# Patient Record
Sex: FEMALE | Race: WHITE | NOT HISPANIC OR LATINO | Employment: OTHER | URBAN - METROPOLITAN AREA
[De-identification: names, ages, dates, MRNs, and addresses within clinical notes are randomized per-mention and may not be internally consistent; named-entity substitution may affect disease eponyms.]

---

## 2017-01-16 ENCOUNTER — TELEPHONE (OUTPATIENT)
Dept: DERMATOLOGY | Facility: CLINIC | Age: 72
End: 2017-01-16

## 2017-01-16 NOTE — TELEPHONE ENCOUNTER
----- Message from Marnie Leon sent at 1/16/2017 11:39 AM CST -----  Contact: Patient  Patient called advising that she has a lump on the inner right arm down from her elbow.  She advised her mother had one like that.  It was treated by Dr. Smith.  She would like to be seen by Dr. Smith as well.  She is very concerned.  I scheduled her an appointment on 2/13/17 and added her to the wait list.  She advised her mother has an appointment this Thursday, 1/19/17, and wanted to be seen on the same day if possible.  She does not want to wait until 2/13/17 for an appointment.  Please contact patient at [phone] or [phone] (sister's phone).

## 2017-01-16 NOTE — TELEPHONE ENCOUNTER
Advised caller that we do not have any availabilities at this time, but have placed patient on my waiting list.

## 2017-01-19 ENCOUNTER — OFFICE VISIT (OUTPATIENT)
Dept: DERMATOLOGY | Facility: CLINIC | Age: 72
End: 2017-01-19
Payer: MEDICARE

## 2017-01-19 VITALS — HEIGHT: 65 IN | BODY MASS INDEX: 22.66 KG/M2 | WEIGHT: 136 LBS

## 2017-01-19 DIAGNOSIS — L82.1 SEBORRHEIC KERATOSES: ICD-10-CM

## 2017-01-19 DIAGNOSIS — D48.9 NEOPLASM OF UNCERTAIN BEHAVIOR: Primary | ICD-10-CM

## 2017-01-19 DIAGNOSIS — L57.0 ACTINIC KERATOSES: ICD-10-CM

## 2017-01-19 DIAGNOSIS — Z12.83 SKIN CANCER SCREENING: ICD-10-CM

## 2017-01-19 PROCEDURE — 17000 DESTRUCT PREMALG LESION: CPT | Mod: S$PBB,,, | Performed by: DERMATOLOGY

## 2017-01-19 PROCEDURE — 17003 DESTRUCT PREMALG LES 2-14: CPT | Mod: PBBFAC,PO | Performed by: DERMATOLOGY

## 2017-01-19 PROCEDURE — 17000 DESTRUCT PREMALG LESION: CPT | Mod: PBBFAC,PO | Performed by: DERMATOLOGY

## 2017-01-19 PROCEDURE — 11100 PR BIOPSY OF SKIN LESION: CPT | Mod: 59,S$PBB,, | Performed by: DERMATOLOGY

## 2017-01-19 PROCEDURE — 11100 PR BIOPSY OF SKIN LESION: CPT | Mod: 59,PBBFAC,PO | Performed by: DERMATOLOGY

## 2017-01-19 PROCEDURE — 99999 PR PBB SHADOW E&M-EST. PATIENT-LVL III: CPT | Mod: PBBFAC,,, | Performed by: DERMATOLOGY

## 2017-01-19 PROCEDURE — 88305 TISSUE EXAM BY PATHOLOGIST: CPT | Performed by: PATHOLOGY

## 2017-01-19 PROCEDURE — 17003 DESTRUCT PREMALG LES 2-14: CPT | Mod: S$PBB,,, | Performed by: DERMATOLOGY

## 2017-01-19 PROCEDURE — 99202 OFFICE O/P NEW SF 15 MIN: CPT | Mod: 25,S$PBB,, | Performed by: DERMATOLOGY

## 2017-01-19 PROCEDURE — 99213 OFFICE O/P EST LOW 20 MIN: CPT | Mod: PBBFAC,PO | Performed by: DERMATOLOGY

## 2017-01-19 RX ORDER — OLANZAPINE 15 MG/1
TABLET ORAL
Refills: 1 | COMMUNITY
Start: 2017-01-09 | End: 2017-10-02 | Stop reason: SDUPTHER

## 2017-01-19 RX ORDER — ATORVASTATIN CALCIUM 20 MG/1
TABLET, FILM COATED ORAL
Refills: 5 | COMMUNITY
Start: 2017-01-09 | End: 2017-11-16 | Stop reason: ALTCHOICE

## 2017-01-19 NOTE — PROGRESS NOTES
Subjective:       Patient ID:  Maria A Smith is a 71 y.o. female who presents for   Chief Complaint   Patient presents with    Growth     Right arm, 1 month, scaly and tender, no tx     HPI Comments: Initial visit  Lives in [place], intense sun exposure. Visiting mother in [place]        Growth  - Initial  Affected locations: right arm  Duration: 1 month  Signs / symptoms: scaling and tender  Severity: mild  Timing: constant  Aggravated by: nothing  Relieving factors/Treatments tried: nothing        Review of Systems   Constitutional: Negative for fever and chills.   Skin: Positive for daily sunscreen use and activity-related sunscreen use. Negative for recent sunburn.        Objective:    Physical Exam   Constitutional: She appears well-developed and well-nourished. No distress.   Neurological: She is alert and oriented to person, place, and time. She is not disoriented.   Psychiatric: She has a normal mood and affect.   Skin:   Areas Examined (abnormalities noted in diagram):   Head / Face Inspection Performed  RUE Inspected  LUE Inspection Performed                           Diagram Legend     Erythematous scaling macule/papule c/w actinic keratosis       Vascular papule c/w angioma      Pigmented verrucoid papule/plaque c/w seborrheic keratosis      Yellow umbilicated papule c/w sebaceous hyperplasia      Irregularly shaped tan macule c/w lentigo     1-2 mm smooth white papules consistent with Milia      Movable subcutaneous cyst with punctum c/w epidermal inclusion cyst      Subcutaneous movable cyst c/w pilar cyst      Firm pink to brown papule c/w dermatofibroma      Pedunculated fleshy papule(s) c/w skin tag(s)      Evenly pigmented macule c/w junctional nevus     Mildly variegated pigmented, slightly irregular-bordered macule c/w mildly atypical nevus      Flesh colored to evenly pigmented papule c/w intradermal nevus       Pink pearly papule/plaque c/w basal cell carcinoma      Erythematous  hyperkeratotic cursted plaque c/w SCC      Surgical scar with no sign of skin cancer recurrence      Open and closed comedones      Inflammatory papules and pustules      Verrucoid papule consistent consistent with wart     Erythematous eczematous patches and plaques     Dystrophic onycholytic nail with subungual debris c/w onychomycosis     Umbilicated papule    Erythematous-base heme-crusted tan verrucoid plaque consistent with inflamed seborrheic keratosis     Erythematous Silvery Scaling Plaque c/w Psoriasis     See annotation          Assessment / Plan:      Pathology Orders:      Normal Orders This Visit    Tissue Specimen To Pathology, Dermatology     Questions:    Directional Terms:  Other(comment)    Clinical information:  pink crusted nodule, SCC/KA vs other    Specific Site:  R forearm        Neoplasm of uncertain behavior  -     Tissue Specimen To Pathology, Dermatology  Shave biopsy procedure note:    Shave biopsy performed after verbal consent including risk of infection, scar, recurrence, need for additional treatment of site. Area prepped with alcohol, anesthetized with approximately 1.0cc of 1% lidocaine with epinephrine. Lesional tissue shaved with razor blade. Hemostasis achieved with application of aluminum chloride followed by hyfrecation. No complications. Dressing applied. Wound care explained.    Actinic keratoses  Cryosurgery Procedure Note    Verbal consent from the patient is obtained and the patient is aware of the precancerous quality and need for treatment of these lesions. Liquid nitrogen cryosurgery is applied to the 3 actinic keratoses, as detailed in the physical exam, to produce a freeze injury. The patient is aware that blisters may form and is instructed on wound care with gentle cleansing and use of vaseline ointment to keep moist until healed. The patient is supplied a handout on cryosurgery and is instructed to call if lesions do not completely resolve.    Patient instructed  in importance in daily sun protection of at least spf 30. Sun avoidance and topical protection discussed.   Recommend Elta MD (physician office) COTZ sensitive (available online) for daily use on face and neck.  Patient encouraged to wear hat for all outdoor exposure.   Also discussed sun protective clothing.    Skin cancer screening  Upper body skin examination performed today as noted in physical examination. Exposed skin only per patient preference    Seborrheic Keratoses, neck  These are benign inherited growths without a malignant potential. Reassurance given to patient. No treatment is necessary.            Return if symptoms worsen or fail to improve.

## 2017-01-19 NOTE — PATIENT INSTRUCTIONS
Shave Biopsy Wound Care    Your doctor has performed a shave biopsy today.  A band aid and vaseline ointment has been placed over the site.  This should remain in place for 24 hours.  It is recommended that you keep the area dry for the first 24 hours.  After 24 hours, you may remove the band aid and wash the area with warm soap and water and apply Vaseline jelly.  Many patients prefer to use Neosporin or Bacitracin ointment.  This is acceptable; however, know that you can develop an allergy to this medication even if you have used it safely for years.  It is important to keep the area moist.  Letting it dry out and get air slows healing time, and will worsen the scar.  Band aid is optional after first 24 hours.      If you notice increasing redness, tenderness, pain, or yellow drainage at the biopsy site, please notify your doctor.  These are signs of an infection.    If your biopsy site is bleeding, apply firm pressure for 15 minutes straight.  Repeat for another 15 minutes, if it is still bleeding.   If the surgical site continues to bleed, then please contact your doctor.       [facility]  [place] - DERMATOLOGY  [address] [place] [address]  Dept: [phone]

## 2017-01-30 ENCOUNTER — PROCEDURE VISIT (OUTPATIENT)
Dept: DERMATOLOGY | Facility: CLINIC | Age: 72
End: 2017-01-30
Payer: MEDICARE

## 2017-01-30 VITALS — BODY MASS INDEX: 22.66 KG/M2 | WEIGHT: 136 LBS | HEIGHT: 65 IN

## 2017-01-30 PROCEDURE — 11602 EXC TR-EXT MAL+MARG 1.1-2 CM: CPT | Mod: PBBFAC,PO | Performed by: DERMATOLOGY

## 2017-01-30 PROCEDURE — 12032 INTMD RPR S/A/T/EXT 2.6-7.5: CPT | Mod: S$PBB,,, | Performed by: DERMATOLOGY

## 2017-01-30 PROCEDURE — 12032 INTMD RPR S/A/T/EXT 2.6-7.5: CPT | Mod: PBBFAC,PO | Performed by: DERMATOLOGY

## 2017-01-30 PROCEDURE — 88305 TISSUE EXAM BY PATHOLOGIST: CPT | Performed by: PATHOLOGY

## 2017-01-30 PROCEDURE — 88305 TISSUE EXAM BY PATHOLOGIST: CPT | Mod: 26,,, | Performed by: PATHOLOGY

## 2017-01-30 PROCEDURE — 11602 EXC TR-EXT MAL+MARG 1.1-2 CM: CPT | Mod: 51,S$PBB,, | Performed by: DERMATOLOGY

## 2017-01-30 PROCEDURE — 99499 UNLISTED E&M SERVICE: CPT | Mod: S$PBB,,, | Performed by: DERMATOLOGY

## 2017-01-30 NOTE — PROGRESS NOTES
Subjective:       Patient ID:  Maria A Smith is a 71 y.o. female who presents for   Chief Complaint   Patient presents with    Pre-op Exam     right forearm-SCC     HPI Comments: Pt here for definitive excision of KA R forearm    FINAL PATHOLOGIC DIAGNOSIS  1. Skin, right forearm, shave biopsy:  - INVASIVE SQUAMOUS CELL CARCINOMA, WELL-DIFFERENTIATED, CRATERIFORM  (KERATOACANTHOMATOUS TYPE).  - THE TUMOR FOCALLY EXTENDS TO THE BASE OF THE BIOPSY.  MICROSCOPIC DESCRIPTION: Sections show a crateriform squamous proliferation exhibiting minimal atypia.  Diagnosed by: Atif Curtis M.D.  (Electronically Signed: 2017-01-25 09:28:38).    Feeling well today.   Denies pacemaker, defibrillator or blood thinners.   No additional cutaneous complaints.   SMOKER - 7 cig per day, planning chantix/cessation in near future   in [place] (retired)      Review of Systems   Constitutional: Negative for fever and chills.        Objective:    Physical Exam   Constitutional: She appears well-developed and well-nourished. No distress.   Neurological: She is alert and oriented to person, place, and time. She is not disoriented.   Psychiatric: She has a normal mood and affect.   Skin:   Areas Examined (abnormalities noted in diagram):   RUE Inspected              Diagram Legend     Erythematous scaling macule/papule c/w actinic keratosis       Vascular papule c/w angioma      Pigmented verrucoid papule/plaque c/w seborrheic keratosis      Yellow umbilicated papule c/w sebaceous hyperplasia      Irregularly shaped tan macule c/w lentigo     1-2 mm smooth white papules consistent with Milia      Movable subcutaneous cyst with punctum c/w epidermal inclusion cyst      Subcutaneous movable cyst c/w pilar cyst      Firm pink to brown papule c/w dermatofibroma      Pedunculated fleshy papule(s) c/w skin tag(s)      Evenly pigmented macule c/w junctional nevus     Mildly variegated pigmented, slightly irregular-bordered macule  c/w mildly atypical nevus      Flesh colored to evenly pigmented papule c/w intradermal nevus       Pink pearly papule/plaque c/w basal cell carcinoma      Erythematous hyperkeratotic cursted plaque c/w SCC      Surgical scar with no sign of skin cancer recurrence      Open and closed comedones      Inflammatory papules and pustules      Verrucoid papule consistent consistent with wart     Erythematous eczematous patches and plaques     Dystrophic onycholytic nail with subungual debris c/w onychomycosis     Umbilicated papule    Erythematous-base heme-crusted tan verrucoid plaque consistent with inflamed seborrheic keratosis     Erythematous Silvery Scaling Plaque c/w Psoriasis     See annotation      Assessment / Plan:      Pathology Orders:      Normal Orders This Visit    Tissue Specimen To Pathology, Dermatology     Questions:    Directional Terms:  Other(comment)    Clinical information:  biopsy proven SCC/KA, check margins    Specific Site:  R forearm        Squamous cell carcinoma  -     Tissue Specimen To Pathology, Dermatology    PROCEDURE: Elliptical excision with intermediate layered repair in order to decrease dead space, close large gap and preserve anatomical contour.    ANESTHETIC: 6 cc 1% Xylocaine with Epinephrine 1:100,000, buffered    SURGEON: Dee Dee Smith MD  ASSISTANTS: Yashira Steele LPN ; Douglas Avila MA      PREOPERATIVE DIAGNOSIS:  Biopsy-proven Squamous Cell Carcinoma    POSTOPERATIVE DIAGNOSIS:  Same as preoperative diagnosis    PATHOLOGIC DIAGNOSIS: Pending    LOCATION: R forearm    INITIAL LESION SIZE: 1.1 cm    EXCISED DIAMETER: 1.7 cm    PREPARATION: The diagnosis, procedure, alternatives, benefits and risks, including but not limited to: infection, bleeding/bruising, drug reactions, pain, scar or cosmetic defect, local sensation disturbances, wound dehiscence (separation of wound edges after sutures removed) and/or recurrence of present condition were explained to the patient.  The patient elected to proceed.  Patient's identity was verified using 2 patient identifiers and the side and site was verified.  Time out period with surgeon, assistant and patient in surgical suite was taken.    PROCEDURE: The location noted above was prepped and draped in the usual sterile fashion. The area was anesthetized with intradermal buffered xylocaine. Lesional tissue was carefully marked with at least 3 mm margins of clinically normal skin in all directions. A fusiform elliptical excision was done with #15 blade carried down completely through the dermis into the subcutaneous tissues to the level of the subcutaneous fat, and dissection was carried out in that plane.  Electrocoagulation was used to obtain hemostasis. Blood loss was minimal. The wound was then approximated in a layered fashion with subcutaneous and intradermal sutures of 4.0 Monocryl, approximately 6 in number, and the wound was then superficially closed with simple interrupted sutures of 4.0 Prolene. (two vertical mattress sutures to improve eversion)    The patient tolerated the procedure well.    The area was cleaned and dressed appropriately and the patient was given wound care instructions, as well as an appointment for follow-up evaluation.    LENGTH OF REPAIR: 5 cm           Return in about 6 months (around 7/30/2017).

## 2017-01-30 NOTE — PATIENT INSTRUCTIONS
Surgery Wound Care    Your doctor has performed an excision today.  A bandage and vaseline ointment has been placed over the site.  This should remain in place for 24 hours.  It is recommended that you keep the area dry for the first 24 hours.  After 24 hours, you may remove the band aid and wash the area with warm soap and water and apply Vaseline jelly or aquaphore.  Many patients prefer to use Neosporin or Bacitracin ointment.  This is acceptable; however know that you can develop an allergy to this medication even if you have used it safely for years.  It is important to keep the area moist.  Letting it dry out and get air slows healing time, will worsen the scar, and make it more difficult to remove the stitches if they were placed.        If you notice increasing redness, tenderness, pain, or yellow drainage at the biopsy or surgical site, please notify your doctor.  These are signs of an infection.    If your biopsy/surgical site is bleeding, apply firm pressure for 15 minutes straight.  Repeat for another 15 minutes, if it is still bleeding.   If the surgical site continues to bleed, then please contact your doctor.     [facility]  [place] - DERMATOLOGY  [address] [place] [address]  Dept: [phone]

## 2017-02-09 ENCOUNTER — CLINICAL SUPPORT (OUTPATIENT)
Dept: DERMATOLOGY | Facility: CLINIC | Age: 72
End: 2017-02-09
Payer: MEDICARE

## 2017-02-09 VITALS — BODY MASS INDEX: 22.66 KG/M2 | WEIGHT: 136 LBS | HEIGHT: 65 IN

## 2017-02-09 DIAGNOSIS — Z48.02 VISIT FOR SUTURE REMOVAL: Primary | ICD-10-CM

## 2017-02-09 PROCEDURE — 99999 PR PBB SHADOW E&M-EST. PATIENT-LVL II: CPT | Mod: PBBFAC,,,

## 2017-02-09 PROCEDURE — 99212 OFFICE O/P EST SF 10 MIN: CPT | Mod: PBBFAC,PO

## 2017-02-09 NOTE — PROGRESS NOTES
Suture Removal note:  CC: 71 y.o. female patient is here for suture removal.     HPI: Patient is s/p excision of SCC from the right forearm on 1/30/2017.  Patient reports no problems.    WOUND PE:  Sutures intact.  Wound healing well.  Good approximation of skin edges.  No signs or symptoms of infection.    IMPRESSION:  FINAL PATHOLOGIC DIAGNOSIS  Skin, right forearm, excision:  - RESIDUAL INVASIVE SQUAMOUS CELL CARCINOMA.  - NEGATIVE MARGINS OF EXCISION.  - PREVIOUS BIOPSY SITE CHANGE.  MICROSCOPIC DESCRIPTION: Sections show a proliferation of squamous cells exhibiting atypia and infiltrating  within the dermis. The lesion is closely associated with previous biopsy site changes. Margins of excision are  negative.  Diagnosed by: Ani Yañez M.D.    PLAN:  Sutures removed today.  Continue wound care.    RTC: In 6 months.

## 2017-02-26 ENCOUNTER — HOSPITAL ENCOUNTER (EMERGENCY)
Facility: HOSPITAL | Age: 72
Discharge: HOME | End: 2017-02-26
Attending: EMERGENCY MEDICINE
Payer: MEDICARE

## 2017-02-26 VITALS
TEMPERATURE: 98 F | WEIGHT: 127.19 LBS | HEART RATE: 74 BPM | BODY MASS INDEX: 21.19 KG/M2 | DIASTOLIC BLOOD PRESSURE: 87 MMHG | SYSTOLIC BLOOD PRESSURE: 181 MMHG | HEIGHT: 65 IN | OXYGEN SATURATION: 96 % | RESPIRATION RATE: 18 BRPM

## 2017-02-26 DIAGNOSIS — L02.413 ABSCESS OF ARM, RIGHT: Primary | ICD-10-CM

## 2017-02-26 PROCEDURE — 25000003 PHARM REV CODE 250: Performed by: NURSE PRACTITIONER

## 2017-02-26 PROCEDURE — 10060 I&D ABSCESS SIMPLE/SINGLE: CPT

## 2017-02-26 PROCEDURE — 99283 EMERGENCY DEPT VISIT LOW MDM: CPT | Mod: 25

## 2017-02-26 RX ORDER — MUPIROCIN 20 MG/G
OINTMENT TOPICAL 3 TIMES DAILY
Qty: 22 G | Refills: 0 | Status: SHIPPED | OUTPATIENT
Start: 2017-02-26 | End: 2017-03-05

## 2017-02-26 RX ORDER — LIDOCAINE HYDROCHLORIDE 10 MG/ML
INJECTION, SOLUTION EPIDURAL; INFILTRATION; INTRACAUDAL; PERINEURAL
Status: DISCONTINUED
Start: 2017-02-26 | End: 2017-02-26 | Stop reason: HOSPADM

## 2017-02-26 RX ORDER — MUPIROCIN 20 MG/G
1 OINTMENT TOPICAL
Status: COMPLETED | OUTPATIENT
Start: 2017-02-26 | End: 2017-02-26

## 2017-02-26 RX ORDER — SULFAMETHOXAZOLE AND TRIMETHOPRIM 800; 160 MG/1; MG/1
1 TABLET ORAL 2 TIMES DAILY
Qty: 14 TABLET | Refills: 0 | Status: SHIPPED | OUTPATIENT
Start: 2017-02-26 | End: 2017-03-05

## 2017-02-26 RX ORDER — LIDOCAINE HYDROCHLORIDE 10 MG/ML
5 INJECTION INFILTRATION; PERINEURAL
Status: COMPLETED | OUTPATIENT
Start: 2017-02-26 | End: 2017-02-26

## 2017-02-26 RX ADMIN — LIDOCAINE HYDROCHLORIDE 5 ML: 10 INJECTION, SOLUTION INFILTRATION; PERINEURAL at 10:02

## 2017-02-26 RX ADMIN — MUPIROCIN 22 G: 20 OINTMENT TOPICAL at 11:02

## 2017-02-26 NOTE — ED AVS SNAPSHOT
[facility] [address] [place] [address]               Maria A Smith   2017 10:42 AM   ED    Description:  Female : 1945   Department:  [facility]           Your Care was Coordinated By:     Provider Role From To    Amanuel Reddy MD Attending Provider 17 1050 --    Mary Sterling NP Nurse Practitioner 17 1044 --      Reason for Visit     Abscess           Diagnoses this Visit        Comments    Abscess of arm, right    -  Primary       ED Disposition     None           To Do List           Follow-up Information     Follow up with [facility].    Specialty:  Emergency Medicine    Why:  As needed, If symptoms worsen    Contact information:    [address] [place] [id number] [phone]        Follow up with Dee Dee Smith MD. Schedule an appointment as soon as possible for a visit in 2 days.    Specialty:  Dermatology    Contact information:    [address] [facility] [address] [phone]         These Medications        Disp Refills Start End    mupirocin (BACTROBAN) 2 % ointment 22 g 0 2017 3/5/2017    Apply topically 3 (three) times daily. - Topical (Top)    Pharmacy: [facility] - [address] Ph #: [phone]       sulfamethoxazole-trimethoprim 800-160mg (BACTRIM DS) 800-160 mg Tab 14 tablet 0 2017 3/5/2017    Take 1 tablet by mouth 2 (two) times daily. - Oral    Pharmacy: [facility] - [address] Ph #: [phone]         [facility]     [facility] Care [facility] -  Assistance  Registered nurses in the [facility] provide clinical advisement, health education, appointment booking, and other advisory services.  Call for this free service at [phone].             Medications           Message regarding Medications     Verify the changes and/or additions  to your medication regime listed below are the same as discussed with your clinician today.  If any of these changes or additions are incorrect, please notify your healthcare provider.        START taking these NEW medications        Refills    mupirocin (BACTROBAN) 2 % ointment 0    Sig: Apply topically 3 (three) times daily.    Class: Print    Route: Topical (Top)    sulfamethoxazole-trimethoprim 800-160mg (BACTRIM DS) 800-160 mg Tab 0    Sig: Take 1 tablet by mouth 2 (two) times daily.    Class: Print    Route: Oral      These medications were administered today        Dose Freq    lidocaine HCL 10 mg/ml (1%) injection 5 mL 5 mL ED 1 Time    Sig: Inject 5 mLs into the skin ED 1 Time.    Class: Normal    Route: Intradermal    lidocaine (PF) 10 mg/ml (1%) 10 mg/mL (1 %) injection      Notes to Pharmacy: Created by cabinet override    mupirocin 2 % ointment 22 g 1 Tube ED 1 Time    Sig: Apply 22 g topically ED 1 Time.    Class: Normal    Route: Topical (Top)      STOP taking these medications     hydrOXYzine (ATARAX) 25 MG tablet Take 2 tablets (50 mg total) by mouth 4 (four) times daily as needed for Itching.           Verify that the below list of medications is an accurate representation of the medications you are currently taking.  If none reported, the list may be blank. If incorrect, please contact your healthcare provider. Carry this list with you in case of emergency.           Current Medications     atorvastatin (LIPITOR) 20 MG tablet     duloxetine (CYMBALTA) 60 MG capsule Take 60 mg by mouth once daily.    lisinopril (PRINIVIL,ZESTRIL) 40 MG tablet Take 1 tablet (40 mg total) by mouth once daily.    olanzapine (ZYPREXA) 15 MG Tab     gabapentin (NEURONTIN) 100 MG capsule Take 1 capsule (100 mg total) by mouth 3 (three) times daily.    lorazepam (ATIVAN) 2 MG Tab Take 1 tablet (2 mg total) by mouth every 8 (eight) hours as needed.    mupirocin (BACTROBAN) 2 % ointment Apply topically 3 (three) times  daily.    mupirocin 2 % ointment 22 g Apply 22 g topically ED 1 Time.    permethrin (ELIMITE) 5 % cream Apply to affected area once and leave on for 12 hours, then wash off. Reapply and do the same thing 7 days later.    sulfamethoxazole-trimethoprim 800-160mg (BACTRIM DS) 800-160 mg Tab Take 1 tablet by mouth 2 (two) times daily.           Clinical Reference Information           Your Vitals Were     BP                   212/92 (BP Location: Left arm, Patient Position: Sitting)           Allergies as of 2/26/2017     No Known Allergies      Immunizations Administered on Date of Encounter - 2/26/2017     None      ED Micro, Lab, POCT     None      ED Imaging Orders     None        Discharge Instructions         Abscess (Incision & Drainage)  An abscess (sometimes called a boil) occurs when bacteria get trapped under the skin and start to grow. Pus forms inside the abscess as the body responds to the bacteria. An abscess can happen with an insect bite, ingrown hair, blocked oil gland, pimple, cyst, or puncture wound.  Your healthcare provider has drained the pus from your abscess. If the abscess pocket was large, your healthcare provider may have inserted gauze packing. Your provider will need to remove and possibly replace it on your next visit. You may not need antibiotics to treat a simple abscess, unless the infection is spreading into the skin around the wound (cellulitis).  Healing of the wound will take about 1 to 2 weeks, depending on the size of the abscess. Healthy tissue will grow from the bottom and sides of the opening until it seals over.  Home care  These tips can help your wound heal:  · The wound may drain for the first 2 days. Cover the wound with a clean dry dressing. If the dressing becomes soaked with blood or pus, change it.  · If a gauze packing was placed inside the abscess cavity, you may be told to remove it yourself. You may do this in the shower. Once the packing is removed, you should  wash the area in the shower or bath 3 to 4 times a day, until the skin opening has closed. Make sure you wash your hands after changing the packing or cleaning the wound.  · If you were prescribed antibiotics, take them as directed until they are all gone.  · You may use acetaminophen or ibuprofen to control pain, unless another pain medicine was prescribed. If you have liver disease or ever had a stomach ulcer, talk with your doctor before using these medicines.  Follow-up care  Follow up with your healthcare provider, or as advised. If a gauze packing was inserted in your wound, it should be removed in 1 to 2 days. Check your wound every day for the signs of worsening infection listed below.  When to seek medical advice  Call your healthcare provider right away if any of these occur:  · Increasing redness or swelling  · Red streaks in the skin leading away from the wound  · Increasing local pain or swelling  · Continued pus draining from the wound 2 days after treatment  · Fever of 100.4ºF (38ºC) or higher, or as directed by your healthcare provider  · Boil returns when you are at home  Date Last Reviewed: 9/1/2016  © 6168-5430 [employer]. [address] [place] [address]. All rights reserved. This information is not intended as a substitute for professional medical care. Always follow your healthcare professional's instructions.          [facility] Sign-Up     Activating your [facility] account is as easy as 1-2-3!     1) Visit [email or web address], select Sign Up Now, enter this activation code and your date of birth, then select Next.  [email or web address]  Expires: 4/12/2017 11:20 AM      2) Create a username and password to use when you visit [facility] in the future and select a security question in case you lose your password and select Next.    3) Enter your e-mail address and click Sign Up!    Additional Information  If you have questions, please e-mail [email or web address] or call  [phone] to talk to our [facility] staff. Remember, [email or web address] is NOT to be used for urgent needs. For medical emergencies, dial 911.         Smoking Cessation     If you would like to quit smoking:   You may be eligible for free services if you are a [place] resident and started smoking cigarettes before September 1, 1988.  Call the Smoking Cessation Trust (SCT) toll free at (310) 369-2978 or (515) 812-0932.   Call 1-800-QUIT-NOW if you do not meet the above criteria.             [facility] complies with applicable Federal civil rights laws and does not discriminate on the basis of race, color, national origin, age, disability, or sex.        Language Assistance Services     ATTENTION: Language assistance services are available, free of charge. Please call [phone].      ATENCIÓN: Si habla español, tiene a enamorado disposición servicios gratuitos de asistencia lingüística. Llame al [phone].     CHÚ Ý: N?u b?n nói Ti?ng Vi?t, có các d?ch v? h? tr? ngôn ng? mi?n phí dành cho b?n. G?i s? [phone].

## 2017-02-26 NOTE — ED NOTES
Pt states she did not take her BP medication this AM. States she is going to take one as soon as she gets home.

## 2017-02-26 NOTE — DISCHARGE INSTRUCTIONS
Abscess (Incision & Drainage)  An abscess (sometimes called a boil) occurs when bacteria get trapped under the skin and start to grow. Pus forms inside the abscess as the body responds to the bacteria. An abscess can happen with an insect bite, ingrown hair, blocked oil gland, pimple, cyst, or puncture wound.  Your healthcare provider has drained the pus from your abscess. If the abscess pocket was large, your healthcare provider may have inserted gauze packing. Your provider will need to remove and possibly replace it on your next visit. You may not need antibiotics to treat a simple abscess, unless the infection is spreading into the skin around the wound (cellulitis).  Healing of the wound will take about 1 to 2 weeks, depending on the size of the abscess. Healthy tissue will grow from the bottom and sides of the opening until it seals over.  Home care  These tips can help your wound heal:  · The wound may drain for the first 2 days. Cover the wound with a clean dry dressing. If the dressing becomes soaked with blood or pus, change it.  · If a gauze packing was placed inside the abscess cavity, you may be told to remove it yourself. You may do this in the shower. Once the packing is removed, you should wash the area in the shower or bath 3 to 4 times a day, until the skin opening has closed. Make sure you wash your hands after changing the packing or cleaning the wound.  · If you were prescribed antibiotics, take them as directed until they are all gone.  · You may use acetaminophen or ibuprofen to control pain, unless another pain medicine was prescribed. If you have liver disease or ever had a stomach ulcer, talk with your doctor before using these medicines.  Follow-up care  Follow up with your healthcare provider, or as advised. If a gauze packing was inserted in your wound, it should be removed in 1 to 2 days. Check your wound every day for the signs of worsening infection listed below.  When to seek  medical advice  Call your healthcare provider right away if any of these occur:  · Increasing redness or swelling  · Red streaks in the skin leading away from the wound  · Increasing local pain or swelling  · Continued pus draining from the wound 2 days after treatment  · Fever of 100.4ºF (38ºC) or higher, or as directed by your healthcare provider  · Boil returns when you are at home  Date Last Reviewed: 9/1/2016  © 5086-1095 [employer]. [address] [place] [address]. All rights reserved. This information is not intended as a substitute for professional medical care. Always follow your healthcare professional's instructions.

## 2017-02-27 ENCOUNTER — TELEPHONE (OUTPATIENT)
Dept: DERMATOLOGY | Facility: CLINIC | Age: 72
End: 2017-02-27

## 2017-02-27 NOTE — ED PROVIDER NOTES
Encounter Date: 2/26/2017       History     Chief Complaint   Patient presents with    Abscess     R forearm s/p skin cancer resection 3 weeks ago.     Review of patient's allergies indicates:  No Known Allergies  HPI Comments: Maria A Smith is a 71 year old female presenting to the ED with pain and swelling to the right forearm. The patient had an excision of squamous cell carcinoma by Dr. Smith on 1-30-17 and states that she developed pain and swelling near this site two days ago. She has had no drainage from the site and also denies fever.     The history is provided by the patient.     Past Medical History:   Diagnosis Date    Hypertension     Squamous cell carcinoma 01/19/2017    right forearm ([place]  type)      History reviewed. No pertinent surgical history.  Family History   Problem Relation Age of Onset    Melanoma Neg Hx     Psoriasis Neg Hx     Lupus Neg Hx     Eczema Neg Hx      Social History   Substance Use Topics    Smoking status: Current Every Day Smoker    Smokeless tobacco: None    Alcohol use None     Review of Systems   Constitutional: Negative.    HENT: Negative.    Respiratory: Negative.    Cardiovascular: Negative.    Gastrointestinal: Negative.    Genitourinary: Negative.    Musculoskeletal: Negative.    Skin: Positive for wound (right forearm).   Neurological: Negative.        Physical Exam   Initial Vitals   BP Pulse Resp Temp SpO2   02/26/17 1013 02/26/17 1013 02/26/17 1013 02/26/17 1013 02/26/17 1013   212/92 101 14 97.7 °F (36.5 °C) 98 %     Physical Exam    Nursing note and vitals reviewed.  Constitutional: She appears well-developed and well-nourished. She is not diaphoretic. No distress.   HENT:   Head: Normocephalic and atraumatic.   Eyes: Conjunctivae are normal.   Neck: Normal range of motion.   Cardiovascular: Normal rate and regular rhythm.   Pulmonary/Chest: Breath sounds normal. No respiratory distress.   Musculoskeletal: Normal range of motion.   Neurological:  She is alert and oriented to person, place, and time.   Skin: Skin is warm and dry.        Psychiatric: She has a normal mood and affect.         ED Course   I & D - Incision and Drainage  Date/Time: 2/26/2017 10:11 PM  Performed by: GRICELDA ELMORE  Authorized by: COLTON HO   Type: abscess  Body area: upper extremity  Location details: right arm  Anesthesia: local infiltration    Anesthesia:  Anesthesia: local infiltration  Local Anesthetic: lidocaine 1% without epinephrine   Anesthetic total: 1.5 mL  Scalpel size: 11  Incision type: single straight  Complexity: simple  Drainage: purulent and  bloody  Wound treatment: incision and  expression of material  Patient tolerance: Patient tolerated the procedure well with no immediate complications        Labs Reviewed - No data to display                APC / Resident Notes:   Maria A Smith is a 71 year old female presenting to the ED with an abscess to the right forearm near a recent excision of squamous cell carcinoma. The patient's abscess has been incised and drained.  The patient will be placed on topical and oral antibiotics.  The patient has no signs of systemic symptoms or significant cellulitis to warrant admission at this time.  The patient has been instructed to follow up with their regular doctor or the emergency department in 2 - 3 days as needed.  I have given the patient specific return precautions. I have also sent a message to Dr. Smith for follow up for recheck of wound. Based on my clinical evaluation, I do not appreciate any immediate, emergent, or life threatening condition or etiology that warrants additional workup today and feel that the patient can be discharged with close follow up care.              Attending Attestation:     Physician Attestation Statement for NP/PA:   I have conducted a face to face encounter with this patient in addition to the NP/PA, due to Medical Complexity    Other NP/PA Attestation Additions:       Medical Decision Making: I provided a face to face evaluation of this patient.  I discussed the patient's care with Advanced Practice Clinician.  I reviewed their note and agree with the history, physical, assessment, diagnosis, treatment, and discharge plan provided by the Advanced Practice Clinician. My overall impression is abscess.  The patient has been instructed to follow up with their physician or the one provided as well as specific return precautions.                    ED Course     Clinical Impression:   The encounter diagnosis was Abscess of arm, right.    Disposition:   Disposition: Discharged  Condition: Stable       Mary Sterling NP  02/26/17 2210       Amanuel Reddy MD  02/27/17 5113

## 2017-02-27 NOTE — TELEPHONE ENCOUNTER
----- Message from Frank Squires sent at 2/25/2017  9:36 AM CST -----  Contact: Patient  Patient needs same day appointment due to lump underneath skin in the same area where she says she had cancer removed. Please call patient at [phone] or [phone]. Thanks!

## 2017-02-27 NOTE — TELEPHONE ENCOUNTER
----- Message from Rocio Arteaga sent at 2/27/2017 10:55 AM CST -----  Patient requesting first available next week, she was seen at [facility] on 02/26/17 due to an infection on her arm where skin cancer was removed, was instructed to follow up with Dr. Smith . Please call patient at [phone] to schedule.         Thank you.

## 2017-03-03 ENCOUNTER — TELEPHONE (OUTPATIENT)
Dept: DERMATOLOGY | Facility: CLINIC | Age: 72
End: 2017-03-03

## 2017-03-03 NOTE — TELEPHONE ENCOUNTER
----- Message from Dee Dee Smith MD sent at 3/2/2017  6:25 PM CST -----  Please see if this patient needs to be seen    ----- Message -----     From: Mary Sterling NP     Sent: 2/26/2017  10:04 PM       To: MD Dr. Luis Pa,         Good morning! I treated a patient of yours in the ED today by the name of Maria A Smith. She had an abscess near the site where an excision was performed on the right forearm. I did an I&D in the ED and placed the patient on Bactrim. Can you see her sometime this week for a follow up of the I&D? I did advise her to return to the ED if the wound did not improve and she was unable to see someone in your clinic. Thank you so much.        VINCENT Thomas (ED)

## 2017-10-02 RX ORDER — OLANZAPINE 15 MG/1
TABLET ORAL
Qty: 30 TABLET | Refills: 0 | Status: SHIPPED | OUTPATIENT
Start: 2017-10-02 | End: 2017-11-16 | Stop reason: ALTCHOICE

## 2017-10-03 NOTE — TELEPHONE ENCOUNTER
Pt last lab 2-18-16 last seen 4-2017 needs lab CBC,CMP,lipid,T4,tSH U/a, Chest Xray EKG Will give one refill zyprexa needfs get from psych MD

## 2017-11-16 ENCOUNTER — OFFICE VISIT (OUTPATIENT)
Dept: PRIMARY CARE CLINIC | Facility: CLINIC | Age: 72
End: 2017-11-16
Payer: MEDICARE

## 2017-11-16 ENCOUNTER — TELEPHONE (OUTPATIENT)
Dept: PRIMARY CARE CLINIC | Facility: CLINIC | Age: 72
End: 2017-11-16

## 2017-11-16 VITALS
SYSTOLIC BLOOD PRESSURE: 165 MMHG | BODY MASS INDEX: 20.97 KG/M2 | WEIGHT: 133.88 LBS | RESPIRATION RATE: 18 BRPM | DIASTOLIC BLOOD PRESSURE: 77 MMHG | TEMPERATURE: 98 F | HEART RATE: 100 BPM

## 2017-11-16 DIAGNOSIS — S22.42XA CLOSED FRACTURE OF MULTIPLE RIBS OF LEFT SIDE, INITIAL ENCOUNTER: ICD-10-CM

## 2017-11-16 DIAGNOSIS — Z00.00 ROUTINE PHYSICAL EXAMINATION: ICD-10-CM

## 2017-11-16 DIAGNOSIS — I10 HYPERTENSION, UNSPECIFIED TYPE: ICD-10-CM

## 2017-11-16 DIAGNOSIS — N63.0 BREAST MASS: Primary | ICD-10-CM

## 2017-11-16 DIAGNOSIS — E78.5 HYPERLIPIDEMIA, UNSPECIFIED HYPERLIPIDEMIA TYPE: ICD-10-CM

## 2017-11-16 DIAGNOSIS — I10 ESSENTIAL (PRIMARY) HYPERTENSION: ICD-10-CM

## 2017-11-16 PROCEDURE — 99999 PR PBB SHADOW E&M-EST. PATIENT-LVL III: CPT | Mod: PBBFAC,,, | Performed by: NURSE PRACTITIONER

## 2017-11-16 PROCEDURE — 99213 OFFICE O/P EST LOW 20 MIN: CPT | Mod: PBBFAC,PN | Performed by: NURSE PRACTITIONER

## 2017-11-16 PROCEDURE — 99214 OFFICE O/P EST MOD 30 MIN: CPT | Mod: S$PBB,,, | Performed by: NURSE PRACTITIONER

## 2017-11-16 RX ORDER — HYDROCODONE BITARTRATE AND ACETAMINOPHEN 5; 325 MG/1; MG/1
1 TABLET ORAL EVERY 6 HOURS PRN
Qty: 28 TABLET | Refills: 0 | Status: SHIPPED | OUTPATIENT
Start: 2017-11-16 | End: 2017-11-23

## 2017-11-16 RX ORDER — ATORVASTATIN CALCIUM 80 MG/1
1 TABLET, FILM COATED ORAL DAILY
Refills: 6 | COMMUNITY
Start: 2017-11-04 | End: 2017-11-16 | Stop reason: SDUPTHER

## 2017-11-16 RX ORDER — CYCLOBENZAPRINE HCL 5 MG
5 TABLET ORAL 3 TIMES DAILY PRN
Qty: 30 TABLET | Refills: 0 | Status: SHIPPED | OUTPATIENT
Start: 2017-11-16 | End: 2017-11-26

## 2017-11-16 RX ORDER — LISINOPRIL 40 MG/1
40 TABLET ORAL DAILY
Qty: 30 TABLET | Refills: 5 | Status: SHIPPED | OUTPATIENT
Start: 2017-11-16 | End: 2018-06-29

## 2017-11-16 RX ORDER — ATORVASTATIN CALCIUM 80 MG/1
80 TABLET, FILM COATED ORAL DAILY
Qty: 30 TABLET | Refills: 6 | Status: SHIPPED | OUTPATIENT
Start: 2017-11-16 | End: 2017-12-14

## 2017-11-16 NOTE — PROGRESS NOTES
Chief Complaint  Chief Complaint   Patient presents with    Follow-up       HPI  Maria A Smith is a 71 y.o. female with multiple medical diagnoses as listed in the medical history and problem list that presents for rib pain and lung mass.  Patient is new to me and new to the clinic. To ER [facility] Sunday for rib pain and shortness of breath. Xray revealed left rib fracture X 2 ribs 9-10. Discharged with ibuprofen. Completing IS twice hourly. Denies shortness of breath. Reports no improvement in pain, rated 10/10 to left side. Also reports the findings of a questionable breast mass during ER imaging. Requesting mammogram today to investigate. Last mammogram date unknown. No known h/o breast cancer. No h/o CVA, CAD, DM. Denies fevers. BP elevated. Has been out of blood pressure medications for months. Requesting refill. Denies cp, palpitations.       PAST MEDICAL HISTORY:  Past Medical History:   Diagnosis Date    Hypertension     Squamous cell carcinoma 01/19/2017    right forearm ([place]  type)        PAST SURGICAL HISTORY:  History reviewed. No pertinent surgical history.    SOCIAL HISTORY:  Social History     Social History    Marital status:      Spouse name: N/A    Number of children: N/A    Years of education: N/A     Occupational History    Not on file.     Social History Main Topics    Smoking status: Former Smoker     Quit date: 9/12/2017    Smokeless tobacco: Never Used    Alcohol use Not on file    Drug use: No    Sexual activity: Not on file     Other Topics Concern    Not on file     Social History Narrative    No narrative on file       FAMILY HISTORY:  Family History   Problem Relation Age of Onset    Melanoma Neg Hx     Psoriasis Neg Hx     Lupus Neg Hx     Eczema Neg Hx        ALLERGIES AND MEDICATIONS: updated and reviewed.  Review of patient's allergies indicates:  No Known Allergies  Current Outpatient Prescriptions   Medication Sig Dispense Refill    atorvastatin (LIPITOR)  80 MG tablet Take 1 tablet by mouth once daily.  6    duloxetine (CYMBALTA) 60 MG capsule Take 60 mg by mouth once daily.      ibuprofen (ADVIL,MOTRIN) 400 MG tablet Take 1 tablet (400 mg total) by mouth every 6 (six) hours as needed. 20 tablet 0    cyclobenzaprine (FLEXERIL) 5 MG tablet Take 1 tablet (5 mg total) by mouth 3 (three) times daily as needed for Muscle spasms. 30 tablet 0    gabapentin (NEURONTIN) 100 MG capsule Take 1 capsule (100 mg total) by mouth 3 (three) times daily. 90 capsule 5    hydrocodone-acetaminophen 5-325mg (NORCO) 5-325 mg per tablet Take 1 tablet by mouth every 6 (six) hours as needed for Pain. 28 tablet 0    lisinopril (PRINIVIL,ZESTRIL) 40 MG tablet Take 1 tablet (40 mg total) by mouth once daily. 30 tablet 5     No current facility-administered medications for this visit.          ROS  Review of Systems   Constitutional: Negative for chills, fatigue and fever.   HENT: Negative for congestion, rhinorrhea, sinus pressure and sore throat.    Respiratory: Positive for shortness of breath. Negative for cough and chest tightness.    Cardiovascular: Positive for chest pain. Negative for palpitations.   Gastrointestinal: Negative for blood in stool, diarrhea, nausea and vomiting.   Genitourinary: Negative for dysuria, frequency, hematuria and urgency.   Musculoskeletal: Positive for arthralgias and myalgias. Negative for joint swelling.   Skin: Negative for rash and wound.   Neurological: Negative for dizziness and headaches.   Psychiatric/Behavioral: Negative for dysphoric mood and sleep disturbance. The patient is not nervous/anxious.          PHYSICAL EXAM  Vitals:    11/16/17 1040   BP: (!) 165/77   BP Location: Right arm   Patient Position: Sitting   BP Method: Medium (Automatic)   Pulse: 100   Resp: 18   Temp: 97.8 °F (36.6 °C)   TempSrc: Oral   Weight: 60.7 kg (133 lb 14.4 oz)    Body mass index is 20.97 kg/m².  Weight: 60.7 kg (133 lb 14.4 oz)         Physical Exam    Constitutional: She is oriented to person, place, and time. She appears well-developed and well-nourished.   HENT:   Head: Normocephalic.   Right Ear: Tympanic membrane normal.   Left Ear: Tympanic membrane normal.   Mouth/Throat: Uvula is midline, oropharynx is clear and moist and mucous membranes are normal.   Eyes: Conjunctivae are normal.   Cardiovascular: Normal rate, regular rhythm, normal heart sounds and normal pulses.    No murmur heard.  Pulses:       Radial pulses are 2+ on the right side, and 2+ on the left side.   Pulmonary/Chest: Effort normal. Tachypnea noted. She has decreased breath sounds. She has no wheezes. She exhibits tenderness and bony tenderness. She exhibits no crepitus, no edema, no swelling and no retraction.       Breath sounds noted in all lung fields without crackles or wheezes.    Abdominal: Soft. Bowel sounds are normal. There is no tenderness.   Musculoskeletal: She exhibits no edema.   Lymphadenopathy:     She has no cervical adenopathy.   Neurological: She is alert and oriented to person, place, and time.   Skin: Skin is warm and dry. No rash noted.   Psychiatric: She has a normal mood and affect.         Health Maintenance       Date Due Completion Date    Hepatitis C Screening 1945 ---    Lipid Panel 1945 ---    TETANUS VACCINE 12/03/1965 ---    Mammogram 12/03/1987 ---    DEXA SCAN 12/03/1987 ---    Colonoscopy 12/03/1997 ---    Zoster Vaccine 12/03/2007 ---    Pneumococcal (65+) (1 of 2 - PCV13) 12/03/2012 ---    Influenza Vaccine 08/01/2017 ---            Assessment & Plan    Maria A was seen today for follow-up.    Diagnoses and all orders for this visit:    Breast mass  -     Mammo Digital Diagnostic Bilat without CAD; Future    Hypertension, unspecified type  -     lisinopril (PRINIVIL,ZESTRIL) 40 MG tablet; Take 1 tablet (40 mg total) by mouth once daily.  -     CBC auto differential; Future  -     Comprehensive metabolic panel; Future    Squamous cell  carcinoma    Hyperlipidemia, unspecified hyperlipidemia type  -     atorvastatin (LIPITOR) 80 MG tablet; Take 1 tablet (80 mg total) by mouth once daily.  -     Lipid panel; Future    Routine physical examination  -     TSH; Future    Essential (primary) hypertension   -     TSH; Future  -     T4, free; Future    Closed fracture of multiple ribs of left side, initial encounter  -     cyclobenzaprine (FLEXERIL) 5 MG tablet; Take 1 tablet (5 mg total) by mouth 3 (three) times daily as needed for Muscle spasms.  -     hydrocodone-acetaminophen 5-325mg (NORCO) 5-325 mg per tablet; Take 1 tablet by mouth every 6 (six) hours as needed for Pain.    Follow-up: Return in about 2 weeks (around 11/30/2017).\

## 2017-11-16 NOTE — TELEPHONE ENCOUNTER
----- Message from Kim De Oliveira sent at 11/16/2017  4:25 PM CST -----  Patient states that the pharmacy will not fill the blood thinner prescription and need to speak to you.  Please call [phone].

## 2017-11-16 NOTE — TELEPHONE ENCOUNTER
Spoke with patient states insurance will not let her fill her meds with out a Pa. Notified her I will do her PA and fax it to her insurance company to hopefully get approval. States her understanding

## 2017-11-16 NOTE — PATIENT INSTRUCTIONS
Flexeril for muscle spasms    Norco for pain    Do not take together    Please take Ibuprofen as ordered to decrease inflammation

## 2017-11-22 ENCOUNTER — TELEPHONE (OUTPATIENT)
Dept: PRIMARY CARE CLINIC | Facility: CLINIC | Age: 72
End: 2017-11-22

## 2017-11-22 DIAGNOSIS — D64.9 ANEMIA, UNSPECIFIED TYPE: ICD-10-CM

## 2017-11-22 DIAGNOSIS — I10 HYPERTENSION, UNSPECIFIED TYPE: Primary | ICD-10-CM

## 2017-11-22 NOTE — TELEPHONE ENCOUNTER
----- Message from Rebeka Oconnor NP sent at 11/21/2017  4:34 PM CST -----  Please call patient and let her know that I have received the results of her mammogram. It showed the same cyst as seen previously in 2015 with no growth or change. Please repeat in 1 year. Thank you.

## 2017-11-29 ENCOUNTER — TELEPHONE (OUTPATIENT)
Dept: PRIMARY CARE CLINIC | Facility: CLINIC | Age: 72
End: 2017-11-29

## 2017-11-29 NOTE — TELEPHONE ENCOUNTER
Spoke with pt, notified her that she is getting repeat lab work due to a low blood count. States her understanding

## 2017-11-29 NOTE — TELEPHONE ENCOUNTER
----- Message from Chioma Pavon sent at 11/29/2017  3:01 PM CST -----  Contact: self  Patient [phone] had blood labs on 11 22 17 and she is asking why she has to have more blood work tomorrow 11 30 17/please advise

## 2017-12-04 ENCOUNTER — OFFICE VISIT (OUTPATIENT)
Dept: PRIMARY CARE CLINIC | Facility: CLINIC | Age: 72
End: 2017-12-04
Payer: MEDICARE

## 2017-12-04 VITALS
BODY MASS INDEX: 20.53 KG/M2 | OXYGEN SATURATION: 97 % | HEART RATE: 78 BPM | WEIGHT: 131.13 LBS | DIASTOLIC BLOOD PRESSURE: 81 MMHG | RESPIRATION RATE: 18 BRPM | SYSTOLIC BLOOD PRESSURE: 182 MMHG | TEMPERATURE: 98 F

## 2017-12-04 DIAGNOSIS — I49.9 IRREGULAR CARDIAC RHYTHM: ICD-10-CM

## 2017-12-04 DIAGNOSIS — I10 HYPERTENSION, UNSPECIFIED TYPE: ICD-10-CM

## 2017-12-04 DIAGNOSIS — D64.9 ANEMIA, UNSPECIFIED TYPE: Primary | ICD-10-CM

## 2017-12-04 PROCEDURE — 99214 OFFICE O/P EST MOD 30 MIN: CPT | Mod: S$PBB,,, | Performed by: NURSE PRACTITIONER

## 2017-12-04 PROCEDURE — 99214 OFFICE O/P EST MOD 30 MIN: CPT | Mod: PBBFAC,PN | Performed by: NURSE PRACTITIONER

## 2017-12-04 PROCEDURE — 99999 PR PBB SHADOW E&M-EST. PATIENT-LVL IV: CPT | Mod: PBBFAC,,, | Performed by: NURSE PRACTITIONER

## 2017-12-04 RX ORDER — AMLODIPINE BESYLATE 5 MG/1
5 TABLET ORAL DAILY
Qty: 30 TABLET | Refills: 11 | Status: SHIPPED | OUTPATIENT
Start: 2017-12-04 | End: 2017-12-04 | Stop reason: ALTCHOICE

## 2017-12-04 RX ORDER — LISINOPRIL AND HYDROCHLOROTHIAZIDE 20; 25 MG/1; MG/1
1 TABLET ORAL DAILY
Refills: 0 | COMMUNITY
Start: 2017-11-16 | End: 2017-12-04

## 2017-12-04 RX ORDER — OLANZAPINE 15 MG/1
1 TABLET ORAL DAILY
Refills: 1 | COMMUNITY
Start: 2017-12-01 | End: 2018-01-10 | Stop reason: SDUPTHER

## 2017-12-04 RX ORDER — FERROUS SULFATE 325(65) MG
325 TABLET, DELAYED RELEASE (ENTERIC COATED) ORAL DAILY
Qty: 30 TABLET | Refills: 2 | COMMUNITY
Start: 2017-12-04 | End: 2018-06-29

## 2017-12-04 RX ORDER — AMLODIPINE BESYLATE 5 MG/1
5 TABLET ORAL DAILY
Qty: 30 TABLET | Refills: 11 | Status: SHIPPED | OUTPATIENT
Start: 2017-12-04 | End: 2018-06-29

## 2017-12-04 NOTE — PROGRESS NOTES
Chief Complaint  Chief Complaint   Patient presents with    Follow-up       HPI  Maria A Smith is a 72 y.o. female with multiple medical diagnoses as listed in the medical history and problem list that presents for lab work review.  Patient is new to me but is known to this clinic with her last appointment being 11/16/2017.  Presents with review of lab work from 11/30. Patient with h/o anemia without iron therapy. No known source of blood loss. BM daily, formed. No blood or tarry stools. No abdominal pain. Last colonscopy unknown. Patient presents with elevated blood pressure. Has taken BP meds this morning. H/o HTN on previous visits. Patient denies headaches, chest pain, blurred vision, palpitations.       PAST MEDICAL HISTORY:  Past Medical History:   Diagnosis Date    Hypertension     Squamous cell carcinoma 01/19/2017    right forearm ([place]  type)        PAST SURGICAL HISTORY:  History reviewed. No pertinent surgical history.    SOCIAL HISTORY:  Social History     Social History    Marital status:      Spouse name: N/A    Number of children: N/A    Years of education: N/A     Occupational History    Not on file.     Social History Main Topics    Smoking status: Former Smoker     Quit date: 9/12/2017    Smokeless tobacco: Never Used    Alcohol use Not on file    Drug use: No    Sexual activity: Not on file     Other Topics Concern    Not on file     Social History Narrative    No narrative on file       FAMILY HISTORY:  Family History   Problem Relation Age of Onset    Melanoma Neg Hx     Psoriasis Neg Hx     Lupus Neg Hx     Eczema Neg Hx        ALLERGIES AND MEDICATIONS: updated and reviewed.  Review of patient's allergies indicates:  No Known Allergies  Current Outpatient Prescriptions   Medication Sig Dispense Refill    atorvastatin (LIPITOR) 80 MG tablet Take 1 tablet (80 mg total) by mouth once daily. 30 tablet 6    ibuprofen (ADVIL,MOTRIN) 400 MG tablet Take 1 tablet (400 mg  total) by mouth every 6 (six) hours as needed. 20 tablet 0    lisinopril (PRINIVIL,ZESTRIL) 40 MG tablet Take 1 tablet (40 mg total) by mouth once daily. 30 tablet 5    lisinopril-hydrochlorothiazide (PRINZIDE,ZESTORETIC) 20-25 mg Tab Take 1 tablet by mouth once daily.  0    OLANZapine (ZYPREXA) 15 MG Tab Take 1 tablet by mouth once daily.  1    amLODIPine (NORVASC) 5 MG tablet Take 1 tablet (5 mg total) by mouth once daily. 30 tablet 11    ferrous sulfate 325 (65 FE) MG EC tablet Take 1 tablet (325 mg total) by mouth once daily. 30 tablet 2    gabapentin (NEURONTIN) 100 MG capsule Take 1 capsule (100 mg total) by mouth 3 (three) times daily. 90 capsule 5     No current facility-administered medications for this visit.          ROS  Review of Systems   Constitutional: Negative for chills, fatigue and fever.   HENT: Negative for congestion, rhinorrhea, sinus pressure and sore throat.    Respiratory: Positive for cough. Negative for chest tightness, shortness of breath and wheezing.    Cardiovascular: Negative for chest pain and palpitations.   Gastrointestinal: Negative for blood in stool, diarrhea, nausea and vomiting.   Genitourinary: Negative for dysuria, frequency, hematuria and urgency.   Musculoskeletal: Negative for arthralgias and joint swelling.   Skin: Negative for rash and wound.   Neurological: Negative for dizziness and headaches.   Psychiatric/Behavioral: Negative for dysphoric mood and sleep disturbance. The patient is not nervous/anxious.          PHYSICAL EXAM  Vitals:    12/04/17 1413 12/04/17 1414   BP: (!) 199/78 (!) 182/81   BP Location: Right arm Left arm   Patient Position: Sitting Sitting   BP Method: Medium (Automatic) Medium (Automatic)   Pulse: 78    Resp: 18    Temp: 98 °F (36.7 °C)    TempSrc: Oral    SpO2: 97%    Weight: 59.5 kg (131 lb 1.6 oz)     Body mass index is 20.53 kg/m².  Weight: 59.5 kg (131 lb 1.6 oz)         Physical Exam   Constitutional: She is oriented to person,  place, and time. She appears well-developed and well-nourished.   HENT:   Head: Normocephalic.   Right Ear: Tympanic membrane normal.   Left Ear: Tympanic membrane normal.   Mouth/Throat: Uvula is midline, oropharynx is clear and moist and mucous membranes are normal.   Eyes: Conjunctivae are normal.   Cardiovascular: Normal rate, normal heart sounds and normal pulses.  An irregular rhythm present.   No murmur heard.  Pulses:       Radial pulses are 2+ on the right side, and 2+ on the left side.   Pulmonary/Chest: Effort normal and breath sounds normal. She has no wheezes.   Abdominal: Soft. Bowel sounds are normal. There is no tenderness.   Musculoskeletal: She exhibits no edema.   Lymphadenopathy:     She has no cervical adenopathy.   Neurological: She is alert and oriented to person, place, and time.   Skin: Skin is warm and dry. No rash noted.   Psychiatric: She has a normal mood and affect.         Health Maintenance       Date Due Completion Date    Hepatitis C Screening 1945 ---    TETANUS VACCINE 12/03/1965 ---    DEXA SCAN 12/03/1987 ---    Colonoscopy 12/03/1997 ---    Zoster Vaccine 12/03/2007 ---    Pneumococcal (65+) (1 of 2 - PCV13) 12/03/2012 ---    Influenza Vaccine 08/01/2017 ---    Mammogram 11/21/2019 11/21/2017    Lipid Panel 11/21/2022 11/21/2017            Assessment & Plan    Maria A was seen today for follow-up.    Diagnoses and all orders for this visit:    Anemia, unspecified type  -     ferrous sulfate 325 (65 FE) MG EC tablet; Take 1 tablet (325 mg total) by mouth once daily.  -     Ambulatory referral to Gastroenterology  -     CBC auto differential; Future  -     Iron; Future    Hypertension, unspecified type      Irregular cardiac rhythm  -     IN OFFICE EKG 12-LEAD (to Muse) - NSR with PACs          Follow-up: Return in about 2 weeks (around 12/18/2017) for nurse visit - blood pressure check; labwork 6 weeks.

## 2017-12-06 ENCOUNTER — TELEPHONE (OUTPATIENT)
Dept: PRIMARY CARE CLINIC | Facility: CLINIC | Age: 72
End: 2017-12-06

## 2017-12-06 NOTE — TELEPHONE ENCOUNTER
----- Message from Ruth Ortiz sent at 12/5/2017 11:10 AM CST -----  Contact: self  Patient iron and blood pressure medication send to [facility], any questions please call back at [phone] (home)     [facility] - [address]  [address] [place] [id number]  Phone: [phone] Fax: [phone]

## 2017-12-06 NOTE — TELEPHONE ENCOUNTER
----- Message from Beatriz Arnold sent at 12/6/2017 11:17 AM CST -----  Contact: Patient  Maria A, patient [phone] calling because Iron pills, and blood pressure pills were supposed to be called into pharmacy (patient did not state name). Please advise. Thanks.      [facility]   [address] [id number]  Phone: [phone] Fax: [phone]

## 2017-12-27 ENCOUNTER — TELEPHONE (OUTPATIENT)
Dept: SMOKING CESSATION | Facility: CLINIC | Age: 72
End: 2017-12-27

## 2017-12-27 NOTE — TELEPHONE ENCOUNTER
Pt missed scon visit last week. Called to see  If she was still interested. She states she is. I will call office to get her rescheduled.

## 2017-12-31 RX ORDER — DULOXETIN HYDROCHLORIDE 60 MG/1
CAPSULE, DELAYED RELEASE ORAL
Qty: 30 CAPSULE | Refills: 5 | Status: SHIPPED | OUTPATIENT
Start: 2017-12-31 | End: 2018-06-29

## 2018-01-02 ENCOUNTER — CLINICAL SUPPORT (OUTPATIENT)
Dept: SMOKING CESSATION | Facility: CLINIC | Age: 73
End: 2018-01-02
Payer: COMMERCIAL

## 2018-01-02 DIAGNOSIS — F17.200 NICOTINE DEPENDENCE: Primary | ICD-10-CM

## 2018-01-02 PROCEDURE — 99404 PREV MED CNSL INDIV APPRX 60: CPT | Mod: S$GLB,,, | Performed by: FAMILY MEDICINE

## 2018-01-02 RX ORDER — ASPIRIN/CALCIUM CARB/MAGNESIUM 325 MG
4 TABLET ORAL
Qty: 72 LOZENGE | Refills: 0 | Status: SHIPPED | OUTPATIENT
Start: 2018-01-02 | End: 2018-02-01

## 2018-01-02 RX ORDER — IBUPROFEN 200 MG
1 TABLET ORAL DAILY
Qty: 28 PATCH | Refills: 0 | Status: SHIPPED | OUTPATIENT
Start: 2018-01-02 | End: 2018-01-31 | Stop reason: SDUPTHER

## 2018-01-02 NOTE — Clinical Note
Pt seen at intake today. She currently smokes 20 cigs/day. Discussed tobacco cessation medication of 21 mg nicotine patch QD and 4 mg nicotine lozenge PRN (1-2 per hour in place of cigarettes). Pt started on rate reduction and wait time of 15 min prior to smoking. Exhaled carbon monoxide level was 21 (0-6 non-smoker). Will see pt back in office in 1 wk.

## 2018-01-02 NOTE — PROGRESS NOTES
See Tobacco Cessation Intake Form for patient assessment and recommendations.  Exhaled carbon monoxide level was 21 ppm per [employer].

## 2018-01-10 RX ORDER — OLANZAPINE 15 MG/1
15 TABLET ORAL DAILY
Qty: 30 TABLET | Refills: 1 | Status: SHIPPED | OUTPATIENT
Start: 2018-01-10 | End: 2018-04-10 | Stop reason: SDUPTHER

## 2018-01-10 NOTE — TELEPHONE ENCOUNTER
----- Message from Rocio Abreu sent at 1/9/2018  3:37 PM CST -----  Patient needs a refill of medication:OLANZapine (ZYPREXA) 15 MG Tab called into [facility] pharmacy . Please order or call patient back at [phone] if you have any questions. Thanks!

## 2018-01-18 NOTE — TELEPHONE ENCOUNTER
----- Message from Rocio Arteaga sent at 11/16/2017  2:17 PM CST -----  Patient states insurance is requesting why she needs muscle relaxors, pharmacy is to fax information to office, patient would like a call back when information is sent back to pharmacy at [phone].    Thank you   
Documented in previous encounter  
JUN...

## 2018-01-23 ENCOUNTER — CLINICAL SUPPORT (OUTPATIENT)
Dept: SMOKING CESSATION | Facility: CLINIC | Age: 73
End: 2018-01-23
Payer: COMMERCIAL

## 2018-01-23 DIAGNOSIS — F17.200 NICOTINE DEPENDENCE: Primary | ICD-10-CM

## 2018-01-31 ENCOUNTER — CLINICAL SUPPORT (OUTPATIENT)
Dept: SMOKING CESSATION | Facility: CLINIC | Age: 73
End: 2018-01-31
Payer: COMMERCIAL

## 2018-01-31 DIAGNOSIS — F17.200 NICOTINE DEPENDENCE: Primary | ICD-10-CM

## 2018-01-31 DIAGNOSIS — F17.200 NICOTINE DEPENDENCE: ICD-10-CM

## 2018-01-31 PROCEDURE — 99407 BEHAV CHNG SMOKING > 10 MIN: CPT | Mod: S$GLB,,, | Performed by: FAMILY MEDICINE

## 2018-01-31 RX ORDER — IBUPROFEN 200 MG
TABLET ORAL
Qty: 28 PATCH | Refills: 0 | Status: SHIPPED | OUTPATIENT
Start: 2018-01-31 | End: 2018-06-29

## 2018-02-12 ENCOUNTER — CLINICAL SUPPORT (OUTPATIENT)
Dept: SMOKING CESSATION | Facility: CLINIC | Age: 73
End: 2018-02-12
Payer: COMMERCIAL

## 2018-02-12 DIAGNOSIS — F17.200 NICOTINE DEPENDENCE: Primary | ICD-10-CM

## 2018-02-12 PROCEDURE — 99407 BEHAV CHNG SMOKING > 10 MIN: CPT | Mod: S$GLB,,, | Performed by: FAMILY MEDICINE

## 2018-02-26 ENCOUNTER — TELEPHONE (OUTPATIENT)
Dept: SMOKING CESSATION | Facility: CLINIC | Age: 73
End: 2018-02-26

## 2018-02-26 NOTE — TELEPHONE ENCOUNTER
Called patient to check up on her progress with tobacco cessation. She was quit as of last phone call. No answer, left message to call back at [phone] if she needs any further help.

## 2018-03-09 DIAGNOSIS — Z71.89 HISTORY OF PARTICIPATION IN SMOKING CESSATION COUNSELING: Primary | ICD-10-CM

## 2018-03-09 NOTE — TELEPHONE ENCOUNTER
----- Message from Chioma Pavon sent at 3/9/2018 10:42 AM CST -----  Contact: self  Patient [phone] is asking if Dr Lord would call Chantix to pharmacy     [facility] - [address]  [address] [place] [address]  Phone: [phone] Fax: [phone]

## 2018-03-13 RX ORDER — VARENICLINE TARTRATE 0.5 (11)-1
1 KIT ORAL 2 TIMES DAILY
Qty: 1 PACKAGE | Refills: 0 | OUTPATIENT
Start: 2018-03-13

## 2018-03-13 RX ORDER — VARENICLINE TARTRATE 1 MG/1
0.5 TABLET, FILM COATED ORAL DAILY
OUTPATIENT
Start: 2018-03-13

## 2018-03-14 RX ORDER — VARENICLINE TARTRATE 0.5 (11)-1
KIT ORAL
Qty: 1 PACKAGE | Refills: 0 | Status: SHIPPED | OUTPATIENT
Start: 2018-03-14 | End: 2018-06-29

## 2018-03-14 RX ORDER — VARENICLINE TARTRATE 1 MG/1
1 TABLET, FILM COATED ORAL DAILY
Qty: 30 TABLET | Refills: 0 | Status: SHIPPED | OUTPATIENT
Start: 2018-03-14 | End: 2018-06-29

## 2018-03-20 ENCOUNTER — TELEPHONE (OUTPATIENT)
Dept: PRIMARY CARE CLINIC | Facility: CLINIC | Age: 73
End: 2018-03-20

## 2018-03-20 NOTE — TELEPHONE ENCOUNTER
----- Message from Kim De Oliveira sent at 3/19/2018  2:49 PM CDT -----  Patient states that she need the medication to stop smoking called into [facility]/[address].  Call patient when called in at [phone].

## 2018-03-21 NOTE — TELEPHONE ENCOUNTER
Please call patient and ask exactly what medication she is looking for?  I see that we have called in Chantix.  She started it?  She may just need a continuation pack.

## 2018-03-27 NOTE — TELEPHONE ENCOUNTER
Spoke to pt. And she states she has already picked up the rx from her pharmacy and that it is Chantix she is currently taking.

## 2018-04-11 RX ORDER — OLANZAPINE 15 MG/1
TABLET ORAL
Qty: 30 TABLET | Refills: 1 | Status: SHIPPED | OUTPATIENT
Start: 2018-04-11 | End: 2018-06-29

## 2018-06-04 RX ORDER — OLANZAPINE 15 MG/1
TABLET ORAL
Qty: 30 TABLET | Refills: 1 | OUTPATIENT
Start: 2018-06-04

## 2018-06-26 ENCOUNTER — NURSE TRIAGE (OUTPATIENT)
Dept: ADMINISTRATIVE | Facility: CLINIC | Age: 73
End: 2018-06-26

## 2018-06-26 NOTE — TELEPHONE ENCOUNTER
Reason for Disposition   Nursing judgment    Protocols used: ST NO PROTOCOL CALL: INFORMATION ONLY-A-OH    Ms. Smith was seen at [facility]'s ED for a stroke. She is requesting an ED follow up with Dr. Lord.

## 2018-06-29 ENCOUNTER — OFFICE VISIT (OUTPATIENT)
Dept: PRIMARY CARE CLINIC | Facility: CLINIC | Age: 73
End: 2018-06-29
Payer: MEDICARE

## 2018-06-29 VITALS
DIASTOLIC BLOOD PRESSURE: 72 MMHG | SYSTOLIC BLOOD PRESSURE: 135 MMHG | HEIGHT: 65 IN | RESPIRATION RATE: 18 BRPM | OXYGEN SATURATION: 98 % | BODY MASS INDEX: 19.49 KG/M2 | WEIGHT: 117 LBS | HEART RATE: 83 BPM

## 2018-06-29 DIAGNOSIS — I63.9 CEREBROVASCULAR ACCIDENT (CVA), UNSPECIFIED MECHANISM: Primary | ICD-10-CM

## 2018-06-29 DIAGNOSIS — Z72.0 TOBACCO ABUSE: ICD-10-CM

## 2018-06-29 DIAGNOSIS — G81.94 LEFT HEMIPARESIS: ICD-10-CM

## 2018-06-29 DIAGNOSIS — I10 HYPERTENSION, UNSPECIFIED TYPE: ICD-10-CM

## 2018-06-29 DIAGNOSIS — D64.9 ANEMIA, UNSPECIFIED TYPE: ICD-10-CM

## 2018-06-29 DIAGNOSIS — R41.3 MEMORY LOSS: ICD-10-CM

## 2018-06-29 DIAGNOSIS — E78.5 HYPERLIPIDEMIA, UNSPECIFIED HYPERLIPIDEMIA TYPE: ICD-10-CM

## 2018-06-29 PROCEDURE — 3078F DIAST BP <80 MM HG: CPT | Mod: CPTII,S$GLB,, | Performed by: FAMILY MEDICINE

## 2018-06-29 PROCEDURE — 99213 OFFICE O/P EST LOW 20 MIN: CPT | Mod: S$GLB,,, | Performed by: FAMILY MEDICINE

## 2018-06-29 PROCEDURE — 3077F SYST BP >= 140 MM HG: CPT | Mod: CPTII,S$GLB,, | Performed by: FAMILY MEDICINE

## 2018-06-29 PROCEDURE — 99999 PR PBB SHADOW E&M-EST. PATIENT-LVL IV: CPT | Mod: PBBFAC,,, | Performed by: FAMILY MEDICINE

## 2018-06-29 RX ORDER — DULOXETIN HYDROCHLORIDE 60 MG/1
60 CAPSULE, DELAYED RELEASE ORAL DAILY
COMMUNITY
End: 2018-07-24 | Stop reason: SDUPTHER

## 2018-06-29 RX ORDER — IBUPROFEN 200 MG
21 TABLET ORAL DAILY
Status: ON HOLD | COMMUNITY
Start: 2018-06-15 | End: 2020-02-10 | Stop reason: CLARIF

## 2018-06-29 RX ORDER — ENOXAPARIN SODIUM 100 MG/ML
40 INJECTION SUBCUTANEOUS
COMMUNITY
Start: 2018-06-15 | End: 2019-07-19

## 2018-06-29 RX ORDER — PANTOPRAZOLE SODIUM 40 MG/1
TABLET, DELAYED RELEASE ORAL
Refills: 0 | COMMUNITY
Start: 2018-06-25 | End: 2018-07-24 | Stop reason: SDUPTHER

## 2018-06-29 RX ORDER — OLANZAPINE 15 MG/1
15 TABLET ORAL NIGHTLY
Status: ON HOLD | COMMUNITY
End: 2020-02-10

## 2018-06-29 RX ORDER — NAPROXEN SODIUM 220 MG/1
81 TABLET, FILM COATED ORAL DAILY
Status: ON HOLD | COMMUNITY
Start: 2018-06-15 | End: 2020-02-10

## 2018-06-29 RX ORDER — CLOPIDOGREL BISULFATE 75 MG/1
75 TABLET ORAL DAILY
COMMUNITY
Start: 2018-06-15 | End: 2018-07-09 | Stop reason: SDUPTHER

## 2018-06-29 RX ORDER — HYDROCHLOROTHIAZIDE 12.5 MG/1
12.5 CAPSULE ORAL DAILY
Refills: 0 | COMMUNITY
Start: 2018-06-25 | End: 2018-07-24 | Stop reason: SDUPTHER

## 2018-06-29 RX ORDER — LISINOPRIL 10 MG/1
10 TABLET ORAL DAILY
Qty: 90 TABLET | Refills: 3 | Status: SHIPPED | OUTPATIENT
Start: 2018-06-29 | End: 2019-06-29

## 2018-06-29 RX ORDER — LISINOPRIL 20 MG/1
20 TABLET ORAL DAILY
Refills: 0 | Status: ON HOLD | COMMUNITY
Start: 2018-06-25 | End: 2023-03-24 | Stop reason: HOSPADM

## 2018-06-29 RX ORDER — ATORVASTATIN CALCIUM 80 MG/1
80 TABLET, FILM COATED ORAL DAILY
Status: ON HOLD | COMMUNITY
Start: 2018-06-15 | End: 2023-03-24 | Stop reason: SDUPTHER

## 2018-06-29 RX ORDER — ONDANSETRON 4 MG/1
4 TABLET, FILM COATED ORAL EVERY 6 HOURS PRN
Status: ON HOLD | COMMUNITY
End: 2020-02-10

## 2018-06-29 NOTE — PROGRESS NOTES
Subjective:       Patient ID: Maria A Smith is a 72 y.o. female.    Chief Complaint: Hospital Follow Up (stroke)    HPI: 72-year-old white female in for follow-up CVA--was an [facility] for one week around 6/13/18--then was transferred to [facility] [place] there for a week.  Patient to get physical therapy at [facility] outpatient.  Patient had a blood clot in the brain--right facial droop, trouble speaking, some difficulty finding words left side was weak.  Patient is able to walk has problems with short-term memory. Pt had a clot in the brain    ROS:  Skin: no psoriasis, eczema, skin cancer  HEENT: No headache, ocular pain, blurred vision, diplopia, epistaxis, hoarseness change in voice, thyroid trouble  Lung: No pneumonia, asthma, Tb, wheezing, SOB, + smoking--third of a pack per day considering Chantix  Heart: No chest pain, ankle edema, palpitations, MI, mary ann murmur, +hypertension,+ hyperlipidemia  Abdomen: No nausea, vomiting, diarrhea, constipation, ulcers, hepatitis, gallbladder disease, melena, hematochezia, hematemesis  : no UTI, renal disease, stones   MS: no fractures, O/A, lupus, rheumatoid, gout   Neuro: No dizziness, LOC, seizures   No diabetes, no anemia, no anxiety, no depression   Patient lives with , has 3 children all grown.    Objective:   Physical Exam:  General: Well nourished, well developed, no acute distress+ Thin   Skin: No lesions --Mild bruising forearms secondary to aspirin   HEENT: Eyes PERRLA, EOM intact, nose patent, throat non-erythematous upper and lower dentures ears TM clear   NECK: Supple, no bruits, No JVD, no nodes  Lungs: Clear, no rales, rhonchi, wheezing  Heart: Regular rate and rhythm, no murmurs, gallops, or rubs  Abdomen: flat, bowel sounds positive, no tenderness, or organomegaly  MS: Range of motion and muscle strength intactSlight tremor to the hands but range of motion muscle strength of the arms good, reflexes +2 over 4 able  squat and arise without difficulty   Neuro: Alert, CN intact, oriented X 3Romberg negative very slight sway heel-to-toe very slight swelling overall pretty good   Extremities: No cyanosis, clubbing, or edema         Assessment:       1. Cerebrovascular accident (CVA), unspecified mechanism    2. Memory loss    3. Left hemiparesis    4. Tobacco abuse    5. Hypertension, unspecified type    6. Hyperlipidemia, unspecified hyperlipidemia type    7. Anemia, unspecified type        Plan:       Cerebrovascular accident (CVA), unspecified mechanism    Memory loss    Left hemiparesis    Tobacco abuse    Hypertension, unspecified type    Hyperlipidemia, unspecified hyperlipidemia type    Anemia, unspecified type     patient and  deny any history of squamous cell cancer  Smoking cessation program get NicoDerm patch would avoid Chantix due to Cymbalta--depression  Recent CVA needs to continue physical therapy--has difficulty finding words has some short-term memory loss needs to follow-up with neurologist  Patient with hypertension blood pressure 146/74 need to decrease to 140  Patient on Norvasc 5 mg less than April 20 milligrams used to be on 40 on hydrochlorothiazide 12.5--patient needs on blood pressure cuff recheck in a week take Norvasc 5 mg and lisinopril 10 mg in AM and lisinopril 20 and hydrochlorothiazide 12.5 and afternoon  Review labs CBC CMP lipid sedimentation rate T4 TSH UA  Recheck blood pressure in 1 month   redo lab in 6 months CBC CMP lipid

## 2018-07-03 ENCOUNTER — CLINICAL SUPPORT (OUTPATIENT)
Dept: PRIMARY CARE CLINIC | Facility: CLINIC | Age: 73
End: 2018-07-03
Payer: MEDICARE

## 2018-07-03 DIAGNOSIS — I63.9 CEREBROVASCULAR ACCIDENT (CVA), UNSPECIFIED MECHANISM: ICD-10-CM

## 2018-07-03 DIAGNOSIS — E78.5 HYPERLIPIDEMIA, UNSPECIFIED HYPERLIPIDEMIA TYPE: ICD-10-CM

## 2018-07-03 LAB
ALBUMIN SERPL BCP-MCNC: 4 G/DL
ALP SERPL-CCNC: 75 U/L
ALT SERPL W/O P-5'-P-CCNC: 16 U/L
ANION GAP SERPL CALC-SCNC: 10 MMOL/L
AST SERPL-CCNC: 19 U/L
BASOPHILS # BLD AUTO: 0.1 K/UL
BASOPHILS NFR BLD: 0.9 %
BILIRUB SERPL-MCNC: 0.4 MG/DL
BUN SERPL-MCNC: 8 MG/DL
CALCIUM SERPL-MCNC: 10 MG/DL
CHLORIDE SERPL-SCNC: 94 MMOL/L
CHOLEST SERPL-MCNC: 191 MG/DL
CHOLEST/HDLC SERPL: 3.8 {RATIO}
CO2 SERPL-SCNC: 27 MMOL/L
CREAT SERPL-MCNC: 0.7 MG/DL
DIFFERENTIAL METHOD: ABNORMAL
EOSINOPHIL # BLD AUTO: 0 K/UL
EOSINOPHIL NFR BLD: 0.1 %
ERYTHROCYTE [DISTWIDTH] IN BLOOD BY AUTOMATED COUNT: 23.1 %
EST. GFR  (AFRICAN AMERICAN): >60 ML/MIN/1.73 M^2
EST. GFR  (NON AFRICAN AMERICAN): >60 ML/MIN/1.73 M^2
GLUCOSE SERPL-MCNC: 112 MG/DL
HCT VFR BLD AUTO: 25.4 %
HDLC SERPL-MCNC: 50 MG/DL
HDLC SERPL: 26.2 %
HGB BLD-MCNC: 7.8 G/DL
LDLC SERPL CALC-MCNC: 114 MG/DL
LYMPHOCYTES # BLD AUTO: 1.7 K/UL
LYMPHOCYTES NFR BLD: 12.6 %
MCH RBC QN AUTO: 23.6 PG
MCHC RBC AUTO-ENTMCNC: 30.9 G/DL
MCV RBC AUTO: 76 FL
MONOCYTES # BLD AUTO: 0.7 K/UL
MONOCYTES NFR BLD: 4.9 %
NEUTROPHILS # BLD AUTO: 11 K/UL
NEUTROPHILS NFR BLD: 81.5 %
NONHDLC SERPL-MCNC: 141 MG/DL
PLATELET # BLD AUTO: 467 K/UL
PMV BLD AUTO: 7.6 FL
POTASSIUM SERPL-SCNC: 3.8 MMOL/L
PROT SERPL-MCNC: 7.4 G/DL
RBC # BLD AUTO: 3.32 M/UL
SODIUM SERPL-SCNC: 131 MMOL/L
TRIGL SERPL-MCNC: 135 MG/DL
WBC # BLD AUTO: 13.5 K/UL

## 2018-07-03 PROCEDURE — 99999 PR PBB SHADOW E&M-EST. PATIENT-LVL II: CPT | Mod: PBBFAC,,,

## 2018-07-06 ENCOUNTER — TELEPHONE (OUTPATIENT)
Dept: PRIMARY CARE CLINIC | Facility: CLINIC | Age: 73
End: 2018-07-06

## 2018-07-06 NOTE — TELEPHONE ENCOUNTER
----- Message from Oralia Parson sent at 7/6/2018  3:13 PM CDT -----  Contact: [phone]  Patient is returning nurse's phone call.  Please call patient back at [phone].

## 2018-07-09 ENCOUNTER — CLINICAL SUPPORT (OUTPATIENT)
Dept: PRIMARY CARE CLINIC | Facility: CLINIC | Age: 73
End: 2018-07-09
Payer: MEDICARE

## 2018-07-09 ENCOUNTER — OFFICE VISIT (OUTPATIENT)
Dept: PRIMARY CARE CLINIC | Facility: CLINIC | Age: 73
End: 2018-07-09
Payer: MEDICARE

## 2018-07-09 VITALS
OXYGEN SATURATION: 97 % | HEART RATE: 62 BPM | HEIGHT: 65 IN | TEMPERATURE: 98 F | RESPIRATION RATE: 18 BRPM | WEIGHT: 115 LBS | SYSTOLIC BLOOD PRESSURE: 122 MMHG | DIASTOLIC BLOOD PRESSURE: 66 MMHG | BODY MASS INDEX: 19.16 KG/M2

## 2018-07-09 DIAGNOSIS — G81.94 LEFT HEMIPARESIS: ICD-10-CM

## 2018-07-09 DIAGNOSIS — D64.9 ANEMIA, UNSPECIFIED TYPE: ICD-10-CM

## 2018-07-09 DIAGNOSIS — E78.5 HYPERLIPIDEMIA, UNSPECIFIED HYPERLIPIDEMIA TYPE: ICD-10-CM

## 2018-07-09 DIAGNOSIS — Z72.0 TOBACCO ABUSE: ICD-10-CM

## 2018-07-09 DIAGNOSIS — R41.3 MEMORY LOSS: ICD-10-CM

## 2018-07-09 DIAGNOSIS — I10 HYPERTENSION, UNSPECIFIED TYPE: ICD-10-CM

## 2018-07-09 DIAGNOSIS — D64.9 ANEMIA, UNSPECIFIED TYPE: Primary | ICD-10-CM

## 2018-07-09 DIAGNOSIS — I63.9 CEREBROVASCULAR ACCIDENT (CVA), UNSPECIFIED MECHANISM: ICD-10-CM

## 2018-07-09 LAB
ANISOCYTOSIS BLD QL SMEAR: SLIGHT
BASOPHILS # BLD AUTO: 0.1 K/UL
BASOPHILS NFR BLD: 0.6 %
DIFFERENTIAL METHOD: ABNORMAL
EOSINOPHIL # BLD AUTO: 0.1 K/UL
EOSINOPHIL NFR BLD: 0.6 %
ERYTHROCYTE [DISTWIDTH] IN BLOOD BY AUTOMATED COUNT: 31 %
HCT VFR BLD AUTO: 29.3 %
HGB BLD-MCNC: 9.4 G/DL
LYMPHOCYTES # BLD AUTO: 1.4 K/UL
LYMPHOCYTES NFR BLD: 10.8 %
MCH RBC QN AUTO: 25.7 PG
MCHC RBC AUTO-ENTMCNC: 32 G/DL
MCV RBC AUTO: 81 FL
MONOCYTES # BLD AUTO: 0.6 K/UL
MONOCYTES NFR BLD: 4.8 %
NEUTROPHILS # BLD AUTO: 10.5 K/UL
NEUTROPHILS NFR BLD: 83.2 %
PLATELET # BLD AUTO: 340 K/UL
PMV BLD AUTO: 7.7 FL
POIKILOCYTOSIS BLD QL SMEAR: SLIGHT
POLYCHROMASIA BLD QL SMEAR: ABNORMAL
RBC # BLD AUTO: 3.65 M/UL
SPHEROCYTES BLD QL SMEAR: ABNORMAL
WBC # BLD AUTO: 12.6 K/UL

## 2018-07-09 PROCEDURE — 3074F SYST BP LT 130 MM HG: CPT | Mod: CPTII,S$GLB,, | Performed by: FAMILY MEDICINE

## 2018-07-09 PROCEDURE — 99213 OFFICE O/P EST LOW 20 MIN: CPT | Mod: S$GLB,,, | Performed by: FAMILY MEDICINE

## 2018-07-09 PROCEDURE — 3078F DIAST BP <80 MM HG: CPT | Mod: CPTII,S$GLB,, | Performed by: FAMILY MEDICINE

## 2018-07-09 PROCEDURE — 99999 PR PBB SHADOW E&M-EST. PATIENT-LVL II: CPT | Mod: PBBFAC,,,

## 2018-07-09 PROCEDURE — 99999 PR PBB SHADOW E&M-EST. PATIENT-LVL V: CPT | Mod: PBBFAC,,, | Performed by: FAMILY MEDICINE

## 2018-07-09 RX ORDER — CLOPIDOGREL BISULFATE 75 MG/1
75 TABLET ORAL DAILY
Qty: 30 TABLET | Refills: 5 | Status: ON HOLD | OUTPATIENT
Start: 2018-07-09 | End: 2020-02-13 | Stop reason: SDUPTHER

## 2018-07-09 NOTE — PROGRESS NOTES
Subjective:       Patient ID: Maria A Smith is a 72 y.o. female.    Chief Complaint: Results    HPI: 72-year-old white female in for lab results--had CVA 6/13/18--blood clot in the brain--had right facial droop--much improved, trouble speaking--better, some difficulty finding words--a lot better but still occasionally difficulty finding words, left-sided weakness appears to have resolved, short-term memory loss still with some residual problems.  Overall doing much better.  Patient supposed to start [facility] physical therapy       Labs WBC 13.5 light increase hematocrit 25.4 platelets 467 sodium 131  better if 100 cholesterol 191 better if 180       Patient with history of anemia is on iron pills twice a day platelets 467--- patient was not on aspirin or Plavix at discharge--is on Lovenox now.  Patient was seen by neurologist thinks  at [facility].       When patient conscious self she still bleeds a lot.       History of present illness from 6/29/18 72-year-old white female in for follow-up CVA--was an [facility] for one week around 6/13/18--then was transferred to [facility] there for a week.  Patient to get physical therapy at [facility] outpatient.  Patient had a blood clot in the brain--right facial droop, trouble speaking, some difficulty finding words left side was weak.  Patient is able to walk has problems with short-term memory. Pt had a clot in the brain    ROS:  Skin: no psoriasis, eczema, skin cancer does bleed a lot of contact  HEENT: No headache, ocular pain, blurred vision, diplopia, epistaxis, hoarseness change in voice, thyroid trouble  Lung: No pneumonia, asthma, Tb, wheezing, SOB, + smoking--third of a pack per day considering Chantix--still smokes but a little less  Heart: No chest pain, ankle edema, palpitations, MI, mary ann murmur, +hypertension,+ hyperlipidemia  Abdomen: No nausea, vomiting, diarrhea, constipation, ulcers,  hepatitis, gallbladder disease, melena, hematochezia, hematemesis  : no UTI, renal disease, stones   MS: no fractures, O/A, lupus, rheumatoid, gout    Neuro: No dizziness, LOC, seizures  patient with a history CVA of difficulty speaking left-sided weakness memory loss see history of present   No diabetes, + anemia--the history of present illness, no anxiety, no depression   Patient lives with , has 3 children all grown.    Objective:   Physical Exam:  General: Well nourished, well developed, no acute distress+ Thin   Skin: No lesions --Mild bruising forearms secondary to aspirin   HEENT: Eyes PERRLA, EOM intact, nose patent, throat non-erythematous upper and lower dentures ears TM clear   NECK: Supple, no bruits, No JVD, no nodes --??/Carotid bruit on the left  Lungs: Clear, no rales, rhonchi, wheezing  Heart: Regular rate and rhythm, no murmurs, gallops, or rubs  Abdomen: flat, bowel sounds positive, no tenderness, or organomegaly  MS: Range of motion and muscle strength intact--these are improved patient did have a slight tremor since appears to be better has full range of motion muscle strength able squat and arise without difficulty   Neuro: Alert, CN intact, oriented X 3Romberg negative very slight sway heel-to-toe very slight swelling overall pretty good   Extremities: No cyanosis, clubbing, or edema         Assessment:       1. Anemia, unspecified type    2. Hypertension, unspecified type    3. Hyperlipidemia, unspecified hyperlipidemia type    4. Cerebrovascular accident (CVA), unspecified mechanism    5. Memory loss    6. Left hemiparesis    7. Tobacco abuse        Plan:       Anemia, unspecified type  -     CBC auto differential; Future; Expected date: 07/09/2018    Hypertension, unspecified type    Hyperlipidemia, unspecified hyperlipidemia type    Cerebrovascular accident (CVA), unspecified mechanism  -     Ambulatory Referral to Physical/Occupational Therapy  -     Ambulatory Referral to  Neurology    Memory loss  -     Ambulatory Referral to Physical/Occupational Therapy  -     Ambulatory Referral to Neurology    Left hemiparesis    Tobacco abuse  -     Ambulatory referral to Smoking Cessation Program    Other orders  -     clopidogrel (PLAVIX) 75 mg tablet; Take 1 tablet (75 mg total) by mouth once daily.  Dispense: 30 tablet; Refill: 5     patient and  deny any history of squamous cell cancer  Smoking cessation program get NicoDerm patch would avoid Chantix due to Cymbalta--depression--patient was referred to this program lasted minutes will give the telephone number again told risk of having a second stroke the patient continues to smoke is very high  Recent CVA needs to continue physical therapy--all of the patient's physical findings have improved patient had difficulty finding words, slurred speech, left-sided weakness all much improved still have some memory loss probably some dementia  Patient with hypertension blood pressure 122/66--presently blood pressure regimen appears to be adequate--patient encouraged to have a home blood pressure cuff and check blood pressure daily  Review labs CBC noted to be anemic with hematocrit 25.4 hemoglobin 7.8--on iron twice a day--patient told to see neurologist--see about switching Lovenox to aspirin and Plavix if they feel appropriate Needs repeat CBC and distal stool guaiac--if  neurologist desires transfusion will give n0w if not we'll monitor weekly--or will transfuse if stool guaiac positive  Recheck blood pressure in 1 month --and see patient again in 1 month and will go to every 3 month  redo lab in 6 months CBC CMP lipid   Patient supposed to be going to [facility] rehabilitation for left-sided weakness and speech problems  Needs to see Dr. Sam JOHNSON for evaluation of hematocrit and stroke and switching Lovenox to Plavix or aspirin if she feels appropriate  To ER notes any blood in the stool.  Decrease in blood pressure,  increased weakness increased slurred speech otherwise to CBC every week including one now

## 2018-07-12 ENCOUNTER — TELEPHONE (OUTPATIENT)
Dept: SMOKING CESSATION | Facility: CLINIC | Age: 73
End: 2018-07-12

## 2018-07-12 ENCOUNTER — TELEPHONE (OUTPATIENT)
Dept: PRIMARY CARE CLINIC | Facility: CLINIC | Age: 73
End: 2018-07-12

## 2018-07-12 NOTE — TELEPHONE ENCOUNTER
Called patient to confirm for her intake appointment with tobacco cessation. She said she will be there .

## 2018-07-12 NOTE — TELEPHONE ENCOUNTER
----- Message from Kim Middleton sent at 7/12/2018  3:58 PM CDT -----  Contact: Hussein Significant other  Hussein is calling to find out if there is a neurologist in the hospital and does he have to find the physical therapist himself. Please call him [phone]. Thanks!

## 2018-07-16 ENCOUNTER — TELEPHONE (OUTPATIENT)
Dept: PRIMARY CARE CLINIC | Facility: CLINIC | Age: 73
End: 2018-07-16

## 2018-07-16 NOTE — TELEPHONE ENCOUNTER
----- Message from Cary Maldonado sent at 7/16/2018  1:54 PM CDT -----  Contact:   Type:  Test Results    Who Called:  Hussein,   Name of Test (Lab/Mammo/Etc):  Lab  Date of Test:  Last week  Ordering Provider:  Dr Lord  Where the test was performed:  Office  Best Call Back Number:  [phone]  Additional Information:  Please call him. Thanks.

## 2018-07-24 ENCOUNTER — TELEPHONE (OUTPATIENT)
Dept: PRIMARY CARE CLINIC | Facility: CLINIC | Age: 73
End: 2018-07-24

## 2018-07-24 RX ORDER — PANTOPRAZOLE SODIUM 40 MG/1
40 TABLET, DELAYED RELEASE ORAL DAILY
Qty: 30 TABLET | Refills: 0 | Status: ON HOLD | OUTPATIENT
Start: 2018-07-24 | End: 2023-03-24 | Stop reason: SDUPTHER

## 2018-07-24 RX ORDER — HYDROCHLOROTHIAZIDE 12.5 MG/1
12.5 CAPSULE ORAL DAILY
Qty: 30 CAPSULE | Refills: 0 | Status: ON HOLD | OUTPATIENT
Start: 2018-07-24 | End: 2020-02-10

## 2018-07-24 RX ORDER — DULOXETIN HYDROCHLORIDE 60 MG/1
60 CAPSULE, DELAYED RELEASE ORAL DAILY
Qty: 30 CAPSULE | Refills: 0 | Status: ON HOLD | OUTPATIENT
Start: 2018-07-24 | End: 2023-03-24 | Stop reason: SDUPTHER

## 2018-07-24 NOTE — TELEPHONE ENCOUNTER
----- Message from Cary Maldonado sent at 7/24/2018  3:49 PM CDT -----  Contact:   Type:  RX Refill Request    Who Called:  higinio Savage  Refill or New Rx:  Refill  RX Name and Strength:  DULoxetine (CYMBALTA) 60 MG capsule  How is the patient currently taking it? (ex. 1XDay):  Take 60 mg by mouth once daily  Is this a 30 day or 90 day RX:  30  Preferred Pharmacy with phone number:    [facility] - [facility] - [address]  [address] [place] [address]  Phone: [phone] Fax: [phone]  Local or Mail Order:  Local  Ordering Provider:  Dr Bereket Richards Call Back Number:  [phone]  Additional Information:  Please see second request.    Type:  RX Refill Request  Refill or New Rx: Refill  RX Name and Strength:  pantoprazole (PROTONIX) 40 MG tablet  How is the patient currently taking it? (ex. 1XDay):  One tablet a day  Is this a 30 day or 90 day RX:  30  Additional Information:  Please see third request.    Type:  RX Refill Request    Refill or New Rx:  Refill  RX Name and Strength:  hydroCHLOROthiazide (MICROZIDE) 12.5 mg capsule  How is the patient currently taking it? (ex. 1XDay):  One tablet once a day  Is this a 30 day or 90 day RX:  30  Additional Information:  Please advise. Thanks.

## 2018-07-24 NOTE — TELEPHONE ENCOUNTER
Patient was seen on 7/9/2018 by Dr. Lord. Needs refills on cymbalta, protonix and hydrochlorothiazide.

## 2018-07-25 NOTE — TELEPHONE ENCOUNTER
----- Message from Yifan Jenkins sent at 7/25/2018 10:59 AM CDT -----  Contact: Ivette larry/[facility]   Ivette is calling to speak with a nurse about the status of pt, pls call back and advise  Call Back#[phone] ext [phone]  Thanks

## 2018-08-15 ENCOUNTER — TELEPHONE (OUTPATIENT)
Dept: PRIMARY CARE CLINIC | Facility: CLINIC | Age: 73
End: 2018-08-15

## 2018-08-15 RX ORDER — OLANZAPINE 15 MG/1
15 TABLET ORAL NIGHTLY
Qty: 30 TABLET | Refills: 1 | OUTPATIENT
Start: 2018-08-15

## 2018-08-15 NOTE — TELEPHONE ENCOUNTER
----- Message from Марина Linton sent at 8/15/2018 11:41 AM CDT -----  Type:  Pharmacy Calling to Clarify an RX    Name of Caller:  Evelyn  Pharmacy Name:  [facility]  Prescription Name:  OLANZapine (ZYPREXA) 15 MG Tab   What do they need to clarify?:  Office never did send in the refill sent in on 8/13/18  Best Call Back Number:  [phone]

## 2018-08-15 NOTE — TELEPHONE ENCOUNTER
----- Message from Cary Miller sent at 8/15/2018 12:39 PM CDT -----  Pt is out of OLANZapine  15 MG Tab / asking for Dr Lord to call in a prescription today/ the Dr that gave it to her is no longer around   [facility] [facility] [address]  [address] [place] [address]  Phone: [phone] Fax: [phone]

## 2018-08-15 NOTE — TELEPHONE ENCOUNTER
Patient is a be getting Zyprexa from psychiatrist--if unable to get in to see psychiatrist can give some bridge medications but only after told when the appointment with the psychiatrist.  0 0 refills at this time

## 2018-08-16 NOTE — TELEPHONE ENCOUNTER
----- Message from Laquita Garcia sent at 8/16/2018 10:05 AM CDT -----  Contact: self   Patient want to know if Dr Lord can refill her rx OLANZapine (ZYPREXA) 15 MG Tab, [facility] Please call back at [phone] (home)        [facility] [address]  [address] [place] [address]  Phone: [phone] Fax: [phone]

## 2018-08-17 RX ORDER — OLANZAPINE 15 MG/1
15 TABLET ORAL NIGHTLY
Qty: 30 TABLET | Refills: 5 | OUTPATIENT
Start: 2018-08-17

## 2019-07-21 ENCOUNTER — TELEPHONE (OUTPATIENT)
Dept: GASTROENTEROLOGY | Facility: HOSPITAL | Age: 74
End: 2019-07-21

## 2019-07-21 NOTE — TELEPHONE ENCOUNTER
Called about eating around 1:30 PM, has colonoscopy scheduled tomorrow, I recommended continuing bowel prep and calling back tomorrow morning and reporting what the stool looks like.

## 2020-02-10 ENCOUNTER — HOSPITAL ENCOUNTER (OUTPATIENT)
Facility: HOSPITAL | Age: 75
Discharge: HOME | End: 2020-02-13
Attending: INTERNAL MEDICINE | Admitting: INTERNAL MEDICINE
Payer: MEDICARE

## 2020-02-10 DIAGNOSIS — D64.9 SYMPTOMATIC ANEMIA: ICD-10-CM

## 2020-02-10 DIAGNOSIS — K63.5 POLYP OF COLON, UNSPECIFIED PART OF COLON, UNSPECIFIED TYPE: Primary | ICD-10-CM

## 2020-02-10 DIAGNOSIS — D50.9 IRON DEFICIENCY ANEMIA, UNSPECIFIED IRON DEFICIENCY ANEMIA TYPE: ICD-10-CM

## 2020-02-10 DIAGNOSIS — K57.90 DIVERTICULOSIS: ICD-10-CM

## 2020-02-10 DIAGNOSIS — K64.8 INTERNAL HEMORRHOIDS: ICD-10-CM

## 2020-02-10 PROBLEM — R79.89 ELEVATED BRAIN NATRIURETIC PEPTIDE (BNP) LEVEL: Status: ACTIVE | Noted: 2020-02-10

## 2020-02-10 LAB
ABO GROUP BLD: NORMAL
BLD GP AB SCN CELLS X3 SERPL QL: NORMAL
HCT VFR BLD AUTO: 21.8 % (ref 37–48.5)
HGB BLD-MCNC: 5.7 G/DL (ref 12–16)
RH BLD: NORMAL

## 2020-02-10 PROCEDURE — 86880 COOMBS TEST DIRECT: CPT

## 2020-02-10 PROCEDURE — 63600175 PHARM REV CODE 636 W HCPCS: Performed by: INTERNAL MEDICINE

## 2020-02-10 PROCEDURE — 86920 COMPATIBILITY TEST SPIN: CPT

## 2020-02-10 PROCEDURE — 86850 RBC ANTIBODY SCREEN: CPT | Mod: 91

## 2020-02-10 PROCEDURE — C9113 INJ PANTOPRAZOLE SODIUM, VIA: HCPCS | Performed by: INTERNAL MEDICINE

## 2020-02-10 PROCEDURE — 96361 HYDRATE IV INFUSION ADD-ON: CPT | Performed by: INTERNAL MEDICINE

## 2020-02-10 PROCEDURE — 96365 THER/PROPH/DIAG IV INF INIT: CPT | Mod: 59 | Performed by: INTERNAL MEDICINE

## 2020-02-10 PROCEDURE — 85014 HEMATOCRIT: CPT

## 2020-02-10 PROCEDURE — 36430 TRANSFUSION BLD/BLD COMPNT: CPT

## 2020-02-10 PROCEDURE — 85018 HEMOGLOBIN: CPT

## 2020-02-10 PROCEDURE — 86901 BLOOD TYPING SEROLOGIC RH(D): CPT | Mod: 91

## 2020-02-10 PROCEDURE — G0378 HOSPITAL OBSERVATION PER HR: HCPCS

## 2020-02-10 PROCEDURE — G0379 DIRECT REFER HOSPITAL OBSERV: HCPCS

## 2020-02-10 PROCEDURE — 25000003 PHARM REV CODE 250: Performed by: INTERNAL MEDICINE

## 2020-02-10 PROCEDURE — 96375 TX/PRO/DX INJ NEW DRUG ADDON: CPT | Performed by: INTERNAL MEDICINE

## 2020-02-10 PROCEDURE — 36415 COLL VENOUS BLD VENIPUNCTURE: CPT

## 2020-02-10 RX ORDER — FUROSEMIDE 10 MG/ML
40 INJECTION INTRAMUSCULAR; INTRAVENOUS ONCE AS NEEDED
Status: COMPLETED | OUTPATIENT
Start: 2020-02-10 | End: 2020-02-11

## 2020-02-10 RX ORDER — SODIUM CHLORIDE 450 MG/100ML
INJECTION, SOLUTION INTRAVENOUS CONTINUOUS
Status: DISCONTINUED | OUTPATIENT
Start: 2020-02-10 | End: 2020-02-10

## 2020-02-10 RX ORDER — HYDRALAZINE HYDROCHLORIDE 20 MG/ML
10 INJECTION INTRAMUSCULAR; INTRAVENOUS EVERY 6 HOURS PRN
Status: DISCONTINUED | OUTPATIENT
Start: 2020-02-10 | End: 2020-02-13 | Stop reason: HOSPADM

## 2020-02-10 RX ORDER — HYDROCODONE BITARTRATE AND ACETAMINOPHEN 500; 5 MG/1; MG/1
TABLET ORAL
Status: DISCONTINUED | OUTPATIENT
Start: 2020-02-11 | End: 2020-02-13 | Stop reason: HOSPADM

## 2020-02-10 RX ORDER — IPRATROPIUM BROMIDE AND ALBUTEROL SULFATE 2.5; .5 MG/3ML; MG/3ML
3 SOLUTION RESPIRATORY (INHALATION) EVERY 6 HOURS PRN
Status: DISCONTINUED | OUTPATIENT
Start: 2020-02-11 | End: 2020-02-13 | Stop reason: HOSPADM

## 2020-02-10 RX ORDER — DULOXETIN HYDROCHLORIDE 30 MG/1
60 CAPSULE, DELAYED RELEASE ORAL DAILY
Status: DISCONTINUED | OUTPATIENT
Start: 2020-02-11 | End: 2020-02-13 | Stop reason: HOSPADM

## 2020-02-10 RX ORDER — PANTOPRAZOLE SODIUM 40 MG/10ML
40 INJECTION, POWDER, LYOPHILIZED, FOR SOLUTION INTRAVENOUS 2 TIMES DAILY
Status: DISCONTINUED | OUTPATIENT
Start: 2020-02-10 | End: 2020-02-13 | Stop reason: HOSPADM

## 2020-02-10 RX ORDER — HYDROCODONE BITARTRATE AND ACETAMINOPHEN 500; 5 MG/1; MG/1
TABLET ORAL
Status: DISCONTINUED | OUTPATIENT
Start: 2020-02-10 | End: 2020-02-11

## 2020-02-10 RX ADMIN — PANTOPRAZOLE SODIUM 40 MG: 40 INJECTION, POWDER, LYOPHILIZED, FOR SOLUTION INTRAVENOUS at 06:02

## 2020-02-10 RX ADMIN — IRON SUCROSE 200 MG: 20 INJECTION, SOLUTION INTRAVENOUS at 10:02

## 2020-02-10 RX ADMIN — SODIUM CHLORIDE: 0.45 INJECTION, SOLUTION INTRAVENOUS at 06:02

## 2020-02-10 RX ADMIN — HYDRALAZINE HYDROCHLORIDE 10 MG: 20 INJECTION INTRAMUSCULAR; INTRAVENOUS at 08:02

## 2020-02-10 NOTE — PLAN OF CARE
"Outside Transfer Acceptance Note        Patients name/MRN:     Maria A Smith MRN: [id number]     Referring Physician or Mid-Level provider giving report:   Juan Diego Lara MD     Referral Facility:    [facility] ED     Date/Time of Acceptance:    2/10/20 at 1:45pm     Accepting Physician for admission to hospital: Cortez Mattson MD ()     Accepting facility:    [facility], telemetry     Consulting Physicians from [facility] involved in case:   Blanca Fuentes MD    Reason for transfer request:    Potential need for GI or Heme/Onc services    73yo woman with hx of CVA 7/18 with aphasia (on DAPT), COPD, HTN, HLD, and depression presents to [facility] ED with progressive shortness of breath over past 2 days. No associated chest pain, palpitations, fever, chills. She denies any black or bloody bowel movements. Afebrile with pulse 90, respirations 28 with 98% saturation on ambient air. Chest x-ray significant for interstitial changes without consolidation; she has had emphysematous changes on CT scan in the past. Initial lab workup revealed hemoglobin 4.6 (baseline 9) with MCV 61, RDW 20, platelets 459.  B12 173. Retic count 2.4. D-dimer 0.76. She was given 125 mg IV Solu-Medrol and DuoNeb treatment with improvement in shortness of breath. 1u PRBC administered. Admitting Physician requesting transfer to facility with GI and Hematology availability.    To Do List upon arrival:    1) Monitor Hb and transfuse <7  2) Continue COPD therapy/workup  3) Consider GI and Hematology consults to further workup anemia    Vital signs:   BP (!) 154/72   Pulse 95   Temp 98.2 °F (36.8 °C) (Oral)   Resp (!) 21   Ht 5' 7" (1.702 m)   Wt 49.9 kg (110 lb)   SpO2 98%   BMI 17.23 kg/m²     Past Medical History:   Diagnosis Date    Hypertension     Squamous cell carcinoma 01/19/2017    right forearm ([facility]  type)     Stroke      Past Surgical History:   Procedure Laterality Date    COLONOSCOPY " N/A 8/7/2019    Procedure: COLONOSCOPY;  Surgeon: Alex Degroot MD;  Location: [facility];  Service: Endoscopy;  Laterality: N/A;       Allergies:   Review of patient's allergies indicates:  No Known Allergies    Current Facility-Administered Medications:   No current facility-administered medications for this encounter.     Current Outpatient Medications:     aspirin 81 MG Chew, 81 mg once daily. , Disp: , Rfl:     atorvastatin (LIPITOR) 80 MG tablet, Take 80 mg by mouth once daily. , Disp: , Rfl:     clopidogrel (PLAVIX) 75 mg tablet, Take 1 tablet (75 mg total) by mouth once daily., Disp: 30 tablet, Rfl: 5    DULoxetine (CYMBALTA) 60 MG capsule, Take 1 capsule (60 mg total) by mouth once daily., Disp: 30 capsule, Rfl: 0    gabapentin (NEURONTIN) 100 MG capsule, Take 1 capsule (100 mg total) by mouth 3 (three) times daily., Disp: 90 capsule, Rfl: 5    hydroCHLOROthiazide (MICROZIDE) 12.5 mg capsule, Take 1 capsule (12.5 mg total) by mouth once daily., Disp: 30 capsule, Rfl: 0    lisinopril (PRINIVIL,ZESTRIL) 20 MG tablet, Take 20 mg by mouth once daily. , Disp: , Rfl: 0    nicotine (NICODERM CQ) 21 mg/24 hr, Place 21 mg onto the skin once daily. , Disp: , Rfl:     OLANZapine (ZYPREXA) 15 MG Tab, 15 mg nightly. , Disp: , Rfl:     ondansetron (ZOFRAN) 4 MG tablet, Take 4 mg by mouth every 6 (six) hours as needed for Nausea., Disp: , Rfl:     pantoprazole (PROTONIX) 40 MG tablet, Take 1 tablet (40 mg total) by mouth once daily., Disp: 30 tablet, Rfl: 0    LABS:  H+H 4.6/17, MCV 61, FOBT negative    See epic    Imaging:    See epic    Cortez Mattson MD  Department of Hospital Medicine  Patient Flow Center/   [phone]

## 2020-02-10 NOTE — NURSING
Pt's 1 unit PRBC completed upon arrival to unit.   Stopped transfusion vitals:  170/72  86  18  98% RA

## 2020-02-11 ENCOUNTER — ANESTHESIA EVENT (OUTPATIENT)
Dept: ENDOSCOPY | Facility: HOSPITAL | Age: 75
End: 2020-02-11
Payer: MEDICARE

## 2020-02-11 ENCOUNTER — ANESTHESIA (OUTPATIENT)
Dept: ENDOSCOPY | Facility: HOSPITAL | Age: 75
End: 2020-02-11
Payer: MEDICARE

## 2020-02-11 LAB
ANISOCYTOSIS BLD QL SMEAR: ABNORMAL
BASOPHILS # BLD AUTO: 0.04 K/UL (ref 0–0.2)
BASOPHILS # BLD AUTO: 0.07 K/UL (ref 0–0.2)
BASOPHILS NFR BLD: 0.3 % (ref 0–1.9)
BASOPHILS NFR BLD: 0.5 % (ref 0–1.9)
BLD PROD TYP BPU: NORMAL
BLOOD UNIT EXPIRATION DATE: NORMAL
BLOOD UNIT TYPE CODE: 8400
BLOOD UNIT TYPE: NORMAL
CODING SYSTEM: NORMAL
DAT IGG-SP REAG RBC-IMP: NORMAL
DIFFERENTIAL METHOD: ABNORMAL
DIFFERENTIAL METHOD: ABNORMAL
DISPENSE STATUS: NORMAL
EOSINOPHIL # BLD AUTO: 0 K/UL (ref 0–0.5)
EOSINOPHIL # BLD AUTO: 0.1 K/UL (ref 0–0.5)
EOSINOPHIL NFR BLD: 0 % (ref 0–8)
EOSINOPHIL NFR BLD: 0.6 % (ref 0–8)
ERYTHROCYTE [DISTWIDTH] IN BLOOD BY AUTOMATED COUNT: 21.9 % (ref 11.5–14.5)
ERYTHROCYTE [DISTWIDTH] IN BLOOD BY AUTOMATED COUNT: 22.5 % (ref 11.5–14.5)
HCT VFR BLD AUTO: 27.3 % (ref 37–48.5)
HCT VFR BLD AUTO: 28.3 % (ref 37–48.5)
HGB BLD-MCNC: 7.9 G/DL (ref 12–16)
HGB BLD-MCNC: 8 G/DL (ref 12–16)
HYPOCHROMIA BLD QL SMEAR: ABNORMAL
IMM GRANULOCYTES # BLD AUTO: 0.11 K/UL (ref 0–0.04)
IMM GRANULOCYTES # BLD AUTO: 0.15 K/UL (ref 0–0.04)
LDH SERPL L TO P-CCNC: 198 U/L (ref 110–260)
LYMPHOCYTES # BLD AUTO: 1.2 K/UL (ref 1–4.8)
LYMPHOCYTES # BLD AUTO: 1.5 K/UL (ref 1–4.8)
LYMPHOCYTES NFR BLD: 10 % (ref 18–48)
LYMPHOCYTES NFR BLD: 10.1 % (ref 18–48)
MCH RBC QN AUTO: 19.9 PG (ref 27–31)
MCH RBC QN AUTO: 20.4 PG (ref 27–31)
MCHC RBC AUTO-ENTMCNC: 28.3 G/DL (ref 32–36)
MCHC RBC AUTO-ENTMCNC: 28.9 G/DL (ref 32–36)
MCV RBC AUTO: 70 FL (ref 82–98)
MCV RBC AUTO: 70 FL (ref 82–98)
MONOCYTES # BLD AUTO: 1.1 K/UL (ref 0.3–1)
MONOCYTES # BLD AUTO: 1.1 K/UL (ref 0.3–1)
MONOCYTES NFR BLD: 7.4 % (ref 4–15)
MONOCYTES NFR BLD: 9 % (ref 4–15)
NEUTROPHILS # BLD AUTO: 11.8 K/UL (ref 1.8–7.7)
NEUTROPHILS # BLD AUTO: 9.7 K/UL (ref 1.8–7.7)
NEUTROPHILS NFR BLD: 79.4 % (ref 38–73)
NEUTROPHILS NFR BLD: 80.7 % (ref 38–73)
NRBC BLD-RTO: 1 /100 WBC
NRBC BLD-RTO: 1 /100 WBC
NUM UNITS TRANS PACKED RBC: NORMAL
PLATELET # BLD AUTO: 380 K/UL (ref 150–350)
PLATELET # BLD AUTO: 382 K/UL (ref 150–350)
PMV BLD AUTO: 9.6 FL (ref 9.2–12.9)
PMV BLD AUTO: 9.9 FL (ref 9.2–12.9)
POLYCHROMASIA BLD QL SMEAR: ABNORMAL
RBC # BLD AUTO: 3.88 M/UL (ref 4–5.4)
RBC # BLD AUTO: 4.02 M/UL (ref 4–5.4)
WBC # BLD AUTO: 12.22 K/UL (ref 3.9–12.7)
WBC # BLD AUTO: 14.54 K/UL (ref 3.9–12.7)

## 2020-02-11 PROCEDURE — 25000003 PHARM REV CODE 250: Performed by: INTERNAL MEDICINE

## 2020-02-11 PROCEDURE — 88305 TISSUE EXAM BY PATHOLOGIST: ICD-10-PCS | Mod: 26,,, | Performed by: PATHOLOGY

## 2020-02-11 PROCEDURE — 25000003 PHARM REV CODE 250: Performed by: NURSE PRACTITIONER

## 2020-02-11 PROCEDURE — 88342 IMHCHEM/IMCYTCHM 1ST ANTB: CPT | Performed by: PATHOLOGY

## 2020-02-11 PROCEDURE — 88305 TISSUE EXAM BY PATHOLOGIST: CPT | Performed by: PATHOLOGY

## 2020-02-11 PROCEDURE — 43239 EGD BIOPSY SINGLE/MULTIPLE: CPT | Performed by: INTERNAL MEDICINE

## 2020-02-11 PROCEDURE — 88342 CHG IMMUNOCYTOCHEMISTRY: ICD-10-PCS | Mod: 26,,, | Performed by: PATHOLOGY

## 2020-02-11 PROCEDURE — 88305 TISSUE EXAM BY PATHOLOGIST: CPT | Mod: 26,,, | Performed by: PATHOLOGY

## 2020-02-11 PROCEDURE — 94640 AIRWAY INHALATION TREATMENT: CPT

## 2020-02-11 PROCEDURE — 43239 PR EGD, FLEX, W/BIOPSY, SGL/MULTI: ICD-10-PCS | Mod: ,,, | Performed by: INTERNAL MEDICINE

## 2020-02-11 PROCEDURE — D9220A PRA ANESTHESIA: ICD-10-PCS | Mod: ,,, | Performed by: ANESTHESIOLOGY

## 2020-02-11 PROCEDURE — 99900035 HC TECH TIME PER 15 MIN (STAT)

## 2020-02-11 PROCEDURE — 96376 TX/PRO/DX INJ SAME DRUG ADON: CPT | Performed by: INTERNAL MEDICINE

## 2020-02-11 PROCEDURE — D9220A PRA ANESTHESIA: Mod: ,,, | Performed by: ANESTHESIOLOGY

## 2020-02-11 PROCEDURE — 96374 THER/PROPH/DIAG INJ IV PUSH: CPT | Mod: 59 | Performed by: INTERNAL MEDICINE

## 2020-02-11 PROCEDURE — 25000242 PHARM REV CODE 250 ALT 637 W/ HCPCS: Performed by: NURSE PRACTITIONER

## 2020-02-11 PROCEDURE — 99214 PR OFFICE/OUTPT VISIT, EST, LEVL IV, 30-39 MIN: ICD-10-PCS | Mod: 25,,, | Performed by: INTERNAL MEDICINE

## 2020-02-11 PROCEDURE — C9113 INJ PANTOPRAZOLE SODIUM, VIA: HCPCS | Performed by: INTERNAL MEDICINE

## 2020-02-11 PROCEDURE — 63600175 PHARM REV CODE 636 W HCPCS: Performed by: INTERNAL MEDICINE

## 2020-02-11 PROCEDURE — 37000008 HC ANESTHESIA 1ST 15 MINUTES: Performed by: INTERNAL MEDICINE

## 2020-02-11 PROCEDURE — 88342 IMHCHEM/IMCYTCHM 1ST ANTB: CPT | Mod: 26,,, | Performed by: PATHOLOGY

## 2020-02-11 PROCEDURE — 36415 COLL VENOUS BLD VENIPUNCTURE: CPT

## 2020-02-11 PROCEDURE — 27100171 HC OXYGEN HIGH FLOW UP TO 24 HOURS

## 2020-02-11 PROCEDURE — 94761 N-INVAS EAR/PLS OXIMETRY MLT: CPT

## 2020-02-11 PROCEDURE — 25000003 PHARM REV CODE 250: Performed by: HOSPITALIST

## 2020-02-11 PROCEDURE — 27000221 HC OXYGEN, UP TO 24 HOURS

## 2020-02-11 PROCEDURE — 27201012 HC FORCEPS, HOT/COLD, DISP: Performed by: INTERNAL MEDICINE

## 2020-02-11 PROCEDURE — 85025 COMPLETE CBC W/AUTO DIFF WBC: CPT

## 2020-02-11 PROCEDURE — 83615 LACTATE (LD) (LDH) ENZYME: CPT

## 2020-02-11 PROCEDURE — P9016 RBC LEUKOCYTES REDUCED: HCPCS

## 2020-02-11 PROCEDURE — 43239 EGD BIOPSY SINGLE/MULTIPLE: CPT | Mod: ,,, | Performed by: INTERNAL MEDICINE

## 2020-02-11 PROCEDURE — 63600175 PHARM REV CODE 636 W HCPCS: Performed by: NURSE PRACTITIONER

## 2020-02-11 PROCEDURE — G0378 HOSPITAL OBSERVATION PER HR: HCPCS

## 2020-02-11 PROCEDURE — 63600175 PHARM REV CODE 636 W HCPCS: Performed by: HOSPITALIST

## 2020-02-11 PROCEDURE — 00731 ANES UPR GI NDSC PX NOS: CPT | Performed by: INTERNAL MEDICINE

## 2020-02-11 PROCEDURE — 63600175 PHARM REV CODE 636 W HCPCS: Performed by: NURSE ANESTHETIST, CERTIFIED REGISTERED

## 2020-02-11 PROCEDURE — 99214 OFFICE O/P EST MOD 30 MIN: CPT | Mod: 25,,, | Performed by: INTERNAL MEDICINE

## 2020-02-11 RX ORDER — SODIUM CHLORIDE 9 MG/ML
INJECTION, SOLUTION INTRAVENOUS CONTINUOUS
Status: DISCONTINUED | OUTPATIENT
Start: 2020-02-11 | End: 2020-02-13 | Stop reason: HOSPADM

## 2020-02-11 RX ORDER — PROPOFOL 10 MG/ML
INJECTION, EMULSION INTRAVENOUS
Status: DISCONTINUED | OUTPATIENT
Start: 2020-02-11 | End: 2020-02-11

## 2020-02-11 RX ORDER — LIDOCAINE HCL/PF 100 MG/5ML
SYRINGE (ML) INTRAVENOUS
Status: DISCONTINUED | OUTPATIENT
Start: 2020-02-11 | End: 2020-02-11

## 2020-02-11 RX ORDER — POLYETHYLENE GLYCOL 3350, SODIUM CHLORIDE, SODIUM BICARBONATE, POTASSIUM CHLORIDE 420; 11.2; 5.72; 1.48 G/4L; G/4L; G/4L; G/4L
4000 POWDER, FOR SOLUTION ORAL ONCE
Status: COMPLETED | OUTPATIENT
Start: 2020-02-11 | End: 2020-02-11

## 2020-02-11 RX ORDER — ACETAMINOPHEN 500 MG
1000 TABLET ORAL EVERY 6 HOURS PRN
Status: DISCONTINUED | OUTPATIENT
Start: 2020-02-11 | End: 2020-02-13 | Stop reason: HOSPADM

## 2020-02-11 RX ORDER — DIPHENHYDRAMINE HCL 25 MG
25 CAPSULE ORAL NIGHTLY PRN
Status: DISCONTINUED | OUTPATIENT
Start: 2020-02-11 | End: 2020-02-13 | Stop reason: HOSPADM

## 2020-02-11 RX ORDER — DIPHENHYDRAMINE HCL 25 MG
25 CAPSULE ORAL ONCE
Status: COMPLETED | OUTPATIENT
Start: 2020-02-11 | End: 2020-02-11

## 2020-02-11 RX ADMIN — PANTOPRAZOLE SODIUM 40 MG: 40 INJECTION, POWDER, LYOPHILIZED, FOR SOLUTION INTRAVENOUS at 08:02

## 2020-02-11 RX ADMIN — POLYETHYLENE GLYCOL 3350, SODIUM CHLORIDE, SODIUM BICARBONATE AND POTASSIUM CHLORIDE 4000 ML: KIT ORAL at 08:02

## 2020-02-11 RX ADMIN — FUROSEMIDE 40 MG: 10 INJECTION, SOLUTION INTRAMUSCULAR; INTRAVENOUS at 12:02

## 2020-02-11 RX ADMIN — HYDRALAZINE HYDROCHLORIDE 10 MG: 20 INJECTION INTRAMUSCULAR; INTRAVENOUS at 08:02

## 2020-02-11 RX ADMIN — LIDOCAINE HYDROCHLORIDE 50 MG: 20 INJECTION INTRAVENOUS at 01:02

## 2020-02-11 RX ADMIN — PROPOFOL 60 MG: 10 INJECTION, EMULSION INTRAVENOUS at 01:02

## 2020-02-11 RX ADMIN — IPRATROPIUM BROMIDE AND ALBUTEROL SULFATE 3 ML: .5; 2.5 SOLUTION RESPIRATORY (INHALATION) at 04:02

## 2020-02-11 RX ADMIN — HYDRALAZINE HYDROCHLORIDE 10 MG: 20 INJECTION INTRAMUSCULAR; INTRAVENOUS at 04:02

## 2020-02-11 RX ADMIN — ACETAMINOPHEN 1000 MG: 500 TABLET ORAL at 06:02

## 2020-02-11 RX ADMIN — DULOXETINE 60 MG: 30 CAPSULE, DELAYED RELEASE ORAL at 09:02

## 2020-02-11 RX ADMIN — SODIUM CHLORIDE: 0.9 INJECTION, SOLUTION INTRAVENOUS at 12:02

## 2020-02-11 RX ADMIN — DIPHENHYDRAMINE HYDROCHLORIDE 25 MG: 25 CAPSULE ORAL at 12:02

## 2020-02-11 RX ADMIN — PROPOFOL 40 MG: 10 INJECTION, EMULSION INTRAVENOUS at 01:02

## 2020-02-11 RX ADMIN — SODIUM CHLORIDE: 0.9 INJECTION, SOLUTION INTRAVENOUS at 01:02

## 2020-02-11 NOTE — PLAN OF CARE
NAD noted. POC reviewed w pt and they verbalized understanding.  Tele 8626.  Pt free from falls. Bed in low position, side rails up X2, bed alarm on, wheels locked, call light in reach. Will continue to monitor. Pt received 1 unit of blood pt tolerated well.

## 2020-02-11 NOTE — ANESTHESIA POSTPROCEDURE EVALUATION
Anesthesia Post Evaluation    Patient: Maria A Smith    Procedure(s) Performed: Procedure(s) (LRB):  EGD (ESOPHAGOGASTRODUODENOSCOPY) (N/A)    Final Anesthesia Type: general    Patient location during evaluation: PACU  Patient participation: Yes- Able to Participate  Level of consciousness: awake and alert and oriented  Post-procedure vital signs: reviewed and stable  Pain management: adequate  Airway patency: patent    PONV status at discharge: No PONV  Anesthetic complications: no      Cardiovascular status: blood pressure returned to baseline  Respiratory status: unassisted, spontaneous ventilation and room air  Hydration status: euvolemic  Follow-up not needed.          Vitals Value Taken Time   /81 2/11/2020  2:06 PM   Temp 36.8 °C (98.2 °F) 2/11/2020  1:10 PM   Pulse 92 2/11/2020  2:06 PM   Resp 19 2/11/2020  2:06 PM   SpO2 96 % 2/11/2020  2:06 PM         Event Time     Out of Recovery 02/11/2020 14:12:08          Pain/Rylee Score: Rylee Score: 10 (2/11/2020  2:06 PM)

## 2020-02-11 NOTE — SIGNIFICANT EVENT
02/11/20 0429   Patient Assessment/Suction   Level of Consciousness (AVPU) alert   Respiratory Effort Severe;Labored;Mouth breathing   Expansion/Accessory Muscles/Retractions abdominal muscle use;accessory muscle use;supraclavicular retractions   All Lung Fields Breath Sounds diminished   KOFFI Breath Sounds diminished   LLL Breath Sounds crackles   RUL Breath Sounds diminished   RML Breath Sounds diminished   RLL Breath Sounds crackles   PRE-TX-O2   O2 Device (Oxygen Therapy) nasal cannula   $ Is the patient on Low Flow Oxygen? Yes   Flow (L/min) 2   SpO2 95 %   Pulse Oximetry Type Intermittent   $ Pulse Oximetry - Multiple Charge Pulse Oximetry - Multiple   Pulse 90   Resp (!) 31   Aerosol Therapy   $ Aerosol Therapy Charges Aerosol Treatment   Respiratory Treatment Status (SVN) given   Treatment Route (SVN) mask   Patient Position (SVN) HOB elevated   Post Treatment Assessment (SVN) breath sounds unchanged   Signs of Intolerance (SVN) none   Breath Sounds Post-Respiratory Treatment   Post-treatment Heart Rate (beats/min) 90   Post-treatment Resp Rate (breaths/min) 28   pt extremely SOB, pt RR in the 30s Sats 95% on 2nc, pt came in with elevated BNP and receiving blood product at this time. Pt bilateral Crackles at the lung bases with diminished throughout the lungs.

## 2020-02-11 NOTE — PROGRESS NOTES
EGD done.  Atrophic gastritis noted with some ulceration but no bleeding.  Biopsies taken.  Recommend clear liquid diet and colonoscopy tomorrow.  Will follow.

## 2020-02-11 NOTE — SUBJECTIVE & OBJECTIVE
Past Medical History:   Diagnosis Date    Hypertension     Squamous cell carcinoma 01/19/2017    right forearm ([facility]  type)     Stroke        Past Surgical History:   Procedure Laterality Date    COLONOSCOPY N/A 8/7/2019    Procedure: COLONOSCOPY;  Surgeon: Alex Degroot MD;  Location: [facility];  Service: Endoscopy;  Laterality: N/A;    HYSTERECTOMY         Review of patient's allergies indicates:  No Known Allergies    Current Facility-Administered Medications on File Prior to Encounter   Medication    [COMPLETED] albuterol-ipratropium 2.5 mg-0.5 mg/3 mL nebulizer solution 3 mL    [COMPLETED] methylPREDNISolone sodium succinate injection 125 mg     Current Outpatient Medications on File Prior to Encounter   Medication Sig    atorvastatin (LIPITOR) 80 MG tablet Take 80 mg by mouth once daily.     clopidogrel (PLAVIX) 75 mg tablet Take 1 tablet (75 mg total) by mouth once daily.    DULoxetine (CYMBALTA) 60 MG capsule Take 1 capsule (60 mg total) by mouth once daily.    lisinopril (PRINIVIL,ZESTRIL) 20 MG tablet Take 20 mg by mouth once daily.     pantoprazole (PROTONIX) 40 MG tablet Take 1 tablet (40 mg total) by mouth once daily.    [DISCONTINUED] aspirin 81 MG Chew 81 mg once daily.     [DISCONTINUED] gabapentin (NEURONTIN) 100 MG capsule Take 1 capsule (100 mg total) by mouth 3 (three) times daily.    [DISCONTINUED] hydroCHLOROthiazide (MICROZIDE) 12.5 mg capsule Take 1 capsule (12.5 mg total) by mouth once daily.    [DISCONTINUED] nicotine (NICODERM CQ) 21 mg/24 hr Place 21 mg onto the skin once daily.     [DISCONTINUED] OLANZapine (ZYPREXA) 15 MG Tab 15 mg nightly.     [DISCONTINUED] ondansetron (ZOFRAN) 4 MG tablet Take 4 mg by mouth every 6 (six) hours as needed for Nausea.     Family History     Problem Relation (Age of Onset)    Cancer Sister    Diabetes Sister        Tobacco Use    Smoking status: Former Smoker     Packs/day: 2.00     Last attempt to quit: 9/12/2017     Years since  quittin.4    Smokeless tobacco: Never Used   Substance and Sexual Activity    Alcohol use: Yes     Alcohol/week: 1.0 standard drinks     Types: 1 Cans of beer per week     Comment: occasionally    Drug use: No    Sexual activity: Not on file     Review of Systems   Constitutional: Negative for appetite change, chills, fatigue and fever.   HENT: Negative for congestion, hearing loss, rhinorrhea, sore throat, trouble swallowing and voice change.    Respiratory: Positive for shortness of breath. Negative for cough, chest tightness and wheezing.    Cardiovascular: Negative for chest pain, palpitations and leg swelling.   Gastrointestinal: Negative for abdominal pain, blood in stool, diarrhea, nausea and vomiting.   Genitourinary: Negative for difficulty urinating, frequency, hematuria and urgency.   Musculoskeletal: Negative for back pain, joint swelling and neck stiffness.   Skin: Negative for pallor and rash.   Neurological: Negative for tremors, seizures, syncope, speech difficulty, weakness, numbness and headaches.   Hematological: Negative for adenopathy.   Psychiatric/Behavioral: Negative for agitation, behavioral problems, confusion and sleep disturbance.     Objective:     Vital Signs (Most Recent):  Temp: 98.4 °F (36.9 °C) (02/10/20 1950)  Pulse: 90 (02/10/20 1950)  Resp: 18 (02/10/20 1950)  BP: (!) 192/85 (02/10/20 1950)  SpO2: 99 % (02/10/20 1950) Vital Signs (24h Range):  Temp:  [97.6 °F (36.4 °C)-98.5 °F (36.9 °C)] 98.4 °F (36.9 °C)  Pulse:  [] 90  Resp:  [16-95] 18  SpO2:  [96 %-99 %] 99 %  BP: (135-204)/(53-97) 192/85        There is no height or weight on file to calculate BMI.    Physical Exam   Constitutional: She is oriented to person, place, and time. She appears well-developed and well-nourished. No distress.   HENT:   Head: Normocephalic and atraumatic.   Right Ear: External ear normal.   Left Ear: External ear normal.   Nose: Nose normal.   Mouth/Throat: Oropharynx is clear and  moist.   Eyes: Pupils are equal, round, and reactive to light. Conjunctivae and EOM are normal. Right eye exhibits no discharge. Left eye exhibits no discharge. No scleral icterus.   Neck: Normal range of motion. Neck supple. No thyromegaly present.   Cardiovascular: Normal rate, regular rhythm and intact distal pulses.   Murmur heard.  Pulmonary/Chest: Effort normal and breath sounds normal. No stridor. No respiratory distress. She has no wheezes. She has no rales.   Abdominal: Soft. Bowel sounds are normal. She exhibits no distension. There is tenderness.   Musculoskeletal: She exhibits no edema or tenderness.   Lymphadenopathy:     She has no cervical adenopathy.   Neurological: She is alert and oriented to person, place, and time. No cranial nerve deficit or sensory deficit. She exhibits normal muscle tone. Coordination normal.   Skin: Skin is warm and dry. Capillary refill takes less than 2 seconds. No rash noted. She is not diaphoretic. No erythema.   Psychiatric: She has a normal mood and affect. Her behavior is normal. Judgment and thought content normal.   Nursing note and vitals reviewed.        CRANIAL NERVES     CN III, IV, VI   Pupils are equal, round, and reactive to light.  Extraocular motions are normal.        Significant Labs:   BMP:   Recent Labs   Lab 02/10/20  1104   *      K 4.6      CO2 24   BUN 16   CREATININE 1.0   CALCIUM 9.5     CBC:   Recent Labs   Lab 02/10/20  1104   WBC 11.70   HGB 4.6*   HCT 17.0*   *       Significant Imaging: I have reviewed all pertinent imaging results/findings within the past 24 hours.

## 2020-02-11 NOTE — HPI
75yo woman with hx of CVA 7/18 with aphasia (on DAPT), COPD, HTN, HLD, and depression presented to [place] ED with progressive shortness of breath over past 2 days.  Patient states that shortness of breath as the main reason she went to the emergency room.  States that she has had dark black stools in the past but nothing recent.  Patient states that recently she had a pinkish color in her stool denies any fresh red blood.  Patient denies chest pain, palpitations, fever, chills.  Patient is a poor historian and limited history available this point.  When evaluated in the emergency room at [facility], she was Afebrile with pulse 90, respirations 28 with 98% saturation on ambient air. Chest x-ray significant for interstitial changes without consolidation; . Initial lab workup revealed hemoglobin 4.6 (baseline 9) with MCV 61, RDW 20, platelets 459.  B12 173. Retic count 2.4. D-dimer 0.76. She was given 125 mg IV Solu-Medrol and DuoNeb treatment with improvement in shortness of breath. 1u PRBC administered.

## 2020-02-11 NOTE — ASSESSMENT & PLAN NOTE
Will transfuse 1 more unit of blood  In addition Will give IV ferric gluconate  Will consider switching to p.o. iron replacement  Most likely secondary GI bleed  Lab Results   Component Value Date    IRON 14 (L) 02/10/2020    TIBC 556 (H) 02/10/2020    FERRITIN 5 (L) 02/10/2020

## 2020-02-11 NOTE — CONSULTS
[facility]     CC: Anemia    HPI 74 y.o. female presenting with shortness of breath. EMS was called and patient was found to be hypoxic on arrival with 80% room air.  Patient does admit to long smoking history.  HPI difficult to obtain on arrival secondary to patient being on BiPAP and in moderate respiratory distress.       Patient has a past medical history of Hypertension, Squamous cell carcinoma (2017), and Stroke.  Patient has a past surgical history that includes Colonoscopy (N/A, 2019).    ADDITIONAL HISTORY:  Above obtained from chart review from Dr. Lara.  In addition on my interview, patient has anemia, recently worse, moderate to severe, with no associated overt GI bleeding and with heme negative stool on exam.  Indices are, however, consistent with RISSA.  She has some aphasia secondary to past CVA but is able to communicate.  She denies BRBPR, black stool, or emesis currently but states that she did have episode of hematemesis about 4 months ago.  She has not had EGD.  Last colonoscopy over a year ago with Dr. Degroot and was unremarkable.  No other acute complaints.  Patient is on DAPT with last dose 3 days ago.      Past Medical History:   Diagnosis Date    Hypertension     Squamous cell carcinoma 2017    right forearm ([place]  type)     Stroke        Past Surgical History:   Procedure Laterality Date    APPENDECTOMY      COLONOSCOPY N/A 2019    Procedure: COLONOSCOPY;  Surgeon: Alex Degroot MD;  Location: [facility];  Service: Endoscopy;  Laterality: N/A;    HYSTERECTOMY         Social History     Tobacco Use    Smoking status: Former Smoker     Packs/day: 2.00     Last attempt to quit: 2017     Years since quittin.4    Smokeless tobacco: Never Used   Substance Use Topics    Alcohol use: Yes     Alcohol/week: 1.0 standard drinks     Types: 1 Cans of beer per week    Drug use: No       Family History   Problem Relation Age of Onset    Cancer Sister      Diabetes Sister     Melanoma Neg Hx     Psoriasis Neg Hx     Lupus Neg Hx     Eczema Neg Hx        Review of Systems  General ROS: negative for - chills, fever or weight loss  Psychological ROS: negative for - hallucination, depression or suicidal ideation  Ophthalmic ROS: negative for - blurry vision, photophobia or eye pain  ENT ROS: negative for - epistaxis, sore throat or rhinorrhea  Respiratory ROS: no cough, + shortness of breath, or wheezing  Cardiovascular ROS: no chest pain or dyspnea on exertion  Gastrointestinal ROS: no bleeding  Genito-Urinary ROS: no dysuria, trouble voiding, or hematuria  Musculoskeletal ROS: negative for - arthralgia, myalgia, weakness  Neurological ROS: no syncope or seizures; no ataxia  Dermatological ROS: negative for pruritis, rash and jaundice    Physical Examination  BP (!) 193/81   Pulse 86   Temp 98.2 °F (36.8 °C)   Resp 16   Wt 53.5 kg (117 lb 15.1 oz)   SpO2 99%   Breastfeeding? No   BMI 18.47 kg/m²   General appearance: alert, cooperative, no distress, cachectic female  HENT: Normocephalic, atraumatic, neck symmetrical, no nasal discharge   Eyes: conjunctivae/corneas clear, PERRL, EOM's intact, sclera anicteric  Lungs: coarse to auscultation bilaterally, no dullness to percussion bilaterally, symmetric expansion, breathing unlabored  Heart: regular rate and rhythm without rub; no displacement of the PMI   Abdomen: scaphoid, NT  Extremities: extremities symmetric; no clubbing, cyanosis, or edema  Integument: Skin color, texture, turgor normal; no rashes; hair distrubution normal, no jaundice  Neurologic: Alert and oriented X 3, + mild aphasia  Psychiatric: no pressured speech; normal affect; no evidence of impaired cognition, no anxiety/depression     Labs:  Lab Results   Component Value Date    WBC 14.54 (H) 02/11/2020    HGB 7.9 (L) 02/11/2020    HCT 27.3 (L) 02/11/2020    MCV 70 (L) 02/11/2020     (H) 02/11/2020         CMP  Sodium   Date Value Ref  Range Status   02/10/2020 139 136 - 145 mmol/L Final     Potassium   Date Value Ref Range Status   02/10/2020 4.6 3.5 - 5.1 mmol/L Final     Chloride   Date Value Ref Range Status   02/10/2020 105 101 - 111 mmol/L Final     CO2   Date Value Ref Range Status   02/10/2020 24 23 - 29 mmol/L Final     Glucose   Date Value Ref Range Status   02/10/2020 136 (H) 74 - 118 mg/dL Final     BUN, Bld   Date Value Ref Range Status   02/10/2020 16 8 - 23 mg/dL Final     Creatinine   Date Value Ref Range Status   02/10/2020 1.0 0.5 - 1.4 mg/dL Final     Calcium   Date Value Ref Range Status   02/10/2020 9.5 8.6 - 10.0 mg/dL Final     Total Protein   Date Value Ref Range Status   02/10/2020 7.2 6.0 - 8.4 g/dL Final     Albumin   Date Value Ref Range Status   02/10/2020 3.5 3.5 - 5.2 g/dL Final     Total Bilirubin   Date Value Ref Range Status   02/10/2020 0.6 0.3 - 1.2 mg/dL Final     Comment:     For infants and newborns, interpretation of results should be based  on gestational age, weight and in agreement with clinical  observations.  Premature Infant recommended reference ranges:  Up to 24 hours.............<8.0 mg/dL  Up to 48 hours............<12.0 mg/dL  3-5 days..................<15.0 mg/dL  6-29 days.................<15.0 mg/dL       Alkaline Phosphatase   Date Value Ref Range Status   02/10/2020 110 38 - 126 U/L Final     AST   Date Value Ref Range Status   02/10/2020 23 15 - 41 U/L Final     ALT   Date Value Ref Range Status   02/10/2020 13 (L) 14 - 54 U/L Final     Anion Gap   Date Value Ref Range Status   02/10/2020 10 8 - 16 mmol/L Final     eGFR if    Date Value Ref Range Status   02/10/2020 >60.0 >60 mL/min/1.73 m^2 Final     eGFR if non    Date Value Ref Range Status   02/10/2020 56.4 (A) >60 mL/min/1.73 m^2 Final     Comment:     Calculation used to obtain the estimated glomerular filtration  rate (eGFR) is the CKD-EPI equation.          Lab Results   Component Value Date    IRON 14  (L) 02/10/2020    TIBC 556 (H) 02/10/2020    FERRITIN 5 (L) 02/10/2020         Imaging:  CXR was independently visualized and reviewed by me and showed interstitial prominence.    I have personally reviewed these images    Old records from Dr. Lara reviewed and are as summarized above in the HPI.      Assessment:   1.  RISSA  2.  Aphasia  3.  H/O CVA  4.  SOB  5.  Hematemesis - remote episode  6.  DAPT    Plan:  1.  NPO  2.  EGD  3.  PPI  4.  Transfuse PRN  5.  Will likely need colonoscopy +/- pillcam for further workup of RISSA  6.  Further recommendations to follow after above.  7.  Communication will be sent to the referring provider, Dr. Linares regarding my assessment and plan on this patient via EPIC.      Evelio Santos MD  [facility]  [address], [address]  [place] [address]  Office: [phone]  Fax: [phone]

## 2020-02-11 NOTE — ASSESSMENT & PLAN NOTE
No signs of CHF exacerbation  Will keep Lasix p.r.n. during blood transfusion  Will repeat BNP in the morning

## 2020-02-11 NOTE — PLAN OF CARE
CM met with pt and family at bedside. Pt verified information on facesheet as correct. Denies POA/LW. JOSEPH is her 3 children. Pt reports living at listed address with her SO Hussein. PCP is DR. López. Pharm is [facility] in [place]. Pt denies hh/hd/dme. Pt reports being independent with ADLs, but not able to do a lot due to being SOB. Pt is not able to drive herself to appts- transportation is provided by SO or daughter, Sirisha. DC plan is home. CM following to assist in any DC needs.     Pt's daughter, Sirisha, asked CM about transferring to another hospital closer to home. CM explained that we don't transfer to other hospitals unless a higher level of care is needed and if family/pt want to transfer they would be responsible for costs. Sirisha verbalized understanding and expressed her unhappiness with pt being transferred so far from home. CM offered to call pt advocate- family declined at this time.        02/11/20 0095   Discharge Assessment   Assessment Type Discharge Planning Assessment   Confirmed/corrected address and phone number on facesheet? Yes   Assessment information obtained from? Patient   Communicated expected length of stay with patient/caregiver yes   Prior to hospitilization cognitive status: Alert/Oriented   Prior to hospitalization functional status: Independent   Current cognitive status: Alert/Oriented   Current Functional Status: Independent   Lives With significant other   Able to Return to Prior Arrangements yes   Is patient able to care for self after discharge? Yes   Patient's perception of discharge disposition home or selfcare   Readmission Within the Last 30 Days no previous admission in last 30 days   Patient currently being followed by outpatient case management? No   Patient currently receives any other outside agency services? No   Equipment Currently Used at Home none   Do you have any problems affording any of your prescribed medications? No   Is the patient taking medications as  prescribed? yes   Does the patient have transportation home? Yes   Transportation Anticipated family or friend will provide   Does the patient receive services at the [facility]? No   Discharge Plan A Home   DME Needed Upon Discharge  none   Patient/Family in Agreement with Plan yes

## 2020-02-11 NOTE — [employer] PATIENT INSTRUCTIONS
Discharge Summary/Instructions after an Endoscopic Procedure  Patient Name: Maria A Smith  Patient MRN: [id number]  Patient YOB: 1945  Tuesday, February 11, 2020  Evelio Santos MD  RESTRICTIONS:  During your procedure today, you received medications for sedation.  These   medications may affect your judgment, balance and coordination.  Therefore,   for 24 hours, you have the following restrictions:   - DO NOT drive a car, operate machinery, make legal/financial decisions,   sign important papers or drink alcohol.    ACTIVITY:  Today: no heavy lifting, straining or running due to procedural   sedation/anesthesia.  The following day: return to full activity including work.  DIET:  Eat and drink normally unless instructed otherwise.     TREATMENT FOR COMMON SIDE EFFECTS:  - Mild abdominal pain, nausea, belching, bloating or excessive gas:  rest,   eat lightly and use a heating pad.  - Sore Throat: treat with throat lozenges and/or gargle with warm salt   water.  - Because air was used during the procedure, expelling large amounts of air   from your rectum or belching is normal.  - If a bowel prep was taken, you may not have a bowel movement for 1-3 days.    This is normal.  SYMPTOMS TO WATCH FOR AND REPORT TO YOUR PHYSICIAN:  1. Abdominal pain or bloating, other than gas cramps.  2. Chest pain.  3. Back pain.  4. Signs of infection such as: chills or fever occurring within 24 hours   after the procedure.  5. Rectal bleeding, which would show as bright red, maroon, or black stools.   (A tablespoon of blood from the rectum is not serious, especially if   hemorrhoids are present.)  6. Vomiting.  7. Weakness or dizziness.  GO DIRECTLY TO THE NEAREST EMERGENCY ROOM IF YOU HAVE ANY OF THE FOLLOWING:      Difficulty breathing              Chills and/or fever over 101 F   Persistent vomiting and/or vomiting blood   Severe abdominal pain   Severe chest pain   Black, tarry stools   Bleeding- more than one  tablespoon   Any other symptom or condition that you feel may need urgent attention  Your doctor recommends these additional instructions:  If any biopsies were taken, your doctors clinic will contact you in 1 to 2   weeks with any results.  - Return patient to hospital rolon for ongoing care.   - Clear liquid diet.   - Continue present medications.   - No aspirin, ibuprofen, naproxen, or other non-steroidal anti-inflammatory   drugs.   - Await pathology results.   - Perform a colonoscopy tomorrow.  For questions, problems or results please call your physician - Evelio Santos MD at Work:  [phone].  [facility], EMERGENCY ROOM PHONE NUMBER: [phone]  IF A COMPLICATION OR EMERGENCY SITUATION ARISES AND YOU ARE UNABLE TO REACH   YOUR PHYSICIAN - GO DIRECTLY TO THE EMERGENCY ROOM.  Evelio Santos MD  2/11/2020 1:35:19 PM  This report has been verified and signed electronically.  [employer]

## 2020-02-11 NOTE — ANESTHESIA PREPROCEDURE EVALUATION
02/11/2020  Maria A Smith is a 74 y.o., female.    Anesthesia Evaluation         Review of Systems  Anesthesia Hx:  No problems with previous Anesthesia   Cardiovascular:   Hypertension    Pulmonary:  Pulmonary Normal    Renal/:  Renal/ Normal     Hepatic/GI:  Hepatic/GI Normal    Neurological:   CVA, residual symptoms Aphasia   Endocrine:  Endocrine Normal    Psych:  Psychiatric Normal           Physical Exam  General:  Well nourished    Airway/Jaw/Neck:  Airway Findings: Mouth Opening: Normal Tongue: Normal  General Airway Assessment: Adult  Mallampati: II  TM Distance: Normal, at least 6 cm       Chest/Lungs:  Chest/Lungs Clear    Heart/Vascular:  Heart Findings: Normal            Anesthesia Plan  Type of Anesthesia, risks & benefits discussed:  Anesthesia Type:  general  Patient's Preference:   Intra-op Monitoring Plan: standard ASA monitors  Intra-op Monitoring Plan Comments:   Post Op Pain Control Plan: multimodal analgesia and IV/PO Opioids PRN  Post Op Pain Control Plan Comments: Opioids and analgesic adjuvants as needed  Regional blocks if applicable/indicated  Induction:   IV  Beta Blocker:  Patient is not currently on a Beta-Blocker (No further documentation required).       Informed Consent: Patient understands risks and agrees with Anesthesia plan.  Questions answered. Anesthesia consent signed with patient.  ASA Score: 3     Day of Surgery Review of History & Physical:    H&P update referred to the surgeon.     Anesthesia Plan Notes: I have personally evaluated the patient and discussed risk/benefits/alternatives of general anesthesia.        Ready For Surgery From Anesthesia Perspective.

## 2020-02-11 NOTE — CARE UPDATE
02/11/20 0702   Patient Assessment/Suction   Level of Consciousness (AVPU) alert   Respiratory Effort Normal   Expansion/Accessory Muscles/Retractions expansion symmetric   All Lung Fields Breath Sounds Anterior:   KOFFI Breath Sounds clear   LLL Breath Sounds diminished   RUL Breath Sounds clear   RLL Breath Sounds diminished   Rhythm/Pattern, Respiratory pattern regular   Cough Frequency no cough   PRE-TX-O2   O2 Device (Oxygen Therapy) nasal cannula   $ Is the patient on Low Flow Oxygen? Yes   Flow (L/min) 2   Oxygen Concentration (%) 28   SpO2 95 %   Pulse Oximetry Type Intermittent   $ Pulse Oximetry - Multiple Charge Pulse Oximetry - Multiple   Pulse 88   Resp 18   Positioning HOB elevated 30 degrees   Aerosol Therapy   $ Aerosol Therapy Charges PRN treatment not required     Aerosol PRN.

## 2020-02-11 NOTE — NURSING
Pt's significant other just called and said he checked the medicine bottle at the house and noticied pt did not take Plavix on Monday.

## 2020-02-11 NOTE — ASSESSMENT & PLAN NOTE
Patient's anemia is currently uncontrolled. S/p 1 units of PRBCs. Etiology likely GI bleed versus autoimmune hemolytic anemia  Patient has shortness of breath most likely secondary to low H&H  Will get Marquise test iron studies done  Current CBC reviewed-   Lab Results   Component Value Date    HGB 4.6 (LL) 02/10/2020    HCT 17.0 (LL) 02/10/2020     Will transfuse 1 more unit of packed red blood cells and will check hemoglobin and hematocrit  Monitor serial CBC and transfuse if patient becomes hemodynamically unstable, symptomatic or H/H drops below 7/21.

## 2020-02-11 NOTE — OR NURSING
During recovery phase from EGD it was discovered that the pt was incorrect about her last dose of Plavix. The S.O. Hussein stated that she had indeed taken it on Sunday and he was reasonably sure she also took it before coming to the hosp. on Monday. He states he will check he med box and let her nurse know for sure. Will notify Dr Rizvi.

## 2020-02-11 NOTE — TRANSFER OF CARE
Anesthesia Transfer of Care Note    Patient: Maria A Smith    Procedure(s) Performed: Procedure(s) (LRB):  EGD (ESOPHAGOGASTRODUODENOSCOPY) (N/A)    Patient location: GI    Anesthesia Type: general    Transport from OR: Transported from OR on 2-3 L/min O2 by NC with adequate spontaneous ventilation    Post pain: adequate analgesia    Post assessment: no apparent anesthetic complications and tolerated procedure well    Post vital signs: stable    Level of consciousness: sedated and responds to stimulation    Nausea/Vomiting: no nausea/vomiting    Complications: none    Transfer of care protocol was followed      Last vitals:   Visit Vitals  BP (!) 144/67   Pulse 99   Temp 36.8 °C (98.2 °F)   Resp (!) 22   Wt 53.5 kg (117 lb 15.1 oz)   SpO2 100%   Breastfeeding? No   BMI 18.47 kg/m²      [FreeTextEntry1] : rash, pruritus most consistent with urticaria/dermatographism\par \par zyrtec 10/20 HS; \par continue Cerave anti itch lotion\par f/u prn in 1 month

## 2020-02-11 NOTE — H&P
[facility] Medicine  History & Physical    Patient Name: Maria A Smith  MRN: [id number]  Admission Date: 2/10/2020  Attending Physician: Marcell Martinez MD   Primary Care Provider: Sneha Peguero MD         Patient information was obtained from patient and ER records.     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  Patient states that shortness of breath as the main reason she went to the emergency room.  States that she has had dark black stools in the past but nothing recent.  Patient states that recently she had a pinkish color in her stool denies any fresh red blood.  Patient denies chest pain, palpitations, fever, chills.  Patient is a poor historian and limited history available this point.  When evaluated in the emergency room at [facility], she was Afebrile with pulse 90, respirations 28 with 98% saturation on ambient air. Chest x-ray significant for interstitial changes without consolidation; . Initial lab workup revealed hemoglobin 4.6 (baseline 9) with MCV 61, RDW 20, platelets 459.  B12 173. Retic count 2.4. D-dimer 0.76. She was given 125 mg IV Solu-Medrol and DuoNeb treatment with improvement in shortness of breath. 1u PRBC administered.    Past Medical History:   Diagnosis Date    Hypertension     Squamous cell carcinoma 01/19/2017    right forearm ([place]  type)     Stroke        Past Surgical History:   Procedure Laterality Date    COLONOSCOPY N/A 8/7/2019    Procedure: COLONOSCOPY;  Surgeon: Alex Degroot MD;  Location: [facility];  Service: Endoscopy;  Laterality: N/A;    HYSTERECTOMY         Review of patient's allergies indicates:  No Known Allergies    Current Facility-Administered Medications on File Prior to Encounter    Medication    [COMPLETED] albuterol-ipratropium 2.5 mg-0.5 mg/3 mL nebulizer solution 3 mL    [COMPLETED] methylPREDNISolone sodium succinate injection 125 mg     Current Outpatient Medications on File Prior to Encounter   Medication Sig    atorvastatin (LIPITOR) 80 MG tablet Take 80 mg by mouth once daily.     clopidogrel (PLAVIX) 75 mg tablet Take 1 tablet (75 mg total) by mouth once daily.    DULoxetine (CYMBALTA) 60 MG capsule Take 1 capsule (60 mg total) by mouth once daily.    lisinopril (PRINIVIL,ZESTRIL) 20 MG tablet Take 20 mg by mouth once daily.     pantoprazole (PROTONIX) 40 MG tablet Take 1 tablet (40 mg total) by mouth once daily.    [DISCONTINUED] aspirin 81 MG Chew 81 mg once daily.     [DISCONTINUED] gabapentin (NEURONTIN) 100 MG capsule Take 1 capsule (100 mg total) by mouth 3 (three) times daily.    [DISCONTINUED] hydroCHLOROthiazide (MICROZIDE) 12.5 mg capsule Take 1 capsule (12.5 mg total) by mouth once daily.    [DISCONTINUED] nicotine (NICODERM CQ) 21 mg/24 hr Place 21 mg onto the skin once daily.     [DISCONTINUED] OLANZapine (ZYPREXA) 15 MG Tab 15 mg nightly.     [DISCONTINUED] ondansetron (ZOFRAN) 4 MG tablet Take 4 mg by mouth every 6 (six) hours as needed for Nausea.     Family History     Problem Relation (Age of Onset)    Cancer Sister    Diabetes Sister        Tobacco Use    Smoking status: Former Smoker     Packs/day: 2.00     Last attempt to quit: 2017     Years since quittin.4    Smokeless tobacco: Never Used   Substance and Sexual Activity    Alcohol use: Yes     Alcohol/week: 1.0 standard drinks     Types: 1 Cans of beer per week     Comment: occasionally    Drug use: No    Sexual activity: Not on file     Review of Systems   Constitutional: Negative for appetite change, chills, fatigue and fever.   HENT: Negative for congestion, hearing loss, rhinorrhea, sore throat, trouble swallowing and voice change.    Respiratory: Positive for shortness of  breath. Negative for cough, chest tightness and wheezing.    Cardiovascular: Negative for chest pain, palpitations and leg swelling.   Gastrointestinal: Negative for abdominal pain, blood in stool, diarrhea, nausea and vomiting.   Genitourinary: Negative for difficulty urinating, frequency, hematuria and urgency.   Musculoskeletal: Negative for back pain, joint swelling and neck stiffness.   Skin: Negative for pallor and rash.   Neurological: Negative for tremors, seizures, syncope, speech difficulty, weakness, numbness and headaches.   Hematological: Negative for adenopathy.   Psychiatric/Behavioral: Negative for agitation, behavioral problems, confusion and sleep disturbance.     Objective:     Vital Signs (Most Recent):  Temp: 98.4 °F (36.9 °C) (02/10/20 1950)  Pulse: 90 (02/10/20 1950)  Resp: 18 (02/10/20 1950)  BP: (!) 192/85 (02/10/20 1950)  SpO2: 99 % (02/10/20 1950) Vital Signs (24h Range):  Temp:  [97.6 °F (36.4 °C)-98.5 °F (36.9 °C)] 98.4 °F (36.9 °C)  Pulse:  [] 90  Resp:  [16-95] 18  SpO2:  [96 %-99 %] 99 %  BP: (135-204)/(53-97) 192/85        There is no height or weight on file to calculate BMI.    Physical Exam   Constitutional: She is oriented to person, place, and time. She appears well-developed and well-nourished. No distress.   HENT:   Head: Normocephalic and atraumatic.   Right Ear: External ear normal.   Left Ear: External ear normal.   Nose: Nose normal.   Mouth/Throat: Oropharynx is clear and moist.   Eyes: Pupils are equal, round, and reactive to light. Conjunctivae and EOM are normal. Right eye exhibits no discharge. Left eye exhibits no discharge. No scleral icterus.   Neck: Normal range of motion. Neck supple. No thyromegaly present.   Cardiovascular: Normal rate, regular rhythm and intact distal pulses.   Murmur heard.  Pulmonary/Chest: Effort normal and breath sounds normal. No stridor. No respiratory distress. She has no wheezes. She has no rales.   Abdominal: Soft. Bowel sounds  are normal. She exhibits no distension. There is tenderness.   Musculoskeletal: She exhibits no edema or tenderness.   Lymphadenopathy:     She has no cervical adenopathy.   Neurological: She is alert and oriented to person, place, and time. No cranial nerve deficit or sensory deficit. She exhibits normal muscle tone. Coordination normal.   Skin: Skin is warm and dry. Capillary refill takes less than 2 seconds. No rash noted. She is not diaphoretic. No erythema.   Psychiatric: She has a normal mood and affect. Her behavior is normal. Judgment and thought content normal.   Nursing note and vitals reviewed.        CRANIAL NERVES     CN III, IV, VI   Pupils are equal, round, and reactive to light.  Extraocular motions are normal.        Significant Labs:   BMP:   Recent Labs   Lab 02/10/20  1104   *      K 4.6      CO2 24   BUN 16   CREATININE 1.0   CALCIUM 9.5     CBC:   Recent Labs   Lab 02/10/20  1104   WBC 11.70   HGB 4.6*   HCT 17.0*   *       Significant Imaging: I have reviewed all pertinent imaging results/findings within the past 24 hours.    Assessment/Plan:     * Symptomatic anemia  Patient's anemia is currently uncontrolled. S/p 1 units of PRBCs. Etiology likely GI bleed versus autoimmune hemolytic anemia  Patient has shortness of breath most likely secondary to low H&H  Will get Marquise test iron studies done  Current CBC reviewed-   Lab Results   Component Value Date    HGB 4.6 (LL) 02/10/2020    HCT 17.0 (LL) 02/10/2020     Will transfuse 1 more unit of packed red blood cells and will check hemoglobin and hematocrit  Monitor serial CBC and transfuse if patient becomes hemodynamically unstable, symptomatic or H/H drops below 7/21.       Elevated brain natriuretic peptide (BNP) level  No signs of CHF exacerbation  Will keep Lasix p.r.n. during blood transfusion  Will repeat BNP in the morning        Iron deficiency anemia  Will transfuse 1 more unit of blood  In addition Will give  IV ferric gluconate  Will consider switching to p.o. iron replacement  Most likely secondary GI bleed  Lab Results   Component Value Date    IRON 14 (L) 02/10/2020    TIBC 556 (H) 02/10/2020    FERRITIN 5 (L) 02/10/2020             Cerebrovascular accident (CVA)  History noted      Tobacco abuse  Not a current smoker  History of smoking noted      Hyperlipidemia  Will hold her home statin at this point      Hypertension  Will continue to monitor for now will hold home medications        VTE Risk Mitigation (From admission, onward)         Ordered     Place sequential compression device  Until discontinued      02/10/20 1816     IP VTE HIGH RISK PATIENT  Once      02/10/20 1816                   Justyn Forman MD  Department of Hospital Medicine   [facility]

## 2020-02-12 ENCOUNTER — ANESTHESIA EVENT (OUTPATIENT)
Dept: ENDOSCOPY | Facility: HOSPITAL | Age: 75
End: 2020-02-12
Payer: MEDICARE

## 2020-02-12 ENCOUNTER — ANESTHESIA (OUTPATIENT)
Dept: ENDOSCOPY | Facility: HOSPITAL | Age: 75
End: 2020-02-12
Payer: MEDICARE

## 2020-02-12 LAB
ANION GAP SERPL CALC-SCNC: 11 MMOL/L (ref 8–16)
ANISOCYTOSIS BLD QL SMEAR: ABNORMAL
BASOPHILS # BLD AUTO: 0.06 K/UL (ref 0–0.2)
BASOPHILS NFR BLD: 0.4 % (ref 0–1.9)
BUN SERPL-MCNC: 12 MG/DL (ref 8–23)
CALCIUM SERPL-MCNC: 10.2 MG/DL (ref 8.7–10.5)
CHLORIDE SERPL-SCNC: 104 MMOL/L (ref 95–110)
CO2 SERPL-SCNC: 23 MMOL/L (ref 23–29)
CREAT SERPL-MCNC: 0.8 MG/DL (ref 0.5–1.4)
DIFFERENTIAL METHOD: ABNORMAL
EOSINOPHIL # BLD AUTO: 0.1 K/UL (ref 0–0.5)
EOSINOPHIL NFR BLD: 0.7 % (ref 0–8)
ERYTHROCYTE [DISTWIDTH] IN BLOOD BY AUTOMATED COUNT: 22.8 % (ref 11.5–14.5)
EST. GFR  (AFRICAN AMERICAN): >60 ML/MIN/1.73 M^2
EST. GFR  (NON AFRICAN AMERICAN): >60 ML/MIN/1.73 M^2
GLUCOSE SERPL-MCNC: 80 MG/DL (ref 70–110)
HCT VFR BLD AUTO: 29.9 % (ref 37–48.5)
HGB BLD-MCNC: 8.4 G/DL (ref 12–16)
HYPOCHROMIA BLD QL SMEAR: ABNORMAL
IMM GRANULOCYTES # BLD AUTO: 0.21 K/UL (ref 0–0.04)
LYMPHOCYTES # BLD AUTO: 1.2 K/UL (ref 1–4.8)
LYMPHOCYTES NFR BLD: 8 % (ref 18–48)
MCH RBC QN AUTO: 20.1 PG (ref 27–31)
MCHC RBC AUTO-ENTMCNC: 28.1 G/DL (ref 32–36)
MCV RBC AUTO: 72 FL (ref 82–98)
MONOCYTES # BLD AUTO: 1.1 K/UL (ref 0.3–1)
MONOCYTES NFR BLD: 7.1 % (ref 4–15)
NEUTROPHILS # BLD AUTO: 12.6 K/UL (ref 1.8–7.7)
NEUTROPHILS NFR BLD: 82.4 % (ref 38–73)
NRBC BLD-RTO: 0 /100 WBC
PLATELET # BLD AUTO: 383 K/UL (ref 150–350)
PLATELET BLD QL SMEAR: ABNORMAL
PMV BLD AUTO: 9.5 FL (ref 9.2–12.9)
POLYCHROMASIA BLD QL SMEAR: ABNORMAL
POTASSIUM SERPL-SCNC: 4.9 MMOL/L (ref 3.5–5.1)
RBC # BLD AUTO: 4.18 M/UL (ref 4–5.4)
SODIUM SERPL-SCNC: 138 MMOL/L (ref 136–145)
WBC # BLD AUTO: 15.3 K/UL (ref 3.9–12.7)

## 2020-02-12 PROCEDURE — 88305 TISSUE EXAM BY PATHOLOGIST: ICD-10-PCS | Mod: 26,,, | Performed by: PATHOLOGY

## 2020-02-12 PROCEDURE — C9113 INJ PANTOPRAZOLE SODIUM, VIA: HCPCS | Performed by: INTERNAL MEDICINE

## 2020-02-12 PROCEDURE — 63600175 PHARM REV CODE 636 W HCPCS: Performed by: INTERNAL MEDICINE

## 2020-02-12 PROCEDURE — 96361 HYDRATE IV INFUSION ADD-ON: CPT | Mod: 59 | Performed by: INTERNAL MEDICINE

## 2020-02-12 PROCEDURE — 45381 COLONOSCOPY SUBMUCOUS NJX: CPT | Performed by: INTERNAL MEDICINE

## 2020-02-12 PROCEDURE — D9220A PRA ANESTHESIA: ICD-10-PCS | Mod: ,,, | Performed by: ANESTHESIOLOGY

## 2020-02-12 PROCEDURE — 88305 TISSUE EXAM BY PATHOLOGIST: CPT | Mod: 26,,, | Performed by: PATHOLOGY

## 2020-02-12 PROCEDURE — 36415 COLL VENOUS BLD VENIPUNCTURE: CPT

## 2020-02-12 PROCEDURE — 45381 PR COLONOSCPY,FLEX,W/DIR SUBMUC INJECT: ICD-10-PCS | Mod: 51,,, | Performed by: INTERNAL MEDICINE

## 2020-02-12 PROCEDURE — 00811 ANES LWR INTST NDSC NOS: CPT | Performed by: INTERNAL MEDICINE

## 2020-02-12 PROCEDURE — 45385 PR COLONOSCOPY,REMV LESN,SNARE: ICD-10-PCS | Mod: 22,,, | Performed by: INTERNAL MEDICINE

## 2020-02-12 PROCEDURE — 45385 COLONOSCOPY W/LESION REMOVAL: CPT | Performed by: INTERNAL MEDICINE

## 2020-02-12 PROCEDURE — 25000003 PHARM REV CODE 250: Performed by: HOSPITALIST

## 2020-02-12 PROCEDURE — 27200997: Performed by: INTERNAL MEDICINE

## 2020-02-12 PROCEDURE — 45381 COLONOSCOPY SUBMUCOUS NJX: CPT | Mod: 51,,, | Performed by: INTERNAL MEDICINE

## 2020-02-12 PROCEDURE — 99900035 HC TECH TIME PER 15 MIN (STAT)

## 2020-02-12 PROCEDURE — 63600175 PHARM REV CODE 636 W HCPCS: Performed by: HOSPITALIST

## 2020-02-12 PROCEDURE — 45380 COLONOSCOPY AND BIOPSY: CPT | Mod: 59,,, | Performed by: INTERNAL MEDICINE

## 2020-02-12 PROCEDURE — 80048 BASIC METABOLIC PNL TOTAL CA: CPT

## 2020-02-12 PROCEDURE — 45380 PR COLONOSCOPY,BIOPSY: ICD-10-PCS | Mod: 59,,, | Performed by: INTERNAL MEDICINE

## 2020-02-12 PROCEDURE — 37000009 HC ANESTHESIA EA ADD 15 MINS: Performed by: INTERNAL MEDICINE

## 2020-02-12 PROCEDURE — 63600175 PHARM REV CODE 636 W HCPCS: Performed by: NURSE ANESTHETIST, CERTIFIED REGISTERED

## 2020-02-12 PROCEDURE — 25000003 PHARM REV CODE 250: Performed by: INTERNAL MEDICINE

## 2020-02-12 PROCEDURE — 27201028 HC NEEDLE, SCLERO: Performed by: INTERNAL MEDICINE

## 2020-02-12 PROCEDURE — 27000221 HC OXYGEN, UP TO 24 HOURS

## 2020-02-12 PROCEDURE — 96375 TX/PRO/DX INJ NEW DRUG ADDON: CPT | Mod: 59 | Performed by: INTERNAL MEDICINE

## 2020-02-12 PROCEDURE — 37000008 HC ANESTHESIA 1ST 15 MINUTES: Performed by: INTERNAL MEDICINE

## 2020-02-12 PROCEDURE — 94761 N-INVAS EAR/PLS OXIMETRY MLT: CPT

## 2020-02-12 PROCEDURE — 85025 COMPLETE CBC W/AUTO DIFF WBC: CPT

## 2020-02-12 PROCEDURE — 96376 TX/PRO/DX INJ SAME DRUG ADON: CPT | Mod: 59 | Performed by: INTERNAL MEDICINE

## 2020-02-12 PROCEDURE — 27201012 HC FORCEPS, HOT/COLD, DISP: Performed by: INTERNAL MEDICINE

## 2020-02-12 PROCEDURE — G0378 HOSPITAL OBSERVATION PER HR: HCPCS

## 2020-02-12 PROCEDURE — 27201089 HC SNARE, DISP (ANY): Performed by: INTERNAL MEDICINE

## 2020-02-12 PROCEDURE — 88305 TISSUE EXAM BY PATHOLOGIST: CPT | Mod: 59 | Performed by: PATHOLOGY

## 2020-02-12 PROCEDURE — 45380 COLONOSCOPY AND BIOPSY: CPT | Performed by: INTERNAL MEDICINE

## 2020-02-12 PROCEDURE — D9220A PRA ANESTHESIA: Mod: ,,, | Performed by: ANESTHESIOLOGY

## 2020-02-12 PROCEDURE — 45385 COLONOSCOPY W/LESION REMOVAL: CPT | Mod: 22,,, | Performed by: INTERNAL MEDICINE

## 2020-02-12 RX ORDER — PROPOFOL 10 MG/ML
VIAL (ML) INTRAVENOUS
Status: DISCONTINUED | OUTPATIENT
Start: 2020-02-12 | End: 2020-02-12

## 2020-02-12 RX ORDER — LIDOCAINE HYDROCHLORIDE 20 MG/ML
INJECTION INTRAVENOUS
Status: DISCONTINUED | OUTPATIENT
Start: 2020-02-12 | End: 2020-02-12

## 2020-02-12 RX ADMIN — SODIUM CHLORIDE: 0.9 INJECTION, SOLUTION INTRAVENOUS at 08:02

## 2020-02-12 RX ADMIN — ACETAMINOPHEN 1000 MG: 500 TABLET ORAL at 08:02

## 2020-02-12 RX ADMIN — PROPOFOL 80 MG: 10 INJECTION, EMULSION INTRAVENOUS at 12:02

## 2020-02-12 RX ADMIN — PANTOPRAZOLE SODIUM 40 MG: 40 INJECTION, POWDER, LYOPHILIZED, FOR SOLUTION INTRAVENOUS at 08:02

## 2020-02-12 RX ADMIN — HYDRALAZINE HYDROCHLORIDE 10 MG: 20 INJECTION INTRAMUSCULAR; INTRAVENOUS at 03:02

## 2020-02-12 RX ADMIN — HYDRALAZINE HYDROCHLORIDE 10 MG: 20 INJECTION INTRAMUSCULAR; INTRAVENOUS at 11:02

## 2020-02-12 RX ADMIN — PANTOPRAZOLE SODIUM 40 MG: 40 INJECTION, POWDER, LYOPHILIZED, FOR SOLUTION INTRAVENOUS at 09:02

## 2020-02-12 RX ADMIN — LIDOCAINE HYDROCHLORIDE 75 MG: 20 INJECTION, SOLUTION INTRAVENOUS at 12:02

## 2020-02-12 RX ADMIN — DULOXETINE 60 MG: 30 CAPSULE, DELAYED RELEASE ORAL at 08:02

## 2020-02-12 NOTE — NURSING
Tap water enema completed. Pt tolerated well. Endo notified. POC updated and reviewed with pt and alvaro, . No complaints at this time. Will continue to monitor.

## 2020-02-12 NOTE — PLAN OF CARE
POC reviewed with patient. Patient verbalizes understanding. Safety and tele maintained throughout shift. NPO at midnight. Call light within reach.

## 2020-02-12 NOTE — PLAN OF CARE
Notified Dr. Rizvi that patient was not able to tolerate bowel prep. Ordered tap water enemas until patient's BM is clear.

## 2020-02-12 NOTE — ANESTHESIA POSTPROCEDURE EVALUATION
Anesthesia Post Evaluation    Patient: Maria A Smith    Procedure(s) Performed: Procedure(s) (LRB):  COLONOSCOPY (N/A)    Final Anesthesia Type: general    Patient location during evaluation: PACU  Patient participation: Yes- Able to Participate  Level of consciousness: awake and alert and oriented  Post-procedure vital signs: reviewed and stable  Pain management: adequate  Airway patency: patent    PONV status at discharge: No PONV  Anesthetic complications: no      Cardiovascular status: blood pressure returned to baseline and stable  Respiratory status: unassisted and spontaneous ventilation  Hydration status: euvolemic  Follow-up not needed.          Vitals Value Taken Time   /65 2/12/2020  1:39 PM   Temp 36.6 °C (97.9 °F) 2/12/2020 12:11 PM   Pulse 82 2/12/2020  1:39 PM   Resp 18 2/12/2020  1:39 PM   SpO2 100 % 2/12/2020  1:39 PM         No case tracking events are documented in the log.      Pain/Rylee Score: Pain Rating Prior to Med Admin: 3 (2/12/2020  8:51 AM)  Pain Rating Post Med Admin: 3 (2/11/2020  7:27 PM)  Rylee Score: 10 (2/11/2020  2:06 PM)

## 2020-02-12 NOTE — SUBJECTIVE & OBJECTIVE
Interval History:   Pt seen/examined -c/o headache  No bleeding/melena noted     Review of Systems   Constitutional: Negative for appetite change, chills, fatigue and fever.   HENT: Negative for congestion, hearing loss, rhinorrhea, sore throat, trouble swallowing and voice change.    Respiratory: Negative for cough, chest tightness and shortness of breath.    Cardiovascular: Negative for chest pain, palpitations and leg swelling.   Gastrointestinal: Negative for abdominal pain, blood in stool and diarrhea.   Genitourinary: Negative for urgency.   Skin: Negative for pallor and rash.   Neurological: Positive for weakness and headaches. Negative for tremors, seizures, syncope, speech difficulty and numbness.   Hematological: Negative for adenopathy.   Psychiatric/Behavioral: Negative for agitation and behavioral problems.     Objective:     Vital Signs (Most Recent):  Temp: 97.1 °F (36.2 °C) (02/11/20 1544)  Pulse: 88 (02/11/20 1544)  Resp: 18 (02/11/20 1544)  BP: (!) 182/83 (02/11/20 1544)  SpO2: 99 % (02/11/20 1544) Vital Signs (24h Range):  Temp:  [96.4 °F (35.8 °C)-98.4 °F (36.9 °C)] 97.1 °F (36.2 °C)  Pulse:  [79-99] 88  Resp:  [16-31] 18  SpO2:  [92 %-100 %] 99 %  BP: (141-198)/(64-94) 182/83     Weight: 53.5 kg (117 lb 15.1 oz)  Body mass index is 18.47 kg/m².    Intake/Output Summary (Last 24 hours) at 2/11/2020 1819  Last data filed at 2/11/2020 1406  Gross per 24 hour   Intake 993.33 ml   Output --   Net 993.33 ml      Physical Exam   Constitutional: She is oriented to person, place, and time. She appears well-developed and well-nourished. No distress.   HENT:   Head: Normocephalic and atraumatic.   Right Ear: External ear normal.   Left Ear: External ear normal.   Nose: Nose normal.   Mouth/Throat: Oropharynx is clear and moist.   Eyes: Pupils are equal, round, and reactive to light. Conjunctivae and EOM are normal. Right eye exhibits no discharge. Left eye exhibits no discharge. No scleral icterus.   Neck:  Normal range of motion. Neck supple. No thyromegaly present.   Cardiovascular: Normal rate, regular rhythm and intact distal pulses.   Murmur heard.  Pulmonary/Chest: Effort normal and breath sounds normal. No stridor. No respiratory distress. She has no wheezes. She has no rales.   Abdominal: Soft. Bowel sounds are normal. She exhibits no distension. There is tenderness.   Musculoskeletal: She exhibits no edema or tenderness.   Lymphadenopathy:     She has no cervical adenopathy.   Neurological: She is alert and oriented to person, place, and time. No cranial nerve deficit or sensory deficit. She exhibits normal muscle tone. Coordination normal.   Skin: Skin is warm and dry. Capillary refill takes less than 2 seconds. No rash noted. She is not diaphoretic. No erythema.   Psychiatric: She has a normal mood and affect. Her behavior is normal. Judgment and thought content normal.   Nursing note and vitals reviewed.      Significant Labs:   BMP:   Recent Labs   Lab 02/10/20  1104   *      K 4.6      CO2 24   BUN 16   CREATININE 1.0   CALCIUM 9.5     CBC:   Recent Labs   Lab 02/10/20  1104 02/10/20  2220 02/11/20  0548 02/11/20  1139   WBC 11.70  --  12.22 14.54*   HGB 4.6* 5.7* 8.0* 7.9*   HCT 17.0* 21.8* 28.3* 27.3*   *  --  380* 382*       Significant Imaging: I have reviewed and interpreted all pertinent imaging results/findings within the past 24 hours.

## 2020-02-12 NOTE — PLAN OF CARE
02/12/20 0728   Patient Assessment/Suction   Level of Consciousness (AVPU) alert   Respiratory Effort Unlabored   All Lung Fields Breath Sounds diminished;clear   Rhythm/Pattern, Respiratory no shortness of breath reported   PRE-TX-O2   O2 Device (Oxygen Therapy) nasal cannula   $ Is the patient on Low Flow Oxygen? Yes   Flow (L/min) 2   SpO2 98 %   Pulse Oximetry Type Intermittent   $ Pulse Oximetry - Multiple Charge Pulse Oximetry - Multiple   Pulse 87   Resp 18   Aerosol Therapy   $ Aerosol Therapy Charges PRN treatment not required

## 2020-02-12 NOTE — NURSING
Pt not able to tolerate bowel prep.  Pt became sob and nauseated.  NP notified will on unit making rounds.

## 2020-02-12 NOTE — PLAN OF CARE
NAD noted. POC reviewed w pt and they verbalized understanding.  Tele [id number].  Pt free from falls.   Bed in low position, side rails up X2, bed alarm on, wheels locked, call light in reach. Will continue to monitor.

## 2020-02-12 NOTE — PROGRESS NOTES
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  Patient states that shortness of breath as the main reason she went to the emergency room.  States that she has had dark black stools in the past but nothing recent.  Patient states that recently she had a pinkish color in her stool denies any fresh red blood.  Patient denies chest pain, palpitations, fever, chills.  Patient is a poor historian and limited history available this point.  When evaluated in the emergency room at [facility], she was Afebrile with pulse 90, respirations 28 with 98% saturation on ambient air. Chest x-ray significant for interstitial changes without consolidation; . Initial lab workup revealed hemoglobin 4.6 (baseline 9) with MCV 61, RDW 20, platelets 459.  B12 173. Retic count 2.4. D-dimer 0.76. She was given 125 mg IV Solu-Medrol and DuoNeb treatment with improvement in shortness of breath. 1u PRBC administered.    Overview/Hospital Course:  No notes on file    Interval History:   Pt seen/examined -c/o headache  No bleeding/melena noted     Review of Systems   Constitutional: Negative for appetite change, chills, fatigue and fever.   HENT: Negative for congestion, hearing loss, rhinorrhea, sore throat, trouble swallowing and voice change.    Respiratory: Negative for cough, chest tightness and shortness of breath.    Cardiovascular: Negative for chest pain, palpitations and leg swelling.   Gastrointestinal: Negative for abdominal pain, blood in stool and  diarrhea.   Genitourinary: Negative for urgency.   Skin: Negative for pallor and rash.   Neurological: Positive for weakness and headaches. Negative for tremors, seizures, syncope, speech difficulty and numbness.   Hematological: Negative for adenopathy.   Psychiatric/Behavioral: Negative for agitation and behavioral problems.     Objective:     Vital Signs (Most Recent):  Temp: 97.1 °F (36.2 °C) (02/11/20 1544)  Pulse: 88 (02/11/20 1544)  Resp: 18 (02/11/20 1544)  BP: (!) 182/83 (02/11/20 1544)  SpO2: 99 % (02/11/20 1544) Vital Signs (24h Range):  Temp:  [96.4 °F (35.8 °C)-98.4 °F (36.9 °C)] 97.1 °F (36.2 °C)  Pulse:  [79-99] 88  Resp:  [16-31] 18  SpO2:  [92 %-100 %] 99 %  BP: (141-198)/(64-94) 182/83     Weight: 53.5 kg (117 lb 15.1 oz)  Body mass index is 18.47 kg/m².    Intake/Output Summary (Last 24 hours) at 2/11/2020 1819  Last data filed at 2/11/2020 1406  Gross per 24 hour   Intake 993.33 ml   Output --   Net 993.33 ml      Physical Exam   Constitutional: She is oriented to person, place, and time. She appears well-developed and well-nourished. No distress.   HENT:   Head: Normocephalic and atraumatic.   Right Ear: External ear normal.   Left Ear: External ear normal.   Nose: Nose normal.   Mouth/Throat: Oropharynx is clear and moist.   Eyes: Pupils are equal, round, and reactive to light. Conjunctivae and EOM are normal. Right eye exhibits no discharge. Left eye exhibits no discharge. No scleral icterus.   Neck: Normal range of motion. Neck supple. No thyromegaly present.   Cardiovascular: Normal rate, regular rhythm and intact distal pulses.   Murmur heard.  Pulmonary/Chest: Effort normal and breath sounds normal. No stridor. No respiratory distress. She has no wheezes. She has no rales.   Abdominal: Soft. Bowel sounds are normal. She exhibits no distension. There is tenderness.   Musculoskeletal: She exhibits no edema or tenderness.   Lymphadenopathy:     She has no cervical adenopathy.   Neurological:  She is alert and oriented to person, place, and time. No cranial nerve deficit or sensory deficit. She exhibits normal muscle tone. Coordination normal.   Skin: Skin is warm and dry. Capillary refill takes less than 2 seconds. No rash noted. She is not diaphoretic. No erythema.   Psychiatric: She has a normal mood and affect. Her behavior is normal. Judgment and thought content normal.   Nursing note and vitals reviewed.      Significant Labs:   BMP:   Recent Labs   Lab 02/10/20  1104   *      K 4.6      CO2 24   BUN 16   CREATININE 1.0   CALCIUM 9.5     CBC:   Recent Labs   Lab 02/10/20  1104 02/10/20  2220 02/11/20  0548 02/11/20  1139   WBC 11.70  --  12.22 14.54*   HGB 4.6* 5.7* 8.0* 7.9*   HCT 17.0* 21.8* 28.3* 27.3*   *  --  380* 382*       Significant Imaging: I have reviewed and interpreted all pertinent imaging results/findings within the past 24 hours.      Assessment/Plan:      * Symptomatic anemia  Patient's anemia is currently uncontrolled. S/p 1 units of PRBCs. Etiology likely GI bleed versus autoimmune hemolytic anemia  Patient has shortness of breath most likely secondary to low H&H  Will get Marquise test iron studies done  Current CBC reviewed-   Lab Results   Component Value Date    HGB 7.9 (L) 02/11/2020    HCT 27.3 (L) 02/11/2020     Will transfuse 1 more unit of packed red blood cells and will check hemoglobin and hematocrit  Monitor serial CBC and transfuse if patient becomes hemodynamically unstable, symptomatic or H/H drops below 7/21.       Elevated brain natriuretic peptide (BNP) level  No signs of CHF exacerbation  Will keep Lasix p.r.n. during blood transfusion  Will repeat BNP in the morning        Iron deficiency anemia  Will transfuse 1 more unit of blood  In addition Will give IV ferric gluconate  Will consider switching to p.o. iron replacement  Most likely secondary GI bleed  Lab Results   Component Value Date    IRON 14 (L) 02/10/2020    TIBC 556 (H)  02/10/2020    FERRITIN 5 (L) 02/10/2020             Cerebrovascular accident (CVA)  History noted      Tobacco abuse  Not a current smoker  History of smoking noted      Hyperlipidemia  Will hold her home statin at this point      Hypertension  Will continue to monitor for now will hold home medications        VTE Risk Mitigation (From admission, onward)         Ordered     Place sequential compression device  Until discontinued      02/10/20 1816     IP VTE HIGH RISK PATIENT  Once      02/10/20 1816                      Connie Linares MD  Department of Hospital Medicine   [facility]

## 2020-02-12 NOTE — ASSESSMENT & PLAN NOTE
Patient's anemia is currently uncontrolled. S/p 1 units of PRBCs. Etiology likely GI bleed versus autoimmune hemolytic anemia  Patient has shortness of breath most likely secondary to low H&H  Will get Marquise test iron studies done  Current CBC reviewed-   Lab Results   Component Value Date    HGB 7.9 (L) 02/11/2020    HCT 27.3 (L) 02/11/2020     Will transfuse 1 more unit of packed red blood cells and will check hemoglobin and hematocrit  Monitor serial CBC and transfuse if patient becomes hemodynamically unstable, symptomatic or H/H drops below 7/21.

## 2020-02-12 NOTE — PROGRESS NOTES
Colonoscopy done.  Polyps removed, one of which was large and required cautery.  Recommend holding Plavix for 3 more days.  If no bleeding then can resume after that.  Recommend repeat colonoscopy in 3 months with Plavix held x 5 days before that procedure given piecemeal polypectomy.  Clear liquid diet today and advance tomorrow.  Will follow.  If above all unrevealing, then will need small bowel evaluation for RISSA.

## 2020-02-12 NOTE — TRANSFER OF CARE
"Anesthesia Transfer of Care Note    Patient: Maria A Smith    Procedure(s) Performed: Procedure(s) (LRB):  COLONOSCOPY (N/A)    Patient location: GI    Anesthesia Type: general    Transport from OR: Transported from OR on room air with adequate spontaneous ventilation    Post pain: adequate analgesia    Post assessment: no apparent anesthetic complications and tolerated procedure well    Post vital signs: stable    Level of consciousness: awake, alert and oriented    Nausea/Vomiting: no nausea/vomiting    Complications: none    Transfer of care protocol was followed      Last vitals:   Visit Vitals  /65   Pulse 82   Temp 36.6 °C (97.9 °F) (Skin)   Resp 18   Ht 5' 7" (1.702 m)   Wt 47.2 kg (104 lb)   SpO2 100%   Breastfeeding? No   BMI 16.29 kg/m²     "

## 2020-02-12 NOTE — RESPIRATORY THERAPY
02/11/20 2016   PRE-TX-O2   O2 Device (Oxygen Therapy) nasal cannula   $ Is the patient on Low Flow Oxygen? Yes   Flow (L/min) 2   Oxygen Concentration (%) 28   SpO2 98 %   Pulse Oximetry Type Intermittent   Pulse 88   Resp 18   Aerosol Therapy   $ Aerosol Therapy Charges PRN treatment not required   Respiratory Treatment Status (SVN) PRN treatment not required   Ready to Wean/Extubation Screen   FIO2<=50 (chart decimal) 0.28

## 2020-02-12 NOTE — [employer] PATIENT INSTRUCTIONS
Discharge Summary/Instructions after an Endoscopic Procedure  Patient Name: Maria A Smith  Patient MRN: [id number]  Patient YOB: 1945  Wednesday, February 12, 2020  Evelio Santos MD  RESTRICTIONS:  During your procedure today, you received medications for sedation.  These   medications may affect your judgment, balance and coordination.  Therefore,   for 24 hours, you have the following restrictions:   - DO NOT drive a car, operate machinery, make legal/financial decisions,   sign important papers or drink alcohol.    ACTIVITY:  Today: no heavy lifting, straining or running due to procedural   sedation/anesthesia.  The following day: return to full activity including work.  DIET:  Eat and drink normally unless instructed otherwise.     TREATMENT FOR COMMON SIDE EFFECTS:  - Mild abdominal pain, nausea, belching, bloating or excessive gas:  rest,   eat lightly and use a heating pad.  - Sore Throat: treat with throat lozenges and/or gargle with warm salt   water.  - Because air was used during the procedure, expelling large amounts of air   from your rectum or belching is normal.  - If a bowel prep was taken, you may not have a bowel movement for 1-3 days.    This is normal.  SYMPTOMS TO WATCH FOR AND REPORT TO YOUR PHYSICIAN:  1. Abdominal pain or bloating, other than gas cramps.  2. Chest pain.  3. Back pain.  4. Signs of infection such as: chills or fever occurring within 24 hours   after the procedure.  5. Rectal bleeding, which would show as bright red, maroon, or black stools.   (A tablespoon of blood from the rectum is not serious, especially if   hemorrhoids are present.)  6. Vomiting.  7. Weakness or dizziness.  GO DIRECTLY TO THE NEAREST EMERGENCY ROOM IF YOU HAVE ANY OF THE FOLLOWING:      Difficulty breathing              Chills and/or fever over 101 F   Persistent vomiting and/or vomiting blood   Severe abdominal pain   Severe chest pain   Black, tarry stools   Bleeding- more than one  tablespoon   Any other symptom or condition that you feel may need urgent attention  Your doctor recommends these additional instructions:  If any biopsies were taken, your doctors clinic will contact you in 1 to 2   weeks with any results.  - Return patient to hospital rolon for ongoing care.   - Clear liquid diet.   - Continue present medications.   - Discontinue Plavix (clopidogrel) for 3 days.   - Await pathology results.   - Repeat colonoscopy in 3 months for surveillance after piecemeal   polypectomy.  For questions, problems or results please call your physician - Evelio Santos MD at Work:  [phone].  [facility], EMERGENCY ROOM PHONE NUMBER: [phone]  IF A COMPLICATION OR EMERGENCY SITUATION ARISES AND YOU ARE UNABLE TO REACH   YOUR PHYSICIAN - GO DIRECTLY TO THE EMERGENCY ROOM.  Evleio Santos MD  2/12/2020 1:41:49 PM  This report has been verified and signed electronically.  [employer]

## 2020-02-13 VITALS
TEMPERATURE: 97 F | WEIGHT: 104 LBS | HEART RATE: 89 BPM | BODY MASS INDEX: 16.32 KG/M2 | OXYGEN SATURATION: 99 % | RESPIRATION RATE: 20 BRPM | DIASTOLIC BLOOD PRESSURE: 69 MMHG | SYSTOLIC BLOOD PRESSURE: 158 MMHG | HEIGHT: 67 IN

## 2020-02-13 PROBLEM — D64.9 SYMPTOMATIC ANEMIA: Status: RESOLVED | Noted: 2020-02-10 | Resolved: 2020-02-13

## 2020-02-13 PROBLEM — K63.5 POLYP OF COLON: Status: ACTIVE | Noted: 2020-02-13

## 2020-02-13 LAB
ANION GAP SERPL CALC-SCNC: 14 MMOL/L (ref 8–16)
BASOPHILS # BLD AUTO: 0.04 K/UL (ref 0–0.2)
BASOPHILS NFR BLD: 0.4 % (ref 0–1.9)
BUN SERPL-MCNC: 15 MG/DL (ref 8–23)
CALCIUM SERPL-MCNC: 9.5 MG/DL (ref 8.7–10.5)
CHLORIDE SERPL-SCNC: 107 MMOL/L (ref 95–110)
CO2 SERPL-SCNC: 16 MMOL/L (ref 23–29)
CREAT SERPL-MCNC: 0.8 MG/DL (ref 0.5–1.4)
DIFFERENTIAL METHOD: ABNORMAL
EOSINOPHIL # BLD AUTO: 0.2 K/UL (ref 0–0.5)
EOSINOPHIL NFR BLD: 1.9 % (ref 0–8)
ERYTHROCYTE [DISTWIDTH] IN BLOOD BY AUTOMATED COUNT: 23.4 % (ref 11.5–14.5)
EST. GFR  (AFRICAN AMERICAN): >60 ML/MIN/1.73 M^2
EST. GFR  (NON AFRICAN AMERICAN): >60 ML/MIN/1.73 M^2
GLUCOSE SERPL-MCNC: 59 MG/DL (ref 70–110)
HCT VFR BLD AUTO: 28.9 % (ref 37–48.5)
HGB BLD-MCNC: 8.1 G/DL (ref 12–16)
IMM GRANULOCYTES # BLD AUTO: 0.15 K/UL (ref 0–0.04)
LYMPHOCYTES # BLD AUTO: 1.3 K/UL (ref 1–4.8)
LYMPHOCYTES NFR BLD: 12.6 % (ref 18–48)
MCH RBC QN AUTO: 20.3 PG (ref 27–31)
MCHC RBC AUTO-ENTMCNC: 28 G/DL (ref 32–36)
MCV RBC AUTO: 72 FL (ref 82–98)
MONOCYTES # BLD AUTO: 0.7 K/UL (ref 0.3–1)
MONOCYTES NFR BLD: 6.7 % (ref 4–15)
NEUTROPHILS # BLD AUTO: 8.1 K/UL (ref 1.8–7.7)
NEUTROPHILS NFR BLD: 77 % (ref 38–73)
NRBC BLD-RTO: 0 /100 WBC
PLATELET # BLD AUTO: 370 K/UL (ref 150–350)
PMV BLD AUTO: 9.9 FL (ref 9.2–12.9)
POTASSIUM SERPL-SCNC: 4.2 MMOL/L (ref 3.5–5.1)
RBC # BLD AUTO: 3.99 M/UL (ref 4–5.4)
SODIUM SERPL-SCNC: 137 MMOL/L (ref 136–145)
WBC # BLD AUTO: 10.47 K/UL (ref 3.9–12.7)

## 2020-02-13 PROCEDURE — 94760 N-INVAS EAR/PLS OXIMETRY 1: CPT

## 2020-02-13 PROCEDURE — 99214 PR OFFICE/OUTPT VISIT, EST, LEVL IV, 30-39 MIN: ICD-10-PCS | Mod: ,,, | Performed by: INTERNAL MEDICINE

## 2020-02-13 PROCEDURE — 80048 BASIC METABOLIC PNL TOTAL CA: CPT

## 2020-02-13 PROCEDURE — 99214 OFFICE O/P EST MOD 30 MIN: CPT | Mod: ,,, | Performed by: INTERNAL MEDICINE

## 2020-02-13 PROCEDURE — 36415 COLL VENOUS BLD VENIPUNCTURE: CPT

## 2020-02-13 PROCEDURE — 25000003 PHARM REV CODE 250: Performed by: INTERNAL MEDICINE

## 2020-02-13 PROCEDURE — G0378 HOSPITAL OBSERVATION PER HR: HCPCS

## 2020-02-13 PROCEDURE — C9113 INJ PANTOPRAZOLE SODIUM, VIA: HCPCS | Performed by: INTERNAL MEDICINE

## 2020-02-13 PROCEDURE — 96365 THER/PROPH/DIAG IV INF INIT: CPT | Mod: 59 | Performed by: INTERNAL MEDICINE

## 2020-02-13 PROCEDURE — 99900035 HC TECH TIME PER 15 MIN (STAT)

## 2020-02-13 PROCEDURE — 94761 N-INVAS EAR/PLS OXIMETRY MLT: CPT

## 2020-02-13 PROCEDURE — 27000221 HC OXYGEN, UP TO 24 HOURS

## 2020-02-13 PROCEDURE — 63600175 PHARM REV CODE 636 W HCPCS: Performed by: INTERNAL MEDICINE

## 2020-02-13 PROCEDURE — 25000003 PHARM REV CODE 250: Performed by: HOSPITALIST

## 2020-02-13 PROCEDURE — 96376 TX/PRO/DX INJ SAME DRUG ADON: CPT | Mod: 59 | Performed by: INTERNAL MEDICINE

## 2020-02-13 PROCEDURE — 96366 THER/PROPH/DIAG IV INF ADDON: CPT | Mod: 59 | Performed by: INTERNAL MEDICINE

## 2020-02-13 PROCEDURE — 85025 COMPLETE CBC W/AUTO DIFF WBC: CPT

## 2020-02-13 RX ORDER — CLOPIDOGREL BISULFATE 75 MG/1
75 TABLET ORAL DAILY
Qty: 30 TABLET | Refills: 5 | Status: ON HOLD
Start: 2020-02-14 | End: 2023-03-24 | Stop reason: SDUPTHER

## 2020-02-13 RX ORDER — FERROUS SULFATE 325(65) MG
325 TABLET, DELAYED RELEASE (ENTERIC COATED) ORAL 2 TIMES DAILY
Status: DISCONTINUED | OUTPATIENT
Start: 2020-02-13 | End: 2020-02-13 | Stop reason: HOSPADM

## 2020-02-13 RX ORDER — FERROUS SULFATE 325(65) MG
325 TABLET, DELAYED RELEASE (ENTERIC COATED) ORAL 2 TIMES DAILY
Qty: 60 TABLET | Refills: 0 | Status: ON HOLD | OUTPATIENT
Start: 2020-02-13 | End: 2023-03-24 | Stop reason: SDUPTHER

## 2020-02-13 RX ADMIN — PANTOPRAZOLE SODIUM 40 MG: 40 INJECTION, POWDER, LYOPHILIZED, FOR SOLUTION INTRAVENOUS at 09:02

## 2020-02-13 RX ADMIN — FERROUS SULFATE TAB EC 325 MG (65 MG FE EQUIVALENT) 325 MG: 325 (65 FE) TABLET DELAYED RESPONSE at 09:02

## 2020-02-13 RX ADMIN — IRON SUCROSE 200 MG: 20 INJECTION, SOLUTION INTRAVENOUS at 02:02

## 2020-02-13 RX ADMIN — HYDRALAZINE HYDROCHLORIDE 10 MG: 20 INJECTION INTRAMUSCULAR; INTRAVENOUS at 11:02

## 2020-02-13 RX ADMIN — ACETAMINOPHEN 1000 MG: 500 TABLET ORAL at 10:02

## 2020-02-13 RX ADMIN — DULOXETINE 60 MG: 30 CAPSULE, DELAYED RELEASE ORAL at 09:02

## 2020-02-13 NOTE — ASSESSMENT & PLAN NOTE
Will give IV ferric gluconate  Will consider switching to p.o. iron replacement  Most likely secondary GI bleed  Lab Results   Component Value Date    IRON 14 (L) 02/10/2020    TIBC 556 (H) 02/10/2020    FERRITIN 5 (L) 02/10/2020

## 2020-02-13 NOTE — DISCHARGE SUMMARY
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  Patient states that shortness of breath as the main reason she went to the emergency room.  States that she has had dark black stools in the past but nothing recent.  Patient states that recently she had a pinkish color in her stool denies any fresh red blood.  Patient denies chest pain, palpitations, fever, chills.  Patient is a poor historian and limited history available this point.  When evaluated in the emergency room at [facility], she was Afebrile with pulse 90, respirations 28 with 98% saturation on ambient air. Chest x-ray significant for interstitial changes without consolidation; . Initial lab workup revealed hemoglobin 4.6 (baseline 9) with MCV 61, RDW 20, platelets 459.  B12 173. Retic count 2.4. D-dimer 0.76. She was given 125 mg IV Solu-Medrol and DuoNeb treatment with improvement in shortness of breath. 1u PRBC administered.    Procedure(s) (LRB):  COLONOSCOPY (N/A)      Hospital Course:   Patient presented to the hospital with hemoglobin of 4.6 (baseline 9) with MCV 61, RDW 20, platelets 459.  B12 173. Retic count 2.4. D-dimer 0.76.  Patient was transfuse 1 unit and then H&H has  Repeated is a 5.7 and 21.8 the patient was transfused another unit of blood.  Patient became tachypneic and was given 1 dose of Lasix after which his breathing improved.  GI was consulted EGD was done.  Showed  Atrophic gastritis noted with some ulceration but no bleeding.   Biopsies  was taken.  Bowel prep was given which the patient did not complete.  Enema was given. Colonoscopy was done.  Polyps removed, one of which was large and required cautery.  Recommend ed holding Plavix for 3 more days.  If no bleeding then can resume after that.  Recommend ed repeat colonoscopy in 3 months with Plavix held x 5 days before that procedure given piecemeal polypectomy.    restarted on Clear liquid diet  and advance the next.    patient H&H remained stable GI cleared the patient for discharge with outpatient follow-up     Consults:   Consults (From admission, onward)        Status Ordering Provider     Inpatient consult to Gastroenterology  Once     Provider:  Evelio Rizvi MD    Completed GIRISH HANDLEY          No new Assessment & Plan notes have been filed under this hospital service since the last note was generated.  Service: Hospital Medicine    Final Active Diagnoses:    Diagnosis Date Noted POA    Polyp of colon [K63.5] 02/13/2020 Yes    Iron deficiency anemia [D50.9] 02/10/2020 Yes    Elevated brain natriuretic peptide (BNP) level [R79.89] 02/10/2020 Yes    Tobacco abuse [Z72.0] 06/29/2018 Yes    Cerebrovascular accident (CVA) [I63.9] 06/29/2018 Yes    Hypertension [I10] 11/16/2017 Yes    Hyperlipidemia [E78.5] 11/16/2017 Yes      Problems Resolved During this Admission:    Diagnosis Date Noted Date Resolved POA    PRINCIPAL PROBLEM:  Symptomatic anemia [D64.9] 02/10/2020 02/13/2020 Yes       Discharged Condition: stable    Disposition: Home or Self Care    Follow Up:  Follow-up Information     Sneha Peguero MD In 1 week.    Specialty:  Internal Medicine  Contact information:  [address]  [address]  [place] [address] [phone]             Evelio Rizvi MD In 4 weeks.    Specialty:  Gastroenterology  Contact information:  [address]  [address]  [place] [address] [phone]                 Patient Instructions:      CBC auto differential    Standing Status: Future Standing Exp. Date: 04/13/21     CBC auto differential   Standing Status: Future Standing Exp. Date: 04/13/21     Diet Cardiac     Notify your health care provider if you experience any of the following:  temperature >100.4     Notify your health care provider if you experience any of the following:  severe uncontrolled pain     Activity as tolerated       Significant Diagnostic Studies: Labs:   BMP:   Recent Labs   Lab 02/12/20  0806 02/13/20  1150   GLU 80 59*    137   K 4.9 4.2    107   CO2 23 16*   BUN 12 15   CREATININE 0.8 0.8   CALCIUM 10.2 9.5   , CBC   Recent Labs   Lab 02/12/20  0806 02/13/20  1150   WBC 15.30* 10.47   HGB 8.4* 8.1*   HCT 29.9* 28.9*   * 370*    and INR   Lab Results   Component Value Date    INR 1.0 02/10/2020       Pending Diagnostic Studies:     Procedure Component Value Units Date/Time    Specimen to Pathology, Surgery Gastrointestinal tract [[id number]] Collected:  02/12/20 1337    Order Status:  Sent Lab Status:  In process Updated:  02/12/20 1439    Specimen to Pathology, Surgery Gastrointestinal tract [[id number]] Collected:  02/11/20 1418    Order Status:  Sent Lab Status:  In process Updated:  02/11/20 1529         Medications:  Reconciled Home Medications:      Medication List      START taking these medications    ferrous sulfate 325 (65 FE) MG EC tablet  Take 1 tablet (325 mg total) by mouth 2 (two) times daily.        CONTINUE taking these medications    atorvastatin 80 MG tablet  Commonly known as:  LIPITOR  Take 80 mg by mouth once daily.     clopidogreL 75 mg tablet  Commonly known as:  PLAVIX  Take 1 tablet (75 mg total) by mouth once daily.  Start taking on:  February 14, 2020     DULoxetine 60 MG capsule  Commonly known as:  CYMBALTA  Take 1 capsule (60 mg total) by mouth once daily.     lisinopril 20 MG tablet  Commonly known as:  PRINIVIL,ZESTRIL  Take 20 mg by mouth once daily.     pantoprazole 40 MG tablet  Commonly known  as:  PROTONIX  Take 1 tablet (40 mg total) by mouth once daily.            Indwelling Lines/Drains at time of discharge:   Lines/Drains/Airways     None                 Time spent on the discharge of patient: 60 minutes  Patient was seen and examined on the date of discharge and determined to be suitable for discharge.         Justyn Forman MD  Department of Hospital Medicine  [facility]

## 2020-02-13 NOTE — PLAN OF CARE
02/13/20 0720   Patient Assessment/Suction   Level of Consciousness (AVPU) alert   Respiratory Effort Unlabored   Expansion/Accessory Muscles/Retractions no use of accessory muscles   All Lung Fields Breath Sounds diminished   Rhythm/Pattern, Respiratory no shortness of breath reported;unlabored;pattern regular;depth regular   PRE-TX-O2   O2 Device (Oxygen Therapy) nasal cannula   $ Is the patient on Low Flow Oxygen? Yes   Flow (L/min) 2   SpO2 98 %   Pulse Oximetry Type Intermittent   $ Pulse Oximetry - Multiple Charge Pulse Oximetry - Multiple   Pulse 84   Resp 19   Aerosol Therapy   $ Aerosol Therapy Charges PRN treatment not required

## 2020-02-13 NOTE — PROGRESS NOTES
"[facility] Note    CC: Anemia    HPI 74 y.o. female presenting with shortness of breath. EMS was called and patient was found to be hypoxic on arrival with 80% room air.  Patient does admit to long smoking history.  HPI difficult to obtain on arrival secondary to patient being on BiPAP and in moderate respiratory distress.       Patient has a past medical history of Hypertension, Squamous cell carcinoma (01/19/2017), and Stroke.  Patient has a past surgical history that includes Colonoscopy (N/A, 8/7/2019).     ADDITIONAL HISTORY:  Above obtained from chart review from Dr. Lara.  In addition on my interview, patient has anemia, recently worse, moderate to severe, with no associated overt GI bleeding and with heme negative stool on exam.  Indices are, however, consistent with RISSA.  She has some aphasia secondary to past CVA but is able to communicate.  She denies BRBPR, black stool, or emesis currently but states that she did have episode of hematemesis about 4 months ago.  She has not had EGD.  Last colonoscopy over a year ago with Dr. Degroot and was unremarkable.  No other acute complaints.  Patient is on DAPT with last dose 3 days ago.      INTERVAL HISTORY:  Patient had colonoscopy with intervention.  See report for details.  No further bleeding.  BMs normal.  Tolerating PO.  No complaints.  Case discussed with nursing regarding resumption of diet and potential discharge home.  No new events overnight.    Past Medical History:   Diagnosis Date    Hypertension     Squamous cell carcinoma 01/19/2017    right forearm ([facility]  type)     Stroke          Review of Systems  General ROS: negative for - chills, fever or weight loss  Cardiovascular ROS: no chest pain or dyspnea on exertion  Gastrointestinal ROS: no abdominal pain or bleeding.    Physical Examination  BP (!) 158/69   Pulse 89   Temp 97.2 °F (36.2 °C) (Oral)   Resp 20   Ht 5' 7" (1.702 m)   Wt 47.2 kg (104 lb)   SpO2 99%   " Breastfeeding? No   BMI 16.29 kg/m²   General appearance: alert, cooperative, no distress, cachectic female  HENT: Normocephalic, atraumatic, neck symmetrical, no nasal discharge   Eyes: conjunctivae/corneas clear, PERRL, EOM's intact, sclera anicteric  Lungs: coarse to auscultation bilaterally, no dullness to percussion bilaterally, symmetric expansion, breathing unlabored  Heart: regular rate and rhythm without rub; no displacement of the PMI   Abdomen: scaphoid, NT  Extremities: extremities symmetric; no clubbing, cyanosis, or edema  Integument: Skin color, texture, turgor normal; no rashes; hair distrubution normal, no jaundice  Neurologic: Alert and oriented X 3, + mild aphasia  Psychiatric: no pressured speech; normal affect; no evidence of impaired cognition, no anxiety/depression       Labs:  Lab Results   Component Value Date    WBC 10.47 02/13/2020    HGB 8.1 (L) 02/13/2020    HCT 28.9 (L) 02/13/2020    MCV 72 (L) 02/13/2020     (H) 02/13/2020           Assessment:   1.  RISSA  2.  Aphasia  3.  H/O CVA  4.  SOB  5.  Hematemesis - remote episode  6.  DAPT  7.  Colon polyps    Plan:  1.  Continue diet  2.  OK to resume Plavix tomorrow  3.  Follow up in my office in 4 weeks.  4.  Patient will need repeat colonoscopy in 3-6 months secondary to piecemeal polypectomy  5.  GI to sign off.  Call for questions.    Evelio Santos MD  [facility]  [address], [address]  [place] [address]  Office: [phone]  Fax: [phone]

## 2020-02-13 NOTE — ASSESSMENT & PLAN NOTE
Patient's anemia is currently controlled. S/p 2 units of PRBCs. Etiology likely GI bleed   Patient has shortness of breath most likely secondary to low H&H  And study shows iron deficiency anemia  Will replace iron with IV iron and then with p.o. Iron  H&H improved after 2 units   Current CBC reviewed-   Monitor serial CBC and transfuse if patient becomes hemodynamically unstable, symptomatic or H/H drops below 7/21.  Scheduled for a colonoscopy today but could not complete the bowel prep

## 2020-02-13 NOTE — HOSPITAL COURSE
Patient presented to the hospital with hemoglobin of 4.6 (baseline 9) with MCV 61, RDW 20, platelets 459.  B12 173. Retic count 2.4. D-dimer 0.76.  Patient was transfuse 1 unit and then H&H has  Repeated is a 5.7 and 21.8 the patient was transfused another unit of blood.  Patient became tachypneic and was given 1 dose of Lasix after which his breathing improved.  GI was consulted EGD was done.  Showed  Atrophic gastritis noted with some ulceration but no bleeding.  Biopsies was taken.  Bowel prep was given which the patient did not complete.  Enema was given. Colonoscopy was done.  Polyps removed, one of which was large and required cautery.  Recommended holding Plavix for 3 more days.  If no bleeding then can resume after that.  Recommended repeat colonoscopy in 3 months with Plavix held x 5 days before that procedure given piecemeal polypectomy.   restarted on Clear liquid diet  and advance the next.   patient H&H remained stable GI cleared the patient for discharge with outpatient follow-up

## 2020-02-13 NOTE — SUBJECTIVE & OBJECTIVE
Interval History:  Patient states last night she had nausea vomiting especially after and the bowel prep.  Could not complete the bowel prep.  No  more signs of bleeding no abdominal pain.    Review of Systems   Constitutional: Negative for appetite change, chills, fatigue and fever.   HENT: Negative for congestion, hearing loss, rhinorrhea, sore throat, trouble swallowing and voice change.    Respiratory: Negative for cough, chest tightness and shortness of breath.    Cardiovascular: Negative for chest pain, palpitations and leg swelling.   Gastrointestinal: Negative for abdominal pain, blood in stool and diarrhea.   Genitourinary: Negative for urgency.   Skin: Negative for pallor and rash.   Neurological: Positive for weakness and headaches. Negative for tremors, seizures, syncope, speech difficulty and numbness.   Hematological: Negative for adenopathy.   Psychiatric/Behavioral: Negative for agitation and behavioral problems.     Objective:     Vital Signs (Most Recent):  Temp: 98.1 °F (36.7 °C) (02/13/20 0354)  Pulse: 95 (02/13/20 0354)  Resp: 18 (02/13/20 0354)  BP: (!) 141/71 (02/13/20 0354)  SpO2: 98 % (02/13/20 0354) Vital Signs (24h Range):  Temp:  [96.6 °F (35.9 °C)-98.6 °F (37 °C)] 98.1 °F (36.7 °C)  Pulse:  [] 95  Resp:  [17-18] 18  SpO2:  [95 %-100 %] 98 %  BP: (139-223)/(65-93) 141/71     Weight: 47.2 kg (104 lb)  Body mass index is 16.29 kg/m².    Intake/Output Summary (Last 24 hours) at 2/13/2020 0608  Last data filed at 2/12/2020 0800  Gross per 24 hour   Intake 120 ml   Output --   Net 120 ml      Physical Exam   Constitutional: She is oriented to person, place, and time. She appears well-developed and well-nourished. No distress.   HENT:   Head: Normocephalic and atraumatic.   Right Ear: External ear normal.   Left Ear: External ear normal.   Nose: Nose normal.   Mouth/Throat: Oropharynx is clear and moist.   Eyes: Pupils are equal, round, and reactive to light. Conjunctivae and EOM are  normal. Right eye exhibits no discharge. Left eye exhibits no discharge. No scleral icterus.   Neck: Normal range of motion. Neck supple. No thyromegaly present.   Cardiovascular: Normal rate, regular rhythm and intact distal pulses.   Murmur heard.  Pulmonary/Chest: Effort normal and breath sounds normal. No stridor. No respiratory distress. She has no wheezes. She has no rales.   Abdominal: Soft. Bowel sounds are normal. She exhibits no distension. There is no tenderness.   Musculoskeletal: She exhibits no edema or tenderness.   Lymphadenopathy:     She has no cervical adenopathy.   Neurological: She is alert and oriented to person, place, and time. No cranial nerve deficit or sensory deficit. She exhibits normal muscle tone. Coordination normal.   Skin: Skin is warm and dry. Capillary refill takes less than 2 seconds. No rash noted. She is not diaphoretic. No erythema.   Psychiatric: She has a normal mood and affect. Her behavior is normal. Judgment and thought content normal.   Nursing note and vitals reviewed.      Significant Labs: All pertinent labs within the past 24 hours have been reviewed.    Significant Imaging: I have reviewed all pertinent imaging results/findings within the past 24 hours.

## 2020-02-13 NOTE — DISCHARGE INSTRUCTIONS
Thank you for choosing [facility] for your medical care. The primary doctor who is taking care of you at the time of your discharge is Justyn Forman MD.     You were admitted to the hospital with Symptomatic anemia.     Please note your discharge instructions, including diet/activity restrictions, follow-up appointments, and medication changes.  If you have any questions about your medical issues, prescriptions, or any other questions, please feel free to contact the [facility] Medicine Dept at [phone] and we will help.    If you are previously with Home health, outpatient PT/OT or under a therapy program, you are cleared to return to those programs.    Please direct all long term medication refills and follow up to your primary care provider, Sneha Peguero MD. Thank you again for letting us take care of your health care needs.    Please note the following discharge instructions per your discharging physician-  Lab work in 1 week time to evaluate your CBC  Continue pantoprazole once daily  Follow up with GI doctor in 4 weeks

## 2020-02-13 NOTE — PLAN OF CARE
02/13/20 1632   Final Note   Assessment Type Final Discharge Note   Anticipated Discharge Disposition Home

## 2020-02-13 NOTE — PROGRESS NOTES
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  Patient states that shortness of breath as the main reason she went to the emergency room.  States that she has had dark black stools in the past but nothing recent.  Patient states that recently she had a pinkish color in her stool denies any fresh red blood.  Patient denies chest pain, palpitations, fever, chills.  Patient is a poor historian and limited history available this point.  When evaluated in the emergency room at [facility], she was Afebrile with pulse 90, respirations 28 with 98% saturation on ambient air. Chest x-ray significant for interstitial changes without consolidation; . Initial lab workup revealed hemoglobin 4.6 (baseline 9) with MCV 61, RDW 20, platelets 459.  B12 173. Retic count 2.4. D-dimer 0.76. She was given 125 mg IV Solu-Medrol and DuoNeb treatment with improvement in shortness of breath. 1u PRBC administered.    Overview/Hospital Course:  No notes on file    Interval History:  Patient states last night she had nausea vomiting especially after and the bowel prep.  Could not complete the bowel prep.  No  more signs of bleeding no abdominal pain.    Review of Systems   Constitutional: Negative for appetite change, chills, fatigue and fever.   HENT: Negative for congestion, hearing loss, rhinorrhea, sore throat, trouble swallowing and voice change.    Respiratory: Negative for cough, chest tightness and shortness of breath.    Cardiovascular: Negative for  chest pain, palpitations and leg swelling.   Gastrointestinal: Negative for abdominal pain, blood in stool and diarrhea.   Genitourinary: Negative for urgency.   Skin: Negative for pallor and rash.   Neurological: Positive for weakness and headaches. Negative for tremors, seizures, syncope, speech difficulty and numbness.   Hematological: Negative for adenopathy.   Psychiatric/Behavioral: Negative for agitation and behavioral problems.     Objective:     Vital Signs (Most Recent):  Temp: 98.1 °F (36.7 °C) (02/13/20 0354)  Pulse: 95 (02/13/20 0354)  Resp: 18 (02/13/20 0354)  BP: (!) 141/71 (02/13/20 0354)  SpO2: 98 % (02/13/20 0354) Vital Signs (24h Range):  Temp:  [96.6 °F (35.9 °C)-98.6 °F (37 °C)] 98.1 °F (36.7 °C)  Pulse:  [] 95  Resp:  [17-18] 18  SpO2:  [95 %-100 %] 98 %  BP: (139-223)/(65-93) 141/71     Weight: 47.2 kg (104 lb)  Body mass index is 16.29 kg/m².    Intake/Output Summary (Last 24 hours) at 2/13/2020 0608  Last data filed at 2/12/2020 0800  Gross per 24 hour   Intake 120 ml   Output --   Net 120 ml      Physical Exam   Constitutional: She is oriented to person, place, and time. She appears well-developed and well-nourished. No distress.   HENT:   Head: Normocephalic and atraumatic.   Right Ear: External ear normal.   Left Ear: External ear normal.   Nose: Nose normal.   Mouth/Throat: Oropharynx is clear and moist.   Eyes: Pupils are equal, round, and reactive to light. Conjunctivae and EOM are normal. Right eye exhibits no discharge. Left eye exhibits no discharge. No scleral icterus.   Neck: Normal range of motion. Neck supple. No thyromegaly present.   Cardiovascular: Normal rate, regular rhythm and intact distal pulses.   Murmur heard.  Pulmonary/Chest: Effort normal and breath sounds normal. No stridor. No respiratory distress. She has no wheezes. She has no rales.   Abdominal: Soft. Bowel sounds are normal. She exhibits no distension. There is no tenderness.   Musculoskeletal: She  exhibits no edema or tenderness.   Lymphadenopathy:     She has no cervical adenopathy.   Neurological: She is alert and oriented to person, place, and time. No cranial nerve deficit or sensory deficit. She exhibits normal muscle tone. Coordination normal.   Skin: Skin is warm and dry. Capillary refill takes less than 2 seconds. No rash noted. She is not diaphoretic. No erythema.   Psychiatric: She has a normal mood and affect. Her behavior is normal. Judgment and thought content normal.   Nursing note and vitals reviewed.      Significant Labs: All pertinent labs within the past 24 hours have been reviewed.    Significant Imaging: I have reviewed all pertinent imaging results/findings within the past 24 hours.      Assessment/Plan:      * Symptomatic anemia  Patient's anemia is currently controlled. S/p 2 units of PRBCs. Etiology likely GI bleed   Patient has shortness of breath most likely secondary to low H&H  And study shows iron deficiency anemia  Will replace iron with IV iron and then with p.o. Iron  H&H improved after 2 units   Current CBC reviewed-   Monitor serial CBC and transfuse if patient becomes hemodynamically unstable, symptomatic or H/H drops below 7/21.  Scheduled for a colonoscopy today but could not complete the bowel prep       Elevated brain natriuretic peptide (BNP) level  No signs of CHF exacerbation  Will keep Lasix p.r.n. during blood transfusion  Will repeat BNP in the morning        Iron deficiency anemia   Will give IV ferric gluconate  Will consider switching to p.o. iron replacement  Most likely secondary GI bleed  Lab Results   Component Value Date    IRON 14 (L) 02/10/2020    TIBC 556 (H) 02/10/2020    FERRITIN 5 (L) 02/10/2020             Cerebrovascular accident (CVA)  History noted      Tobacco abuse  Not a current smoker  History of smoking noted      Hyperlipidemia  Will hold her home statin at this point      Hypertension  Will continue to monitor for now will hold home  medications        VTE Risk Mitigation (From admission, onward)         Ordered     Place sequential compression device  Until discontinued      02/10/20 1816     IP VTE HIGH RISK PATIENT  Once      02/10/20 1816                      Justyn Forman MD  Department of Hospital Medicine   [facility]

## 2020-02-14 ENCOUNTER — TELEPHONE (OUTPATIENT)
Dept: MEDSURG UNIT | Facility: HOSPITAL | Age: 75
End: 2020-02-14

## 2020-02-17 LAB
FINAL PATHOLOGIC DIAGNOSIS: NORMAL
FINAL PATHOLOGIC DIAGNOSIS: NORMAL
GROSS: NORMAL
GROSS: NORMAL

## 2021-01-14 ENCOUNTER — IMMUNIZATION (OUTPATIENT)
Dept: PRIMARY CARE CLINIC | Facility: CLINIC | Age: 76
End: 2021-01-14
Payer: MEDICARE

## 2021-01-14 DIAGNOSIS — Z23 NEED FOR VACCINATION: ICD-10-CM

## 2021-01-14 PROCEDURE — 91300 COVID-19, MRNA, LNP-S, PF, 30 MCG/0.3 ML DOSE VACCINE: CPT | Mod: PBBFAC | Performed by: EMERGENCY MEDICINE

## 2021-02-04 ENCOUNTER — IMMUNIZATION (OUTPATIENT)
Dept: PRIMARY CARE CLINIC | Facility: CLINIC | Age: 76
End: 2021-02-04
Payer: MEDICARE

## 2021-02-04 DIAGNOSIS — Z23 NEED FOR VACCINATION: Primary | ICD-10-CM

## 2021-02-04 PROCEDURE — 91300 COVID-19, MRNA, LNP-S, PF, 30 MCG/0.3 ML DOSE VACCINE: CPT | Mod: PBBFAC | Performed by: EMERGENCY MEDICINE

## 2021-02-04 PROCEDURE — 0002A COVID-19, MRNA, LNP-S, PF, 30 MCG/0.3 ML DOSE VACCINE: CPT | Mod: PBBFAC | Performed by: EMERGENCY MEDICINE

## 2023-03-21 ENCOUNTER — HOSPITAL ENCOUNTER (INPATIENT)
Facility: HOSPITAL | Age: 78
LOS: 1 days | Discharge: HOME HEALTH | DRG: 309 | End: 2023-03-24
Attending: EMERGENCY MEDICINE | Admitting: HOSPITALIST
Payer: MEDICARE

## 2023-03-21 DIAGNOSIS — J43.9 PULMONARY EMPHYSEMA, UNSPECIFIED EMPHYSEMA TYPE: Primary | ICD-10-CM

## 2023-03-21 DIAGNOSIS — R00.0 TACHYCARDIA: ICD-10-CM

## 2023-03-21 DIAGNOSIS — I48.91 A-FIB: ICD-10-CM

## 2023-03-21 DIAGNOSIS — R07.9 CHEST PAIN: ICD-10-CM

## 2023-03-21 DIAGNOSIS — M79.603 ARM PAIN: ICD-10-CM

## 2023-03-21 DIAGNOSIS — I48.91 ATRIAL FIBRILLATION: ICD-10-CM

## 2023-03-21 DIAGNOSIS — I48.91 NEW ONSET ATRIAL FIBRILLATION: ICD-10-CM

## 2023-03-21 DIAGNOSIS — Z92.89 HISTORY OF CARDIOVERSION: ICD-10-CM

## 2023-03-21 PROBLEM — D50.9 IRON DEFICIENCY ANEMIA: Status: ACTIVE | Noted: 2018-08-24

## 2023-03-21 PROBLEM — M81.0 OSTEOPOROSIS: Status: ACTIVE | Noted: 2020-02-02

## 2023-03-21 PROBLEM — M85.80 OSTEOPENIA: Status: ACTIVE | Noted: 2018-08-24

## 2023-03-21 PROBLEM — F43.12 CHRONIC POST-TRAUMATIC STRESS DISORDER: Status: ACTIVE | Noted: 2018-08-24

## 2023-03-21 PROBLEM — Z86.73 HISTORY OF CVA (CEREBROVASCULAR ACCIDENT): Status: ACTIVE | Noted: 2022-03-10

## 2023-03-21 PROBLEM — I69.30 LATE EFFECTS OF CEREBRAL ISCHEMIC STROKE: Status: ACTIVE | Noted: 2018-06-24

## 2023-03-21 PROBLEM — F17.200 NICOTINE DEPENDENCE: Status: ACTIVE | Noted: 2021-06-28

## 2023-03-21 LAB
ALBUMIN SERPL BCP-MCNC: 3.3 G/DL (ref 3.5–5.2)
ALBUMIN SERPL BCP-MCNC: 3.9 G/DL (ref 3.5–5.2)
ALP SERPL-CCNC: 107 U/L (ref 55–135)
ALP SERPL-CCNC: 131 U/L (ref 55–135)
ALT SERPL W/O P-5'-P-CCNC: 12 U/L (ref 10–44)
ALT SERPL W/O P-5'-P-CCNC: 13 U/L (ref 10–44)
ANION GAP SERPL CALC-SCNC: 11 MMOL/L (ref 8–16)
ANION GAP SERPL CALC-SCNC: 17 MMOL/L (ref 8–16)
APTT BLDCRRT: 23.9 SEC (ref 21–32)
AST SERPL-CCNC: 16 U/L (ref 10–40)
AST SERPL-CCNC: 33 U/L (ref 10–40)
BACTERIA #/AREA URNS AUTO: ABNORMAL /HPF
BASOPHILS # BLD AUTO: 0.03 K/UL (ref 0–0.2)
BASOPHILS NFR BLD: 0.4 % (ref 0–1.9)
BILIRUB SERPL-MCNC: 1.8 MG/DL (ref 0.1–1)
BILIRUB SERPL-MCNC: 1.9 MG/DL (ref 0.1–1)
BILIRUB UR QL STRIP: NEGATIVE
BUN SERPL-MCNC: 10 MG/DL (ref 8–23)
BUN SERPL-MCNC: 9 MG/DL (ref 8–23)
CALCIUM SERPL-MCNC: 10.3 MG/DL (ref 8.7–10.5)
CALCIUM SERPL-MCNC: 9.4 MG/DL (ref 8.7–10.5)
CHLORIDE SERPL-SCNC: 103 MMOL/L (ref 95–110)
CHLORIDE SERPL-SCNC: 105 MMOL/L (ref 95–110)
CLARITY UR REFRACT.AUTO: ABNORMAL
CO2 SERPL-SCNC: 22 MMOL/L (ref 23–29)
CO2 SERPL-SCNC: 26 MMOL/L (ref 23–29)
COLOR UR AUTO: YELLOW
CREAT SERPL-MCNC: 0.7 MG/DL (ref 0.5–1.4)
CREAT SERPL-MCNC: 0.8 MG/DL (ref 0.5–1.4)
DIFFERENTIAL METHOD: ABNORMAL
EOSINOPHIL # BLD AUTO: 0 K/UL (ref 0–0.5)
EOSINOPHIL NFR BLD: 0.4 % (ref 0–8)
ERYTHROCYTE [DISTWIDTH] IN BLOOD BY AUTOMATED COUNT: 17.2 % (ref 11.5–14.5)
EST. GFR  (NO RACE VARIABLE): >60 ML/MIN/1.73 M^2
EST. GFR  (NO RACE VARIABLE): >60 ML/MIN/1.73 M^2
GLUCOSE SERPL-MCNC: 90 MG/DL (ref 70–110)
GLUCOSE SERPL-MCNC: 94 MG/DL (ref 70–110)
GLUCOSE UR QL STRIP: NEGATIVE
HCT VFR BLD AUTO: 33.4 % (ref 37–48.5)
HGB BLD-MCNC: 9.8 G/DL (ref 12–16)
HGB UR QL STRIP: NEGATIVE
HYALINE CASTS UR QL AUTO: 0 /LPF
IMM GRANULOCYTES # BLD AUTO: 0.04 K/UL (ref 0–0.04)
IMM GRANULOCYTES NFR BLD AUTO: 0.6 % (ref 0–0.5)
INR PPP: 1.2 (ref 0.8–1.2)
KETONES UR QL STRIP: ABNORMAL
LEUKOCYTE ESTERASE UR QL STRIP: NEGATIVE
LYMPHOCYTES # BLD AUTO: 1 K/UL (ref 1–4.8)
LYMPHOCYTES NFR BLD: 14 % (ref 18–48)
MAGNESIUM SERPL-MCNC: 1.5 MG/DL (ref 1.6–2.6)
MCH RBC QN AUTO: 23.4 PG (ref 27–31)
MCHC RBC AUTO-ENTMCNC: 29.3 G/DL (ref 32–36)
MCV RBC AUTO: 80 FL (ref 82–98)
MICROSCOPIC COMMENT: ABNORMAL
MONOCYTES # BLD AUTO: 0.6 K/UL (ref 0.3–1)
MONOCYTES NFR BLD: 8 % (ref 4–15)
NEUTROPHILS # BLD AUTO: 5.3 K/UL (ref 1.8–7.7)
NEUTROPHILS NFR BLD: 76.6 % (ref 38–73)
NITRITE UR QL STRIP: POSITIVE
NRBC BLD-RTO: 0 /100 WBC
PH UR STRIP: 7 [PH] (ref 5–8)
PLATELET # BLD AUTO: 215 K/UL (ref 150–450)
PMV BLD AUTO: 10.5 FL (ref 9.2–12.9)
POTASSIUM SERPL-SCNC: 3.6 MMOL/L (ref 3.5–5.1)
POTASSIUM SERPL-SCNC: 4.6 MMOL/L (ref 3.5–5.1)
PROT SERPL-MCNC: 6.1 G/DL (ref 6–8.4)
PROT SERPL-MCNC: 7.6 G/DL (ref 6–8.4)
PROT UR QL STRIP: ABNORMAL
PROTHROMBIN TIME: 12.5 SEC (ref 9–12.5)
RBC # BLD AUTO: 4.18 M/UL (ref 4–5.4)
RBC #/AREA URNS AUTO: 18 /HPF (ref 0–4)
SODIUM SERPL-SCNC: 142 MMOL/L (ref 136–145)
SODIUM SERPL-SCNC: 142 MMOL/L (ref 136–145)
SP GR UR STRIP: 1.02 (ref 1–1.03)
SQUAMOUS #/AREA URNS AUTO: 6 /HPF
TROPONIN I SERPL DL<=0.01 NG/ML-MCNC: 0.07 NG/ML (ref 0–0.03)
TROPONIN I SERPL DL<=0.01 NG/ML-MCNC: 0.08 NG/ML (ref 0–0.03)
TSH SERPL DL<=0.005 MIU/L-ACNC: 1.32 UIU/ML (ref 0.4–4)
URN SPEC COLLECT METH UR: ABNORMAL
WBC # BLD AUTO: 6.87 K/UL (ref 3.9–12.7)
WBC #/AREA URNS AUTO: 15 /HPF (ref 0–5)

## 2023-03-21 PROCEDURE — 87186 SC STD MICRODIL/AGAR DIL: CPT | Performed by: NURSE PRACTITIONER

## 2023-03-21 PROCEDURE — 84443 ASSAY THYROID STIM HORMONE: CPT | Performed by: PHYSICIAN ASSISTANT

## 2023-03-21 PROCEDURE — 99291 CRITICAL CARE FIRST HOUR: CPT | Mod: ,,, | Performed by: EMERGENCY MEDICINE

## 2023-03-21 PROCEDURE — 93005 ELECTROCARDIOGRAM TRACING: CPT

## 2023-03-21 PROCEDURE — 96365 THER/PROPH/DIAG IV INF INIT: CPT | Mod: 59

## 2023-03-21 PROCEDURE — 85730 THROMBOPLASTIN TIME PARTIAL: CPT | Performed by: EMERGENCY MEDICINE

## 2023-03-21 PROCEDURE — 87077 CULTURE AEROBIC IDENTIFY: CPT | Performed by: NURSE PRACTITIONER

## 2023-03-21 PROCEDURE — 80053 COMPREHEN METABOLIC PANEL: CPT | Performed by: EMERGENCY MEDICINE

## 2023-03-21 PROCEDURE — 96366 THER/PROPH/DIAG IV INF ADDON: CPT

## 2023-03-21 PROCEDURE — 63600175 PHARM REV CODE 636 W HCPCS: Performed by: EMERGENCY MEDICINE

## 2023-03-21 PROCEDURE — 93010 EKG 12-LEAD: ICD-10-PCS | Mod: ,,, | Performed by: INTERNAL MEDICINE

## 2023-03-21 PROCEDURE — 85610 PROTHROMBIN TIME: CPT | Performed by: EMERGENCY MEDICINE

## 2023-03-21 PROCEDURE — 87086 URINE CULTURE/COLONY COUNT: CPT | Performed by: NURSE PRACTITIONER

## 2023-03-21 PROCEDURE — 25500020 PHARM REV CODE 255: Performed by: EMERGENCY MEDICINE

## 2023-03-21 PROCEDURE — 93010 ELECTROCARDIOGRAM REPORT: CPT | Mod: 76,,, | Performed by: INTERNAL MEDICINE

## 2023-03-21 PROCEDURE — 84484 ASSAY OF TROPONIN QUANT: CPT | Mod: 91 | Performed by: NURSE PRACTITIONER

## 2023-03-21 PROCEDURE — 94761 N-INVAS EAR/PLS OXIMETRY MLT: CPT

## 2023-03-21 PROCEDURE — 25000003 PHARM REV CODE 250: Performed by: EMERGENCY MEDICINE

## 2023-03-21 PROCEDURE — 84484 ASSAY OF TROPONIN QUANT: CPT | Performed by: EMERGENCY MEDICINE

## 2023-03-21 PROCEDURE — 25000003 PHARM REV CODE 250: Performed by: PHYSICIAN ASSISTANT

## 2023-03-21 PROCEDURE — 99285 EMERGENCY DEPT VISIT HI MDM: CPT | Mod: 25

## 2023-03-21 PROCEDURE — 99291 PR CRITICAL CARE, E/M 30-74 MINUTES: ICD-10-PCS | Mod: ,,, | Performed by: EMERGENCY MEDICINE

## 2023-03-21 PROCEDURE — 85025 COMPLETE CBC W/AUTO DIFF WBC: CPT | Performed by: EMERGENCY MEDICINE

## 2023-03-21 PROCEDURE — 80053 COMPREHEN METABOLIC PANEL: CPT | Mod: 91 | Performed by: NURSE PRACTITIONER

## 2023-03-21 PROCEDURE — 96375 TX/PRO/DX INJ NEW DRUG ADDON: CPT

## 2023-03-21 PROCEDURE — 96376 TX/PRO/DX INJ SAME DRUG ADON: CPT

## 2023-03-21 PROCEDURE — 93010 ELECTROCARDIOGRAM REPORT: CPT | Mod: ,,, | Performed by: INTERNAL MEDICINE

## 2023-03-21 PROCEDURE — 81001 URINALYSIS AUTO W/SCOPE: CPT | Performed by: NURSE PRACTITIONER

## 2023-03-21 PROCEDURE — 83735 ASSAY OF MAGNESIUM: CPT | Performed by: PHYSICIAN ASSISTANT

## 2023-03-21 PROCEDURE — 87088 URINE BACTERIA CULTURE: CPT | Performed by: NURSE PRACTITIONER

## 2023-03-21 RX ORDER — DILTIAZEM HYDROCHLORIDE 30 MG/1
30 TABLET, FILM COATED ORAL ONCE
Status: COMPLETED | OUTPATIENT
Start: 2023-03-21 | End: 2023-03-21

## 2023-03-21 RX ORDER — LISINOPRIL 20 MG/1
40 TABLET ORAL
Status: COMPLETED | OUTPATIENT
Start: 2023-03-21 | End: 2023-03-21

## 2023-03-21 RX ORDER — ASPIRIN 325 MG
325 TABLET ORAL
Status: COMPLETED | OUTPATIENT
Start: 2023-03-21 | End: 2023-03-21

## 2023-03-21 RX ORDER — HYDRALAZINE HYDROCHLORIDE 20 MG/ML
5 INJECTION INTRAMUSCULAR; INTRAVENOUS
Status: COMPLETED | OUTPATIENT
Start: 2023-03-21 | End: 2023-03-21

## 2023-03-21 RX ORDER — DILTIAZEM HYDROCHLORIDE 5 MG/ML
0.25 INJECTION INTRAVENOUS
Status: COMPLETED | OUTPATIENT
Start: 2023-03-21 | End: 2023-03-21

## 2023-03-21 RX ORDER — ACETAMINOPHEN 500 MG
1000 TABLET ORAL
Status: COMPLETED | OUTPATIENT
Start: 2023-03-21 | End: 2023-03-21

## 2023-03-21 RX ORDER — HEPARIN SODIUM,PORCINE/D5W 25000/250
0-40 INTRAVENOUS SOLUTION INTRAVENOUS CONTINUOUS
Status: DISCONTINUED | OUTPATIENT
Start: 2023-03-21 | End: 2023-03-24

## 2023-03-21 RX ORDER — MAGNESIUM SULFATE HEPTAHYDRATE 40 MG/ML
2 INJECTION, SOLUTION INTRAVENOUS ONCE
Status: COMPLETED | OUTPATIENT
Start: 2023-03-21 | End: 2023-03-21

## 2023-03-21 RX ADMIN — LISINOPRIL 40 MG: 20 TABLET ORAL at 10:03

## 2023-03-21 RX ADMIN — DILTIAZEM HYDROCHLORIDE 15 MG: 5 INJECTION INTRAVENOUS at 06:03

## 2023-03-21 RX ADMIN — CEFTRIAXONE 1 G: 1 INJECTION, POWDER, FOR SOLUTION INTRAMUSCULAR; INTRAVENOUS at 10:03

## 2023-03-21 RX ADMIN — HYDRALAZINE HYDROCHLORIDE 5 MG: 20 INJECTION, SOLUTION INTRAMUSCULAR; INTRAVENOUS at 11:03

## 2023-03-21 RX ADMIN — ACETAMINOPHEN 1000 MG: 500 TABLET ORAL at 06:03

## 2023-03-21 RX ADMIN — IOHEXOL 75 ML: 350 INJECTION, SOLUTION INTRAVENOUS at 08:03

## 2023-03-21 RX ADMIN — ASPIRIN 325 MG ORAL TABLET 325 MG: 325 PILL ORAL at 06:03

## 2023-03-21 RX ADMIN — DILTIAZEM HYDROCHLORIDE 30 MG: 30 TABLET, FILM COATED ORAL at 09:03

## 2023-03-21 RX ADMIN — MAGNESIUM SULFATE 2 G: 2 INJECTION INTRAVENOUS at 09:03

## 2023-03-21 RX ADMIN — HEPARIN SODIUM 12 UNITS/KG/HR: 10000 INJECTION, SOLUTION INTRAVENOUS at 11:03

## 2023-03-21 NOTE — ED PROVIDER NOTES
Encounter Date: 3/21/2023       History     Chief Complaint   Patient presents with    Back Pain     Right sided back pain that started today. Denies N/V/D. +urinary frequency.      77-year-old female presenting with left-sided rib and arm pain.    PMH: CVA, hypertension, hyperlipidemia, squamous cell carcinoma    Patient's daughter Sirisha is present at bedside to supplemental history.    Context:  Sirisha is concerned about some cognitive decline recently.  The patient states she was having pain in her left arm earlier, as well as the left side of her chest.  Triage nurse noted right side, but patient tells me that it is her left.  Patient was initially evaluated in the intake area, but noted to be tachycardic and transferred to an ED bed.  Patient denies falls or trauma.  No prior history of abnormal heart rhythm.  She reports the pain is improved.  Unable to say when the pain started, exactly, but she was washing her kitchen floor.   Onset:  Gradual   Location:  Left chest and left arm  Duration:  Today  Associated Symptoms: Positive urinary frequency       The history is provided by the patient, medical records and a relative. No  was used.   Review of patient's allergies indicates:  No Known Allergies  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)     Stroke      Past Surgical History:   Procedure Laterality Date    APPENDECTOMY      COLONOSCOPY N/A 8/7/2019    Procedure: COLONOSCOPY;  Surgeon: Alex Degroot MD;  Location: [facility];  Service: Endoscopy;  Laterality: N/A;    COLONOSCOPY N/A 2/12/2020    Procedure: COLONOSCOPY;  Surgeon: Evelio Rizvi MD;  Location: [facility];  Service: Endoscopy;  Laterality: N/A;    ESOPHAGOGASTRODUODENOSCOPY N/A 2/11/2020    Procedure: EGD (ESOPHAGOGASTRODUODENOSCOPY);  Surgeon: Evelio Rizvi MD;  Location: [facility];  Service: Endoscopy;  Laterality: N/A;    HYSTERECTOMY        Family History   Problem Relation Age of Onset    Cancer Sister     Diabetes Sister     Melanoma Neg Hx     Psoriasis Neg Hx     Lupus Neg Hx     Eczema Neg Hx      Social History     Tobacco Use    Smoking status: Former     Packs/day: 2.00     Types: Cigarettes     Quit date: 2017     Years since quittin.5    Smokeless tobacco: Never   Substance Use Topics    Alcohol use: Yes     Alcohol/week: 1.0 standard drink     Types: 1 Cans of beer per week    Drug use: No     Review of Systems   Constitutional:  Negative for fever.   Respiratory:  Positive for chest tightness and shortness of breath.    Genitourinary:  Positive for frequency.   Musculoskeletal:  Positive for myalgias.     Physical Exam     Initial Vitals [23 1603]   BP Pulse Resp Temp SpO2   (!) 142/92 (!) 119 20 97.5 °F (36.4 °C) 96 %      MAP       --         Physical Exam    Nursing note and vitals reviewed.  Constitutional: She is not diaphoretic. No distress.   HENT:   Head: Normocephalic and atraumatic.   Eyes: Right eye exhibits no discharge. Left eye exhibits no discharge.   Neck: Neck supple. No tracheal deviation present.   Cardiovascular:  Intact distal pulses. An irregularly irregular rhythm present.   Tachycardia present.         Pulmonary/Chest: Breath sounds normal. No respiratory distress. She exhibits no tenderness.   Abdominal: Abdomen is soft. There is no abdominal tenderness.   Musculoskeletal:         General: No edema.      Cervical back: Neck supple.      Comments: Left upper extremity:  All compartments soft, intact distal pulse, no deformity, diffuse upper arm tenderness, full range of motion at shoulder with no overlying skin changes or warmth     Neurological: She is alert and oriented to person, place, and time.   Skin: Skin is warm. No rash noted.   Psychiatric: She has a normal mood and affect. Her behavior is normal.       ED Course   Procedures  Labs Reviewed   COMPREHENSIVE METABOLIC PANEL - Abnormal;  Notable for the following components:       Result Value    CO2 22 (*)     Total Bilirubin 1.9 (*)     Anion Gap 17 (*)     All other components within normal limits   TROPONIN I - Abnormal; Notable for the following components:    Troponin I 0.082 (*)     All other components within normal limits   URINALYSIS, REFLEX TO URINE CULTURE - Abnormal; Notable for the following components:    Appearance, UA Hazy (*)     Protein, UA 1+ (*)     Ketones, UA 1+ (*)     Nitrite, UA Positive (*)     All other components within normal limits    Narrative:     Specimen Source->Urine   MAGNESIUM - Abnormal; Notable for the following components:    Magnesium 1.5 (*)     All other components within normal limits   TROPONIN I - Abnormal; Notable for the following components:    Troponin I 0.066 (*)     All other components within normal limits   CBC W/ AUTO DIFFERENTIAL - Abnormal; Notable for the following components:    Hemoglobin 9.8 (*)     Hematocrit 33.4 (*)     MCV 80 (*)     MCH 23.4 (*)     MCHC 29.3 (*)     RDW 17.2 (*)     Immature Granulocytes 0.6 (*)     Gran % 76.6 (*)     Lymph % 14.0 (*)     All other components within normal limits   COMPREHENSIVE METABOLIC PANEL - Abnormal; Notable for the following components:    Albumin 3.3 (*)     Total Bilirubin 1.8 (*)     All other components within normal limits   URINALYSIS MICROSCOPIC - Abnormal; Notable for the following components:    RBC, UA 18 (*)     WBC, UA 15 (*)     Bacteria Moderate (*)     All other components within normal limits    Narrative:     Specimen Source->Urine   APTT - Abnormal; Notable for the following components:    aPTT 66.9 (*)     All other components within normal limits    Narrative:     PTT daily if no changes in infusion rate   CBC W/ AUTO DIFFERENTIAL - Abnormal; Notable for the following components:    Hemoglobin 10.2 (*)     Hematocrit 34.5 (*)     MCV 79 (*)     MCH 23.3 (*)     MCHC 29.6 (*)     RDW 17.2 (*)     Immature Granulocytes 0.6  (*)     Immature Grans (Abs) 0.05 (*)     Gran % 77.5 (*)     Lymph % 12.9 (*)     All other components within normal limits    Narrative:     PTT daily if no changes in infusion rate   CBC W/ AUTO DIFFERENTIAL - Abnormal; Notable for the following components:    Hemoglobin 10.2 (*)     Hematocrit 34.5 (*)     MCV 79 (*)     MCH 23.3 (*)     MCHC 29.6 (*)     RDW 17.2 (*)     Immature Granulocytes 0.6 (*)     Immature Grans (Abs) 0.05 (*)     Gran % 77.5 (*)     Lymph % 12.9 (*)     All other components within normal limits    Narrative:     PTT daily if no changes in infusion rate   CULTURE, URINE   TSH   APTT    Narrative:     Draw baseline aPTT prior to starting the heparin bolus or  infusion  (if patient is on warfarin prior to heparin therapy)   PROTIME-INR    Narrative:     Draw baseline aPTT prior to starting the heparin bolus or  infusion  (if patient is on warfarin prior to heparin therapy)   BASIC METABOLIC PANEL    Narrative:     PTT daily if no changes in infusion rate   APTT     EKG Readings: (Independently Interpreted)   Initial Reading: No STEMI. Rhythm: Atrial Fibrillation. Heart Rate: 137. Clinical Impression: Atrial Fibrillation with RVR   ST-TWA     Imaging Results              CT Head Without Contrast (Final result)  Result time 03/21/23 21:15:34      Final result by Suman Burroughs MD (03/21/23 21:15:34)                   Impression:      1. No acute intracranial process.  2. Involutional changes with chronic microvascular ischemic changes and remote left MCA distribution infarction involving the left frontal cortex, left parietal cortex and left lateral basal ganglia.      Electronically signed by: Suman Burroughs  Date:    03/21/2023  Time:    21:15               Narrative:    EXAMINATION:  CT HEAD WITHOUT CONTRAST    CLINICAL HISTORY:  Mental status change, unknown cause;    TECHNIQUE:  Low dose axial CT images obtained throughout the head without intravenous contrast. Sagittal and coronal  reconstructions were performed.    COMPARISON:  10/06/2020    FINDINGS:  Intracranial compartment:    No midline shift or hydrocephalus.  No extra-axial blood or fluid collections.    Remote cortical infarction involving the left frontal and parietal lobes in the left MCA distribution with residual encephalomalacia.  Remote infarction of the left lateral basal ganglia.    Involutional changes with chronic microvascular ischemic changes in the periventricular white matter bilaterally.    MRI follow-up may better characterize if there is high clinical suspicion.    No parenchymal mass, hemorrhage, edema or major vascular distribution infarct.    Skull/extracranial contents (limited evaluation): No fracture. Mastoid air cells and paranasal sinuses are essentially clear.                                        CTA Chest Abdomen Pelvis (Final result)  Result time 03/21/23 21:35:27      Final result by Suman Burroughs MD (03/21/23 21:35:27)                   Impression:      1. No evidence of aortic aneurysm or dissection.  2. Severe hydronephrosis on the left with associated renal cortical thinning which is new from the prior study.  No significant hydroureter.  UPJ narrowing is a consideration.  Follow-up is recommended.  3. Diverticulosis of the colon.  There is mild thickening and stranding in the descending colon could represent mild acute diverticulitis.  Mild wall thickening in the transverse colon and right colon also may be associated with mild colitis.  Follow-up recommended.  4. Mild cardiomegaly.  5. Moderate-severe emphysematous changes throughout the lungs.  6. Few small subcentimeter hypodense foci in the spleen which are nonspecific.  This could represent small cysts.  Fungal infection is felt to be less likely.  7.  This report was flagged in Epic as abnormal.      Electronically signed by: Suman Burroughs  Date:    03/21/2023  Time:    21:35               Narrative:    EXAMINATION:  CTA CHEST ABDOMEN  PELVIS    CLINICAL HISTORY:  Aortic aneurysm, known or suspected;    TECHNIQUE:  Low dose axial, sagittal and coronal reformations were performed from the thoracic inlet to the pubic symphysis following the IV administration of 75 mL of Omnipaque 350.   CTA protocol.  MIPS were utilized.    COMPARISON:  11/12/2017    FINDINGS:  Chest:    Heart and great vessels: Heart is enlarged.  No pericardial effusion.    Adenopathy: None demonstrated.    Lungs: Moderate to severe emphysematous changes throughout the lungs.  Mild atelectasis in the right middle lobe.  No mass or consolidation.  No effusion is detected.    No evidence of aortic aneurysm or dissection.    No evidence of pulmonary embolism.    Abdomen:    Liver: Within normal limits.    Gallbladder and biliary: Within normal limits.    Spleen: Few small subcentimeter hypodense foci in the spleen could represent small cyst.  Fungal infection is felt to be less likely.  Recommend clinical correlation.    Pancreas: Within normal limits.    Adrenals: Within normal limits.    Kidneys: There is severe hydronephrosis on the left associated renal cortical thinning which is new from the prior study.  No significant hydroureter.  UPJ narrowing is a consideration.  Follow-up recommended.    Right kidney is within normal limits.    Bowel: No evidence of bowel obstruction.  There is diverticulosis of the colon.  There is mild thickening and stranding in the descending colon could represent mild acute diverticulitis.  There is mild wall thickening in the transverse colon and right colon also.    The appendix is not definitively identified.    Peritoneum: No ascites or pneumoperitoneum.    Abdominal Adenopathy: None.    Vasculature: No evidence of aortic aneurysm or dissection.    Pelvis:    Urinary bladder: Unremarkable.    Female: Within normal limits.    Pelvis adenopathy: None.    Bones: No acute findings.    Miscellaneous: None.                                       X-Ray  Humerus 2 View Left (Final result)  Result time 03/21/23 19:31:14      Final result by Rogers Whitfield MD (03/21/23 19:31:14)                   Impression:      Grossly stable proximal left humeral remote postop findings, without acute displaced fracture-dislocation identified.      Electronically signed by: Rogers Whitfield MD  Date:    03/21/2023  Time:    19:31               Narrative:    EXAMINATION:  XR SHOULDER TRAUMA 3 VIEW LEFT; XR HUMERUS 2 VIEW LEFT    CLINICAL HISTORY:  Pain in arm, unspecified    TECHNIQUE:  Three views of the left shoulder; AP and lateral views left humerus    COMPARISON  Left shoulder series 10/03/2021    FINDINGS:  Redemonstrated remote postoperative change of the proximal left humerus with surgical plate and multiple screw construct, stable in configuration.  No evidence of acute hardware malalignment or loosening or perihardware fracture.  Generalized osteopenia.  No displaced fracture, dislocation or destructive osseous process.  Moderate to advanced degenerative change at the glenohumeral joint not significantly progressed from prior.  Mild DJD at the AC joint also unchanged.  Calcification at the arch.  Left lung apex is clear.  No subcutaneous emphysema or radiodense retained foreign body.                                       X-Ray Shoulder Trauma Left (Final result)  Result time 03/21/23 19:31:14      Final result by Rogers Whitfield MD (03/21/23 19:31:14)                   Impression:      Grossly stable proximal left humeral remote postop findings, without acute displaced fracture-dislocation identified.      Electronically signed by: Rogers Whitfield MD  Date:    03/21/2023  Time:    19:31               Narrative:    EXAMINATION:  XR SHOULDER TRAUMA 3 VIEW LEFT; XR HUMERUS 2 VIEW LEFT    CLINICAL HISTORY:  Pain in arm, unspecified    TECHNIQUE:  Three views of the left shoulder; AP and lateral views left humerus    COMPARISON  Left shoulder series  10/03/2021    FINDINGS:  Redemonstrated remote postoperative change of the proximal left humerus with surgical plate and multiple screw construct, stable in configuration.  No evidence of acute hardware malalignment or loosening or perihardware fracture.  Generalized osteopenia.  No displaced fracture, dislocation or destructive osseous process.  Moderate to advanced degenerative change at the glenohumeral joint not significantly progressed from prior.  Mild DJD at the AC joint also unchanged.  Calcification at the arch.  Left lung apex is clear.  No subcutaneous emphysema or radiodense retained foreign body.                                       X-Ray Chest AP Portable (Final result)  Result time 03/21/23 19:43:28      Final result by Suman Burroughs MD (03/21/23 19:43:28)                   Impression:      Mild atelectasis at the right lung base.      Electronically signed by: Suman Burroughs  Date:    03/21/2023  Time:    19:43               Narrative:    EXAMINATION:  XR CHEST AP PORTABLE    CLINICAL HISTORY:  chest pain, a fib;    TECHNIQUE:  Single frontal view of the chest was performed.    COMPARISON:  02/02/2020    FINDINGS:  Heart is normal size.  No effusion or pneumothorax.  Aortic atherosclerosis.    Mild atelectasis at the right lung base.    No acute osseous abnormality.    Postop changes of the left humerus.    No mass, consolidation or significant infiltrate.                                       Medications   heparin 25,000 units in dextrose 5% 250 mL (100 units/mL) infusion LOW INTENSITY nomogram - [facility] (12 Units/kg/hr × 55 kg (Adjusted) Intravenous New Bag 3/21/23 4936)   heparin 25,000 units in dextrose 5% (100 units/ml) IV bolus from bag - ADDITIONAL PRN BOLUS - 60 units/kg (has no administration in time range)   heparin 25,000 units in dextrose 5% (100 units/ml) IV bolus from bag - ADDITIONAL PRN BOLUS - 30 units/kg (has no administration in time range)   atorvastatin tablet 80 mg (has no  administration in time range)   clopidogreL tablet 75 mg (has no administration in time range)   DULoxetine DR capsule 60 mg (has no administration in time range)   ferrous sulfate tablet 1 each (has no administration in time range)   lisinopriL tablet 20 mg (has no administration in time range)   pantoprazole EC tablet 40 mg (has no administration in time range)   sodium chloride 0.9% flush 5 mL (has no administration in time range)   melatonin tablet 6 mg (has no administration in time range)   ondansetron disintegrating tablet 8 mg (has no administration in time range)   prochlorperazine injection Soln 5 mg (has no administration in time range)   polyethylene glycol packet 17 g (has no administration in time range)   bisacodyL suppository 10 mg (has no administration in time range)   simethicone chewable tablet 80 mg (has no administration in time range)   aluminum-magnesium hydroxide-simethicone 200-200-20 mg/5 mL suspension 30 mL (has no administration in time range)   acetaminophen tablet 650 mg (has no administration in time range)   acetaminophen tablet 1,000 mg (has no administration in time range)   glucagon (human recombinant) injection 1 mg (has no administration in time range)   hydrALAZINE tablet 50 mg (0 mg Oral Hold 3/22/23 0553)   metoprolol injection 5 mg (5 mg Intravenous Given 3/22/23 0320)   cefTRIAXone (ROCEPHIN) 1 g in dextrose 5 % in water (D5W) 5 % 50 mL IVPB (MB+) (has no administration in time range)   acetaminophen tablet 1,000 mg (1,000 mg Oral Given 3/21/23 1843)   aspirin tablet 325 mg (325 mg Oral Given 3/21/23 1844)   diltiaZEM injection 15 mg (15 mg Intravenous Given 3/21/23 1850)   diltiaZEM tablet 30 mg (30 mg Oral Given 3/21/23 2100)   magnesium sulfate 2g in water 50mL IVPB (premix) (0 g Intravenous Stopped 3/21/23 2326)   iohexoL (OMNIPAQUE 350) injection 75 mL (75 mLs Intravenous Given 3/21/23 2046)   cefTRIAXone (ROCEPHIN) 1 g in dextrose 5 % in water (D5W) 5 % 50 mL IVPB  (MB+) (0 g Intravenous Stopped 3/21/23 4898)   heparin 25,000 units in dextrose 5% (100 units/ml) IV bolus from bag INITIAL BOLUS (3,300 Units Intravenous Bolus from Bag 3/21/23 4144)   lisinopriL tablet 40 mg (40 mg Oral Given 3/21/23 2259)   hydrALAZINE injection 5 mg (5 mg Intravenous Given 3/21/23 2329)   hydrALAZINE injection 5 mg (5 mg Intravenous Given 3/22/23 0030)   hydrALAZINE injection 5 mg (5 mg Intravenous Given 3/22/23 0345)     Medical Decision Making:   History:   I obtained history from: someone other than patient.  Old Medical Records: I decided to obtain old medical records.  Old Records Summarized: other records.       <> Summary of Records: No prior echo noted  Initial Assessment:   Emergent evaluation of a 77-year-old female presenting with left-sided chest pain and left arm pain.  On initial arrival, she is hypertensive, and AFib.  Differential Diagnosis:   Including, but not limited to:    ACS  Arrhythmia  Lower suspicion for PE - no hypoxia  Aortic pathology   Arm pain could be related to her prior surgery vs ACS  Independently Interpreted Test(s):   I have ordered and independently interpreted X-rays - see summary below.       <> Summary of X-Ray Reading(s): Stable hardware   I have ordered and independently interpreted EKG Reading(s) - see prior notes  Clinical Tests:   Lab Tests: Ordered and Reviewed  Radiological Study: Reviewed and Ordered  Medical Tests: Ordered and Reviewed  ED Management:  New onset a fib with RVR.  Rate controlled after single dose of IV diltiazem, ordered PO.  Repeat EKG with rate control, remains in a fib.   Pain resolved.   Nitrites on UA, tx for UTI.  Case d/w cardiology -- recommend anticoagulation given her CVA risk, admit for EP consult in AM.   Will plan for CTH and CT C/A/P to r/o aortic emergency/ICH prior to anti-coagulation.     Signed out at 9p to oncoming attending.   Plan:  Admit to Hospital Medicine.  Anticoagulate for stroke risk.  Electrophysiology  consult in the morning for TTE/cardioversion consideration.   CTNI down-trending in ED.     ED diagnosis:   Acute atrial fibrillation  UTI  Chest pain, r/o ACS  Elevated troponin   Hypomagnesemia   Other:   I have discussed this case with another health care provider.           ED Course as of 03/22/23 0633   Tue Mar 21, 2023   1834 Troponin I(!): 0.082  Abnormal  [AB]   1900 Rate improved.  Patient currently asymptomatic. [AB]   1930 Anion Gap(!): 17 [AB]   2009 Repeat EKG from 1902:  rate 91, a fib, IRBBB, nonspecific ST abnormality  [AB]   2012 CHADS-VASC elevated, AC indicated. Discussed with cardiology - once CTH back, will order heparin gtt and admit for EP consult.  [AB]   2102 Potassium: 3.6 [AB]   2109 Troponin I(!): 0.066  Down-trending  [AB]   2118 NITRITE UA(!): Positive [AB]      ED Course User Index  [AB] Allen Aguilera MD                   Clinical Impression:   Final diagnoses:  [R00.0] Tachycardia  [M79.603] Arm pain  [I48.91] Atrial fibrillation  [I48.91] New onset atrial fibrillation        ED Disposition Condition    Observation                     Allen Aguilera MD  03/22/23 0611

## 2023-03-21 NOTE — ED TRIAGE NOTES
Pt reports having L arm pain that started today. Pt denies falls or trauma to arm. Pt denies taking any medication for arm at home. Pt AAOx4.

## 2023-03-21 NOTE — FIRST PROVIDER EVALUATION
" Emergency Department TeleTriage Encounter Note      CHIEF COMPLAINT    Chief Complaint   Patient presents with    Back Pain     Right sided back pain that started today. Denies N/V/D. +urinary frequency.        VITAL SIGNS   Initial Vitals [03/21/23 1603]   BP Pulse Resp Temp SpO2   (!) 142/92 (!) 119 20 97.5 °F (36.4 °C) 96 %      MAP       --            ALLERGIES    Review of patient's allergies indicates:  No Known Allergies    PROVIDER TRIAGE NOTE  This is a teletriage evaluation of a 77 y.o. female presenting to the ED complaining of right sided back pain starting today. Denies hematuria, dysuria, abd pain, n/v/d. Reports urinary frequency "for a month."  No pmhx of kidney stones.  Pain is constant.     Pt is tachycardic but afebrile. Will check labs and EKG.     Initial orders will be placed and care will be transferred to an alternate provider when patient is roomed for a full evaluation. Any additional orders and the final disposition will be determined by that provider.         ORDERS  Labs Reviewed - No data to display    ED Orders (720h ago, onward)      Start Ordered     Status Ordering Provider    03/21/23 1715 03/21/23 1714  Troponin I  STAT         Ordered NATHALIE PHIPPS NAnupam    03/21/23 1715 03/21/23 1714  Urinalysis, Reflex to Urine Culture Urine, Clean Catch  STAT         Ordered NATHALIE PHIPPS N.    03/21/23 1714 03/21/23 1714  CBC auto differential  STAT         Ordered NATHALIE PHIPPS NAnupam    03/21/23 1714 03/21/23 1714  Comprehensive metabolic panel  STAT         Ordered NATHALIE PHIPPS NAnupam    03/21/23 1714 03/21/23 1714  Insert Saline lock IV  Once         Ordered NATHALIE PHIPPS    03/21/23 1714 03/21/23 1714  EKG 12-lead  Once         Ordered NATHALIE PHIPPS              Virtual Visit Note: The provider triage portion of this emergency department evaluation and documentation was performed via [employer], a HIPAA-compliant telemedicine " application, in concert with a tele-presenter in the room. A face to face patient evaluation with one of my colleagues will occur once the patient is placed in an emergency department room.      DISCLAIMER: This note was prepared with [employer] voice recognition transcription software. Garbled syntax, mangled pronouns, and other bizarre constructions may be attributed to that software system.

## 2023-03-21 NOTE — FIRST PROVIDER EVALUATION
"Medical screening examination initiated.  I have conducted a focused provider triage encounter, findings are as follows:    Brief history of present illness:  77-year-old female history of CVA, hypertension, osteoporosis presents for left arm pain for 1 day.  She reports that she was washing her kitchen floor but denies a fall or specific trauma.  She denies any other complaints, no chest pain, shortness of breath, lightheadedness or weakness.    Vitals:    03/21/23 1603 03/21/23 1725   BP: (!) 142/92 (!) 183/101   BP Location:  Left arm   Patient Position:  Sitting   Pulse: (!) 119 66   Resp: 20 16   Temp: 97.5 °F (36.4 °C) 97.9 °F (36.6 °C)   TempSrc: Oral Oral   SpO2: 96% 98%   Weight: 59 kg (130 lb)    Height: 5' 3" (1.6 m)        Pertinent physical exam:  Tachycardic with irregular rhythm.  Able to range the left arm but tenderness to palpation of the posterior humerus.    Per my independent interpretation, EKG shows atrial fibrillation at a rate of 137.  Significant artifact.  Will place on cardiac monitor, check labs, transferred to main ED bed.    Brief workup plan:  Labs, cardiac monitoring, x-rays    Preliminary workup initiated; this workup will be continued and followed by the physician or advanced practice provider that is assigned to the patient when roomed.  "

## 2023-03-22 PROBLEM — N13.30 HYDRONEPHROSIS: Status: ACTIVE | Noted: 2023-03-22

## 2023-03-22 PROBLEM — I48.91 NEW ONSET ATRIAL FIBRILLATION: Status: ACTIVE | Noted: 2023-03-22

## 2023-03-22 LAB
ANION GAP SERPL CALC-SCNC: 12 MMOL/L (ref 8–16)
APTT BLDCRRT: 40.2 SEC (ref 21–32)
APTT BLDCRRT: 66.9 SEC (ref 21–32)
ASCENDING AORTA: 2.91 CM
AV INDEX (PROSTH): 1
AV MEAN GRADIENT: 3 MMHG
AV PEAK GRADIENT: 6 MMHG
AV VALVE AREA: 3.02 CM2
AV VELOCITY RATIO: 0.74
BASOPHILS # BLD AUTO: 0.04 K/UL (ref 0–0.2)
BASOPHILS # BLD AUTO: 0.04 K/UL (ref 0–0.2)
BASOPHILS NFR BLD: 0.5 % (ref 0–1.9)
BASOPHILS NFR BLD: 0.5 % (ref 0–1.9)
BNP SERPL-MCNC: 505 PG/ML (ref 0–99)
BSA FOR ECHO PROCEDURE: 1.62 M2
BUN SERPL-MCNC: 9 MG/DL (ref 8–23)
CALCIUM SERPL-MCNC: 10 MG/DL (ref 8.7–10.5)
CHLORIDE SERPL-SCNC: 103 MMOL/L (ref 95–110)
CO2 SERPL-SCNC: 27 MMOL/L (ref 23–29)
CREAT SERPL-MCNC: 0.7 MG/DL (ref 0.5–1.4)
CV ECHO LV RWT: 0.59 CM
DIFFERENTIAL METHOD: ABNORMAL
DIFFERENTIAL METHOD: ABNORMAL
DOP CALC AO PEAK VEL: 1.23 M/S
DOP CALC AO VTI: 15.68 CM
DOP CALC LVOT AREA: 3 CM2
DOP CALC LVOT DIAMETER: 1.96 CM
DOP CALC LVOT PEAK VEL: 0.91 M/S
DOP CALC LVOT STROKE VOLUME: 47.35 CM3
DOP CALCLVOT PEAK VEL VTI: 15.7 CM
ECHO LV POSTERIOR WALL: 1.1 CM (ref 0.6–1.1)
EJECTION FRACTION: 65 %
EOSINOPHIL # BLD AUTO: 0 K/UL (ref 0–0.5)
EOSINOPHIL # BLD AUTO: 0 K/UL (ref 0–0.5)
EOSINOPHIL NFR BLD: 0.5 % (ref 0–8)
EOSINOPHIL NFR BLD: 0.5 % (ref 0–8)
ERYTHROCYTE [DISTWIDTH] IN BLOOD BY AUTOMATED COUNT: 17.2 % (ref 11.5–14.5)
ERYTHROCYTE [DISTWIDTH] IN BLOOD BY AUTOMATED COUNT: 17.2 % (ref 11.5–14.5)
EST. GFR  (NO RACE VARIABLE): >60 ML/MIN/1.73 M^2
FRACTIONAL SHORTENING: 30 % (ref 28–44)
GLUCOSE SERPL-MCNC: 101 MG/DL (ref 70–110)
HCT VFR BLD AUTO: 34.5 % (ref 37–48.5)
HCT VFR BLD AUTO: 34.5 % (ref 37–48.5)
HGB BLD-MCNC: 10.2 G/DL (ref 12–16)
HGB BLD-MCNC: 10.2 G/DL (ref 12–16)
IMM GRANULOCYTES # BLD AUTO: 0.05 K/UL (ref 0–0.04)
IMM GRANULOCYTES # BLD AUTO: 0.05 K/UL (ref 0–0.04)
IMM GRANULOCYTES NFR BLD AUTO: 0.6 % (ref 0–0.5)
IMM GRANULOCYTES NFR BLD AUTO: 0.6 % (ref 0–0.5)
INTERVENTRICULAR SEPTUM: 1.1 CM (ref 0.6–1.1)
LA MAJOR: 5.44 CM
LA MINOR: 5.19 CM
LA WIDTH: 3.81 CM
LEFT ATRIUM SIZE: 3.6 CM
LEFT ATRIUM VOLUME INDEX: 38.5 ML/M2
LEFT ATRIUM VOLUME: 61.93 CM3
LEFT INTERNAL DIMENSION IN SYSTOLE: 2.58 CM (ref 2.1–4)
LEFT VENTRICLE DIASTOLIC VOLUME INDEX: 27.61 ML/M2
LEFT VENTRICLE DIASTOLIC VOLUME: 44.46 ML
LEFT VENTRICLE MASS INDEX: 80 G/M2
LEFT VENTRICLE SYSTOLIC VOLUME INDEX: 15 ML/M2
LEFT VENTRICLE SYSTOLIC VOLUME: 24.13 ML
LEFT VENTRICULAR INTERNAL DIMENSION IN DIASTOLE: 3.7 CM (ref 3.5–6)
LEFT VENTRICULAR MASS: 129.33 G
LYMPHOCYTES # BLD AUTO: 1.1 K/UL (ref 1–4.8)
LYMPHOCYTES # BLD AUTO: 1.1 K/UL (ref 1–4.8)
LYMPHOCYTES NFR BLD: 12.9 % (ref 18–48)
LYMPHOCYTES NFR BLD: 12.9 % (ref 18–48)
MCH RBC QN AUTO: 23.3 PG (ref 27–31)
MCH RBC QN AUTO: 23.3 PG (ref 27–31)
MCHC RBC AUTO-ENTMCNC: 29.6 G/DL (ref 32–36)
MCHC RBC AUTO-ENTMCNC: 29.6 G/DL (ref 32–36)
MCV RBC AUTO: 79 FL (ref 82–98)
MCV RBC AUTO: 79 FL (ref 82–98)
MONOCYTES # BLD AUTO: 0.7 K/UL (ref 0.3–1)
MONOCYTES # BLD AUTO: 0.7 K/UL (ref 0.3–1)
MONOCYTES NFR BLD: 8 % (ref 4–15)
MONOCYTES NFR BLD: 8 % (ref 4–15)
NEUTROPHILS # BLD AUTO: 6.6 K/UL (ref 1.8–7.7)
NEUTROPHILS # BLD AUTO: 6.6 K/UL (ref 1.8–7.7)
NEUTROPHILS NFR BLD: 77.5 % (ref 38–73)
NEUTROPHILS NFR BLD: 77.5 % (ref 38–73)
NRBC BLD-RTO: 0 /100 WBC
NRBC BLD-RTO: 0 /100 WBC
PLATELET # BLD AUTO: 217 K/UL (ref 150–450)
PLATELET # BLD AUTO: 217 K/UL (ref 150–450)
PMV BLD AUTO: 9.8 FL (ref 9.2–12.9)
PMV BLD AUTO: 9.8 FL (ref 9.2–12.9)
POTASSIUM SERPL-SCNC: 3.5 MMOL/L (ref 3.5–5.1)
RA MAJOR: 4.99 CM
RA PRESSURE: 3 MMHG
RA WIDTH: 3.51 CM
RBC # BLD AUTO: 4.37 M/UL (ref 4–5.4)
RBC # BLD AUTO: 4.37 M/UL (ref 4–5.4)
RIGHT VENTRICULAR END-DIASTOLIC DIMENSION: 2.84 CM
SINUS: 2.63 CM
SODIUM SERPL-SCNC: 142 MMOL/L (ref 136–145)
STJ: 2.6 CM
TDI LATERAL: 0.1 M/S
TDI SEPTAL: 0.07 M/S
TDI: 0.09 M/S
TRICUSPID ANNULAR PLANE SYSTOLIC EXCURSION: 1.64 CM
WBC # BLD AUTO: 8.45 K/UL (ref 3.9–12.7)
WBC # BLD AUTO: 8.45 K/UL (ref 3.9–12.7)

## 2023-03-22 PROCEDURE — 99223 1ST HOSP IP/OBS HIGH 75: CPT | Mod: GC,,, | Performed by: STUDENT IN AN ORGANIZED HEALTH CARE EDUCATION/TRAINING PROGRAM

## 2023-03-22 PROCEDURE — 85730 THROMBOPLASTIN TIME PARTIAL: CPT | Performed by: PHYSICIAN ASSISTANT

## 2023-03-22 PROCEDURE — G0378 HOSPITAL OBSERVATION PER HR: HCPCS

## 2023-03-22 PROCEDURE — 25000003 PHARM REV CODE 250: Performed by: PHYSICIAN ASSISTANT

## 2023-03-22 PROCEDURE — 63600175 PHARM REV CODE 636 W HCPCS: Performed by: EMERGENCY MEDICINE

## 2023-03-22 PROCEDURE — 25000003 PHARM REV CODE 250

## 2023-03-22 PROCEDURE — 99900031 HC PATIENT EDUCATION (STAT)

## 2023-03-22 PROCEDURE — 99223 PR INITIAL HOSPITAL CARE,LEVL III: ICD-10-PCS | Mod: ,,, | Performed by: PHYSICIAN ASSISTANT

## 2023-03-22 PROCEDURE — 25000003 PHARM REV CODE 250: Performed by: HOSPITALIST

## 2023-03-22 PROCEDURE — 96375 TX/PRO/DX INJ NEW DRUG ADDON: CPT

## 2023-03-22 PROCEDURE — 99900035 HC TECH TIME PER 15 MIN (STAT)

## 2023-03-22 PROCEDURE — 94761 N-INVAS EAR/PLS OXIMETRY MLT: CPT

## 2023-03-22 PROCEDURE — 99223 PR INITIAL HOSPITAL CARE,LEVL III: ICD-10-PCS | Mod: GC,,, | Performed by: STUDENT IN AN ORGANIZED HEALTH CARE EDUCATION/TRAINING PROGRAM

## 2023-03-22 PROCEDURE — 80048 BASIC METABOLIC PNL TOTAL CA: CPT | Performed by: PHYSICIAN ASSISTANT

## 2023-03-22 PROCEDURE — 85730 THROMBOPLASTIN TIME PARTIAL: CPT | Mod: 91 | Performed by: HOSPITALIST

## 2023-03-22 PROCEDURE — 83880 ASSAY OF NATRIURETIC PEPTIDE: CPT | Performed by: HOSPITALIST

## 2023-03-22 PROCEDURE — 99223 1ST HOSP IP/OBS HIGH 75: CPT | Mod: ,,, | Performed by: PHYSICIAN ASSISTANT

## 2023-03-22 PROCEDURE — 85025 COMPLETE CBC W/AUTO DIFF WBC: CPT | Performed by: PHYSICIAN ASSISTANT

## 2023-03-22 PROCEDURE — 63600175 PHARM REV CODE 636 W HCPCS

## 2023-03-22 PROCEDURE — 27000221 HC OXYGEN, UP TO 24 HOURS

## 2023-03-22 PROCEDURE — 96376 TX/PRO/DX INJ SAME DRUG ADON: CPT

## 2023-03-22 RX ORDER — DULOXETIN HYDROCHLORIDE 60 MG/1
60 CAPSULE, DELAYED RELEASE ORAL DAILY
Status: DISCONTINUED | OUTPATIENT
Start: 2023-03-22 | End: 2023-03-24 | Stop reason: HOSPADM

## 2023-03-22 RX ORDER — ATORVASTATIN CALCIUM 40 MG/1
80 TABLET, FILM COATED ORAL DAILY
Status: DISCONTINUED | OUTPATIENT
Start: 2023-03-22 | End: 2023-03-24 | Stop reason: HOSPADM

## 2023-03-22 RX ORDER — ACETAMINOPHEN 325 MG/1
650 TABLET ORAL EVERY 4 HOURS PRN
Status: DISCONTINUED | OUTPATIENT
Start: 2023-03-22 | End: 2023-03-24 | Stop reason: HOSPADM

## 2023-03-22 RX ORDER — LISINOPRIL 20 MG/1
40 TABLET ORAL DAILY
Status: DISCONTINUED | OUTPATIENT
Start: 2023-03-22 | End: 2023-03-24 | Stop reason: HOSPADM

## 2023-03-22 RX ORDER — HYDRALAZINE HYDROCHLORIDE 20 MG/ML
5 INJECTION INTRAMUSCULAR; INTRAVENOUS ONCE
Status: COMPLETED | OUTPATIENT
Start: 2023-03-22 | End: 2023-03-22

## 2023-03-22 RX ORDER — POLYETHYLENE GLYCOL 3350 17 G/17G
17 POWDER, FOR SOLUTION ORAL DAILY PRN
Status: DISCONTINUED | OUTPATIENT
Start: 2023-03-22 | End: 2023-03-24 | Stop reason: HOSPADM

## 2023-03-22 RX ORDER — PANTOPRAZOLE SODIUM 40 MG/1
40 TABLET, DELAYED RELEASE ORAL DAILY
Status: DISCONTINUED | OUTPATIENT
Start: 2023-03-22 | End: 2023-03-24 | Stop reason: HOSPADM

## 2023-03-22 RX ORDER — SIMETHICONE 80 MG
1 TABLET,CHEWABLE ORAL 4 TIMES DAILY PRN
Status: DISCONTINUED | OUTPATIENT
Start: 2023-03-22 | End: 2023-03-24 | Stop reason: HOSPADM

## 2023-03-22 RX ORDER — FUROSEMIDE 10 MG/ML
40 INJECTION INTRAMUSCULAR; INTRAVENOUS ONCE
Status: DISCONTINUED | OUTPATIENT
Start: 2023-03-22 | End: 2023-03-22

## 2023-03-22 RX ORDER — BISACODYL 10 MG
10 SUPPOSITORY, RECTAL RECTAL DAILY PRN
Status: DISCONTINUED | OUTPATIENT
Start: 2023-03-22 | End: 2023-03-24 | Stop reason: HOSPADM

## 2023-03-22 RX ORDER — ACETAMINOPHEN 500 MG
1000 TABLET ORAL EVERY 8 HOURS PRN
Status: DISCONTINUED | OUTPATIENT
Start: 2023-03-22 | End: 2023-03-24 | Stop reason: HOSPADM

## 2023-03-22 RX ORDER — TALC
6 POWDER (GRAM) TOPICAL NIGHTLY PRN
Status: DISCONTINUED | OUTPATIENT
Start: 2023-03-22 | End: 2023-03-24 | Stop reason: HOSPADM

## 2023-03-22 RX ORDER — PROCHLORPERAZINE EDISYLATE 5 MG/ML
5 INJECTION INTRAMUSCULAR; INTRAVENOUS EVERY 6 HOURS PRN
Status: DISCONTINUED | OUTPATIENT
Start: 2023-03-22 | End: 2023-03-24 | Stop reason: HOSPADM

## 2023-03-22 RX ORDER — HYDRALAZINE HYDROCHLORIDE 50 MG/1
50 TABLET, FILM COATED ORAL EVERY 8 HOURS PRN
Status: DISCONTINUED | OUTPATIENT
Start: 2023-03-22 | End: 2023-03-24 | Stop reason: HOSPADM

## 2023-03-22 RX ORDER — LISINOPRIL 20 MG/1
20 TABLET ORAL DAILY
Status: DISCONTINUED | OUTPATIENT
Start: 2023-03-22 | End: 2023-03-22

## 2023-03-22 RX ORDER — MAG HYDROX/ALUMINUM HYD/SIMETH 200-200-20
30 SUSPENSION, ORAL (FINAL DOSE FORM) ORAL 4 TIMES DAILY PRN
Status: DISCONTINUED | OUTPATIENT
Start: 2023-03-22 | End: 2023-03-24 | Stop reason: HOSPADM

## 2023-03-22 RX ORDER — LANOLIN ALCOHOL/MO/W.PET/CERES
1 CREAM (GRAM) TOPICAL DAILY
Status: DISCONTINUED | OUTPATIENT
Start: 2023-03-22 | End: 2023-03-24 | Stop reason: HOSPADM

## 2023-03-22 RX ORDER — METOPROLOL TARTRATE 25 MG/1
25 TABLET, FILM COATED ORAL 4 TIMES DAILY
Status: DISCONTINUED | OUTPATIENT
Start: 2023-03-22 | End: 2023-03-23

## 2023-03-22 RX ORDER — METOPROLOL TARTRATE 1 MG/ML
5 INJECTION, SOLUTION INTRAVENOUS EVERY 5 MIN PRN
Status: DISCONTINUED | OUTPATIENT
Start: 2023-03-22 | End: 2023-03-24 | Stop reason: HOSPADM

## 2023-03-22 RX ORDER — CLOPIDOGREL BISULFATE 75 MG/1
75 TABLET ORAL DAILY
Status: DISCONTINUED | OUTPATIENT
Start: 2023-03-22 | End: 2023-03-24 | Stop reason: HOSPADM

## 2023-03-22 RX ORDER — SODIUM CHLORIDE 0.9 % (FLUSH) 0.9 %
5 SYRINGE (ML) INJECTION
Status: DISCONTINUED | OUTPATIENT
Start: 2023-03-22 | End: 2023-03-24 | Stop reason: HOSPADM

## 2023-03-22 RX ORDER — ONDANSETRON 8 MG/1
8 TABLET, ORALLY DISINTEGRATING ORAL EVERY 8 HOURS PRN
Status: DISCONTINUED | OUTPATIENT
Start: 2023-03-22 | End: 2023-03-24 | Stop reason: HOSPADM

## 2023-03-22 RX ORDER — GLUCAGON 1 MG
1 KIT INJECTION
Status: DISCONTINUED | OUTPATIENT
Start: 2023-03-22 | End: 2023-03-24 | Stop reason: HOSPADM

## 2023-03-22 RX ADMIN — HYDRALAZINE HYDROCHLORIDE 5 MG: 20 INJECTION, SOLUTION INTRAMUSCULAR; INTRAVENOUS at 12:03

## 2023-03-22 RX ADMIN — METOPROLOL TARTRATE 25 MG: 25 TABLET, FILM COATED ORAL at 11:03

## 2023-03-22 RX ADMIN — METOPROLOL TARTRATE 25 MG: 25 TABLET, FILM COATED ORAL at 04:03

## 2023-03-22 RX ADMIN — HYDRALAZINE HYDROCHLORIDE 5 MG: 20 INJECTION INTRAMUSCULAR; INTRAVENOUS at 03:03

## 2023-03-22 RX ADMIN — ATORVASTATIN CALCIUM 80 MG: 40 TABLET, FILM COATED ORAL at 08:03

## 2023-03-22 RX ADMIN — METOROPROLOL TARTRATE 5 MG: 5 INJECTION, SOLUTION INTRAVENOUS at 06:03

## 2023-03-22 RX ADMIN — CLOPIDOGREL BISULFATE 75 MG: 75 TABLET ORAL at 08:03

## 2023-03-22 RX ADMIN — ONDANSETRON 8 MG: 8 TABLET, ORALLY DISINTEGRATING ORAL at 09:03

## 2023-03-22 RX ADMIN — METOROPROLOL TARTRATE 5 MG: 5 INJECTION, SOLUTION INTRAVENOUS at 03:03

## 2023-03-22 RX ADMIN — METOPROLOL TARTRATE 25 MG: 25 TABLET, FILM COATED ORAL at 10:03

## 2023-03-22 RX ADMIN — HYDRALAZINE HYDROCHLORIDE 50 MG: 50 TABLET ORAL at 07:03

## 2023-03-22 RX ADMIN — FERROUS SULFATE TAB 325 MG (65 MG ELEMENTAL FE) 1 EACH: 325 (65 FE) TAB at 08:03

## 2023-03-22 RX ADMIN — HYDRALAZINE HYDROCHLORIDE 50 MG: 50 TABLET ORAL at 04:03

## 2023-03-22 RX ADMIN — PANTOPRAZOLE SODIUM 40 MG: 40 TABLET, DELAYED RELEASE ORAL at 08:03

## 2023-03-22 RX ADMIN — DULOXETINE HYDROCHLORIDE 60 MG: 60 CAPSULE, DELAYED RELEASE ORAL at 08:03

## 2023-03-22 RX ADMIN — LISINOPRIL 40 MG: 20 TABLET ORAL at 08:03

## 2023-03-22 NOTE — SUBJECTIVE & OBJECTIVE
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)     Stroke        Past Surgical History:   Procedure Laterality Date    APPENDECTOMY      COLONOSCOPY N/A 8/7/2019    Procedure: COLONOSCOPY;  Surgeon: Alex Degroot MD;  Location: [facility] ENDO;  Service: Endoscopy;  Laterality: N/A;    COLONOSCOPY N/A 2/12/2020    Procedure: COLONOSCOPY;  Surgeon: Evelio Rizvi MD;  Location: [facility] ENDO;  Service: Endoscopy;  Laterality: N/A;    ESOPHAGOGASTRODUODENOSCOPY N/A 2/11/2020    Procedure: EGD (ESOPHAGOGASTRODUODENOSCOPY);  Surgeon: Evelio Rizvi MD;  Location: [facility] ENDO;  Service: Endoscopy;  Laterality: N/A;    HYSTERECTOMY         Review of patient's allergies indicates:  No Known Allergies    No current facility-administered medications on file prior to encounter.     Current Outpatient Medications on File Prior to Encounter   Medication Sig    acetaminophen (TYLENOL) 500 MG tablet Take 1 tablet (500 mg total) by mouth every 6 (six) hours as needed for Pain or Temperature greater than (100.4 F).    atorvastatin (LIPITOR) 80 MG tablet Take 80 mg by mouth once daily.     clopidogreL (PLAVIX) 75 mg tablet Take 1 tablet (75 mg total) by mouth once daily.    DULoxetine (CYMBALTA) 60 MG capsule Take 1 capsule (60 mg total) by mouth once daily.    ferrous sulfate 325 (65 FE) MG EC tablet Take 1 tablet (325 mg total) by mouth 2 (two) times daily.    LIDOcaine (LIDODERM) 5 % Place 1 patch onto the skin once daily. Remove & Discard patch within 12 hours or as directed by MD    lisinopril (PRINIVIL,ZESTRIL) 20 MG tablet Take 20 mg by mouth once daily.     pantoprazole (PROTONIX) 40 MG tablet Take 1 tablet (40 mg total) by mouth once daily.     Family History       Problem Relation (Age of Onset)    Cancer Sister    Diabetes Sister          Tobacco Use    Smoking status: Former     Packs/day: 2.00     Types: Cigarettes     Quit date: 9/12/2017     Years  since quittin.5    Smokeless tobacco: Never   Substance and Sexual Activity    Alcohol use: Yes     Alcohol/week: 1.0 standard drink     Types: 1 Cans of beer per week    Drug use: No    Sexual activity: Not on file     Review of Systems   Constitutional:  Negative for activity change, chills and fever.   HENT:  Negative for trouble swallowing.    Eyes:  Negative for photophobia and visual disturbance.   Respiratory:  Positive for shortness of breath. Negative for cough and chest tightness.    Cardiovascular:  Positive for palpitations. Negative for chest pain and leg swelling.   Gastrointestinal:  Negative for abdominal pain, constipation, diarrhea, nausea and vomiting.   Genitourinary:  Negative for dysuria, frequency and hematuria.   Musculoskeletal:  Negative for back pain, gait problem and neck pain.   Skin:  Negative for rash and wound.   Neurological:  Negative for dizziness, syncope, speech difficulty and light-headedness.   Psychiatric/Behavioral:  Negative for agitation and confusion. The patient is not nervous/anxious.    Objective:     Vital Signs (Most Recent):  Temp: 97.8 °F (36.6 °C) (23 0300)  Pulse: (!) 120 (23 0300)  Resp: 16 (23 0300)  BP: (!) 185/77 (23 0300)  SpO2: 97 % (23 0300)   Vital Signs (24h Range):  Temp:  [97.5 °F (36.4 °C)-97.9 °F (36.6 °C)] 97.8 °F (36.6 °C)  Pulse:  [] 120  Resp:  [16-30] 16  SpO2:  [94 %-98 %] 97 %  BP: (142-233)/() 185/77     Weight: 59 kg (130 lb)  Body mass index is 23.03 kg/m².    Physical Exam  Vitals and nursing note reviewed.   Constitutional:       General: She is not in acute distress.     Appearance: She is well-developed.   HENT:      Head: Normocephalic and atraumatic.      Mouth/Throat:      Pharynx: No oropharyngeal exudate.   Eyes:      Conjunctiva/sclera: Conjunctivae normal.      Pupils: Pupils are equal, round, and reactive to light.   Cardiovascular:      Rate and Rhythm: Regular rhythm. Tachycardia  present.      Heart sounds: Normal heart sounds.   Pulmonary:      Effort: Pulmonary effort is normal. No respiratory distress.      Breath sounds: Normal breath sounds. No wheezing.   Abdominal:      General: Bowel sounds are normal. There is no distension.      Palpations: Abdomen is soft.      Tenderness: There is no abdominal tenderness.   Musculoskeletal:         General: No tenderness. Normal range of motion.      Cervical back: Normal range of motion and neck supple.   Lymphadenopathy:      Cervical: No cervical adenopathy.   Skin:     General: Skin is warm and dry.      Capillary Refill: Capillary refill takes less than 2 seconds.      Findings: No rash.   Neurological:      Mental Status: She is alert and oriented to person, place, and time.      Cranial Nerves: No cranial nerve deficit.      Sensory: No sensory deficit.      Coordination: Coordination normal.   Psychiatric:         Behavior: Behavior normal.         Thought Content: Thought content normal.         Judgment: Judgment normal.         CRANIAL NERVES     CN III, IV, VI   Pupils are equal, round, and reactive to light.     Significant Labs: All pertinent labs within the past 24 hours have been reviewed.    Significant Imaging: I have reviewed all pertinent imaging results/findings within the past 24 hours.

## 2023-03-22 NOTE — CARE UPDATE
76 yo F with history of CVA and HTN who presented to the ED with new onset AF and the ED team called me for curbside recommendations on starting AC/admission. She reportedly was rate controlled by ED team who plans to have the patient admitted to  with EP consult. No formal consultation was requested, and I agreed with ED team for admission for EP evaluation with possible cardioversion. Please call back with any questions and if formal consult needed.

## 2023-03-22 NOTE — HOSPITAL COURSE
Maria A Smith is a 78 yo F admitted to observation for new onset A fib and hypertensive urgency. Started on heparin drip for anticoagulation. Required multiple doses of IV metoprolol for rate control. Started on lopressor QID. Echo performed showing A fib, EF 65%, and CVP 3 mmHg. EP consulted; performed successful CHERRIE/CV 03/23. Required multiple doses of IV and po hydralazine for BP control. Increased home lisinopril to 40 mg daily. Evaluated by urology for L hydronephrosis seen on CT. NM renal scan showing poorly functioning left kidney and bilateral obstruction which appears to be chronic in nature. She does not require any urologic interventions.  UA positive for infection; started Rocephin. Urine cx with + Klebsiella pneumoniae susceptible to cephalosporins; completed 3 day course of IV rocephin. CT with moderate to severe emphysema and patient requiring O2 throughout stay to maintain sats. 6MWT completed; patient requiring home O2. Discharged with coreg for rate control, eliquis for anticoagulation, and increased dose of lisinopril. Discharge home with , home O2, and f/u with cardiology and pulmonology.

## 2023-03-22 NOTE — HPI
Ms. Smith is a 76 yo female with CVA with residual deficits, afib, HTN, SCC skin cancer of arm admitted to medicine for newly diagnosed afib. Urology consulted for left hydronephrosis.    Patient presented to ED for arm pain and diagnosed with afib. Patient denies hematuria, flank pain or dysuria. No recent history of UTI. Medicine started her on heparin gtt and Rocephin.    Upon assessment, resting comfortably with HR in the 120s in afib.    Labs 3/22/23: WBC 8.45 Hgb 10.2 Cr 0.7 (0.7), which is her baseline  UA 3/21/23: 6 squams, 18 RBC, 15 WBC, moderate bacteria, nitrite positive    CTA 3/21/23: moderate to severe left hydronephrosis with thinned parenchyma, appears chronic in nature. No hydronephrosis on the right. No masses or urolithiasis bilaterally.

## 2023-03-22 NOTE — ASSESSMENT & PLAN NOTE
Patient with Paroxysmal (<7 days) atrial fibrillation which is uncontrolled currently with Beta Blocker and Calcium Channel Blocker. Patient is currently in atrial fibrillation.[id number] Score: 4.  Anticoagulation indicated. Anticoagulation done with heparin gtt.  - ED discussed with EP fellow, formal consult placed for AM  - check ECHO  - TSH 1.31  - Mag 1.5, given IV repletion  - cardiac monitoring

## 2023-03-22 NOTE — PROVIDER PROGRESS NOTES - EMERGENCY DEPT.
Encounter Date: 3/21/2023    ED Physician Progress Notes        Physician Note:   ED Physician Hand-off Note:    ED Course: I assumed care of patient from off-going ED physician team. Briefly, Patient is a 77 F hx CVA, hypertension, osteoporosis here for left back, left arm pain.    Patient was found to be in AFib with RVR, required 1 dose of Cardizem with improvement in her rate.    Labs reviewed with mildly elevated troponin at 0.066.  Labs otherwise stable    At the time of signout plan was pending CT head, CT chest abdomen pelvis, heparin drip initiation for high chads vascular with new onset AFib.    Medications given in the ED:    Medications  magnesium sulfate 2g in water 50mL IVPB (premix) (2 g Intravenous New Bag 3/21/23 2106)  cefTRIAXone (ROCEPHIN) 1 g in dextrose 5 % in water (D5W) 5 % 50 mL IVPB (MB+) (has no administration in time range)  acetaminophen tablet 1,000 mg (1,000 mg Oral Given 3/21/23 1843)  aspirin tablet 325 mg (325 mg Oral Given 3/21/23 1844)  diltiaZEM injection 15 mg (15 mg Intravenous Given 3/21/23 1850)  diltiaZEM tablet 30 mg (30 mg Oral Given 3/21/23 2100)  iohexoL (OMNIPAQUE 350) injection 75 mL (75 mLs Intravenous Given 3/21/23 2046)    9:30 PM  Patient re-evaluated, has no questions or complaints at this time.  Understanding plan.  CT chest, abdomen, pelvis    Impression:     1. No evidence of aortic aneurysm or dissection.  2. Severe hydronephrosis on the left with associated renal cortical thinning which is new from the prior study.  No significant hydroureter.  UPJ narrowing is a consideration.  Follow-up is recommended.  3. Diverticulosis of the colon.  There is mild thickening and stranding in the descending colon could represent mild acute diverticulitis.  Mild wall thickening in the transverse colon and right colon also may be associated with mild colitis.  Follow-up recommended.  4. Mild cardiomegaly.  5. Moderate-severe emphysematous changes throughout the lungs.  6.  Few small subcentimeter hypodense foci in the spleen which are nonspecific.  This could represent small cysts.  Fungal infection is felt to be less likely.  7.  This report was flagged in Epic as abnormal.        Electronically signed by: Suman Vance  Date:                                            03/21/2023  Time:                                           21:35      Ct head  Impression:     1. No acute intracranial process.  2. Involutional changes with chronic microvascular ischemic changes and remote left MCA distribution infarction involving the left frontal cortex, left parietal cortex and left lateral basal ganglia.        Electronically signed by: Suman Vance  Date:                                            03/21/2023  Time:                                           21:15    No urinary complaints.  UTI noted and treated with rocephin.  Will place urology consult to see tomorrow regarding hydronephrosis.     Heparin initiated, given multiple antihypertensives for elevated BP although she is asymptomatic.  Place in observation under hospital medicine    Disposition: admit    Patient comfortable with plan. Patient counseled regarding exam, results, diagnosis, treatment, and plan.    Impression: new onset A fib, L sided hydronephrosis    HALINA Tinajero MD  Staff ED Physician  03/21/2023 9:27 PM      Critical Care time:35 minutes inclusive of direct patient care, review of previous records, interpretation of labs, imaging and ekg, as well as discussion of my impression and plan of care with the patient, family and other clinicians/consultants. This time is exclusive of any separate billable procedures and of treating other patients.

## 2023-03-22 NOTE — CONSULTS
[facility] - Emergency Dept  Cardiac Electrophysiology  Consult Note    Admission Date: 3/21/2023  Code Status: Full Code   Attending Provider: Irma Foy MD  Consulting Provider: Suhas Pickard MD  Principal Problem:New onset atrial fibrillation    Inpatient consult to Electrophysiology  Consult performed by: Suhas Pickard MD  Consult ordered by: Steve Lopez Jr., PA-C        Subjective:     Chief Complaint:  New AF    HPI:   77 year old with PMH of HTN, HLD, CVA (ischemic [place]13[place]18, [place] who presented for left arm pain over the past day. Denies recent palpitations or SOB. No history of arrhythmias or heart disease. On arrival, noted to have new onset AF with RVR, rates of 134, hypertensive. She was given Cardizem 15 IV x1 with improvement of ventriclar rate. CT scan with severe L hydronephrosis. Elevated BNP of 500, but no significant edema on CXR. Admitted to [facility] for further care, urology consulted. 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/2017    right forearm ([place]  type)     Stroke        Past Surgical History:   Procedure Laterality Date    APPENDECTOMY      COLONOSCOPY N/A 8/7/2019    Procedure: COLONOSCOPY;  Surgeon: Alex Degroot MD;  Location: [facility];  Service: Endoscopy;  Laterality: N/A;    COLONOSCOPY N/A 2/12/2020    Procedure: COLONOSCOPY;  Surgeon: Evelio Rizvi MD;  Location: [facility];  Service: Endoscopy;  Laterality: N/A;    ESOPHAGOGASTRODUODENOSCOPY N/A 2/11/2020    Procedure: EGD (ESOPHAGOGASTRODUODENOSCOPY);  Surgeon: Evelio Rizvi MD;  Location: [facility];  Service: Endoscopy;  Laterality: N/A;    HYSTERECTOMY         Review of patient's allergies indicates:  No Known Allergies    No current facility-administered medications on file prior to encounter.     Current Outpatient Medications on File Prior to Encounter   Medication Sig    acetaminophen (TYLENOL) 500 MG tablet Take 1  tablet (500 mg total) by mouth every 6 (six) hours as needed for Pain or Temperature greater than (100.4 F).    atorvastatin (LIPITOR) 80 MG tablet Take 80 mg by mouth once daily.     clopidogreL (PLAVIX) 75 mg tablet Take 1 tablet (75 mg total) by mouth once daily.    DULoxetine (CYMBALTA) 60 MG capsule Take 1 capsule (60 mg total) by mouth once daily.    ferrous sulfate 325 (65 FE) MG EC tablet Take 1 tablet (325 mg total) by mouth 2 (two) times daily.    LIDOcaine (LIDODERM) 5 % Place 1 patch onto the skin once daily. Remove & Discard patch within 12 hours or as directed by MD    lisinopril (PRINIVIL,ZESTRIL) 20 MG tablet Take 20 mg by mouth once daily.     pantoprazole (PROTONIX) 40 MG tablet Take 1 tablet (40 mg total) by mouth once daily.     Family History       Problem Relation (Age of Onset)    Cancer Sister    Diabetes Sister          Tobacco Use    Smoking status: Former     Packs/day: 2.00     Types: Cigarettes     Quit date: 2017     Years since quittin.5    Smokeless tobacco: Never   Substance and Sexual Activity    Alcohol use: Yes     Alcohol/week: 1.0 standard drink     Types: 1 Cans of beer per week    Drug use: No    Sexual activity: Not on file     Review of Systems   Constitutional: Positive for malaise/fatigue.   Objective:     Vital Signs (Most Recent):  Temp: 97.9 °F (36.6 °C) (23)  Pulse: 94 (23)  Resp: 20 (23)  BP: (!) 191/86 (23)  SpO2: (!) 92 % (23)   Vital Signs (24h Range):  Temp:  [97.7 °F (36.5 °C)-97.9 °F (36.6 °C)] 97.9 °F (36.6 °C)  Pulse:  [] 94  Resp:  [16-30] 20  SpO2:  [88 %-98 %] 92 %  BP: (155-233)/() 191/86       Weight: 59 kg (130 lb)  Body mass index is 23.03 kg/m².    SpO2: (!) 92 %       Physical Exam  Vitals reviewed.   Constitutional:       Appearance: Normal appearance.   HENT:      Head: Normocephalic and atraumatic.      Mouth/Throat:      Mouth: Mucous membranes are moist.    Eyes:      Conjunctiva/sclera: Conjunctivae normal.   Cardiovascular:      Rate and Rhythm: Tachycardia present. Rhythm irregular.      Pulses: Normal pulses.   Abdominal:      General: Abdomen is flat. There is no distension.   Musculoskeletal:      Cervical back: Normal range of motion.      Right lower leg: No edema.      Left lower leg: No edema.   Skin:     Capillary Refill: Capillary refill takes less than 2 seconds.      Findings: No rash.   Neurological:      Mental Status: She is alert and oriented to person, place, and time.       Significant Labs: All pertinent lab results from the last 24 hours have been reviewed.                  Assessment and Plan:     * New onset atrial fibrillation  77 year old with PMH of HTN, HLD, CVA (ischemic 6/13/18, [facility]) admitted for new AF with RVR.     Recs  Plan for CHERRIE/CCV tomorrow pending eval of hydronephrosis by urology and if no contraindication for uninterrupted anticoagulation x 1 month (urologic procedure/stent)  NPO at midnight, will discuss with urology prior to CV  If needed, can start low dose BB given CVP of 3 on echo and work up unrevealing for significant volume overload   Chadvasc of 6 (age, sex, htn, cva). Continue anticoagulation with heparin gtt.     Results for orders placed during the hospital encounter of 03/21/23    Echo    Interpretation Summary  · Irregularly irregular rhythm and tachycardia were present during the study.  · Atrial fibrillation observed.  · Mild left atrial enlargement.  · The left ventricle is normal in size with concentric remodeling and normal systolic function.  · Normal right ventricular size with normal right ventricular systolic function.  · The estimated ejection fraction is 65%.  · Mild mitral regurgitation.  · Normal central venous pressure (3 mmHg).            Suhas Pickard MD  Cardiac Electrophysiology  [facility] - Emergency Dept

## 2023-03-22 NOTE — PLAN OF CARE
Problem: Adult Inpatient Plan of Care  Goal: Plan of Care Review  Outcome: Ongoing, Progressing  Goal: Patient-Specific Goal (Individualized)  Outcome: Ongoing, Progressing  Goal: Absence of Hospital-Acquired Illness or Injury  Outcome: Ongoing, Progressing  Goal: Optimal Comfort and Wellbeing  Outcome: Ongoing, Progressing  Goal: Readiness for Transition of Care  Outcome: Ongoing, Progressing     Problem: Infection  Goal: Absence of Infection Signs and Symptoms  Outcome: Ongoing, Progressing     Problem: Dysrhythmia  Goal: Normalized Cardiac Rhythm  Outcome: Ongoing, Progressing

## 2023-03-22 NOTE — ASSESSMENT & PLAN NOTE
77 year old with PMH of HTN, HLD, CVA (ischemic 6/13/18, [facility]) admitted for new AF with RVR.     Recs  Plan for CHERRIE/CCV tomorrow pending eval of hydronephrosis by urology and if no contraindication for uninterrupted anticoagulation x 1 month (urologic procedure/stent)  NPO at midnight, will discuss with urology prior to CV  If needed, can start low dose BB given CVP of 3 on echo and work up unrevealing for significant volume overload   Chadvasc of 6 (age, sex, htn, cva). Continue anticoagulation with heparin gtt.

## 2023-03-22 NOTE — SUBJECTIVE & OBJECTIVE
Past Medical History:   Diagnosis Date    Hypertension     Squamous cell carcinoma 2017    right forearm ([place]  type)     Stroke        Past Surgical History:   Procedure Laterality Date    APPENDECTOMY      COLONOSCOPY N/A 2019    Procedure: COLONOSCOPY;  Surgeon: Alex Degroot MD;  Location: [facility] ENDO;  Service: Endoscopy;  Laterality: N/A;    COLONOSCOPY N/A 2020    Procedure: COLONOSCOPY;  Surgeon: Evelio Rizvi MD;  Location: [facility] ENDO;  Service: Endoscopy;  Laterality: N/A;    ESOPHAGOGASTRODUODENOSCOPY N/A 2020    Procedure: EGD (ESOPHAGOGASTRODUODENOSCOPY);  Surgeon: Evelio Rizvi MD;  Location: [facility] ENDO;  Service: Endoscopy;  Laterality: N/A;    HYSTERECTOMY         Review of patient's allergies indicates:  No Known Allergies    Family History       Problem Relation (Age of Onset)    Cancer Sister    Diabetes Sister            Tobacco Use    Smoking status: Former     Packs/day: 2.00     Types: Cigarettes     Quit date: 2017     Years since quittin.5    Smokeless tobacco: Never   Substance and Sexual Activity    Alcohol use: Yes     Alcohol/week: 1.0 standard drink     Types: 1 Cans of beer per week    Drug use: No    Sexual activity: Not on file       Review of Systems   Constitutional:  Negative for activity change, chills, diaphoresis and fever.   HENT: Negative.     Eyes: Negative.    Respiratory: Negative.  Negative for shortness of breath.    Cardiovascular: Negative.  Negative for chest pain.   Gastrointestinal: Negative.  Negative for constipation, diarrhea, nausea and vomiting.   Musculoskeletal: Negative.    Skin: Negative.    Neurological: Negative.    Psychiatric/Behavioral: Negative.       Objective:     Temp:  [97.5 °F (36.4 °C)-97.9 °F (36.6 °C)] 97.9 °F (36.6 °C)  Pulse:  [] 107  Resp:  [16-30] 20  SpO2:  [94 %-98 %] 97 %  BP: (142-233)/() 165/86     Body mass index is 23.03 kg/m².           Drains       Drain  Duration             Female  External Urinary Catheter 03/21/23 1917 <1 day                    Physical Exam  Vitals and nursing note reviewed.   Constitutional:       General: She is not in acute distress.  HENT:      Head: Atraumatic.      Nose: Nose normal.   Eyes:      Extraocular Movements: Extraocular movements intact.   Cardiovascular:      Rate and Rhythm: Tachycardia present. Rhythm irregular.   Pulmonary:      Effort: Pulmonary effort is normal.   Abdominal:      General: Abdomen is flat. There is no distension.      Tenderness: There is no abdominal tenderness. There is no right CVA tenderness or left CVA tenderness.   Musculoskeletal:         General: Normal range of motion.      Cervical back: Normal range of motion.   Skin:     Coloration: Skin is not jaundiced.   Neurological:      General: No focal deficit present.      Mental Status: She is alert and oriented to person, place, and time.   Psychiatric:         Mood and Affect: Mood normal.         Behavior: Behavior normal.       Significant Labs:    BMP:  Recent Labs   Lab 03/21/23  1724 03/21/23 2011 03/22/23  0246    142 142   K 4.6 3.6 3.5    105 103   CO2 22* 26 27   BUN 10 9 9   CREATININE 0.8 0.7 0.7   CALCIUM 10.3 9.4 10.0       CBC:  Recent Labs   Lab 03/21/23  1939 03/22/23  0246   WBC 6.87 8.45  8.45   HGB 9.8* 10.2*  10.2*   HCT 33.4* 34.5*  34.5*    217  217       Urine Studies:   Recent Labs   Lab 03/1947   COLORU Yellow   APPEARANCEUA Hazy*   PHUR 7.0   SPECGRAV 1.020   PROTEINUA 1+*   GLUCUA Negative   KETONESU 1+*   BILIRUBINUA Negative   OCCULTUA Negative   NITRITE Positive*   LEUKOCYTESUR Negative   RBCUA 18*   WBCUA 15*   BACTERIA Moderate*   SQUAMEPITHEL 6   HYALINECASTS 0       Significant Imaging:  CT: I have reviewed all results within the past 24 hours and my personal findings are:  as per HPI.

## 2023-03-22 NOTE — SUBJECTIVE & OBJECTIVE
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)     Stroke        Past Surgical History:   Procedure Laterality Date    APPENDECTOMY      COLONOSCOPY N/A 8/7/2019    Procedure: COLONOSCOPY;  Surgeon: Alex Degroot MD;  Location: [facility] ENDO;  Service: Endoscopy;  Laterality: N/A;    COLONOSCOPY N/A 2/12/2020    Procedure: COLONOSCOPY;  Surgeon: Evelio Rizvi MD;  Location: [facility] ENDO;  Service: Endoscopy;  Laterality: N/A;    ESOPHAGOGASTRODUODENOSCOPY N/A 2/11/2020    Procedure: EGD (ESOPHAGOGASTRODUODENOSCOPY);  Surgeon: Evelio Rizvi MD;  Location: [facility] ENDO;  Service: Endoscopy;  Laterality: N/A;    HYSTERECTOMY         Review of patient's allergies indicates:  No Known Allergies    No current facility-administered medications on file prior to encounter.     Current Outpatient Medications on File Prior to Encounter   Medication Sig    acetaminophen (TYLENOL) 500 MG tablet Take 1 tablet (500 mg total) by mouth every 6 (six) hours as needed for Pain or Temperature greater than (100.4 F).    atorvastatin (LIPITOR) 80 MG tablet Take 80 mg by mouth once daily.     clopidogreL (PLAVIX) 75 mg tablet Take 1 tablet (75 mg total) by mouth once daily.    DULoxetine (CYMBALTA) 60 MG capsule Take 1 capsule (60 mg total) by mouth once daily.    ferrous sulfate 325 (65 FE) MG EC tablet Take 1 tablet (325 mg total) by mouth 2 (two) times daily.    LIDOcaine (LIDODERM) 5 % Place 1 patch onto the skin once daily. Remove & Discard patch within 12 hours or as directed by MD    lisinopril (PRINIVIL,ZESTRIL) 20 MG tablet Take 20 mg by mouth once daily.     pantoprazole (PROTONIX) 40 MG tablet Take 1 tablet (40 mg total) by mouth once daily.     Family History       Problem Relation (Age of Onset)    Cancer Sister    Diabetes Sister          Tobacco Use    Smoking status: Former     Packs/day: 2.00     Types: Cigarettes     Quit date: 9/12/2017     Years  since quittin.5    Smokeless tobacco: Never   Substance and Sexual Activity    Alcohol use: Yes     Alcohol/week: 1.0 standard drink     Types: 1 Cans of beer per week    Drug use: No    Sexual activity: Not on file     Review of Systems   Constitutional: Positive for malaise/fatigue.   Objective:     Vital Signs (Most Recent):  Temp: 97.9 °F (36.6 °C) (23 161)  Pulse: 94 (23 161)  Resp: 20 (23)  BP: (!) 191/86 (23 161)  SpO2: (!) 92 % (23 161)   Vital Signs (24h Range):  Temp:  [97.7 °F (36.5 °C)-97.9 °F (36.6 °C)] 97.9 °F (36.6 °C)  Pulse:  [] 94  Resp:  [16-30] 20  SpO2:  [88 %-98 %] 92 %  BP: (155-233)/() 191/86       Weight: 59 kg (130 lb)  Body mass index is 23.03 kg/m².    SpO2: (!) 92 %       Physical Exam  Vitals reviewed.   Constitutional:       Appearance: Normal appearance.   HENT:      Head: Normocephalic and atraumatic.      Mouth/Throat:      Mouth: Mucous membranes are moist.   Eyes:      Conjunctiva/sclera: Conjunctivae normal.   Cardiovascular:      Rate and Rhythm: Tachycardia present. Rhythm irregular.      Pulses: Normal pulses.   Abdominal:      General: Abdomen is flat. There is no distension.   Musculoskeletal:      Cervical back: Normal range of motion.      Right lower leg: No edema.      Left lower leg: No edema.   Skin:     Capillary Refill: Capillary refill takes less than 2 seconds.      Findings: No rash.   Neurological:      Mental Status: She is alert and oriented to person, place, and time.       Significant Labs: All pertinent lab results from the last 24 hours have been reviewed.

## 2023-03-22 NOTE — HPI
77 year old with PMH of HTN, HLD, CVA (ischemic 6/13/18, [facility]) who presented for left arm pain over the past day. Denies recent palpitations or SOB. No history of arrhythmias or heart disease. On arrival, noted to have new onset AF with RVR, rates of 134, hypertensive. She was given Cardizem 15 IV x1 with improvement of ventriclar rate. CT scan with severe L hydronephrosis. Elevated BNP of 500, but no significant edema on CXR. Admitted to [facility] for further care, urology consulted. EP consulted for new onset AF

## 2023-03-22 NOTE — CARE UPDATE
Brief Hospital Medicine Update    Maria A Smith is a 78 yo F admitted to observation for new onset A fib and hypertensive urgency. Started on heparin drip for anticoagulation. Required multiple doses of IV metoprolol for rate control. Started on lopressor QID. Echo performed showing A fib, EF 65%, and CVP 3 mmHg. EP consulted and awaiting further recs. NPO at midnight for possible cardioversion. Required multiple doses of IV and po hydralazine for BP control. Increased home lisinopril to 40 mg daily. Evaluated by urology for L hydronephrosis seen on CT. NM renal scan performed. UA positive for infection; started Rocephin. Will follow urine cx. Plan to discharge home with HH and cardiology f/u when medically ready.     Vivian Negron PA-C

## 2023-03-22 NOTE — CONSULTS
[facility] - Emergency Dept  Urology  Consult Note    Patient Name: Maria A Smith  MRN: [id number]  Admission Date: 3/21/2023  Hospital Length of Stay: 0   Code Status: Full Code   Attending Provider: Irma Foy MD   Consulting Provider: Laci Lentz MD  Primary Care Physician: Sneha Peguero MD  Principal Problem:New onset atrial fibrillation    Inpatient consult to Urology  Consult performed by: Laci Lentz MD  Consult ordered by: Steve Lopez Jr., PA-C  Reason for consult: Left hydronephrosis        Subjective:     HPI:  Ms. Smith is a 78 yo female with CVA with residual deficits, afib, HTN, SCC skin cancer of arm admitted to medicine for newly diagnosed afib. Urology consulted for left hydronephrosis.    Patient presented to ED for arm pain and diagnosed with afib. Patient denies hematuria, flank pain or dysuria. No recent history of UTI. Medicine started her on heparin gtt and Rocephin.    Upon assessment, resting comfortably with HR in the 120s in afib.    Labs 3/22/23: WBC 8.45 Hgb 10.2 Cr 0.7 (0.7), which is her baseline  UA 3/21/23: 6 squams, 18 RBC, 15 WBC, moderate bacteria, nitrite positive    CTA 3/21/23: moderate to severe left hydronephrosis with thinned parenchyma, appears chronic in nature. No hydronephrosis on the right. No masses or urolithiasis bilaterally.      Past Medical History:   Diagnosis Date    Hypertension     Squamous cell carcinoma 01/19/2017    right forearm ([place]  type)     Stroke        Past Surgical History:   Procedure Laterality Date    APPENDECTOMY      COLONOSCOPY N/A 8/7/2019    Procedure: COLONOSCOPY;  Surgeon: Alex Degroot MD;  Location: [facility] ENDO;  Service: Endoscopy;  Laterality: N/A;    COLONOSCOPY N/A 2/12/2020    Procedure: COLONOSCOPY;  Surgeon: Evelio Rizvi MD;  Location: [facility] ENDO;  Service: Endoscopy;  Laterality: N/A;    ESOPHAGOGASTRODUODENOSCOPY N/A 2/11/2020    Procedure: EGD (ESOPHAGOGASTRODUODENOSCOPY);  Surgeon:  Evelio Rizvi MD;  Location: [facility];  Service: Endoscopy;  Laterality: N/A;    HYSTERECTOMY         Review of patient's allergies indicates:  No Known Allergies    Family History       Problem Relation (Age of Onset)    Cancer Sister    Diabetes Sister            Tobacco Use    Smoking status: Former     Packs/day: 2.00     Types: Cigarettes     Quit date: 2017     Years since quittin.5    Smokeless tobacco: Never   Substance and Sexual Activity    Alcohol use: Yes     Alcohol/week: 1.0 standard drink     Types: 1 Cans of beer per week    Drug use: No    Sexual activity: Not on file       Review of Systems   Constitutional:  Negative for activity change, chills, diaphoresis and fever.   HENT: Negative.     Eyes: Negative.    Respiratory: Negative.  Negative for shortness of breath.    Cardiovascular: Negative.  Negative for chest pain.   Gastrointestinal: Negative.  Negative for constipation, diarrhea, nausea and vomiting.   Musculoskeletal: Negative.    Skin: Negative.    Neurological: Negative.    Psychiatric/Behavioral: Negative.       Objective:     Temp:  [97.5 °F (36.4 °C)-97.9 °F (36.6 °C)] 97.9 °F (36.6 °C)  Pulse:  [] 107  Resp:  [16-30] 20  SpO2:  [94 %-98 %] 97 %  BP: (142-233)/() 165/86     Body mass index is 23.03 kg/m².           Drains       Drain  Duration             Female External Urinary Catheter 23 <1 day                    Physical Exam  Vitals and nursing note reviewed.   Constitutional:       General: She is not in acute distress.  HENT:      Head: Atraumatic.      Nose: Nose normal.   Eyes:      Extraocular Movements: Extraocular movements intact.   Cardiovascular:      Rate and Rhythm: Tachycardia present. Rhythm irregular.   Pulmonary:      Effort: Pulmonary effort is normal.   Abdominal:      General: Abdomen is flat. There is no distension.      Tenderness: There is no abdominal tenderness. There is no right CVA tenderness or left CVA  tenderness.   Musculoskeletal:         General: Normal range of motion.      Cervical back: Normal range of motion.   Skin:     Coloration: Skin is not jaundiced.   Neurological:      General: No focal deficit present.      Mental Status: She is alert and oriented to person, place, and time.   Psychiatric:         Mood and Affect: Mood normal.         Behavior: Behavior normal.       Significant Labs:    BMP:  Recent Labs   Lab 03/21/23  1724 03/21/23 2011 03/22/23  0246    142 142   K 4.6 3.6 3.5    105 103   CO2 22* 26 27   BUN 10 9 9   CREATININE 0.8 0.7 0.7   CALCIUM 10.3 9.4 10.0       CBC:  Recent Labs   Lab 03/21/23  1939 03/22/23  0246   WBC 6.87 8.45  8.45   HGB 9.8* 10.2*  10.2*   HCT 33.4* 34.5*  34.5*    217  217       Urine Studies:   Recent Labs   Lab 03/1947   COLORU Yellow   APPEARANCEUA Hazy*   PHUR 7.0   SPECGRAV 1.020   PROTEINUA 1+*   GLUCUA Negative   KETONESU 1+*   BILIRUBINUA Negative   OCCULTUA Negative   NITRITE Positive*   LEUKOCYTESUR Negative   RBCUA 18*   WBCUA 15*   BACTERIA Moderate*   SQUAMEPITHEL 6   HYALINECASTS 0       Significant Imaging:  CT: I have reviewed all results within the past 24 hours and my personal findings are:  as per HPI.        Assessment and Plan:     Hydronephrosis  78 yo female with above comorbidities. Urology consulted for left hydronphrosis.    - Appears to be chronic in nature with thinned parenchyma  - Patient is asymptomatic  - Will order NM renal scan while inpatient to assess obstruction and function  - Will base follow up on results  - Antibiotics per primary team  - Follow up urine culture, tailor antibiotics if positive  - Rest of care per primary team          VTE Risk Mitigation (From admission, onward)         Ordered     heparin 25,000 units in dextrose 5% (100 units/ml) IV bolus from bag - ADDITIONAL PRN BOLUS - 60 units/kg  As needed (PRN)        Question:  Heparin Infusion Adjustment (DO NOT MODIFY ANSWER)   Answer:  [email or web address]\epic\Images\Pharmacy\HeparinInfusions\heparin LOW INTENSITY nomogram for [facility] [id number]    03/21/23 2147     heparin 25,000 units in dextrose 5% (100 units/ml) IV bolus from bag - ADDITIONAL PRN BOLUS - 30 units/kg  As needed (PRN)        Question:  Heparin Infusion Adjustment (DO NOT MODIFY ANSWER)  Answer:  [email or web address]\epic\Images\Pharmacy\HeparinInfusions\heparin LOW INTENSITY nomogram for [facility] [email or web address]    03/21/23 2147     Reason for No Pharmacological VTE Prophylaxis  Once        Question:  Reasons:  Answer:  Already adequately anticoagulated on oral Anticoagulants    03/22/23 0225     IP VTE HIGH RISK PATIENT  Once         03/22/23 0225     heparin 25,000 units in dextrose 5% 250 mL (100 units/mL) infusion LOW INTENSITY nomogram - [facility]  Continuous        Question Answer Comment   Heparin Infusion Adjustment (DO NOT MODIFY ANSWER) [email or web address]\epic\Images\Pharmacy\HeparinInfusions\heparin LOW INTENSITY nomogram for [facility] [id number]    Begin at (in units/kg/hr) 12        03/21/23 2147                Thank you for your consult. I will follow-up with patient. Please contact us if you have any additional questions.    Laci Lentz MD  Urology  [facility] - Emergency Dept

## 2023-03-22 NOTE — ASSESSMENT & PLAN NOTE
78 yo female with above comorbidities. Urology consulted for left hydronphrosis.    - Appears to be chronic in nature with thinned parenchyma  - Patient is asymptomatic  - Will order NM renal scan while inpatient to assess obstruction and function  - Will base follow up on results  - Antibiotics per primary team  - Follow up urine culture, tailor antibiotics if positive  - Rest of care per primary team

## 2023-03-22 NOTE — ED NOTES
Hospital med notified of elevated HR.  Pt in afib with HR or 115-130.  Lopressor PRN for sustained HR > 120 ordered.

## 2023-03-22 NOTE — H&P
[facility] - Emergency Dept  [facility] Medicine  History & Physical    Patient Name: Maria A Smith  MRN: [id number]  Patient Class: OP- Observation  Admission Date: 3/21/2023  Attending Physician: Irma Foy MD   Primary Care Provider: Sneha Peguero MD         Patient information was obtained from patient, past medical records and ER records.     Subjective:     Principal Problem:New onset atrial fibrillation    Chief Complaint:   Chief Complaint   Patient presents with    Back Pain     Right sided back pain that started today. Denies N/V/D. +urinary frequency.         HPI: Patient is a 76yo female with a PMHx of HTN, HLD, CVA being admitted to observation for new onset afib. Patient reports onset of left arm pain for 1 day. She reports that she was washing her kitchen floor but denies a fall or specific trauma.  She denies any other complaints, no chest pain, shortness of breath, lightheadedness or weakness.    In the ED, . She was given Cardizem with improvement. EKG showed afib RVR. Troponin 0.066. UA with 15 WBCs. CTA abdomen pelvis showed severe hydronephrosis on the left with associated renal cortical thinning which is new from the prior study.  No significant hydroureter.  UPJ narrowing is a consideration. Other films without fracture. CTH negative. She was started on heparin drip. She was admitted for further management.       Past Medical History:   Diagnosis Date    Hypertension     Squamous cell carcinoma 01/19/2017    right forearm ([facility]  type)     Stroke        Past Surgical History:   Procedure Laterality Date    APPENDECTOMY      COLONOSCOPY N/A 8/7/2019    Procedure: COLONOSCOPY;  Surgeon: Alex Degroot MD;  Location: [facility];  Service: Endoscopy;  Laterality: N/A;    COLONOSCOPY N/A 2/12/2020    Procedure: COLONOSCOPY;  Surgeon: Evelio Rizvi MD;  Location: [facility];  Service: Endoscopy;  Laterality: N/A;    ESOPHAGOGASTRODUODENOSCOPY N/A 2/11/2020    Procedure: EGD  (ESOPHAGOGASTRODUODENOSCOPY);  Surgeon: Evelio Rizvi MD;  Location: [facility];  Service: Endoscopy;  Laterality: N/A;    HYSTERECTOMY         Review of patient's allergies indicates:  No Known Allergies    No current facility-administered medications on file prior to encounter.     Current Outpatient Medications on File Prior to Encounter   Medication Sig    acetaminophen (TYLENOL) 500 MG tablet Take 1 tablet (500 mg total) by mouth every 6 (six) hours as needed for Pain or Temperature greater than (100.4 F).    atorvastatin (LIPITOR) 80 MG tablet Take 80 mg by mouth once daily.     clopidogreL (PLAVIX) 75 mg tablet Take 1 tablet (75 mg total) by mouth once daily.    DULoxetine (CYMBALTA) 60 MG capsule Take 1 capsule (60 mg total) by mouth once daily.    ferrous sulfate 325 (65 FE) MG EC tablet Take 1 tablet (325 mg total) by mouth 2 (two) times daily.    LIDOcaine (LIDODERM) 5 % Place 1 patch onto the skin once daily. Remove & Discard patch within 12 hours or as directed by MD    lisinopril (PRINIVIL,ZESTRIL) 20 MG tablet Take 20 mg by mouth once daily.     pantoprazole (PROTONIX) 40 MG tablet Take 1 tablet (40 mg total) by mouth once daily.     Family History       Problem Relation (Age of Onset)    Cancer Sister    Diabetes Sister          Tobacco Use    Smoking status: Former     Packs/day: 2.00     Types: Cigarettes     Quit date: 2017     Years since quittin.5    Smokeless tobacco: Never   Substance and Sexual Activity    Alcohol use: Yes     Alcohol/week: 1.0 standard drink     Types: 1 Cans of beer per week    Drug use: No    Sexual activity: Not on file     Review of Systems   Constitutional:  Negative for activity change, chills and fever.   HENT:  Negative for trouble swallowing.    Eyes:  Negative for photophobia and visual disturbance.   Respiratory:  Positive for shortness of breath. Negative for cough and chest tightness.    Cardiovascular:  Positive for palpitations.  Negative for chest pain and leg swelling.   Gastrointestinal:  Negative for abdominal pain, constipation, diarrhea, nausea and vomiting.   Genitourinary:  Negative for dysuria, frequency and hematuria.   Musculoskeletal:  Negative for back pain, gait problem and neck pain.   Skin:  Negative for rash and wound.   Neurological:  Negative for dizziness, syncope, speech difficulty and light-headedness.   Psychiatric/Behavioral:  Negative for agitation and confusion. The patient is not nervous/anxious.    Objective:     Vital Signs (Most Recent):  Temp: 97.8 °F (36.6 °C) (03/22/23 0300)  Pulse: (!) 120 (03/22/23 0300)  Resp: 16 (03/22/23 0300)  BP: (!) 185/77 (03/22/23 0300)  SpO2: 97 % (03/22/23 0300)   Vital Signs (24h Range):  Temp:  [97.5 °F (36.4 °C)-97.9 °F (36.6 °C)] 97.8 °F (36.6 °C)  Pulse:  [] 120  Resp:  [16-30] 16  SpO2:  [94 %-98 %] 97 %  BP: (142-233)/() 185/77     Weight: 59 kg (130 lb)  Body mass index is 23.03 kg/m².    Physical Exam  Vitals and nursing note reviewed.   Constitutional:       General: She is not in acute distress.     Appearance: She is well-developed.   HENT:      Head: Normocephalic and atraumatic.      Mouth/Throat:      Pharynx: No oropharyngeal exudate.   Eyes:      Conjunctiva/sclera: Conjunctivae normal.      Pupils: Pupils are equal, round, and reactive to light.   Cardiovascular:      Rate and Rhythm: Regular rhythm. Tachycardia present.      Heart sounds: Normal heart sounds.   Pulmonary:      Effort: Pulmonary effort is normal. No respiratory distress.      Breath sounds: Normal breath sounds. No wheezing.   Abdominal:      General: Bowel sounds are normal. There is no distension.      Palpations: Abdomen is soft.      Tenderness: There is no abdominal tenderness.   Musculoskeletal:         General: No tenderness. Normal range of motion.      Cervical back: Normal range of motion and neck supple.   Lymphadenopathy:      Cervical: No cervical adenopathy.   Skin:      General: Skin is warm and dry.      Capillary Refill: Capillary refill takes less than 2 seconds.      Findings: No rash.   Neurological:      Mental Status: She is alert and oriented to person, place, and time.      Cranial Nerves: No cranial nerve deficit.      Sensory: No sensory deficit.      Coordination: Coordination normal.   Psychiatric:         Behavior: Behavior normal.         Thought Content: Thought content normal.         Judgment: Judgment normal.         CRANIAL NERVES     CN III, IV, VI   Pupils are equal, round, and reactive to light.     Significant Labs: All pertinent labs within the past 24 hours have been reviewed.    Significant Imaging: I have reviewed all pertinent imaging results/findings within the past 24 hours.    Assessment/Plan:     * New onset atrial fibrillation  Patient with Paroxysmal (<7 days) atrial fibrillation which is uncontrolled currently with Beta Blocker and Calcium Channel Blocker. Patient is currently in atrial fibrillation.[id number] Score: 4.  Anticoagulation indicated. Anticoagulation done with heparin gtt.  - ED discussed with EP fellow, formal consult placed for AM  - check ECHO  - TSH 1.31  - Mag 1.5, given IV repletion  - cardiac monitoring    Hydronephrosis  - UA with 15 WBCs  - continue ceftriaxone  - CT with hydronephrosis  - Urology consulted    Late effects of cerebral ischemic stroke  - at baseline  - continue statin + plavix    Iron deficiency anemia  - continue iron supplements    Tobacco abuse  - counseled on cessation    Hyperlipidemia  - continue statin    Essential hypertension  - continue lisinopril 20mg daily, may need adjustments  - hydralazine PRN      VTE Risk Mitigation (From admission, onward)         Ordered     heparin 25,000 units in dextrose 5% (100 units/ml) IV bolus from bag - ADDITIONAL PRN BOLUS - 60 units/kg  As needed (PRN)        Question:  Heparin Infusion Adjustment (DO NOT MODIFY ANSWER)  Answer:   [email or web address]\epic\Images\Pharmacy\HeparinInfusions\heparin LOW INTENSITY nomogram for [facility] [id number].pdf    03/21/23 2147     heparin 25,000 units in dextrose 5% (100 units/ml) IV bolus from bag - ADDITIONAL PRN BOLUS - 30 units/kg  As needed (PRN)        Question:  Heparin Infusion Adjustment (DO NOT MODIFY ANSWER)  Answer:  \\[email or web address]\epic\Images\Pharmacy\HeparinInfusions\heparin LOW INTENSITY nomogram for [facility] [id number].pdf    03/21/23 2147     Reason for No Pharmacological VTE Prophylaxis  Once        Question:  Reasons:  Answer:  Already adequately anticoagulated on oral Anticoagulants    03/22/23 0225     IP VTE HIGH RISK PATIENT  Once         03/22/23 0225     heparin 25,000 units in dextrose 5% 250 mL (100 units/mL) infusion LOW INTENSITY nomogram - [facility]  Continuous        Question Answer Comment   Heparin Infusion Adjustment (DO NOT MODIFY ANSWER) \\[email or web address]\epic\Images\Pharmacy\HeparinInfusions\heparin LOW INTENSITY nomogram for [facility] [id number].pdf    Begin at (in units/kg/hr) 12        03/21/23 2147                     On 03/22/2023, patient should be placed in hospital observation services under my care in collaboration with Arron Gtz MD.      Steve Lopez PA-C  Department of Hospital Medicine  [facility] - Emergency Dept

## 2023-03-23 ENCOUNTER — ANESTHESIA EVENT (OUTPATIENT)
Dept: MEDSURG UNIT | Facility: HOSPITAL | Age: 78
DRG: 309 | End: 2023-03-23
Payer: MEDICARE

## 2023-03-23 ENCOUNTER — ANESTHESIA (OUTPATIENT)
Dept: MEDSURG UNIT | Facility: HOSPITAL | Age: 78
DRG: 309 | End: 2023-03-23
Payer: MEDICARE

## 2023-03-23 PROBLEM — J43.9 CHRONIC OBSTRUCTIVE PULMONARY EMPHYSEMA: Status: ACTIVE | Noted: 2023-03-23

## 2023-03-23 PROBLEM — N30.01 ACUTE CYSTITIS WITH HEMATURIA: Status: ACTIVE | Noted: 2023-03-23

## 2023-03-23 LAB
ANION GAP SERPL CALC-SCNC: 15 MMOL/L (ref 8–16)
APTT BLDCRRT: 30.7 SEC (ref 21–32)
APTT BLDCRRT: 32.4 SEC (ref 21–32)
APTT BLDCRRT: 66.5 SEC (ref 21–32)
ASCENDING AORTA: 3.2 CM
BACTERIA UR CULT: ABNORMAL
BASOPHILS # BLD AUTO: 0.03 K/UL (ref 0–0.2)
BASOPHILS # BLD AUTO: 0.03 K/UL (ref 0–0.2)
BASOPHILS NFR BLD: 0.5 % (ref 0–1.9)
BASOPHILS NFR BLD: 0.5 % (ref 0–1.9)
BSA FOR ECHO PROCEDURE: 1.48 M2
BUN SERPL-MCNC: 14 MG/DL (ref 8–23)
CALCIUM SERPL-MCNC: 9.4 MG/DL (ref 8.7–10.5)
CHLORIDE SERPL-SCNC: 103 MMOL/L (ref 95–110)
CO2 SERPL-SCNC: 21 MMOL/L (ref 23–29)
CREAT SERPL-MCNC: 0.9 MG/DL (ref 0.5–1.4)
DIFFERENTIAL METHOD: ABNORMAL
DIFFERENTIAL METHOD: ABNORMAL
DOP CALC LVOT AREA: 2.5 CM2
DOP CALC LVOT DIAMETER: 1.8 CM
EJECTION FRACTION: 55 %
EOSINOPHIL # BLD AUTO: 0.1 K/UL (ref 0–0.5)
EOSINOPHIL # BLD AUTO: 0.1 K/UL (ref 0–0.5)
EOSINOPHIL NFR BLD: 1.6 % (ref 0–8)
EOSINOPHIL NFR BLD: 1.6 % (ref 0–8)
ERYTHROCYTE [DISTWIDTH] IN BLOOD BY AUTOMATED COUNT: 17.5 % (ref 11.5–14.5)
ERYTHROCYTE [DISTWIDTH] IN BLOOD BY AUTOMATED COUNT: 17.5 % (ref 11.5–14.5)
EST. GFR  (NO RACE VARIABLE): >60 ML/MIN/1.73 M^2
GLUCOSE SERPL-MCNC: 89 MG/DL (ref 70–110)
HCT VFR BLD AUTO: 34.9 % (ref 37–48.5)
HCT VFR BLD AUTO: 34.9 % (ref 37–48.5)
HGB BLD-MCNC: 9.5 G/DL (ref 12–16)
HGB BLD-MCNC: 9.5 G/DL (ref 12–16)
IMM GRANULOCYTES # BLD AUTO: 0.02 K/UL (ref 0–0.04)
IMM GRANULOCYTES # BLD AUTO: 0.02 K/UL (ref 0–0.04)
IMM GRANULOCYTES NFR BLD AUTO: 0.3 % (ref 0–0.5)
IMM GRANULOCYTES NFR BLD AUTO: 0.3 % (ref 0–0.5)
LYMPHOCYTES # BLD AUTO: 0.7 K/UL (ref 1–4.8)
LYMPHOCYTES # BLD AUTO: 0.7 K/UL (ref 1–4.8)
LYMPHOCYTES NFR BLD: 11.9 % (ref 18–48)
LYMPHOCYTES NFR BLD: 11.9 % (ref 18–48)
MAGNESIUM SERPL-MCNC: 1.7 MG/DL (ref 1.6–2.6)
MCH RBC QN AUTO: 23.1 PG (ref 27–31)
MCH RBC QN AUTO: 23.1 PG (ref 27–31)
MCHC RBC AUTO-ENTMCNC: 27.2 G/DL (ref 32–36)
MCHC RBC AUTO-ENTMCNC: 27.2 G/DL (ref 32–36)
MCV RBC AUTO: 85 FL (ref 82–98)
MCV RBC AUTO: 85 FL (ref 82–98)
MONOCYTES # BLD AUTO: 0.6 K/UL (ref 0.3–1)
MONOCYTES # BLD AUTO: 0.6 K/UL (ref 0.3–1)
MONOCYTES NFR BLD: 9.8 % (ref 4–15)
MONOCYTES NFR BLD: 9.8 % (ref 4–15)
NEUTROPHILS # BLD AUTO: 4.7 K/UL (ref 1.8–7.7)
NEUTROPHILS # BLD AUTO: 4.7 K/UL (ref 1.8–7.7)
NEUTROPHILS NFR BLD: 75.9 % (ref 38–73)
NEUTROPHILS NFR BLD: 75.9 % (ref 38–73)
NRBC BLD-RTO: 0 /100 WBC
NRBC BLD-RTO: 0 /100 WBC
PLATELET # BLD AUTO: 197 K/UL (ref 150–450)
PLATELET # BLD AUTO: 197 K/UL (ref 150–450)
PMV BLD AUTO: 10.1 FL (ref 9.2–12.9)
PMV BLD AUTO: 10.1 FL (ref 9.2–12.9)
POTASSIUM SERPL-SCNC: 3.4 MMOL/L (ref 3.5–5.1)
RBC # BLD AUTO: 4.12 M/UL (ref 4–5.4)
RBC # BLD AUTO: 4.12 M/UL (ref 4–5.4)
SINUS: 3 CM
SODIUM SERPL-SCNC: 139 MMOL/L (ref 136–145)
STJ: 2.5 CM
WBC # BLD AUTO: 6.23 K/UL (ref 3.9–12.7)
WBC # BLD AUTO: 6.23 K/UL (ref 3.9–12.7)

## 2023-03-23 PROCEDURE — 80048 BASIC METABOLIC PNL TOTAL CA: CPT | Performed by: PHYSICIAN ASSISTANT

## 2023-03-23 PROCEDURE — 37000009 HC ANESTHESIA EA ADD 15 MINS: Performed by: STUDENT IN AN ORGANIZED HEALTH CARE EDUCATION/TRAINING PROGRAM

## 2023-03-23 PROCEDURE — 63600175 PHARM REV CODE 636 W HCPCS: Performed by: NURSE ANESTHETIST, CERTIFIED REGISTERED

## 2023-03-23 PROCEDURE — 63600175 PHARM REV CODE 636 W HCPCS: Performed by: PHYSICIAN ASSISTANT

## 2023-03-23 PROCEDURE — D9220A PRA ANESTHESIA: Mod: CRNA,,, | Performed by: NURSE ANESTHETIST, CERTIFIED REGISTERED

## 2023-03-23 PROCEDURE — 96366 THER/PROPH/DIAG IV INF ADDON: CPT

## 2023-03-23 PROCEDURE — 25000003 PHARM REV CODE 250: Performed by: PHYSICIAN ASSISTANT

## 2023-03-23 PROCEDURE — 83735 ASSAY OF MAGNESIUM: CPT

## 2023-03-23 PROCEDURE — 93010 EKG 12-LEAD: ICD-10-PCS | Mod: ,,, | Performed by: INTERNAL MEDICINE

## 2023-03-23 PROCEDURE — 92960 PR CARDIOVERSION, ELECTIVE;EXTERN: ICD-10-PCS | Mod: ,,, | Performed by: STUDENT IN AN ORGANIZED HEALTH CARE EDUCATION/TRAINING PROGRAM

## 2023-03-23 PROCEDURE — 93005 ELECTROCARDIOGRAM TRACING: CPT

## 2023-03-23 PROCEDURE — 25500020 PHARM REV CODE 255: Performed by: INTERNAL MEDICINE

## 2023-03-23 PROCEDURE — 37000008 HC ANESTHESIA 1ST 15 MINUTES: Performed by: STUDENT IN AN ORGANIZED HEALTH CARE EDUCATION/TRAINING PROGRAM

## 2023-03-23 PROCEDURE — 36415 COLL VENOUS BLD VENIPUNCTURE: CPT | Performed by: INTERNAL MEDICINE

## 2023-03-23 PROCEDURE — 25000003 PHARM REV CODE 250

## 2023-03-23 PROCEDURE — 85730 THROMBOPLASTIN TIME PARTIAL: CPT | Mod: 91 | Performed by: INTERNAL MEDICINE

## 2023-03-23 PROCEDURE — 99233 PR SUBSEQUENT HOSPITAL CARE,LEVL III: ICD-10-PCS | Mod: ,,, | Performed by: STUDENT IN AN ORGANIZED HEALTH CARE EDUCATION/TRAINING PROGRAM

## 2023-03-23 PROCEDURE — D9220A PRA ANESTHESIA: ICD-10-PCS | Mod: ANES,,, | Performed by: STUDENT IN AN ORGANIZED HEALTH CARE EDUCATION/TRAINING PROGRAM

## 2023-03-23 PROCEDURE — 99233 SBSQ HOSP IP/OBS HIGH 50: CPT | Mod: ,,, | Performed by: STUDENT IN AN ORGANIZED HEALTH CARE EDUCATION/TRAINING PROGRAM

## 2023-03-23 PROCEDURE — 93010 ELECTROCARDIOGRAM REPORT: CPT | Mod: ,,, | Performed by: INTERNAL MEDICINE

## 2023-03-23 PROCEDURE — 11000001 HC ACUTE MED/SURG PRIVATE ROOM

## 2023-03-23 PROCEDURE — 85730 THROMBOPLASTIN TIME PARTIAL: CPT | Performed by: HOSPITALIST

## 2023-03-23 PROCEDURE — 85025 COMPLETE CBC W/AUTO DIFF WBC: CPT | Performed by: PHYSICIAN ASSISTANT

## 2023-03-23 PROCEDURE — 36415 COLL VENOUS BLD VENIPUNCTURE: CPT | Performed by: HOSPITALIST

## 2023-03-23 PROCEDURE — 92960 CARDIOVERSION ELECTRIC EXT: CPT | Mod: ,,, | Performed by: STUDENT IN AN ORGANIZED HEALTH CARE EDUCATION/TRAINING PROGRAM

## 2023-03-23 PROCEDURE — 25000003 PHARM REV CODE 250: Performed by: HOSPITALIST

## 2023-03-23 PROCEDURE — D9220A PRA ANESTHESIA: Mod: ANES,,, | Performed by: STUDENT IN AN ORGANIZED HEALTH CARE EDUCATION/TRAINING PROGRAM

## 2023-03-23 PROCEDURE — 99233 PR SUBSEQUENT HOSPITAL CARE,LEVL III: ICD-10-PCS | Mod: ,,,

## 2023-03-23 PROCEDURE — 92960 CARDIOVERSION ELECTRIC EXT: CPT | Performed by: STUDENT IN AN ORGANIZED HEALTH CARE EDUCATION/TRAINING PROGRAM

## 2023-03-23 PROCEDURE — 25000003 PHARM REV CODE 250: Performed by: NURSE ANESTHETIST, CERTIFIED REGISTERED

## 2023-03-23 PROCEDURE — 99233 SBSQ HOSP IP/OBS HIGH 50: CPT | Mod: ,,,

## 2023-03-23 PROCEDURE — D9220A PRA ANESTHESIA: ICD-10-PCS | Mod: CRNA,,, | Performed by: NURSE ANESTHETIST, CERTIFIED REGISTERED

## 2023-03-23 RX ORDER — LIDOCAINE HYDROCHLORIDE 20 MG/ML
INJECTION INTRAVENOUS
Status: DISCONTINUED | OUTPATIENT
Start: 2023-03-23 | End: 2023-03-23

## 2023-03-23 RX ORDER — PROPOFOL 10 MG/ML
VIAL (ML) INTRAVENOUS
Status: DISCONTINUED | OUTPATIENT
Start: 2023-03-23 | End: 2023-03-23

## 2023-03-23 RX ORDER — PROPOFOL 10 MG/ML
VIAL (ML) INTRAVENOUS CONTINUOUS PRN
Status: DISCONTINUED | OUTPATIENT
Start: 2023-03-23 | End: 2023-03-23

## 2023-03-23 RX ORDER — CARVEDILOL 12.5 MG/1
12.5 TABLET ORAL 2 TIMES DAILY
Status: DISCONTINUED | OUTPATIENT
Start: 2023-03-24 | End: 2023-03-24

## 2023-03-23 RX ADMIN — CEFTRIAXONE 1 G: 1 INJECTION, POWDER, FOR SOLUTION INTRAMUSCULAR; INTRAVENOUS at 12:03

## 2023-03-23 RX ADMIN — PROPOFOL 30 MG: 10 INJECTION, EMULSION INTRAVENOUS at 03:03

## 2023-03-23 RX ADMIN — LIDOCAINE HYDROCHLORIDE 50 MG: 20 INJECTION INTRAVENOUS at 03:03

## 2023-03-23 RX ADMIN — METOPROLOL TARTRATE 25 MG: 25 TABLET, FILM COATED ORAL at 06:03

## 2023-03-23 RX ADMIN — PERFLUTREN 1.5 ML: 6.52 INJECTION, SUSPENSION INTRAVENOUS at 04:03

## 2023-03-23 RX ADMIN — Medication 50 MCG/KG/MIN: at 03:03

## 2023-03-23 RX ADMIN — METOPROLOL TARTRATE 25 MG: 25 TABLET, FILM COATED ORAL at 01:03

## 2023-03-23 RX ADMIN — PANTOPRAZOLE SODIUM 40 MG: 40 TABLET, DELAYED RELEASE ORAL at 09:03

## 2023-03-23 RX ADMIN — CLOPIDOGREL BISULFATE 75 MG: 75 TABLET ORAL at 09:03

## 2023-03-23 RX ADMIN — METOPROLOL TARTRATE 25 MG: 25 TABLET, FILM COATED ORAL at 09:03

## 2023-03-23 RX ADMIN — HYDRALAZINE HYDROCHLORIDE 50 MG: 50 TABLET ORAL at 04:03

## 2023-03-23 RX ADMIN — HEPARIN SODIUM 17 UNITS/KG/HR: 10000 INJECTION, SOLUTION INTRAVENOUS at 06:03

## 2023-03-23 RX ADMIN — HEPARIN SODIUM 14 UNITS/KG/HR: 10000 INJECTION, SOLUTION INTRAVENOUS at 07:03

## 2023-03-23 RX ADMIN — HEPARIN SODIUM 30 UNITS/KG/HR: 10000 INJECTION, SOLUTION INTRAVENOUS at 05:03

## 2023-03-23 RX ADMIN — FERROUS SULFATE TAB 325 MG (65 MG ELEMENTAL FE) 1 EACH: 325 (65 FE) TAB at 09:03

## 2023-03-23 RX ADMIN — LISINOPRIL 40 MG: 20 TABLET ORAL at 09:03

## 2023-03-23 RX ADMIN — SODIUM CHLORIDE, SODIUM LACTATE, POTASSIUM CHLORIDE, AND CALCIUM CHLORIDE: .6; .31; .03; .02 INJECTION, SOLUTION INTRAVENOUS at 03:03

## 2023-03-23 RX ADMIN — ATORVASTATIN CALCIUM 80 MG: 40 TABLET, FILM COATED ORAL at 09:03

## 2023-03-23 RX ADMIN — HYDRALAZINE HYDROCHLORIDE 50 MG: 50 TABLET ORAL at 12:03

## 2023-03-23 RX ADMIN — CEFTRIAXONE 1 G: 1 INJECTION, POWDER, FOR SOLUTION INTRAMUSCULAR; INTRAVENOUS at 11:03

## 2023-03-23 RX ADMIN — DULOXETINE HYDROCHLORIDE 60 MG: 60 CAPSULE, DELAYED RELEASE ORAL at 09:03

## 2023-03-23 NOTE — SUBJECTIVE & OBJECTIVE
Interval History: AFVSS. Seen by EP. Renal Scan showing poorly functioning left kidney and bilateral obstruction. Denies flank pain, fevers or chills.      Objective:     Temp:  [97.4 °F (36.3 °C)-98 °F (36.7 °C)] 98 °F (36.7 °C)  Pulse:  [] 110  Resp:  [18-20] 18  SpO2:  [88 %-100 %] 93 %  BP: (158-196)/(65-96) 172/96     Body mass index is 19.45 kg/m².           Drains       Drain  Duration             Female External Urinary Catheter 03/21/23 1917 1 day                    Physical Exam  Vitals and nursing note reviewed.   Constitutional:       General: She is not in acute distress.  HENT:      Head: Atraumatic.      Nose: Nose normal.   Eyes:      Extraocular Movements: Extraocular movements intact.   Cardiovascular:      Rate and Rhythm: Tachycardia present. Rhythm irregular.   Pulmonary:      Effort: Pulmonary effort is normal.   Abdominal:      General: Abdomen is flat. There is no distension.      Tenderness: There is no abdominal tenderness. There is no right CVA tenderness or left CVA tenderness.   Musculoskeletal:         General: Normal range of motion.      Cervical back: Normal range of motion.   Skin:     Coloration: Skin is not jaundiced.   Neurological:      General: No focal deficit present.      Mental Status: She is alert and oriented to person, place, and time.   Psychiatric:         Mood and Affect: Mood normal.         Behavior: Behavior normal.       Significant Labs:    BMP:  Recent Labs   Lab 03/21/23 2011 03/22/23 0246 03/23/23  0404    142 139   K 3.6 3.5 3.4*    103 103   CO2 26 27 21*   BUN 9 9 14   CREATININE 0.7 0.7 0.9   CALCIUM 9.4 10.0 9.4       CBC:   Recent Labs   Lab 03/21/23 1939 03/22/23  0246 03/23/23  0404   WBC 6.87 8.45  8.45 6.23  6.23   HGB 9.8* 10.2*  10.2* 9.5*  9.5*   HCT 33.4* 34.5*  34.5* 34.9*  34.9*    217  217 197  197       Urine Studies:   Recent Labs   Lab 03/1947   COLORU Yellow   APPEARANCEUA Hazy*   PHUR 7.0    SPECGRAV 1.020   PROTEINUA 1+*   GLUCUA Negative   KETONESU 1+*   BILIRUBINUA Negative   OCCULTUA Negative   NITRITE Positive*   LEUKOCYTESUR Negative   RBCUA 18*   WBCUA 15*   BACTERIA Moderate*   SQUAMEPITHEL 6   HYALINECASTS 0     All pertinent labs results from the past 24 hours have been reviewed.    Significant Imaging:  CT scan per HPI.  NM Scan:  1.  The right kidney is obstructed.     2.  The left kidney demonstrates scant perfusion and function, if any.     3.  The differential renal function is 9 % on the left and 91 % on the right.  Calculated left renal function is likely spurious.

## 2023-03-23 NOTE — NURSING TRANSFER
Nursing Transfer Note      3/23/2023     Reason patient is being transferred: vinicio/rohini gould met     Transfer To: 1108    Transfer via stretcher    Transfer with 2lnc  to O2, cardiac monitoring    Transported by escort with ticket to ride     Medicines sent: heparin gtt still infusing     Any special needs or follow-up needed: vinicio/ rohini gould met     Chart send with patient: Yes    Notified: reported to yung patel     Patient reassessed at: see epic  (date, time)    Upon arrival to floor: to room no complaints no distress noted.

## 2023-03-23 NOTE — PLAN OF CARE
[facility] - Observation [facility]  Initial Discharge Assessment       Primary Care Provider: Sneha Peguero MD    Admission Diagnosis: Atrial fibrillation [I48.91]  Arm pain [M79.603]  A-fib [I48.91]  Tachycardia [R00.0]  New onset atrial fibrillation [I48.91]  Chest pain [R07.9]    Admission Date: 3/21/2023  Expected Discharge Date: 3/24/2023         Payor: [facility] MCARE / Plan: [facility] DUAL COMPLETE HMO SNP / Product Type: Medicare Advantage /     Extended Emergency Contact Information  Primary Emergency Contact: DonnaRocio  Address: [address] [place] [address]  Home Phone: [phone]  Mobile Phone: [phone]  Relation: Sister  Preferred language: English  Secondary Emergency Contact: Sirisha Benavides  Home Phone: [phone]  Mobile Phone: [phone]  Relation: Daughter  Preferred language: English   needed? No    Discharge Plan A: (P) Home Health  Discharge Plan B: (P) Home, Home Health      [facility]  [address]  [address] [place] [address]  Phone: [phone] Fax: [phone]    [facility] [place] [address]  Phone: [phone] Fax: [phone]    [facility] [address]  [address] [facility] [address]  Phone: [phone] Fax: [phone]               SW completed Discharge Planning Assessment with patient via bedside. Discharge planning booklet given to patient/family and whiteboard updated with ROSA and phone #. All questions answered.    Patient reported that her daughter will provide transportation upon discharge.     Patient reported that she lives alone, and prior to hospitalization she was independent with her ADL's. Patient reported that she is not on dialysis and does not go to a Coumadin clinic.      Patient lives in an apartment complex with 17 steps to  [address]. Patient reported that she has some difficulty climbing the stairs.      Kina Monroe LMSW  [facility]  Ext. [phone]

## 2023-03-23 NOTE — NURSING TRANSFER
Nursing Transfer Note      3/23/2023     Reason patient is being transferred: d/c critieria met     Transfer To: 1108    Transfer via stretcher    Transfer with cardiac monitoring and 2lnc     Transported by leanne rn and shaggy rn     Medicines sent: heparin gtt infusing     Any special needs or follow-up needed: see epic     Chart send with patient: Yes    Notified: to room no complaints no distress noted.    Patient reassessed at: see epic  (date, time)    Upon arrival to floor: to room no complaints no distress noted.

## 2023-03-23 NOTE — PROGRESS NOTES
[facility] Observation [facility]  Urology  Progress Note    Patient Name: Maria A Smith  MRN: [id number]  Admission Date: 3/21/2023  Hospital Length of Stay: 0 days  Code Status: Full Code   Attending Provider: Irma Foy MD   Primary Care Physician: Sneha Peguero MD    Subjective:     HPI:  Ms. Smith is a 76 yo female with CVA with residual deficits, afib, HTN, SCC skin cancer of arm admitted to medicine for newly diagnosed afib. Urology consulted for left hydronephrosis.    Patient presented to ED for arm pain and diagnosed with afib. Patient denies hematuria, flank pain or dysuria. No recent history of UTI. Medicine started her on heparin gtt and Rocephin.    Upon assessment, resting comfortably with HR in the 120s in afib.    Labs 3/22/23: WBC 8.45 Hgb 10.2 Cr 0.7 (0.7), which is her baseline  UA 3/21/23: 6 squams, 18 RBC, 15 WBC, moderate bacteria, nitrite positive    CTA 3/21/23: moderate to severe left hydronephrosis with thinned parenchyma, appears chronic in nature. No hydronephrosis on the right. No masses or urolithiasis bilaterally.      Interval History: AFVSS. Seen by EP. Renal Scan showing poorly functioning left kidney and bilateral obstruction. Denies flank pain, fevers or chills.      Objective:     Temp:  [97.4 °F (36.3 °C)-98 °F (36.7 °C)] 98 °F (36.7 °C)  Pulse:  [] 110  Resp:  [18-20] 18  SpO2:  [88 %-100 %] 93 %  BP: (158-196)/(65-96) 172/96     Body mass index is 19.45 kg/m².           Drains       Drain  Duration             Female External Urinary Catheter 03/21/23 1917 1 day                    Physical Exam  Vitals and nursing note reviewed.   Constitutional:       General: She is not in acute distress.  HENT:      Head: Atraumatic.      Nose: Nose normal.   Eyes:      Extraocular Movements: Extraocular movements intact.   Cardiovascular:      Rate and Rhythm: Tachycardia present. Rhythm irregular.   Pulmonary:      Effort: Pulmonary effort is normal.   Abdominal:       General: Abdomen is flat. There is no distension.      Tenderness: There is no abdominal tenderness. There is no right CVA tenderness or left CVA tenderness.   Musculoskeletal:         General: Normal range of motion.      Cervical back: Normal range of motion.   Skin:     Coloration: Skin is not jaundiced.   Neurological:      General: No focal deficit present.      Mental Status: She is alert and oriented to person, place, and time.   Psychiatric:         Mood and Affect: Mood normal.         Behavior: Behavior normal.       Significant Labs:    BMP:  Recent Labs   Lab 03/21/23 2011 03/22/23 0246 03/23/23  0404    142 139   K 3.6 3.5 3.4*    103 103   CO2 26 27 21*   BUN 9 9 14   CREATININE 0.7 0.7 0.9   CALCIUM 9.4 10.0 9.4       CBC:   Recent Labs   Lab 03/21/23 1939 03/22/23 0246 03/23/23  0404   WBC 6.87 8.45  8.45 6.23  6.23   HGB 9.8* 10.2*  10.2* 9.5*  9.5*   HCT 33.4* 34.5*  34.5* 34.9*  34.9*    217  217 197  197       Urine Studies:   Recent Labs   Lab 03/1947   COLORU Yellow   APPEARANCEUA Hazy*   PHUR 7.0   SPECGRAV 1.020   PROTEINUA 1+*   GLUCUA Negative   KETONESU 1+*   BILIRUBINUA Negative   OCCULTUA Negative   NITRITE Positive*   LEUKOCYTESUR Negative   RBCUA 18*   WBCUA 15*   BACTERIA Moderate*   SQUAMEPITHEL 6   HYALINECASTS 0     All pertinent labs results from the past 24 hours have been reviewed.    Significant Imaging:  CT scan per HPI.  NM Scan:  1.  The right kidney is obstructed.     2.  The left kidney demonstrates scant perfusion and function, if any.     3.  The differential renal function is 9 % on the left and 91 % on the right.  Calculated left renal function is likely spurious.        Assessment/Plan:     Hydronephrosis  76 yo female with above comorbidities. Urology consulted for left hydronphrosis.    - NM Scan confirms poorly functioning left kidney. Bilateral obstruction.  - Appears to be chronic in nature with thinned parenchyma  - Patient  is asymptomatic  - No urologic intervention necessary unless patient develops symptoms or complications  - Urology follow up prn    Urology will sign off at this time. Please contact with any further questions or concerns.          VTE Risk Mitigation (From admission, onward)         Ordered     heparin 25,000 units in dextrose 5% (100 units/ml) IV bolus from bag - ADDITIONAL PRN BOLUS - 60 units/kg  As needed (PRN)        Question:  Heparin Infusion Adjustment (DO NOT MODIFY ANSWER)  Answer:  \\[email or web address]\epic\Images\Pharmacy\HeparinInfusions\heparin LOW INTENSITY nomogram for [facility] [id number].pdf    03/21/23 2147     heparin 25,000 units in dextrose 5% (100 units/ml) IV bolus from bag - ADDITIONAL PRN BOLUS - 30 units/kg  As needed (PRN)        Question:  Heparin Infusion Adjustment (DO NOT MODIFY ANSWER)  Answer:  \\[email or web address]\epic\Images\Pharmacy\HeparinInfusions\heparin LOW INTENSITY nomogram for [facility] [id number].pdf    03/21/23 2147     Reason for No Pharmacological VTE Prophylaxis  Once        Question:  Reasons:  Answer:  Already adequately anticoagulated on oral Anticoagulants    03/22/23 0225     IP VTE HIGH RISK PATIENT  Once         03/22/23 0225     heparin 25,000 units in dextrose 5% 250 mL (100 units/mL) infusion LOW INTENSITY nomogram - [facility]  Continuous        Question Answer Comment   Heparin Infusion Adjustment (DO NOT MODIFY ANSWER) [email or web address]\epic\Images\Pharmacy\HeparinInfusions\heparin LOW INTENSITY nomogram for [facility] [id number].pdf    Begin at (in units/kg/hr) 12        03/21/23 2147                Laci Lentz MD  Urology  [facility] - Observation 11H

## 2023-03-23 NOTE — ANESTHESIA PREPROCEDURE EVALUATION
Pre-operative evaluation for Procedure(s) (LRB):  Cardioversion or Defibrillation (N/A)  ECHOCARDIOGRAM, TRANSESOPHAGEAL (N/A)    Maria A Smith is a 77 y.o. female with reported history of CVA, HTN, HLD that was admitted for Afib with RVR now rate controlled. CHADS2-VASc 6. CHERRIE requested prior to DCCV.    LDA: 20 G PIV    Prev airway: none recorded    Drips: heparin gtt    Patient Active Problem List   Diagnosis    Essential hypertension    Squamous cell carcinoma    Hyperlipidemia    Anemia    Tobacco abuse    Left hemiparesis    Memory impairment    Cerebrovascular accident (CVA)    Iron deficiency anemia    Elevated brain natriuretic peptide (BNP) level    Polyp of colon    Chronic post-traumatic stress disorder    History of CVA (cerebrovascular accident)    Late effects of cerebral ischemic stroke    Nicotine dependence    Osteopenia    Osteoporosis    New onset atrial fibrillation    Hydronephrosis    Chronic obstructive pulmonary emphysema       Review of patient's allergies indicates:  No Known Allergies     No current facility-administered medications on file prior to encounter.     Current Outpatient Medications on File Prior to Encounter   Medication Sig Dispense Refill    acetaminophen (TYLENOL) 500 MG tablet Take 1 tablet (500 mg total) by mouth every 6 (six) hours as needed for Pain or Temperature greater than (100.4 F). 20 tablet 0    atorvastatin (LIPITOR) 80 MG tablet Take 80 mg by mouth once daily.       clopidogreL (PLAVIX) 75 mg tablet Take 1 tablet (75 mg total) by mouth once daily. 30 tablet 5    DULoxetine (CYMBALTA) 60 MG capsule Take 1 capsule (60 mg total) by mouth once daily. 30 capsule 0    ferrous sulfate 325 (65 FE) MG EC tablet Take 1 tablet (325 mg total) by mouth 2 (two) times daily. 60 tablet 0    LIDOcaine (LIDODERM) 5 % Place 1 patch onto the skin once daily. Remove & Discard patch within 12 hours or as  directed by MD 6 patch 0    lisinopril (PRINIVIL,ZESTRIL) 20 MG tablet Take 20 mg by mouth once daily.   0    pantoprazole (PROTONIX) 40 MG tablet Take 1 tablet (40 mg total) by mouth once daily. 30 tablet 0       Past Surgical History:   Procedure Laterality Date    APPENDECTOMY      COLONOSCOPY N/A 2019    Procedure: COLONOSCOPY;  Surgeon: Alex Degroot MD;  Location: [facility] ENDO;  Service: Endoscopy;  Laterality: N/A;    COLONOSCOPY N/A 2020    Procedure: COLONOSCOPY;  Surgeon: Evelio Rizvi MD;  Location: [facility] ENDO;  Service: Endoscopy;  Laterality: N/A;    ESOPHAGOGASTRODUODENOSCOPY N/A 2020    Procedure: EGD (ESOPHAGOGASTRODUODENOSCOPY);  Surgeon: Evelio Rizvi MD;  Location: [facility];  Service: Endoscopy;  Laterality: N/A;    HYSTERECTOMY         Social History     Socioeconomic History    Marital status:    Tobacco Use    Smoking status: Former     Packs/day: 2.00     Types: Cigarettes     Quit date: 2017     Years since quittin.5    Smokeless tobacco: Never   Substance and Sexual Activity    Alcohol use: Yes     Alcohol/week: 1.0 standard drink     Types: 1 Cans of beer per week    Drug use: No         Vital Signs Range (Last 24H):  Temp:  [36.3 °C (97.4 °F)-36.7 °C (98 °F)]   Pulse:  []   Resp:  [14-20]   BP: (136-196)/(72-96)   SpO2:  [90 %-100 %]       CBC:   Recent Labs     23  02423  0404   WBC 8.45  8.45 6.23  6.23   RBC 4.37  4.37 4.12  4.12   HGB 10.2*  10.2* 9.5*  9.5*   HCT 34.5*  34.5* 34.9*  34.9*     217 197  197   MCV 79*  79* 85  85   MCH 23.3*  23.3* 23.1*  23.1*   MCHC 29.6*  29.6* 27.2*  27.2*       CMP:   Recent Labs     23  1724 23  1809 2323/23  0404     --  142 142 139   K 4.6  --  3.6 3.5 3.4*     --  105 103 103   CO2 22*  --  26 27 21*   BUN 10  --  9 9 14   CREATININE 0.8  --  0.7 0.7 0.9   GLU 94  --  90 101 89   MG  --  1.5*  --    --  1.7   CALCIUM 10.3  --  9.4 10.0 9.4   ALBUMIN 3.9  --  3.3*  --   --    PROT 7.6  --  6.1  --   --    ALKPHOS 131  --  107  --   --    ALT 13  --  12  --   --    AST 33  --  16  --   --    BILITOT 1.9*  --  1.8*  --   --        INR  Recent Labs     03/21/23  2259 03/22/23  0246 03/22/23  0614 03/23/23  0404 03/23/23  1212   INR 1.2  --   --   --   --    APTT 23.9   < > 40.2* 32.4* 30.7    < > = values in this interval not displayed.           Diagnostic Studies:      EKG: 3/21/23  Vent. Rate : 091 BPM     Atrial Rate : 117 BPM      P-R Int : 000 ms          QRS Dur : 114 ms       QT Int : 374 ms       P-R-T Axes : 000 069 073 degrees      QTc Int : 460 ms     Atrial fibrillation   Incomplete right bundle branch block   Nonspecific ST abnormality   Abnormal ECG   When compared with ECG of 21-MAR-2023 17:48,   No significant change was found   Confirmed by Estefanía DAMON, [facility] ([id number]) on 3/22/2023 1:31:00 PM       2D Echo: 3/23/22   Irregularly irregular rhythm and tachycardia were present during the study.   Atrial fibrillation observed.   Mild left atrial enlargement.   The left ventricle is normal in size with concentric remodeling and normal systolic function.   Normal right ventricular size with normal right ventricular systolic function.   The estimated ejection fraction is 65%.   Mild mitral regurgitation.   Normal central venous pressure (3 mmHg).            Pre-op Assessment    I have reviewed the Patient Summary Reports.     I have reviewed the Nursing Notes.    I have reviewed the Medications.     Review of Systems  Anesthesia Hx:  No problems with previous Anesthesia    Social:  Former Smoker    Hematology/Oncology:         -- Anemia: --  Cancer in past history (squamous cell CA):    Cardiovascular:   Hypertension, well controlled Dysrhythmias atrial fibrillation hyperlipidemia    Pulmonary:  Pulmonary Normal    Renal/:  Renal/ Normal     Neurological:   CVA (L hemiparesis (slight)), residual  symptoms    Endocrine:  Endocrine Normal           Anesthesia Plan  Type of Anesthesia, risks & benefits discussed:    Anesthesia Type: MAC, Gen Natural Airway  Intra-op Monitoring Plan: Standard ASA Monitors  Post Op Pain Control Plan: multimodal analgesia  Induction:  IV  Informed Consent: Informed consent signed with the Patient and all parties understand the risks and agree with anesthesia plan.  All questions answered. Patient consented to blood products? No  ASA Score: 3  Day of Surgery Review of History & Physical: H&P Update referred to the surgeon/provider.    Ready For Surgery From Anesthesia Perspective.     .

## 2023-03-23 NOTE — ASSESSMENT & PLAN NOTE
- moderate to severe emphysematous changes throughout the lungs with mild atelectasis in the right middle lobe noted on CT  - decrease inspiratory lung sounds and expiratory wheezing noted on exam  - hx of tobacco use (quit 3 years ago)  - will need pulmonology f/u for PFTs and to start inhaler

## 2023-03-23 NOTE — PLAN OF CARE
Problem: Adult Inpatient Plan of Care  Goal: Plan of Care Review  3/23/2023 1025 by Rima Winn RN  Outcome: Ongoing, Progressing  3/23/2023 1025 by Rima Winn RN  Outcome: Ongoing, Progressing  Goal: Patient-Specific Goal (Individualized)  3/23/2023 1025 by Rima Winn RN  Outcome: Ongoing, Progressing  3/23/2023 1025 by Rima Winn RN  Outcome: Ongoing, Progressing  Goal: Absence of Hospital-Acquired Illness or Injury  3/23/2023 1025 by Rima Winn RN  Outcome: Ongoing, Progressing  3/23/2023 1025 by Rima Winn RN  Outcome: Ongoing, Progressing  Goal: Optimal Comfort and Wellbeing  3/23/2023 1025 by Rima Winn RN  Outcome: Ongoing, Progressing  3/23/2023 1025 by Rima Winn RN  Outcome: Ongoing, Progressing  Goal: Readiness for Transition of Care  3/23/2023 1025 by Rima Winn RN  Outcome: Ongoing, Progressing  3/23/2023 1025 by Rima Winn RN  Outcome: Ongoing, Progressing     Problem: Dysrhythmia  Goal: Normalized Cardiac Rhythm  3/23/2023 1025 by Rima Winn RN  Outcome: Ongoing, Progressing  3/23/2023 1025 by Rima Winn RN  Outcome: Ongoing, Progressing     Problem: Infection  Goal: Absence of Infection Signs and Symptoms  3/23/2023 1025 by Rima Winn RN  Outcome: Ongoing, Progressing  3/23/2023 1025 by Rima Winn RN  Outcome: Ongoing, Progressing     Problem: Skin Injury Risk Increased  Goal: Skin Health and Integrity  3/23/2023 1025 by Rima Winn RN  Outcome: Ongoing, Progressing  3/23/2023 1025 by Rima Winn RN  Outcome: Ongoing, Progressing

## 2023-03-23 NOTE — SUBJECTIVE & OBJECTIVE
Interval History:   Remains in rate controlled AF    Review of Systems   Constitutional: Positive for malaise/fatigue.   Objective:     Vital Signs (Most Recent):  Temp: 98 °F (36.7 °C) (03/23/23 1153)  Pulse: 93 (03/23/23 1153)  Resp: 14 (03/23/23 1153)  BP: (!) 170/82 (03/23/23 1153)  SpO2: 98 % (03/23/23 1153)   Vital Signs (24h Range):  Temp:  [97.4 °F (36.3 °C)-98 °F (36.7 °C)] 98 °F (36.7 °C)  Pulse:  [] 93  Resp:  [14-20] 14  SpO2:  [88 %-100 %] 98 %  BP: (161-196)/(74-96) 170/82     Weight: 49.8 kg (109 lb 12.6 oz)  Body mass index is 19.45 kg/m².     SpO2: 98 %       Physical Exam  Vitals reviewed.   Constitutional:       Appearance: Normal appearance.   HENT:      Head: Normocephalic and atraumatic.      Mouth/Throat:      Mouth: Mucous membranes are moist.   Eyes:      Conjunctiva/sclera: Conjunctivae normal.   Cardiovascular:      Rate and Rhythm: Normal rate. Rhythm irregular.      Pulses: Normal pulses.   Abdominal:      General: Abdomen is flat. There is no distension.   Musculoskeletal:      Cervical back: Normal range of motion.      Right lower leg: No edema.      Left lower leg: No edema.   Skin:     Capillary Refill: Capillary refill takes less than 2 seconds.      Findings: No rash.   Neurological:      Mental Status: She is alert and oriented to person, place, and time.       Significant Labs: All pertinent lab results from the last 24 hours have been reviewed.

## 2023-03-23 NOTE — ASSESSMENT & PLAN NOTE
77 year old with PMH of HTN, HLD, CVA (ischemic 6/13/18, [facility]) admitted for new AF with RVR.     Recs  Plan for CHERRIE/CCV today, urology with no plans for intervention   Keep NPO  If needed, can start low dose BB given CVP of 3 on echo and work up unrevealing for significant volume overload   Chadvasc of 6 (age, sex, htn, cva). Continue anticoagulation with heparin gtt, transition to OAC after CV

## 2023-03-23 NOTE — SUBJECTIVE & OBJECTIVE
Interval History: NAEON. Remains rate controlled on po lopressor. BP not well controlled. Still on heparin gtt, but with transition to OAC after CV. Evaluated by EP; NPO for cardioversion today. Patient denies any complaints at this time. Daughter and patient interested in speaking to CM and setting up HH as patient lives alone, but has been struggling to get up her stairs.     Review of Systems   Constitutional:  Negative for chills and fever.   Respiratory:  Negative for chest tightness and shortness of breath.    Cardiovascular:  Negative for chest pain and leg swelling.   Gastrointestinal:  Negative for abdominal pain and nausea.   Neurological:  Negative for dizziness and weakness.   Objective:     Vital Signs (Most Recent):  Temp: 98 °F (36.7 °C) (03/23/23 1153)  Pulse: 92 (03/23/23 1345)  Resp: 16 (03/23/23 1232)  BP: 136/72 (03/23/23 1345)  SpO2: 97 % (03/23/23 1345) Vital Signs (24h Range):  Temp:  [97.4 °F (36.3 °C)-98 °F (36.7 °C)] 98 °F (36.7 °C)  Pulse:  [] 92  Resp:  [14-20] 16  SpO2:  [90 %-100 %] 97 %  BP: (136-196)/(72-96) 136/72     Weight: 49.8 kg (109 lb 12.6 oz)  Body mass index is 19.45 kg/m².    Intake/Output Summary (Last 24 hours) at 3/23/2023 1438  Last data filed at 3/22/2023 1800  Gross per 24 hour   Intake --   Output 500 ml   Net -500 ml      Physical Exam  Vitals and nursing note reviewed.   Constitutional:       Appearance: She is well-developed.   Eyes:      Pupils: Pupils are equal, round, and reactive to light.   Cardiovascular:      Rate and Rhythm: Normal rate.      Comments: Irregularly irregular rhythm  Pulmonary:      Effort: Pulmonary effort is normal. No respiratory distress.      Breath sounds: Wheezing present.      Comments: Decreased breath sounds with inspiration and mild end expiratory wheezing. On RA  Abdominal:      Palpations: Abdomen is soft.      Tenderness: There is no abdominal tenderness.   Musculoskeletal:         General: No tenderness.      Right lower  leg: No edema.      Left lower leg: No edema.   Skin:     General: Skin is warm and dry.   Neurological:      Mental Status: She is alert and oriented to person, place, and time.   Psychiatric:         Behavior: Behavior normal.       Significant Labs: All pertinent labs within the past 24 hours have been reviewed.  BMP:   Recent Labs   Lab 03/23/23  0404   GLU 89      K 3.4*      CO2 21*   BUN 14   CREATININE 0.9   CALCIUM 9.4   MG 1.7     CBC:   Recent Labs   Lab 03/21/23  1939 03/22/23  0246 03/23/23  0404   WBC 6.87 8.45  8.45 6.23  6.23   HGB 9.8* 10.2*  10.2* 9.5*  9.5*   HCT 33.4* 34.5*  34.5* 34.9*  34.9*    217  217 197  197       Significant Imaging: I have reviewed all pertinent imaging results/findings within the past 24 hours.

## 2023-03-23 NOTE — ASSESSMENT & PLAN NOTE
- moderate to severe emphysematous changes throughout the lungs with mild atelectasis in the right middle lobe noted on CT  - no diagnosis of emphysema in the past  - decrease inspiratory lung sounds and expiratory wheezing noted on exam  - hx of tobacco use (quit 3 years ago)  - will need pulmonology f/u for PFTs and to start inhaler  - intermittently requiring O2 during hospital stay; will perform 6MWT prior to discharge

## 2023-03-23 NOTE — CONSULTS
[facility]  Cardiology  Consult Note    Patient Name: Maria A Smith  MRN: [id number]  Admission Date: 3/21/2023  Hospital Length of Stay: 0 days  Code Status: Full Code   Attending Provider: Earl Veronica MD   Consulting Provider: Aníbal Kathleen MD  Primary Care Physician: Sneha Peguero MD  Principal Problem:New onset atrial fibrillation    Patient information was obtained from patient, past medical records and ER records.     Subjective:     Chief Complaint:  CHERRIE/DCCV     HPI:   Patient is a 77 year old female with reported history of CVA, HTN, HLD that was admitted for Afib with RVR. CHADS2-VASc 6. CHERRIE requested prior to DCCV.    Dysphagia or odynophagia:  No  Liver Disease, esophageal disease, or known varices:  No  Upper GI Bleeding: No  Prior neck surgery or radiation:  No  History of anesthetic difficulties:  No  Family history of anesthetic difficulties:  No  Able to move neck in all directions:  Yes  Snoring:  No  Sleep Apnea:  No  Last oral intake:  12 hours ago        Past Medical History:   Diagnosis Date    Hypertension     Squamous cell carcinoma 01/19/2017    right forearm ([facility]  type)     Stroke        Past Surgical History:   Procedure Laterality Date    APPENDECTOMY      COLONOSCOPY N/A 8/7/2019    Procedure: COLONOSCOPY;  Surgeon: Alex Degroot MD;  Location: [facility];  Service: Endoscopy;  Laterality: N/A;    COLONOSCOPY N/A 2/12/2020    Procedure: COLONOSCOPY;  Surgeon: Evelio Rizvi MD;  Location: [facility];  Service: Endoscopy;  Laterality: N/A;    ESOPHAGOGASTRODUODENOSCOPY N/A 2/11/2020    Procedure: EGD (ESOPHAGOGASTRODUODENOSCOPY);  Surgeon: Evelio Rizvi MD;  Location: [facility];  Service: Endoscopy;  Laterality: N/A;    HYSTERECTOMY         Review of patient's allergies indicates:  No Known Allergies    No current facility-administered medications on file prior to encounter.     Current Outpatient Medications on File Prior to Encounter    Medication Sig    acetaminophen (TYLENOL) 500 MG tablet Take 1 tablet (500 mg total) by mouth every 6 (six) hours as needed for Pain or Temperature greater than (100.4 F).    atorvastatin (LIPITOR) 80 MG tablet Take 80 mg by mouth once daily.     clopidogreL (PLAVIX) 75 mg tablet Take 1 tablet (75 mg total) by mouth once daily.    DULoxetine (CYMBALTA) 60 MG capsule Take 1 capsule (60 mg total) by mouth once daily.    ferrous sulfate 325 (65 FE) MG EC tablet Take 1 tablet (325 mg total) by mouth 2 (two) times daily.    LIDOcaine (LIDODERM) 5 % Place 1 patch onto the skin once daily. Remove & Discard patch within 12 hours or as directed by MD    lisinopril (PRINIVIL,ZESTRIL) 20 MG tablet Take 20 mg by mouth once daily.     pantoprazole (PROTONIX) 40 MG tablet Take 1 tablet (40 mg total) by mouth once daily.     Family History       Problem Relation (Age of Onset)    Cancer Sister    Diabetes Sister          Tobacco Use    Smoking status: Former     Packs/day: 2.00     Types: Cigarettes     Quit date: 2017     Years since quittin.5    Smokeless tobacco: Never   Substance and Sexual Activity    Alcohol use: Yes     Alcohol/week: 1.0 standard drink     Types: 1 Cans of beer per week    Drug use: No    Sexual activity: Not on file     Review of Systems   Constitutional: Negative for chills and fever.   Cardiovascular:  Negative for chest pain, orthopnea and palpitations.   Respiratory:  Negative for cough and shortness of breath.    Gastrointestinal:  Negative for abdominal pain.   Neurological:  Negative for dizziness, headaches and light-headedness.   Psychiatric/Behavioral:  Negative for altered mental status.    Objective:     Vital Signs (Most Recent):  Temp: 98 °F (36.7 °C) (23 1153)  Pulse: 95 (23 1232)  Resp: 16 (23 1232)  BP: (!) 174/80 (23 1232)  SpO2: 98 % (23 1153)   Vital Signs (24h Range):  Temp:  [97.4 °F (36.3 °C)-98 °F (36.7 °C)] 98 °F (36.7  °C)  Pulse:  [] 95  Resp:  [14-20] 16  SpO2:  [90 %-100 %] 98 %  BP: (166-196)/(80-96) 174/80     Weight: 49.8 kg (109 lb 12.6 oz)  Body mass index is 19.45 kg/m².    SpO2: 98 %         Intake/Output Summary (Last 24 hours) at 3/23/2023 1331  Last data filed at 3/22/2023 1800  Gross per 24 hour   Intake --   Output 500 ml   Net -500 ml       Lines/Drains/Airways       Drain  Duration             Female External Urinary Catheter 03/21/23 1917 1 day                    Physical Exam  Vitals reviewed.   Constitutional:       General: She is not in acute distress.     Appearance: Normal appearance. She is not ill-appearing or toxic-appearing.   HENT:      Head: Normocephalic and atraumatic.      Mouth/Throat:      Mouth: Mucous membranes are moist.   Cardiovascular:      Rate and Rhythm: Normal rate. Rhythm irregular.      Heart sounds: Murmur (Grade 2/6 systolic crescendo-decrescendo murmur) heard.     No friction rub. No gallop.   Pulmonary:      Effort: Pulmonary effort is normal. No respiratory distress.      Breath sounds: Normal breath sounds.   Abdominal:      Palpations: Abdomen is soft.   Musculoskeletal:      Right lower leg: No edema.      Left lower leg: No edema.   Skin:     General: Skin is warm.   Neurological:      Mental Status: She is alert and oriented to person, place, and time. Mental status is at baseline.     Significant Labs: BMP:   Recent Labs   Lab 03/21/23  1809 03/21/23 2011 03/22/23  0246 03/23/23  0404   GLU  --  90 101 89   NA  --  142 142 139   K  --  3.6 3.5 3.4*   CL  --  105 103 103   CO2  --  26 27 21*   BUN  --  9 9 14   CREATININE  --  0.7 0.7 0.9   CALCIUM  --  9.4 10.0 9.4   MG 1.5*  --   --  1.7   , CMP   Recent Labs   Lab 03/21/23  1724 03/21/23 2011 03/22/23  0246 03/23/23  0404    142 142 139   K 4.6 3.6 3.5 3.4*    105 103 103   CO2 22* 26 27 21*   GLU 94 90 101 89   BUN 10 9 9 14   CREATININE 0.8 0.7 0.7 0.9   CALCIUM 10.3 9.4 10.0 9.4   PROT 7.6 6.1  --    --    ALBUMIN 3.9 3.3*  --   --    BILITOT 1.9* 1.8*  --   --    ALKPHOS 131 107  --   --    AST 33 16  --   --    ALT 13 12  --   --    ANIONGAP 17* 11 12 15   , CBC   Recent Labs   Lab 03/21/23  1939 03/22/23  0246 03/23/23  0404   WBC 6.87 8.45  8.45 6.23  6.23   HGB 9.8* 10.2*  10.2* 9.5*  9.5*   HCT 33.4* 34.5*  34.5* 34.9*  34.9*    217  217 197  197   , INR   Recent Labs   Lab 03/21/23  2259   INR 1.2   , and All pertinent lab results from the last 24 hours have been reviewed.    Significant Imaging: Echocardiogram: 2D echo with color flow doppler: No results found for this or any previous visit. and Transthoracic echo (TTE) complete (Cupid Only):   Results for orders placed or performed during the hospital encounter of 03/21/23   Echo   Result Value Ref Range    Ascending aorta 2.91 cm    STJ 2.60 cm    AV mean gradient 3 mmHg    Ao peak jacobo 1.23 m/s    Ao VTI 15.68 cm    IVS 1.10 (A) 0.6 - 1.1 cm    LA size 3.60 cm    Left Atrium Major Axis 5.44 cm    Left Atrium Minor Axis 5.19 cm    LVIDd 3.70 (A) 3.5 - 6.0 cm    LVIDs 2.58 2.1 - 4.0 cm    LVOT diameter 1.96 cm    LVOT peak VTI 15.70 cm    Posterior Wall 1.10 (A) 0.6 - 1.1 cm    RA Major Axis 4.99 cm    RA Width 3.51 cm    RVDD 2.84 cm    Sinus 2.63 cm    TAPSE 1.64 cm    TDI LATERAL 0.10 m/s    TDI SEPTAL 0.07 m/s    LA WIDTH 3.81 cm    LV Diastolic Volume 44.46 mL    LV Systolic Volume 24.13 mL    LVOT peak jacobo 0.91 m/s    FS 30 %    LA volume 61.93 cm3    LV mass 129.33 g    Left Ventricle Relative Wall Thickness 0.59 cm    AV valve area 3.02 cm2    AV Velocity Ratio 0.74     AV index (prosthetic) 1.00     Mean e' 0.09 m/s    LVOT area 3.0 cm2    LVOT stroke volume 47.35 cm3    AV peak gradient 6 mmHg    LV Systolic Volume Index 15.0 mL/m2    LV Diastolic Volume Index 27.61 mL/m2    LA Volume Index 38.5 mL/m2    LV Mass Index 80 g/m2    BSA 1.62 m2    Right Atrial Pressure (from IVC) 3 mmHg    EF 65 %    Narrative    · Irregularly  irregular rhythm and tachycardia were present during the   study.  · Atrial fibrillation observed.  · Mild left atrial enlargement.  · The left ventricle is normal in size with concentric remodeling and   normal systolic function.  · Normal right ventricular size with normal right ventricular systolic   function.  · The estimated ejection fraction is 65%.  · Mild mitral regurgitation.  · Normal central venous pressure (3 mmHg).        Assessment and Plan:     * New onset atrial fibrillation  Patient is a 77 year old female with CVA, HTN, HLD that was admitted for new onset afib. CHADS2-VASc 6.     1. CHERRIE for evaluation of JEFF in the setting of DCCV  -No absolute contraindications of esophageal stricture, tumor, perforation, laceration,or diverticulum and/or active GI bleed  -The risks, benefits & alternatives of the procedure were explained to the patient.   -The risks of transesophageal echo include but are not limited to:  Dental trauma, esophageal trauma/perforation, bleeding, laryngospasm/brochospasm, aspiration, sore throat/hoarseness, & dislodgement of the endotracheal tube/nasogastric tube (where applicable).    -The risks of moderate sedation include hypotension, respiratory depression, arrhythmias, bronchospasm, & death.    -Informed consent was obtained. The patient is agreeable to proceed with the procedure and all questions and concerns addressed.        VTE Risk Mitigation (From admission, onward)         Ordered     heparin 25,000 units in dextrose 5% (100 units/ml) IV bolus from bag - ADDITIONAL PRN BOLUS - 60 units/kg  As needed (PRN)        Question:  Heparin Infusion Adjustment (DO NOT MODIFY ANSWER)  Answer:  [email or web address]\epic\Images\Pharmacy\HeparinInfusions\heparin LOW INTENSITY nomogram for [facility] [id number]    03/21/23 2147     heparin 25,000 units in dextrose 5% (100 units/ml) IV bolus from bag - ADDITIONAL PRN BOLUS - 30 units/kg  As needed (PRN)        Question:  Heparin Infusion Adjustment  (DO NOT MODIFY ANSWER)  Answer:  [email or web address]\epic\Images\Pharmacy\HeparinInfusions\heparin LOW INTENSITY nomogram for [facility] [id number]    03/21/23 2147     Reason for No Pharmacological VTE Prophylaxis  Once        Question:  Reasons:  Answer:  Already adequately anticoagulated on oral Anticoagulants    03/22/23 0225     IP VTE HIGH RISK PATIENT  Once         03/22/23 0225     heparin 25,000 units in dextrose 5% 250 mL (100 units/mL) infusion LOW INTENSITY nomogram - [facility]  Continuous        Question Answer Comment   Heparin Infusion Adjustment (DO NOT MODIFY ANSWER) [email or web address]\epic\Images\Pharmacy\HeparinInfusions\heparin LOW INTENSITY nomogram for [facility] [id number]    Begin at (in units/kg/hr) 12        03/21/23 2147              Staff attestation to follow. Further recommendations per attending addendum    Thank you for your consult. We will sign off post CHERRIE. Please contact us if you have any additional questions.    Aníbal Kathleen MD  Cardiology PGY5  [facility]

## 2023-03-23 NOTE — ASSESSMENT & PLAN NOTE
78 yo female with above comorbidities. Urology consulted for left hydronphrosis.    - NM Scan confirms poorly functioning left kidney. Bilateral obstruction.  - Appears to be chronic in nature with thinned parenchyma  - Patient is asymptomatic  - No urologic intervention necessary unless patient develops symptoms or complications  - Urology follow up prn    Urology will sign off at this time. Please contact with any further questions or concerns.

## 2023-03-23 NOTE — TRANSFER OF CARE
"Anesthesia Transfer of Care Note    Patient: Maria A Smith    Procedure(s) Performed: Procedure(s) (LRB):  Cardioversion or Defibrillation (N/A)  ECHOCARDIOGRAM, TRANSESOPHAGEAL (N/A)    Patient location: PACU    Anesthesia Type: MAC    Transport from OR: Transported from OR on 6-10 L/min O2 by face mask with adequate spontaneous ventilation    Post pain: adequate analgesia    Post assessment: no apparent anesthetic complications and tolerated procedure well    Post vital signs: stable    Level of consciousness: awake, alert and oriented    Nausea/Vomiting: no nausea/vomiting    Complications: none    Transfer of care protocol was followed      Last vitals:   Visit Vitals  BP (!) 144/70   Pulse 90   Temp 36.7 °C (98 °F) (Oral)   Resp 16   Ht 5' 3" (1.6 m)   Wt 49.4 kg (109 lb)   SpO2 97%   Breastfeeding No   BMI 19.31 kg/m²     "

## 2023-03-23 NOTE — ASSESSMENT & PLAN NOTE
Patient with Paroxysmal (<7 days) atrial fibrillation which is uncontrolled currently with Beta Blocker and Calcium Channel Blocker. Patient is currently in atrial fibrillation.MWHHD7IDUp Score: 4.  Anticoagulation indicated. Anticoagulation done with heparin gtt.    - check ECHO; results below  - TSH 1.31  - Mag 1.5, given IV repletion  - cardiac monitoring  - started lopressor 25 mg QID for rate control>> will transition to coreg after CV for better BP control  - EP consulted:  - Plan for CHERRIE/CCV today, urology with no plans for intervention   - Keep NPO  - If needed, can start low dose BB given CVP of 3 on echo and work up unrevealing for significant volume overload   - Chadvasc of 6 (age, sex, htn, cva). Continue anticoagulation with heparin gtt, transition to OAC after CV    Results for orders placed during the hospital encounter of 03/21/23    Echo    Interpretation Summary  · Irregularly irregular rhythm and tachycardia were present during the study.  · Atrial fibrillation observed.  · Mild left atrial enlargement.  · The left ventricle is normal in size with concentric remodeling and normal systolic function.  · Normal right ventricular size with normal right ventricular systolic function.  · The estimated ejection fraction is 65%.  · Mild mitral regurgitation.  · Normal central venous pressure (3 mmHg).

## 2023-03-23 NOTE — ASSESSMENT & PLAN NOTE
- UA with 15 WBCs  - continue ceftriaxone  - CT with hydronephrosis  - Urology consulted:  - NM Scan confirms poorly functioning left kidney. Bilateral obstruction.  - Appears to be chronic in nature with thinned parenchyma  - Patient is asymptomatic  - No urologic intervention necessary unless patient develops symptoms or complications  - Urology follow up prn  - Urology will sign off at this time. Please contact with any further questions or concerns.

## 2023-03-23 NOTE — PROGRESS NOTES
[facility] [address] [place] [address] Medicine  Progress Note    Patient Name: Maria A Smith  MRN: [id number]  Patient Class: IP- Inpatient   Admission Date: 3/21/2023  Length of Stay: 0 days  Attending Physician: Earl Veronica MD  Primary Care Provider: Sneha Peguero MD        Subjective:     Principal Problem:New onset atrial fibrillation        HPI:  Patient is a 76yo female with a PMHx of HTN, HLD, CVA being admitted to observation for new onset afib. Patient reports onset of left arm pain for 1 day. She reports that she was washing her kitchen floor but denies a fall or specific trauma.  She denies any other complaints, no chest pain, shortness of breath, lightheadedness or weakness.    In the ED, . She was given Cardizem with improvement. EKG showed afib RVR. Troponin 0.066. UA with 15 WBCs. CTA abdomen pelvis showed severe hydronephrosis on the left with associated renal cortical thinning which is new from the prior study.  No significant hydroureter.  UPJ narrowing is a consideration. Other films without fracture. CTH negative. She was started on heparin drip. She was admitted for further management.       Overview/Hospital Course:  Maria A Smith is a 78 yo F admitted to observation for new onset A fib and hypertensive urgency. Started on heparin drip for anticoagulation; will transition to OAC. Required multiple doses of IV metoprolol for rate control. Started on lopressor QID. Echo performed showing A fib, EF 65%, and CVP 3 mmHg. EP consulted; performing CHERRIE/CV 03/23. Required multiple doses of IV and po hydralazine for BP control. Increased home lisinopril to 40 mg daily. Evaluated by urology for L hydronephrosis seen on CT. NM renal scan showing poorly functioning left kidney and bilateral obstruction which appears to be chronic in nature. She does not require any urologic interventions.  UA positive for infection; started Rocephin. Urine cx with + GNR; pending susceptibilities. Plan  to discharge home with HH and f/u with cardiology and pulmonology when medically ready.       Interval History: NAEON. Remains rate controlled on po lopressor. BP not well controlled. Still on heparin gtt, but with transition to OAC after CV. Evaluated by EP; NPO for cardioversion today. Patient denies any complaints at this time. Daughter and patient interested in speaking to  and setting up HH as patient lives alone, but has been struggling to get up her stairs.     Review of Systems   Constitutional:  Negative for chills and fever.   Respiratory:  Negative for chest tightness and shortness of breath.    Cardiovascular:  Negative for chest pain and leg swelling.   Gastrointestinal:  Negative for abdominal pain and nausea.   Neurological:  Negative for dizziness and weakness.   Objective:     Vital Signs (Most Recent):  Temp: 98 °F (36.7 °C) (03/23/23 1153)  Pulse: 92 (03/23/23 1345)  Resp: 16 (03/23/23 1232)  BP: 136/72 (03/23/23 1345)  SpO2: 97 % (03/23/23 1345) Vital Signs (24h Range):  Temp:  [97.4 °F (36.3 °C)-98 °F (36.7 °C)] 98 °F (36.7 °C)  Pulse:  [] 92  Resp:  [14-20] 16  SpO2:  [90 %-100 %] 97 %  BP: (136-196)/(72-96) 136/72     Weight: 49.8 kg (109 lb 12.6 oz)  Body mass index is 19.45 kg/m².    Intake/Output Summary (Last 24 hours) at 3/23/2023 1438  Last data filed at 3/22/2023 1800  Gross per 24 hour   Intake --   Output 500 ml   Net -500 ml      Physical Exam  Vitals and nursing note reviewed.   Constitutional:       Appearance: She is well-developed.   Eyes:      Pupils: Pupils are equal, round, and reactive to light.   Cardiovascular:      Rate and Rhythm: Normal rate.      Comments: Irregularly irregular rhythm  Pulmonary:      Effort: Pulmonary effort is normal. No respiratory distress.      Breath sounds: Wheezing present.      Comments: Decreased breath sounds with inspiration and mild end expiratory wheezing. On RA  Abdominal:      Palpations: Abdomen is soft.      Tenderness: There  is no abdominal tenderness.   Musculoskeletal:         General: No tenderness.      Right lower leg: No edema.      Left lower leg: No edema.   Skin:     General: Skin is warm and dry.   Neurological:      Mental Status: She is alert and oriented to person, place, and time.   Psychiatric:         Behavior: Behavior normal.       Significant Labs: All pertinent labs within the past 24 hours have been reviewed.  BMP:   Recent Labs   Lab 03/23/23  0404   GLU 89      K 3.4*      CO2 21*   BUN 14   CREATININE 0.9   CALCIUM 9.4   MG 1.7     CBC:   Recent Labs   Lab 03/21/23  1939 03/22/23  0246 03/23/23  0404   WBC 6.87 8.45  8.45 6.23  6.23   HGB 9.8* 10.2*  10.2* 9.5*  9.5*   HCT 33.4* 34.5*  34.5* 34.9*  34.9*    217  217 197  197       Significant Imaging: I have reviewed all pertinent imaging results/findings within the past 24 hours.      Assessment/Plan:      * New onset atrial fibrillation  Patient with Paroxysmal (<7 days) atrial fibrillation which is uncontrolled currently with Beta Blocker and Calcium Channel Blocker. Patient is currently in atrial fibrillation.ILSCU1DPHs Score: 4.  Anticoagulation indicated. Anticoagulation done with heparin gtt.    - check ECHO; results below  - TSH 1.31  - Mag 1.5, given IV repletion  - cardiac monitoring  - started lopressor 25 mg QID for rate control>> will transition to coreg after CV for better BP control  - EP consulted:  - Plan for CHERRIE/CCV today, urology with no plans for intervention   - Keep NPO  - If needed, can start low dose BB given CVP of 3 on echo and work up unrevealing for significant volume overload   - Chadvasc of 6 (age, sex, htn, cva). Continue anticoagulation with heparin gtt, transition to OAC after CV    Results for orders placed during the hospital encounter of 03/21/23    Echo    Interpretation Summary  · Irregularly irregular rhythm and tachycardia were present during the study.  · Atrial fibrillation observed.  · Mild  left atrial enlargement.  · The left ventricle is normal in size with concentric remodeling and normal systolic function.  · Normal right ventricular size with normal right ventricular systolic function.  · The estimated ejection fraction is 65%.  · Mild mitral regurgitation.  · Normal central venous pressure (3 mmHg).    Essential hypertension  - home lisinopril 20 mg >> increased to 40 mg daily  - added lopressor 25 mg QID for rate control>> plan to transition to coreg for better BP control after CV  - hydralazine PRN    Acute cystitis with hematuria  - UA infectious with WBC 15, +nitrites, RBC 15, and moderate bacteria  - started CTX  - urine cx + GNR; pending susceptibilities    Hydronephrosis  - UA with 15 WBCs  - continue ceftriaxone  - CT with hydronephrosis  - Urology consulted:  - NM Scan confirms poorly functioning left kidney. Bilateral obstruction.  - Appears to be chronic in nature with thinned parenchyma  - Patient is asymptomatic  - No urologic intervention necessary unless patient develops symptoms or complications  - Urology follow up prn  - Urology will sign off at this time. Please contact with any further questions or concerns.    Chronic obstructive pulmonary emphysema  - moderate to severe emphysematous changes throughout the lungs with mild atelectasis in the right middle lobe noted on CT  - no diagnosis of emphysema in the past  - decrease inspiratory lung sounds and expiratory wheezing noted on exam  - hx of tobacco use (quit 3 years ago)  - will need pulmonology f/u for PFTs and to start inhaler  - intermittently requiring O2 during hospital stay; will perform 6MWT prior to discharge    Late effects of cerebral ischemic stroke  - at baseline  - continue statin + plavix    Iron deficiency anemia  - continue iron supplements    Tobacco abuse  - quit 3 years ago    Hyperlipidemia  - continue statin      VTE Risk Mitigation (From admission, onward)           Ordered     heparin 25,000 units in  dextrose 5% (100 units/ml) IV bolus from bag - ADDITIONAL PRN BOLUS - 60 units/kg  As needed (PRN)        Question:  Heparin Infusion Adjustment (DO NOT MODIFY ANSWER)  Answer:  [email or web address]\epic\Images\Pharmacy\HeparinInfusions\heparin LOW INTENSITY nomogram for [facility] [id number]    03/21/23 2147     heparin 25,000 units in dextrose 5% (100 units/ml) IV bolus from bag - ADDITIONAL PRN BOLUS - 30 units/kg  As needed (PRN)        Question:  Heparin Infusion Adjustment (DO NOT MODIFY ANSWER)  Answer:  [email or web address]\epic\Images\Pharmacy\HeparinInfusions\heparin LOW INTENSITY nomogram for [facility] [id number]    03/21/23 2147     Reason for No Pharmacological VTE Prophylaxis  Once        Question:  Reasons:  Answer:  Already adequately anticoagulated on oral Anticoagulants    03/22/23 0225     IP VTE HIGH RISK PATIENT  Once         03/22/23 0225     heparin 25,000 units in dextrose 5% 250 mL (100 units/mL) infusion LOW INTENSITY nomogram - [facility]  Continuous        Question Answer Comment   Heparin Infusion Adjustment (DO NOT MODIFY ANSWER) [email or web address]\epic\Images\Pharmacy\HeparinInfusions\heparin LOW INTENSITY nomogram for [facility] [id number]    Begin at (in units/kg/hr) 12        03/21/23 2147                    Discharge Planning   ROSA: 3/24/2023     Code Status: Full Code   Is the patient medically ready for discharge?: No    Reason for patient still in hospital (select all that apply): Patient trending condition, Treatment, Consult recommendations and Other (specify) cardioversion today  Discharge Plan A: Home Health                  Vivian Negron PA-C  Department of Hospital Medicine   [facility]

## 2023-03-23 NOTE — SUBJECTIVE & OBJECTIVE
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)     Stroke        Past Surgical History:   Procedure Laterality Date    APPENDECTOMY      COLONOSCOPY N/A 8/7/2019    Procedure: COLONOSCOPY;  Surgeon: Alex Degroot MD;  Location: [facility] ENDO;  Service: Endoscopy;  Laterality: N/A;    COLONOSCOPY N/A 2/12/2020    Procedure: COLONOSCOPY;  Surgeon: Evelio Rizvi MD;  Location: [facility] ENDO;  Service: Endoscopy;  Laterality: N/A;    ESOPHAGOGASTRODUODENOSCOPY N/A 2/11/2020    Procedure: EGD (ESOPHAGOGASTRODUODENOSCOPY);  Surgeon: Evelio Rizvi MD;  Location: [facility] ENDO;  Service: Endoscopy;  Laterality: N/A;    HYSTERECTOMY         Review of patient's allergies indicates:  No Known Allergies    No current facility-administered medications on file prior to encounter.     Current Outpatient Medications on File Prior to Encounter   Medication Sig    acetaminophen (TYLENOL) 500 MG tablet Take 1 tablet (500 mg total) by mouth every 6 (six) hours as needed for Pain or Temperature greater than (100.4 F).    atorvastatin (LIPITOR) 80 MG tablet Take 80 mg by mouth once daily.     clopidogreL (PLAVIX) 75 mg tablet Take 1 tablet (75 mg total) by mouth once daily.    DULoxetine (CYMBALTA) 60 MG capsule Take 1 capsule (60 mg total) by mouth once daily.    ferrous sulfate 325 (65 FE) MG EC tablet Take 1 tablet (325 mg total) by mouth 2 (two) times daily.    LIDOcaine (LIDODERM) 5 % Place 1 patch onto the skin once daily. Remove & Discard patch within 12 hours or as directed by MD    lisinopril (PRINIVIL,ZESTRIL) 20 MG tablet Take 20 mg by mouth once daily.     pantoprazole (PROTONIX) 40 MG tablet Take 1 tablet (40 mg total) by mouth once daily.     Family History       Problem Relation (Age of Onset)    Cancer Sister    Diabetes Sister          Tobacco Use    Smoking status: Former     Packs/day: 2.00     Types: Cigarettes     Quit date: 9/12/2017     Years  since quittin.5    Smokeless tobacco: Never   Substance and Sexual Activity    Alcohol use: Yes     Alcohol/week: 1.0 standard drink     Types: 1 Cans of beer per week    Drug use: No    Sexual activity: Not on file     Review of Systems   Constitutional: Negative for chills and fever.   Cardiovascular:  Negative for chest pain, orthopnea and palpitations.   Respiratory:  Negative for cough and shortness of breath.    Gastrointestinal:  Negative for abdominal pain.   Neurological:  Negative for dizziness, headaches and light-headedness.   Psychiatric/Behavioral:  Negative for altered mental status.    Objective:     Vital Signs (Most Recent):  Temp: 98 °F (36.7 °C) (23 1153)  Pulse: 95 (23 1232)  Resp: 16 (23 1232)  BP: (!) 174/80 (23 1232)  SpO2: 98 % (23 1153)   Vital Signs (24h Range):  Temp:  [97.4 °F (36.3 °C)-98 °F (36.7 °C)] 98 °F (36.7 °C)  Pulse:  [] 95  Resp:  [14-20] 16  SpO2:  [90 %-100 %] 98 %  BP: (166-196)/(80-96) 174/80     Weight: 49.8 kg (109 lb 12.6 oz)  Body mass index is 19.45 kg/m².    SpO2: 98 %         Intake/Output Summary (Last 24 hours) at 3/23/2023 1331  Last data filed at 3/22/2023 1800  Gross per 24 hour   Intake --   Output 500 ml   Net -500 ml       Lines/Drains/Airways       Drain  Duration             Female External Urinary Catheter 23 1917 1 day                    Physical Exam  Vitals reviewed.   Constitutional:       General: She is not in acute distress.     Appearance: Normal appearance. She is not ill-appearing or toxic-appearing.   HENT:      Head: Normocephalic and atraumatic.      Mouth/Throat:      Mouth: Mucous membranes are moist.   Cardiovascular:      Rate and Rhythm: Normal rate. Rhythm irregular.      Heart sounds: Murmur (Grade 2/6 systolic crescendo-decrescendo murmur) heard.     No friction rub. No gallop.   Pulmonary:      Effort: Pulmonary effort is normal. No respiratory distress.      Breath sounds: Normal breath  sounds.   Abdominal:      Palpations: Abdomen is soft.   Musculoskeletal:      Right lower leg: No edema.      Left lower leg: No edema.   Skin:     General: Skin is warm.   Neurological:      Mental Status: She is alert and oriented to person, place, and time. Mental status is at baseline.     Significant Labs: BMP:   Recent Labs   Lab 03/21/23  1809 03/21/23 2011 03/22/23 0246 03/23/23  0404   GLU  --  90 101 89   NA  --  142 142 139   K  --  3.6 3.5 3.4*   CL  --  105 103 103   CO2  --  26 27 21*   BUN  --  9 9 14   CREATININE  --  0.7 0.7 0.9   CALCIUM  --  9.4 10.0 9.4   MG 1.5*  --   --  1.7   , CMP   Recent Labs   Lab 03/21/23  1724 03/21/23 2011 03/22/23 0246 03/23/23  0404    142 142 139   K 4.6 3.6 3.5 3.4*    105 103 103   CO2 22* 26 27 21*   GLU 94 90 101 89   BUN 10 9 9 14   CREATININE 0.8 0.7 0.7 0.9   CALCIUM 10.3 9.4 10.0 9.4   PROT 7.6 6.1  --   --    ALBUMIN 3.9 3.3*  --   --    BILITOT 1.9* 1.8*  --   --    ALKPHOS 131 107  --   --    AST 33 16  --   --    ALT 13 12  --   --    ANIONGAP 17* 11 12 15   , CBC   Recent Labs   Lab 03/21/23  1939 03/22/23 0246 03/23/23  0404   WBC 6.87 8.45  8.45 6.23  6.23   HGB 9.8* 10.2*  10.2* 9.5*  9.5*   HCT 33.4* 34.5*  34.5* 34.9*  34.9*    217  217 197  197   , INR   Recent Labs   Lab 03/21/23  2259   INR 1.2   , and All pertinent lab results from the last 24 hours have been reviewed.    Significant Imaging: Echocardiogram: 2D echo with color flow doppler: No results found for this or any previous visit. and Transthoracic echo (TTE) complete (Cupid Only):   Results for orders placed or performed during the hospital encounter of 03/21/23   Echo   Result Value Ref Range    Ascending aorta 2.91 cm    STJ 2.60 cm    AV mean gradient 3 mmHg    Ao peak jacobo 1.23 m/s    Ao VTI 15.68 cm    IVS 1.10 (A) 0.6 - 1.1 cm    LA size 3.60 cm    Left Atrium Major Axis 5.44 cm    Left Atrium Minor Axis 5.19 cm    LVIDd 3.70 (A) 3.5 - 6.0 cm     LVIDs 2.58 2.1 - 4.0 cm    LVOT diameter 1.96 cm    LVOT peak VTI 15.70 cm    Posterior Wall 1.10 (A) 0.6 - 1.1 cm    RA Major Axis 4.99 cm    RA Width 3.51 cm    RVDD 2.84 cm    Sinus 2.63 cm    TAPSE 1.64 cm    TDI LATERAL 0.10 m/s    TDI SEPTAL 0.07 m/s    LA WIDTH 3.81 cm    LV Diastolic Volume 44.46 mL    LV Systolic Volume 24.13 mL    LVOT peak jacobo 0.91 m/s    FS 30 %    LA volume 61.93 cm3    LV mass 129.33 g    Left Ventricle Relative Wall Thickness 0.59 cm    AV valve area 3.02 cm2    AV Velocity Ratio 0.74     AV index (prosthetic) 1.00     Mean e' 0.09 m/s    LVOT area 3.0 cm2    LVOT stroke volume 47.35 cm3    AV peak gradient 6 mmHg    LV Systolic Volume Index 15.0 mL/m2    LV Diastolic Volume Index 27.61 mL/m2    LA Volume Index 38.5 mL/m2    LV Mass Index 80 g/m2    BSA 1.62 m2    Right Atrial Pressure (from IVC) 3 mmHg    EF 65 %    Narrative    · Irregularly irregular rhythm and tachycardia were present during the   study.  · Atrial fibrillation observed.  · Mild left atrial enlargement.  · The left ventricle is normal in size with concentric remodeling and   normal systolic function.  · Normal right ventricular size with normal right ventricular systolic   function.  · The estimated ejection fraction is 65%.  · Mild mitral regurgitation.  · Normal central venous pressure (3 mmHg).

## 2023-03-23 NOTE — HPI
Patient is a 77 year old female with reported history of CVA, HTN, HLD that was admitted for Afib with RVR. CHADS2-VASc 6. CHERRIE requested prior to DCCV.    Dysphagia or odynophagia:  No  Liver Disease, esophageal disease, or known varices:  No  Upper GI Bleeding: No  Prior neck surgery or radiation:  No  History of anesthetic difficulties:  No  Family history of anesthetic difficulties:  No  Able to move neck in all directions:  Yes  Snoring:  No  Sleep Apnea:  No  Last oral intake:  12 hours ago

## 2023-03-23 NOTE — ASSESSMENT & PLAN NOTE
- UA infectious with WBC 15, +nitrites, RBC 15, and moderate bacteria  - started CTX  - urine cx + GNR; pending susceptibilities

## 2023-03-23 NOTE — ASSESSMENT & PLAN NOTE
Patient is a 77 year old female with CVA, HTN, HLD that was admitted for new onset afib. CHADS2-VASc 6.     1. CHERRIE for evaluation of JEFF in the setting of DCCV  -No absolute contraindications of esophageal stricture, tumor, perforation, laceration,or diverticulum and/or active GI bleed  -The risks, benefits & alternatives of the procedure were explained to the patient.   -The risks of transesophageal echo include but are not limited to:  Dental trauma, esophageal trauma/perforation, bleeding, laryngospasm/brochospasm, aspiration, sore throat/hoarseness, & dislodgement of the endotracheal tube/nasogastric tube (where applicable).    -The risks of moderate sedation include hypotension, respiratory depression, arrhythmias, bronchospasm, & death.    -Informed consent was obtained. The patient is agreeable to proceed with the procedure and all questions and concerns addressed.

## 2023-03-23 NOTE — ASSESSMENT & PLAN NOTE
- home lisinopril 20 mg >> increased to 40 mg daily  - added lopressor 25 mg QID for rate control>> plan to transition to coreg for better BP control after CV  - hydralazine PRN

## 2023-03-24 ENCOUNTER — TELEPHONE (OUTPATIENT)
Dept: PULMONOLOGY | Facility: CLINIC | Age: 78
End: 2023-03-24
Payer: MEDICARE

## 2023-03-24 VITALS
HEIGHT: 63 IN | WEIGHT: 109 LBS | OXYGEN SATURATION: 95 % | TEMPERATURE: 98 F | SYSTOLIC BLOOD PRESSURE: 161 MMHG | BODY MASS INDEX: 19.31 KG/M2 | RESPIRATION RATE: 18 BRPM | HEART RATE: 60 BPM | DIASTOLIC BLOOD PRESSURE: 70 MMHG

## 2023-03-24 LAB
ANION GAP SERPL CALC-SCNC: 12 MMOL/L (ref 8–16)
APTT BLDCRRT: 66.3 SEC (ref 21–32)
BASOPHILS # BLD AUTO: 0.05 K/UL (ref 0–0.2)
BASOPHILS # BLD AUTO: 0.05 K/UL (ref 0–0.2)
BASOPHILS NFR BLD: 0.8 % (ref 0–1.9)
BASOPHILS NFR BLD: 0.8 % (ref 0–1.9)
BUN SERPL-MCNC: 20 MG/DL (ref 8–23)
CALCIUM SERPL-MCNC: 9.6 MG/DL (ref 8.7–10.5)
CHLORIDE SERPL-SCNC: 101 MMOL/L (ref 95–110)
CO2 SERPL-SCNC: 28 MMOL/L (ref 23–29)
CREAT SERPL-MCNC: 0.9 MG/DL (ref 0.5–1.4)
DIFFERENTIAL METHOD: ABNORMAL
DIFFERENTIAL METHOD: ABNORMAL
EOSINOPHIL # BLD AUTO: 0.2 K/UL (ref 0–0.5)
EOSINOPHIL # BLD AUTO: 0.2 K/UL (ref 0–0.5)
EOSINOPHIL NFR BLD: 2.6 % (ref 0–8)
EOSINOPHIL NFR BLD: 2.6 % (ref 0–8)
ERYTHROCYTE [DISTWIDTH] IN BLOOD BY AUTOMATED COUNT: 17.5 % (ref 11.5–14.5)
ERYTHROCYTE [DISTWIDTH] IN BLOOD BY AUTOMATED COUNT: 17.5 % (ref 11.5–14.5)
EST. GFR  (NO RACE VARIABLE): >60 ML/MIN/1.73 M^2
GLUCOSE SERPL-MCNC: 102 MG/DL (ref 70–110)
HCT VFR BLD AUTO: 33.7 % (ref 37–48.5)
HCT VFR BLD AUTO: 33.7 % (ref 37–48.5)
HGB BLD-MCNC: 9.5 G/DL (ref 12–16)
HGB BLD-MCNC: 9.5 G/DL (ref 12–16)
IMM GRANULOCYTES # BLD AUTO: 0.03 K/UL (ref 0–0.04)
IMM GRANULOCYTES # BLD AUTO: 0.03 K/UL (ref 0–0.04)
IMM GRANULOCYTES NFR BLD AUTO: 0.5 % (ref 0–0.5)
IMM GRANULOCYTES NFR BLD AUTO: 0.5 % (ref 0–0.5)
LYMPHOCYTES # BLD AUTO: 1.1 K/UL (ref 1–4.8)
LYMPHOCYTES # BLD AUTO: 1.1 K/UL (ref 1–4.8)
LYMPHOCYTES NFR BLD: 16.9 % (ref 18–48)
LYMPHOCYTES NFR BLD: 16.9 % (ref 18–48)
MAGNESIUM SERPL-MCNC: 1.9 MG/DL (ref 1.6–2.6)
MCH RBC QN AUTO: 23.3 PG (ref 27–31)
MCH RBC QN AUTO: 23.3 PG (ref 27–31)
MCHC RBC AUTO-ENTMCNC: 28.2 G/DL (ref 32–36)
MCHC RBC AUTO-ENTMCNC: 28.2 G/DL (ref 32–36)
MCV RBC AUTO: 83 FL (ref 82–98)
MCV RBC AUTO: 83 FL (ref 82–98)
MONOCYTES # BLD AUTO: 0.7 K/UL (ref 0.3–1)
MONOCYTES # BLD AUTO: 0.7 K/UL (ref 0.3–1)
MONOCYTES NFR BLD: 9.8 % (ref 4–15)
MONOCYTES NFR BLD: 9.8 % (ref 4–15)
NEUTROPHILS # BLD AUTO: 4.6 K/UL (ref 1.8–7.7)
NEUTROPHILS # BLD AUTO: 4.6 K/UL (ref 1.8–7.7)
NEUTROPHILS NFR BLD: 69.4 % (ref 38–73)
NEUTROPHILS NFR BLD: 69.4 % (ref 38–73)
NRBC BLD-RTO: 0 /100 WBC
NRBC BLD-RTO: 0 /100 WBC
PLATELET # BLD AUTO: 227 K/UL (ref 150–450)
PLATELET # BLD AUTO: 227 K/UL (ref 150–450)
PMV BLD AUTO: 10.3 FL (ref 9.2–12.9)
PMV BLD AUTO: 10.3 FL (ref 9.2–12.9)
POTASSIUM SERPL-SCNC: 3.4 MMOL/L (ref 3.5–5.1)
RBC # BLD AUTO: 4.07 M/UL (ref 4–5.4)
RBC # BLD AUTO: 4.07 M/UL (ref 4–5.4)
SODIUM SERPL-SCNC: 141 MMOL/L (ref 136–145)
WBC # BLD AUTO: 6.62 K/UL (ref 3.9–12.7)
WBC # BLD AUTO: 6.62 K/UL (ref 3.9–12.7)

## 2023-03-24 PROCEDURE — 36415 COLL VENOUS BLD VENIPUNCTURE: CPT | Performed by: INTERNAL MEDICINE

## 2023-03-24 PROCEDURE — 80048 BASIC METABOLIC PNL TOTAL CA: CPT | Performed by: PHYSICIAN ASSISTANT

## 2023-03-24 PROCEDURE — 83735 ASSAY OF MAGNESIUM: CPT

## 2023-03-24 PROCEDURE — 85730 THROMBOPLASTIN TIME PARTIAL: CPT | Performed by: INTERNAL MEDICINE

## 2023-03-24 PROCEDURE — 99239 PR HOSPITAL DISCHARGE DAY,>30 MIN: ICD-10-PCS | Mod: ,,,

## 2023-03-24 PROCEDURE — 85025 COMPLETE CBC W/AUTO DIFF WBC: CPT | Performed by: PHYSICIAN ASSISTANT

## 2023-03-24 PROCEDURE — 99239 HOSP IP/OBS DSCHRG MGMT >30: CPT | Mod: ,,,

## 2023-03-24 PROCEDURE — 25000003 PHARM REV CODE 250

## 2023-03-24 PROCEDURE — 25000003 PHARM REV CODE 250: Performed by: PHYSICIAN ASSISTANT

## 2023-03-24 PROCEDURE — 63600175 PHARM REV CODE 636 W HCPCS: Performed by: PHYSICIAN ASSISTANT

## 2023-03-24 RX ORDER — CARVEDILOL 6.25 MG/1
6.25 TABLET ORAL 2 TIMES DAILY
Qty: 60 TABLET | Refills: 11 | Status: SHIPPED | OUTPATIENT
Start: 2023-03-24 | End: 2024-03-23

## 2023-03-24 RX ORDER — POTASSIUM CHLORIDE 20 MEQ/1
40 TABLET, EXTENDED RELEASE ORAL ONCE
Status: COMPLETED | OUTPATIENT
Start: 2023-03-24 | End: 2023-03-24

## 2023-03-24 RX ORDER — ATORVASTATIN CALCIUM 80 MG/1
80 TABLET, FILM COATED ORAL DAILY
Qty: 90 TABLET | Refills: 3 | Status: SHIPPED | OUTPATIENT
Start: 2023-03-24 | End: 2024-03-23

## 2023-03-24 RX ORDER — CARVEDILOL 6.25 MG/1
6.25 TABLET ORAL 2 TIMES DAILY
Status: DISCONTINUED | OUTPATIENT
Start: 2023-03-24 | End: 2023-03-24 | Stop reason: HOSPADM

## 2023-03-24 RX ORDER — DULOXETIN HYDROCHLORIDE 60 MG/1
60 CAPSULE, DELAYED RELEASE ORAL DAILY
Qty: 30 CAPSULE | Refills: 11 | Status: SHIPPED | OUTPATIENT
Start: 2023-03-24 | End: 2024-03-23

## 2023-03-24 RX ORDER — LISINOPRIL 40 MG/1
40 TABLET ORAL DAILY
Qty: 90 TABLET | Refills: 3 | Status: SHIPPED | OUTPATIENT
Start: 2023-03-25 | End: 2024-03-24

## 2023-03-24 RX ORDER — FERROUS SULFATE 325(65) MG
325 TABLET, DELAYED RELEASE (ENTERIC COATED) ORAL 2 TIMES DAILY
Qty: 60 TABLET | Refills: 11 | Status: SHIPPED | OUTPATIENT
Start: 2023-03-24 | End: 2024-03-23

## 2023-03-24 RX ORDER — PANTOPRAZOLE SODIUM 40 MG/1
40 TABLET, DELAYED RELEASE ORAL DAILY
Qty: 30 TABLET | Refills: 11 | Status: SHIPPED | OUTPATIENT
Start: 2023-03-24 | End: 2024-03-23

## 2023-03-24 RX ORDER — CLOPIDOGREL BISULFATE 75 MG/1
75 TABLET ORAL DAILY
Qty: 30 TABLET | Refills: 5
Start: 2023-03-24 | End: 2024-03-23

## 2023-03-24 RX ADMIN — CLOPIDOGREL BISULFATE 75 MG: 75 TABLET ORAL at 09:03

## 2023-03-24 RX ADMIN — POTASSIUM CHLORIDE 40 MEQ: 1500 TABLET, EXTENDED RELEASE ORAL at 09:03

## 2023-03-24 RX ADMIN — ATORVASTATIN CALCIUM 80 MG: 40 TABLET, FILM COATED ORAL at 09:03

## 2023-03-24 RX ADMIN — DULOXETINE HYDROCHLORIDE 60 MG: 60 CAPSULE, DELAYED RELEASE ORAL at 09:03

## 2023-03-24 RX ADMIN — CARVEDILOL 6.25 MG: 6.25 TABLET, FILM COATED ORAL at 09:03

## 2023-03-24 RX ADMIN — FERROUS SULFATE TAB 325 MG (65 MG ELEMENTAL FE) 1 EACH: 325 (65 FE) TAB at 09:03

## 2023-03-24 RX ADMIN — HEPARIN SODIUM 17 UNITS/KG/HR: 10000 INJECTION, SOLUTION INTRAVENOUS at 07:03

## 2023-03-24 RX ADMIN — PANTOPRAZOLE SODIUM 40 MG: 40 TABLET, DELAYED RELEASE ORAL at 09:03

## 2023-03-24 RX ADMIN — APIXABAN 5 MG: 5 TABLET, FILM COATED ORAL at 09:03

## 2023-03-24 RX ADMIN — LISINOPRIL 40 MG: 20 TABLET ORAL at 09:03

## 2023-03-24 NOTE — PLAN OF CARE
03/24/23 1302   Post-Acute Status   Post-Acute Authorization Home Health   Home Health Status Referrals Sent     Pt is expected to discharge home with home health. SW sent  referrals via [facility] and is waiting for an accepting agency.    AJ will continue to follow up.      Kina Monroe LMSW  [facility]  Ext. [phone]

## 2023-03-24 NOTE — PLAN OF CARE
TRAMAINEW scheduled the PCP hospital follow up for March 30 @ 2:00pm    A message was sent to Pulmonology requesting a hospital follow up appointment on the patients behalf. I added this information to the AVS as a reminder for the patient.    A message was sent to Electrophysiology Cardiology requesting a hospital follow up appointment on the patients behalf. I added this information to the AVS as a reminder for the patient.

## 2023-03-24 NOTE — ANESTHESIA POSTPROCEDURE EVALUATION
Anesthesia Post Evaluation    Patient: Maria A Smith    Procedure(s) Performed: Procedure(s) (LRB):  Cardioversion or Defibrillation (N/A)  ECHOCARDIOGRAM, TRANSESOPHAGEAL (N/A)    Final Anesthesia Type: MAC      Patient location during evaluation: PACU  Patient participation: Yes- Able to Participate  Level of consciousness: awake and alert  Post-procedure vital signs: reviewed and stable  Pain management: adequate  Airway patency: patent    PONV status at discharge: No PONV  Anesthetic complications: no      Cardiovascular status: blood pressure returned to baseline and hemodynamically stable  Respiratory status: unassisted, spontaneous ventilation and room air  Hydration status: euvolemic  Follow-up not needed.          Vitals Value Taken Time   /70 03/24/23 1105   Temp 36.7 °C (98 °F) 03/24/23 1105   Pulse 70 03/24/23 1105   Resp 18 03/24/23 1105   SpO2 95 % 03/24/23 1105         Event Time   Out of Recovery 03/23/2023 17:55:00         Pain/Rylee Score: Rylee Score: 9 (3/23/2023  5:15 PM)

## 2023-03-24 NOTE — CARE UPDATE
Home Oxygen Evaluation     Date Performed: 3/24/2023     1)         Patient's Home O2 Sat on room air, while at rest: 94                               If O2 sats on room air at rest are 88% or below, patient qualifies. No additional testing needed. Document N/A in steps 2 and 3. If 89% or above, complete steps 2.        2)         Patient's O2 Sat on room air while exercisin                               If O2 sats on room air while exercising remain 89% or above patient does not qualify, no further testing needed Document N/A in step 3. If O2 sats on room air while exercising are 88% or below, continue to step 3.        3)         Patient's O2 Sat while exercising on O2: 89% on 3L                                           (Must show improvement from #2 for patients to qualify)     If O2 sats improve on oxygen, patient qualifies for portable oxygen. If not, the patient does not qualify.

## 2023-03-24 NOTE — TELEPHONE ENCOUNTER
Attempted to contact patient at the telephone number provided but  picked up stating that The prescriber Im trying to contact is no longer in service.

## 2023-03-24 NOTE — PLAN OF CARE
03/24/23 1319   Post-Acute Status   Post-Acute Authorization Home Health   Home Health Status Set-up Complete/Auth obtained     SW received notification that pt has been accepted with [facility].      Kina Monroe LMSW  [facility]  Ext. [phone]

## 2023-03-24 NOTE — PLAN OF CARE
[facility]      HOME HEALTH ORDERS  FACE TO FACE ENCOUNTER    Patient Name: Maria A Smith  YOB: 1945    PCP: Sneha Peguero MD   PCP Address: [address] [facility]  PCP Phone Number: [phone]  PCP Fax: [phone]    Encounter Date: 3/21/23    Admit to Home Health    Diagnoses:  Active Hospital Problems    Diagnosis  POA    *New onset atrial fibrillation [I48.91]  Yes    Essential hypertension [I10]  Yes     Priority: 2     Acute cystitis with hematuria [N30.01]  Yes     Priority: 3     Hydronephrosis [N13.30]  Yes     Priority: 4     Chronic obstructive pulmonary emphysema [J43.9]  Yes     Priority: 5     Iron deficiency anemia [D50.9]  Yes    Tobacco abuse [Z72.0]  Yes    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       Follow Up Appointments:  No future appointments.    Allergies:Review of patient's allergies indicates:  No Known Allergies    Medications: Review discharge medications with patient and family and provide education.    Current Facility-Administered Medications   Medication Dose Route Frequency Provider Last Rate Last Admin    acetaminophen tablet 1,000 mg  1,000 mg Oral Q8H PRN Steve Lopez Jr., PA-C        acetaminophen tablet 650 mg  650 mg Oral Q4H PRN Steve Lopez Jr., PA-C        aluminum-magnesium hydroxide-simethicone 200-200-20 mg/5 mL suspension 30 mL  30 mL Oral QID PRN Steve Lopez Jr., PA-C        apixaban tablet 5 mg  5 mg Oral BID Vivian Negron PA-C   5 mg at 03/24/23 0930    atorvastatin tablet 80 mg  80 mg Oral Daily Steve Lopez Jr., PA-C   80 mg at 03/24/23 0929    bisacodyL suppository 10 mg  10 mg Rectal Daily PRN Steve Lopez Jr., PA-C        carvediloL tablet 6.25 mg  6.25 mg Oral BID Vivian Negron PA-C   6.25 mg at 03/24/23 0929     clopidogreL tablet 75 mg  75 mg Oral Daily Steve Lopez Jr., DEBRA   75 mg at 03/24/23 0929    DULoxetine DR capsule 60 mg  60 mg Oral Daily Steve Lopez Jr., PA-C   60 mg at 03/24/23 0929    ferrous sulfate tablet 1 each  1 tablet Oral Daily Steve Lopez Jr., PA-C   1 each at 03/24/23 0929    glucagon (human recombinant) injection 1 mg  1 mg Intramuscular PRN Stvee Lopez Jr., PA-C        hydrALAZINE tablet 50 mg  50 mg Oral Q8H PRN Steve Lopez Jr., PA-C   50 mg at 03/23/23 1232    lisinopriL tablet 40 mg  40 mg Oral Daily Vivian Negron PA-C   40 mg at 03/24/23 0929    melatonin tablet 6 mg  6 mg Oral Nightly PRN Steve Lopez Jr., PA-C        metoprolol injection 5 mg  5 mg Intravenous Q5 Min PRN Steve Lopez Jr., PA-C   5 mg at 03/22/23 0652    ondansetron disintegrating tablet 8 mg  8 mg Oral Q8H PRN Steve Lopez Jr., PA-C   8 mg at 03/22/23 0906    pantoprazole EC tablet 40 mg  40 mg Oral Daily Steve Lopez Jr., PA-C   40 mg at 03/24/23 0929    polyethylene glycol packet 17 g  17 g Oral Daily PRN Steve Lopez Jr., PA-C        prochlorperazine injection Soln 5 mg  5 mg Intravenous Q6H PRN Steve Lopez Jr., PA-C        simethicone chewable tablet 80 mg  1 tablet Oral QID PRN Steve Lopez Jr., PA-C        sodium chloride 0.9% flush 5 mL  5 mL Intravenous PRN Steve Lopez Jr., PA-C         Current Discharge Medication List        CONTINUE these medications which have NOT CHANGED    Details   acetaminophen (TYLENOL) 500 MG tablet Take 1 tablet (500 mg total) by mouth every 6 (six) hours as needed for Pain or Temperature greater than (100.4 F).  Qty: 20 tablet, Refills: 0      atorvastatin (LIPITOR) 80 MG tablet Take 80 mg by mouth once daily.       clopidogreL (PLAVIX) 75 mg tablet Take 1 tablet (75 mg total) by mouth once daily.  Qty: 30 tablet, Refills: 5      DULoxetine (CYMBALTA) 60 MG capsule Take 1 capsule (60 mg  total) by mouth once daily.  Qty: 30 capsule, Refills: 0      ferrous sulfate 325 (65 FE) MG EC tablet Take 1 tablet (325 mg total) by mouth 2 (two) times daily.  Qty: 60 tablet, Refills: 0      LIDOcaine (LIDODERM) 5 % Place 1 patch onto the skin once daily. Remove & Discard patch within 12 hours or as directed by MD  Qty: 6 patch, Refills: 0      lisinopril (PRINIVIL,ZESTRIL) 20 MG tablet Take 20 mg by mouth once daily.   Refills: 0      pantoprazole (PROTONIX) 40 MG tablet Take 1 tablet (40 mg total) by mouth once daily.  Qty: 30 tablet, Refills: 0               I have seen and examined this patient within the last 30 days. My clinical findings that support the need for the home health skilled services and home bound status are the following:no   Weakness/numbness causing balance and gait disturbance due to Weakness/Debility and COPD making it taxing to leave home.     Diet:   cardiac diet      Referrals/ Consults  Physical Therapy to evaluate and treat. Evaluate for home safety and equipment needs; Establish/upgrade home exercise program. Perform / instruct on therapeutic exercises, gait training, transfer training, and Range of Motion.  Occupational Therapy to evaluate and treat. Evaluate home environment for safety and equipment needs. Perform/Instruct on transfers, ADL training, ROM, and therapeutic exercises.   to evaluate for community resources/long-range planning.  Aide to provide assistance with personal care, ADLs, and vital signs.    Activities:   activity as tolerated    Nursing:   Agency to admit patient within 24 hours of hospital discharge unless specified on physician order or at patient request    SN to complete comprehensive assessment including routine vital signs. Instruct on disease process and s/s of complications to report to MD. Review/verify medication list sent home with the patient at time of discharge  and instruct patient/caregiver as needed. Frequency may be adjusted  depending on start of care date.     Skilled nurse to perform up to 3 visits PRN for symptoms related to diagnosis    Notify MD if SBP > 160 or < 90; DBP > 90 or < 50; HR > 120 or < 50; Temp > 101; O2 < 88%    Ok to schedule additional visits based on staff availability and patient request on consecutive days within the home health episode.    When multiple disciplines ordered:    Start of Care occurs on Sunday - Wednesday schedule remaining discipline evaluations as ordered on separate consecutive days following the start of care.    Thursday SOC -schedule subsequent evaluations Friday and Monday the following week.     Friday - Saturday SOC - schedule subsequent discipline evaluations on consecutive days starting Monday of the following week.      Home Health Aide:  Nursing Three times weekly, Physical Therapy Three times weekly, Occupational Therapy Three times weekly, and Home Health Aide Three times weekly    Wound Care Orders  no    I certify that this patient is confined to her home and needs intermittent skilled nursing care, physical therapy, and occupational therapy.

## 2023-03-24 NOTE — NURSING
Home Oxygen Evaluation    Date Performed: 3/24/2023    1) Patient's Home O2 Sat on room air, while at rest: 94        If O2 sats on room air at rest are 88% or below, patient qualifies. No additional testing needed. Document N/A in steps 2 and 3. If 89% or above, complete steps 2.      2) Patient's O2 Sat on room air while exercisin        If O2 sats on room air while exercising remain 89% or above patient does not qualify, no further testing needed Document N/A in step 3. If O2 sats on room air while exercising are 88% or below, continue to step 3.      3) Patient's O2 Sat while exercising on O2: na at na LPM - unable to complete.  Patient desaturated to 74 on RA.         (Must show improvement from #2 for patients to qualify)    If O2 sats improve on oxygen, patient qualifies for portable oxygen. If not, the patient does not qualify.

## 2023-03-24 NOTE — PLAN OF CARE
Problem: Adult Inpatient Plan of Care  Goal: Plan of Care Review  Outcome: Ongoing, Progressing  Goal: Patient-Specific Goal (Individualized)  Outcome: Ongoing, Progressing  Goal: Absence of Hospital-Acquired Illness or Injury  Outcome: Ongoing, Progressing  Goal: Optimal Comfort and Wellbeing  Outcome: Ongoing, Progressing  Goal: Readiness for Transition of Care  Outcome: Ongoing, Progressing     Problem: Infection  Goal: Absence of Infection Signs and Symptoms  Outcome: Ongoing, Progressing     Problem: Dysrhythmia  Goal: Normalized Cardiac Rhythm  Outcome: Ongoing, Progressing     Problem: Skin Injury Risk Increased  Goal: Skin Health and Integrity  Outcome: Ongoing, Progressing

## 2023-03-24 NOTE — NURSING
Home Oxygen Evaluation    Date Performed: 3/24/2023    1) Patient's Home O2 Sat on room air, while at rest: 94        If O2 sats on room air at rest are 88% or below, patient qualifies. No additional testing needed. Document N/A in steps 2 and 3. If 89% or above, complete steps 2.      2) Patient's O2 Sat on room air while exercisin        If O2 sats on room air while exercising remain 89% or above patient does not qualify, no further testing needed Document N/A in step 3. If O2 sats on room air while exercising are 88% or below, continue to step 3.      3) Patient's O2 Sat while exercising on O2: na at na LPM         (Must show improvement from #2 for patients to qualify)    If O2 sats improve on oxygen, patient qualifies for portable oxygen. If not, the patient does not qualify.

## 2023-03-24 NOTE — TELEPHONE ENCOUNTER
----- Message from Flor Senior CMA sent at 3/24/2023 10:46 AM CDT -----  Regarding: referral  Contact: [phone]  Roxanne calling to schedule referral. First available 5/2/23, declined by case manger pt will need an appt by 3/30/23. The pt will be d/c today. Please call pt to schedule.     Thank you

## 2023-03-25 NOTE — DISCHARGE SUMMARY
[facility] [address] [place] [address] Medicine  Discharge Summary      Patient Name: Maria A Smith  MRN: [id number]  JASON: [id number]  Patient Class: IP- Inpatient  Admission Date: 3/21/2023  Hospital Length of Stay: 1 days  Discharge Date and Time: 3/24/2023  7:28 PM  Attending Physician: No att. providers found   Discharging Provider: Vivian Negron PA-C  Primary Care Provider: Sneha Peguero MD  [facility] Medicine Team: [facility] MED E Vivian Negron PA-C  Primary Care Team: [facility] MED E    HPI:   Patient is a 76yo female with a PMHx of HTN, HLD, CVA being admitted to observation for new onset afib. Patient reports onset of left arm pain for 1 day. She reports that she was washing her kitchen floor but denies a fall or specific trauma.  She denies any other complaints, no chest pain, shortness of breath, lightheadedness or weakness.    In the ED, . She was given Cardizem with improvement. EKG showed afib RVR. Troponin 0.066. UA with 15 WBCs. CTA abdomen pelvis showed severe hydronephrosis on the left with associated renal cortical thinning which is new from the prior study.  No significant hydroureter.  UPJ narrowing is a consideration. Other films without fracture. CTH negative. She was started on heparin drip. She was admitted for further management.       Procedure(s) (LRB):  Cardioversion or Defibrillation (N/A)  ECHOCARDIOGRAM, TRANSESOPHAGEAL (N/A)      Hospital Course:   Maria A Smith is a 78 yo F admitted to observation for new onset A fib and hypertensive urgency. Started on heparin drip for anticoagulation. Required multiple doses of IV metoprolol for rate control. Started on lopressor QID. Echo performed showing A fib, EF 65%, and CVP 3 mmHg. EP consulted; performed successful CHERRIE/CV 03/23. Required multiple doses of IV and po hydralazine for BP control. Increased home lisinopril to 40 mg daily. Evaluated by urology for L hydronephrosis seen on CT. NM renal scan showing poorly  functioning left kidney and bilateral obstruction which appears to be chronic in nature. She does not require any urologic interventions.  UA positive for infection; started Rocephin. Urine cx with + Klebsiella pneumoniae susceptible to cephalosporins; completed 3 day course of IV rocephin. CT with moderate to severe emphysema and patient requiring O2 throughout stay to maintain sats. 6MWT completed; patient requiring home O2. Discharged with coreg for rate control, eliquis for anticoagulation, and increased dose of lisinopril. Discharge home with HH, home O2, and f/u with cardiology and pulmonology.        Goals of Care Treatment Preferences:  Code Status: Full Code      Consults:   Consults (From admission, onward)        Status Ordering Provider     [facility]  Once        Provider:  (Not yet assigned)    Completed VERONICA SYKES     Inpatient consult to Electrophysiology  Once        Provider:  (Not yet assigned)    Completed ERICA IRAHETA JR     Inpatient consult to Urology  Once        Provider:  (Not yet assigned)    Completed ERICA IRAHETA JR            Final Active Diagnoses:    Diagnosis Date Noted POA    PRINCIPAL PROBLEM:  New onset atrial fibrillation [I48.91] 03/22/2023 Yes    Essential hypertension [I10] 11/16/2017 Yes    Acute cystitis with hematuria [N30.01] 03/23/2023 Yes    Hydronephrosis [N13.30] 03/22/2023 Yes    Chronic obstructive pulmonary emphysema [J43.9] 03/23/2023 Yes    Iron deficiency anemia [D50.9] 08/24/2018 Yes    Tobacco abuse [Z72.0] 06/29/2018 Yes    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    Why: A message has been sent to the Pulmonology clinic, the clinic nurse will  "contact you to schedule a hospital follow up visit. However, if you do not hear from them within 24 to 48 hours of discharge please call to schedule the appointment.  Contact information:  Pulmonology   [phone]           Sneha Peguero MD Follow up on 3/30/2023.    Specialty: Internal Medicine  Why: Hospital follow up visit at 2:00pm. Please bring your discharge summary, current medications, insurance card and ID  Contact information:  [address] [facility]  [address] [place] [address]  [phone]             Electrophysiology Cardiology Follow up.    Why: A message has been sent to theElectrophysiology Cardiology clinic, the clinic nurse will contact you to schedule a hospital follow up visit. However, if you do not hear from them within 24 to 48 hours of discharge please call to schedule the appointment.  Contact information:  Electrophysiology Cardiology  [phone]                     Patient Instructions:      OXYGEN FOR HOME USE     Order Specific Question Answer Comments   Liter Flow 3    Duration Continuous    Qualifying Test Performed at: Activity    Oxygen saturation at rest 94    Oxygen saturation with activity 76    Oxygen saturation with activity on oxygen 88 3L   Portable mode: continuous    Route nasal cannula    Device: home concentrator with portable tanks    Length of need (in months): 3 mos 1 month** and will follow up with OP provider   Patient condition with qualifying saturation COPD    Height: 5' 3" (1.6 m)    Weight: 49.4 kg (109 lb)    Alternative treatment measures have been tried or considered and deemed clinically ineffective. Yes      Ambulatory referral/consult to Outpatient Case Management   Referral Priority: Routine Referral Type: Consultation   Referral Reason: Specialty Services Required   Number of Visits Requested: 1     Ambulatory referral/consult to [facility]   Standing Status: Future   Referral Priority: Routine Referral " Type: Consultation   Referral Reason: Specialty Services Required   Number of Visits Requested: 1     Ambulatory referral/consult to Pulmonology   Standing Status: Future   Referral Priority: Routine Referral Type: Consultation   Referral Reason: Specialty Services Required   Requested Specialty: Pulmonary Disease   Number of Visits Requested: 1     Ambulatory referral/consult to Cardiac Electrophysiology   Standing Status: Future   Referral Priority: Routine Referral Type: Consultation   Referral Reason: Specialty Services Required   Requested Specialty: Cardiology   Number of Visits Requested: 1     Diet Cardiac     Notify your health care provider if you experience any of the following:  severe uncontrolled pain     Notify your health care provider if you experience any of the following:  difficulty breathing or increased cough     Notify your health care provider if you experience any of the following:  persistent dizziness, light-headedness, or visual disturbances     Notify your health care provider if you experience any of the following:  increased confusion or weakness     Activity as tolerated         Pending Diagnostic Studies:     None         Medications:  Reconciled Home Medications:      Medication List      START taking these medications    carvediloL 6.25 MG tablet  Commonly known as: COREG  Take 1 tablet (6.25 mg total) by mouth 2 (two) times daily.     ELIQUIS 5 mg Tab  Generic drug: apixaban  Take 1 tablet (5 mg total) by mouth 2 (two) times daily.        CHANGE how you take these medications    lisinopriL 40 MG tablet  Commonly known as: PRINIVIL,ZESTRIL  Take 1 tablet (40 mg total) by mouth once daily.  What changed:   · medication strength  · how much to take        CONTINUE taking these medications    acetaminophen 500 MG tablet  Commonly known as: TYLENOL  Take 1 tablet (500 mg total) by mouth every 6 (six) hours as needed for Pain or Temperature greater than (100.4 F).     atorvastatin 80 MG  tablet  Commonly known as: LIPITOR  Take 1 tablet (80 mg total) by mouth once daily.     clopidogreL 75 mg tablet  Commonly known as: PLAVIX  Take 1 tablet (75 mg total) by mouth once daily.     DULoxetine 60 MG capsule  Commonly known as: CYMBALTA  Take 1 capsule (60 mg total) by mouth once daily.     ferrous sulfate 325 (65 FE) MG EC tablet  Take 1 tablet (325 mg total) by mouth 2 (two) times daily.     LIDOcaine 5 %  Commonly known as: LIDODERM  Place 1 patch onto the skin once daily. Remove & Discard patch within 12 hours or as directed by MD     pantoprazole 40 MG tablet  Commonly known as: PROTONIX  Take 1 tablet (40 mg total) by mouth once daily.            Indwelling Lines/Drains at time of discharge:   Lines/Drains/Airways     None                 Time spent on the discharge of patient: 35 minutes         Vivian Negron PA-C  Department of Hospital Medicine  [facility]

## 2023-03-27 NOTE — PLAN OF CARE
[facility]  Discharge Final Note    Primary Care Provider: Sneha Peguero MD    Expected Discharge Date: 3/24/2023    Patient discharged to home via personal transportation.     Patient's bedside nurse and patient/family notified of the above.      Final Discharge Note (most recent)       Final Note - 03/27/23 1602          Final Note    Assessment Type Final Discharge Note (P)      Anticipated Discharge Disposition Home-Health Care Svc (P)         Post-Acute Status    Post-Acute Authorization Home Health (P)      Home Health Status Set-up Complete/Auth obtained (P)                      Important Message from Medicare  Important Message from Medicare regarding Discharge Appeal Rights: Given to patient/caregiver, Explained to patient/caregiver, Signed/date by patient/caregiver, Other (comments) (Verbal consent with patient, Noris witnessed)     Date IMM was signed: 03/24/23  Time IMM was signed: 0921    Contact Info       Pulmonology    Pulmonology   [phone]       Next Steps: Follow up    Instructions: A message has been sent to the Pulmonology clinic, the clinic nurse will contact you to schedule a hospital follow up visit. However, if you do not hear from them within 24 to 48 hours of discharge please call to schedule the appointment.    Sneha Peguero MD   Specialty: Internal Medicine   Relationship: PCP - General    [address] [facility]  [address] [place] [address]   Phone: [phone]       Next Steps: Follow up on 3/30/2023    Instructions: Hospital follow up visit at 2:00pm. Please bring your discharge summary, current medications, insurance card and ID    Electrophysiology Cardiology    Electrophysiology Cardiology  [phone]       Next Steps: Follow up    Instructions: A message has been sent to theElectrophysiology Cardiology clinic, the clinic nurse will contact you to schedule a hospital follow up visit. However, if you do not hear from them within 24 to 48 hours of  discharge please call to schedule the appointment.            Future Appointments   Date Time Provider Department Center   4/25/2023  2:00 PM Sara Lowe NP [facility] scheduled post-discharge follow-up appointment and information added to AVS.     Patient has been accepted with [facility].    Kina Monroe LMSW  [facility]  Ext. [phone]

## 2023-03-28 ENCOUNTER — PES CALL (OUTPATIENT)
Dept: ADMINISTRATIVE | Facility: CLINIC | Age: 78
End: 2023-03-28
Payer: MEDICARE

## 2023-04-03 ENCOUNTER — OFFICE VISIT (OUTPATIENT)
Dept: HOME HEALTH SERVICES | Facility: CLINIC | Age: 78
End: 2023-04-03
Payer: MEDICARE

## 2023-04-03 VITALS
SYSTOLIC BLOOD PRESSURE: 142 MMHG | OXYGEN SATURATION: 97 % | RESPIRATION RATE: 18 BRPM | DIASTOLIC BLOOD PRESSURE: 80 MMHG | HEART RATE: 88 BPM

## 2023-04-03 DIAGNOSIS — Z74.8 ASSISTANCE NEEDED WITH TRANSPORTATION: Primary | ICD-10-CM

## 2023-04-03 DIAGNOSIS — J43.2 CENTRILOBULAR EMPHYSEMA: ICD-10-CM

## 2023-04-03 DIAGNOSIS — I48.91 NEW ONSET ATRIAL FIBRILLATION: ICD-10-CM

## 2023-04-03 PROCEDURE — 1160F RVW MEDS BY RX/DR IN RCRD: CPT | Mod: CPTII,S$GLB,, | Performed by: NURSE PRACTITIONER

## 2023-04-03 PROCEDURE — 3079F DIAST BP 80-89 MM HG: CPT | Mod: CPTII,S$GLB,, | Performed by: NURSE PRACTITIONER

## 2023-04-03 PROCEDURE — 1159F PR MEDICATION LIST DOCUMENTED IN MEDICAL RECORD: ICD-10-PCS | Mod: CPTII,S$GLB,, | Performed by: NURSE PRACTITIONER

## 2023-04-03 PROCEDURE — 3077F PR MOST RECENT SYSTOLIC BLOOD PRESSURE >= 140 MM HG: ICD-10-PCS | Mod: CPTII,S$GLB,, | Performed by: NURSE PRACTITIONER

## 2023-04-03 PROCEDURE — 3079F PR MOST RECENT DIASTOLIC BLOOD PRESSURE 80-89 MM HG: ICD-10-PCS | Mod: CPTII,S$GLB,, | Performed by: NURSE PRACTITIONER

## 2023-04-03 PROCEDURE — 99350 HOME/RES VST EST HIGH MDM 60: CPT | Mod: S$GLB,,, | Performed by: NURSE PRACTITIONER

## 2023-04-03 PROCEDURE — 99350 PR HOME VISIT,ESTAB PATIENT,LEVEL IV: ICD-10-PCS | Mod: S$GLB,,, | Performed by: NURSE PRACTITIONER

## 2023-04-03 PROCEDURE — 3077F SYST BP >= 140 MM HG: CPT | Mod: CPTII,S$GLB,, | Performed by: NURSE PRACTITIONER

## 2023-04-03 PROCEDURE — 1160F PR REVIEW ALL MEDS BY PRESCRIBER/CLIN PHARMACIST DOCUMENTED: ICD-10-PCS | Mod: CPTII,S$GLB,, | Performed by: NURSE PRACTITIONER

## 2023-04-03 PROCEDURE — 1159F MED LIST DOCD IN RCRD: CPT | Mod: CPTII,S$GLB,, | Performed by: NURSE PRACTITIONER

## 2023-04-03 NOTE — ASSESSMENT & PLAN NOTE
Former smoker, quit 3 yrs ago  Denies SOB today  RA sat of 96. Not currently using supplemental oxygen  Monitor

## 2023-04-03 NOTE — PROGRESS NOTES
[facility]  Transition of Care Home Visit    Visit Date: 4/3/2023  Encounter Provider: Sada Tellez   PCP:  Sneha Peguero MD    PRESENTING HISTORY      Patient ID: Maria A Smith is a 77 y.o. female.    Consult Requested By:  Dr. Earl Veronica  Reason for Consult:  Hospital Follow Up.    Maria A is being seen at home due to being seen at home due to physical debility that presents a taxing effort to leave the home, to mitigate high risk of hospital readmission and/or due to the limited availability of reliable or safe options for transportation to the point of access to the provider. Prior to treatment on this visit the chart was reviewed and patient verbal consent was obtained.      Chief Complaint: Transitional Care        History of Present Illness: Ms. Maria A Smith is a 77 y.o. female who was recently admitted to the hospital.    Admit: 3/21/23  Discharge: 3/24/23  Hospital Course:   Maria A Smith is a 78 yo F admitted to observation for new onset A fib and hypertensive urgency. Started on heparin drip for anticoagulation. Required multiple doses of IV metoprolol for rate control. Started on lopressor QID. Echo performed showing A fib, EF 65%, and CVP 3 mmHg. EP consulted; performed successful CHERRIE/CV 03/23. Required multiple doses of IV and po hydralazine for BP control. Increased home lisinopril to 40 mg daily. Evaluated by urology for L hydronephrosis seen on CT. NM renal scan showing poorly functioning left kidney and bilateral obstruction which appears to be chronic in nature. She does not require any urologic interventions.  UA positive for infection; started Rocephin. Urine cx with + Klebsiella pneumoniae susceptible to cephalosporins; completed 3 day course of IV rocephin. CT with moderate to severe emphysema and patient requiring O2 throughout stay to maintain sats. 6MWT completed; patient requiring home O2. Discharged with coreg for rate control, eliquis for  anticoagulation, and increased dose of lisinopril. Discharge home with HH, home O2, and f/u with cardiology and pulmonology.   ___________________________________________________________________    Today:    HPI:  Pt is being seen today for a hospital follow up. See hospital course.    She is found in her home, AAOx3 pleasant and answering questions.  She was recently hospitalized for new onset afib and required cardioversion. She denies any symptoms today. Reports she was weak and had left arm pain prior to her admit, those symptoms have resolved. She required oxygen while in the hospital, but reports she is not using now. She is not SOB, RA sat in high 90s during visit today. All medications are present in the home and she reports compliance.    Pt reports she will not be able to get to her cardiology follow up due to transportation issues.    VSS. Denies fever, chest pain, shortness of breath, nausea, vomiting, diarrhea. Risks of environmental exposure to coronavirus discussed including: social distancing, hand hygiene, and limiting departures from the home for necessities only.  Reports understanding and willingness to comply.  All hospital discharge orders reviewed and being followed, all medications reconciled and reviewed, patient and family verbalized understanding             Review of Systems   Constitutional:  Negative for activity change, fatigue and fever.   HENT: Negative.     Eyes: Negative.    Respiratory:  Negative for chest tightness.    Cardiovascular: Negative.  Negative for leg swelling.   Gastrointestinal: Negative.    Endocrine: Negative.    Genitourinary: Negative.    Musculoskeletal: Negative.    Skin: Negative.    Allergic/Immunologic: Negative.    Neurological: Negative.    Hematological: Negative.    Psychiatric/Behavioral: Negative.  Negative for agitation.    All other systems reviewed and are negative.    Assessments:  Environmental: 2nd floor apartment, clean, open paths.   Functional  Status: independent  Safety: fall risk  Nutritional: adequate available  Home Health/DME/Supplies: [facility]/oxygen    PAST HISTORY:     Past Medical History:   Diagnosis Date    Hypertension     Squamous cell carcinoma 2017    right forearm ([place]  type)     Stroke        Past Surgical History:   Procedure Laterality Date    APPENDECTOMY      COLONOSCOPY N/A 2019    Procedure: COLONOSCOPY;  Surgeon: Alex Degroot MD;  Location: [facility] ENDO;  Service: Endoscopy;  Laterality: N/A;    COLONOSCOPY N/A 2020    Procedure: COLONOSCOPY;  Surgeon: Evelio Rizvi MD;  Location: [facility];  Service: Endoscopy;  Laterality: N/A;    ESOPHAGOGASTRODUODENOSCOPY N/A 2020    Procedure: EGD (ESOPHAGOGASTRODUODENOSCOPY);  Surgeon: Evelio Rizvi MD;  Location: [facility];  Service: Endoscopy;  Laterality: N/A;    HYSTERECTOMY      TRANSESOPHAGEAL ECHOCARDIOGRAPHY N/A 3/23/2023    Procedure: ECHOCARDIOGRAM, TRANSESOPHAGEAL;  Surgeon: [facility];  Location: [facility] EP LAB;  Service: Cardiology;  Laterality: N/A;    TREATMENT OF CARDIAC ARRHYTHMIA N/A 3/23/2023    Procedure: Cardioversion or Defibrillation;  Surgeon: ASHLEY Watson MD;  Location: [facility] EP LAB;  Service: Cardiology;  Laterality: N/A;  AF, CHERRIE/DCCV, ANES, EH, 1108       Family History   Problem Relation Age of Onset    Cancer Sister     Diabetes Sister     Melanoma Neg Hx     Psoriasis Neg Hx     Lupus Neg Hx     Eczema Neg Hx        Social History     Socioeconomic History    Marital status:    Tobacco Use    Smoking status: Former     Packs/day: 2.00     Types: Cigarettes     Quit date: 2017     Years since quittin.5    Smokeless tobacco: Never   Substance and Sexual Activity    Alcohol use: Yes     Alcohol/week: 1.0 standard drink     Types: 1 Cans of beer per week    Drug use: No       MEDICATIONS & ALLERGIES:     Current Outpatient Medications on File Prior to Visit   Medication Sig Dispense Refill     acetaminophen (TYLENOL) 500 MG tablet Take 1 tablet (500 mg total) by mouth every 6 (six) hours as needed for Pain or Temperature greater than (100.4 F). 20 tablet 0    apixaban (ELIQUIS) 5 mg Tab Take 1 tablet (5 mg total) by mouth 2 (two) times daily. 60 tablet 11    atorvastatin (LIPITOR) 80 MG tablet Take 1 tablet (80 mg total) by mouth once daily. 90 tablet 3    carvediloL (COREG) 6.25 MG tablet Take 1 tablet (6.25 mg total) by mouth 2 (two) times daily. 60 tablet 11    clopidogreL (PLAVIX) 75 mg tablet Take 1 tablet (75 mg total) by mouth once daily. 30 tablet 5    DULoxetine (CYMBALTA) 60 MG capsule Take 1 capsule (60 mg total) by mouth once daily. 30 capsule 11    ferrous sulfate 325 (65 FE) MG EC tablet Take 1 tablet (325 mg total) by mouth 2 (two) times daily. 60 tablet 11    LIDOcaine (LIDODERM) 5 % Place 1 patch onto the skin once daily. Remove & Discard patch within 12 hours or as directed by MD 6 patch 0    lisinopriL (PRINIVIL,ZESTRIL) 40 MG tablet Take 1 tablet (40 mg total) by mouth once daily. 90 tablet 3    pantoprazole (PROTONIX) 40 MG tablet Take 1 tablet (40 mg total) by mouth once daily. 30 tablet 11     No current facility-administered medications on file prior to visit.        Review of patient's allergies indicates:  No Known Allergies    OBJECTIVE:     Vital Signs:  Vitals:    04/03/23 1300   BP: (!) 142/80   Pulse: 88   Resp: 18     There is no height or weight on file to calculate BMI.     Physical Exam:  Physical Exam  Vitals reviewed.   Constitutional:       General: She is not in acute distress.     Appearance: She is well-developed.   HENT:      Head: Normocephalic and atraumatic.      Nose: Nose normal.      Mouth/Throat:      Mouth: Mucous membranes are dry.   Eyes:      Pupils: Pupils are equal, round, and reactive to light.   Cardiovascular:      Rate and Rhythm: Normal rate. Rhythm irregular.      Heart sounds: Normal heart sounds.   Pulmonary:      Effort: Pulmonary effort is  normal.      Breath sounds: Normal breath sounds.   Abdominal:      General: Bowel sounds are normal.      Palpations: Abdomen is soft.   Musculoskeletal:         General: Normal range of motion.      Cervical back: Normal range of motion and neck supple.   Skin:     General: Skin is warm and dry.   Neurological:      Mental Status: She is alert and oriented to person, place, and time.      Cranial Nerves: No cranial nerve deficit.   Psychiatric:         Behavior: Behavior normal.         Thought Content: Thought content normal.         Judgment: Judgment normal.       Laboratory  Lab Results   Component Value Date    WBC 6.62 03/24/2023    WBC 6.62 03/24/2023    HGB 9.5 (L) 03/24/2023    HGB 9.5 (L) 03/24/2023    HCT 33.7 (L) 03/24/2023    HCT 33.7 (L) 03/24/2023    MCV 83 03/24/2023    MCV 83 03/24/2023     03/24/2023     03/24/2023     Lab Results   Component Value Date    INR 1.2 03/21/2023    INR 1.0 02/10/2020     Lab Results   Component Value Date    HGBA1C 5.6 01/08/2014     No results for input(s): POCTGLUCOSE in the last 72 hours.    Diagnostic Results:  Results for orders placed during the hospital encounter of 03/21/23    Echo    Interpretation Summary  · Irregularly irregular rhythm and tachycardia were present during the study.  · Atrial fibrillation observed.  · Mild left atrial enlargement.  · The left ventricle is normal in size with concentric remodeling and normal systolic function.  · Normal right ventricular size with normal right ventricular systolic function.  · The estimated ejection fraction is 65%.  · Mild mitral regurgitation.  · Normal central venous pressure (3 mmHg).      TRANSITION OF CARE:     [facility] On Call Contact Note: 3/28/23    Family and/or Caretaker present at visit?  No.  Diagnostic tests reviewed/disposition: No diagnosic tests pending after this hospitalization.  Disease/illness education: afib  Home health/community services discussion/referrals: Patient has  home health established at [facility] .   Establishment or re-establishment of referral orders for community resources: No other necessary community resources.   Discussion with other health care providers: No discussion with other health care providers necessary.     Transition of Care Visit:  I have reviewed and updated the history and problem list.  I have reconciled the medication list.  I have discussed the hospitalization and current medical issues, prognosis and plans with the patient/family.  I  spent more than 50% of time discussing the care with the patient/family.  Total Face-to-Face Encounter: 60 minutes.    Medications Reconciliation:   I have reconciled the patient's home medications and discharge medications with the patient/family. I have updated all changes.  Refer to After-Visit Medication List.    ASSESSMENT & PLAN:     HIGH RISK CONDITION(S):      Problem List Items Addressed This Visit          Pulmonary    Chronic obstructive pulmonary emphysema    Current Assessment & Plan     Former smoker, quit 3 yrs ago  Denies SOB today  RA sat of 96. Not currently using supplemental oxygen  Monitor              Cardiac/Vascular    New onset atrial fibrillation    Current Assessment & Plan     Irregular rhythm  Rate controlled  Eliquis and Coreg in place  Keep upcoming cards appt  Monitor           Relevant Orders    Ambulatory referral/consult to Outpatient Case Management       Other    Assistance needed with transportation - Primary    Current Assessment & Plan     Reports she will not be able to follow up with cardiology as scheduled this month as she has no transportation  Referral placed to case management to assist               Were controlled substances prescribed?  No    Instructions for the patient:  - Continue all medications, treatments and therapies as ordered.   - Follow all instructions, recommendations as discussed.  - Maintain Safety Precautions at all times.  - Attend all medical  appointments as scheduled.  - For worsening symptoms: call Primary Care Physician or Nurse Practitioner.  - For emergencies, call 911 or immediately report to the nearest emergency room.  - Limit Risks of environmental exposure to coronavirus/COVID-19 as discussed including: social distancing, hand hygiene, and limiting departures from the home for necessities only.     Questions elicited and answered.  Contact information provided for any changes in condition or concerns    Scheduled Follow-up :  Future Appointments   Date Time Provider Department Center   4/25/2023  2:00 PM Saar Lowe NP [facility]       After Visit Medication List :     Medication List            Accurate as of April 3, 2023  2:43 PM. If you have any questions, ask your nurse or doctor.                CONTINUE taking these medications      acetaminophen 500 MG tablet  Commonly known as: TYLENOL  Take 1 tablet (500 mg total) by mouth every 6 (six) hours as needed for Pain or Temperature greater than (100.4 F).     atorvastatin 80 MG tablet  Commonly known as: LIPITOR  Take 1 tablet (80 mg total) by mouth once daily.     carvediloL 6.25 MG tablet  Commonly known as: COREG  Take 1 tablet (6.25 mg total) by mouth 2 (two) times daily.     clopidogreL 75 mg tablet  Commonly known as: PLAVIX  Take 1 tablet (75 mg total) by mouth once daily.     DULoxetine 60 MG capsule  Commonly known as: CYMBALTA  Take 1 capsule (60 mg total) by mouth once daily.     ELIQUIS 5 mg Tab  Generic drug: apixaban  Take 1 tablet (5 mg total) by mouth 2 (two) times daily.     ferrous sulfate 325 (65 FE) MG EC tablet  Take 1 tablet (325 mg total) by mouth 2 (two) times daily.     LIDOcaine 5 %  Commonly known as: LIDODERM  Place 1 patch onto the skin once daily. Remove & Discard patch within 12 hours or as directed by MD     lisinopriL 40 MG tablet  Commonly known as: PRINIVIL,ZESTRIL  Take 1 tablet (40 mg total) by mouth once daily.     pantoprazole 40 MG  tablet  Commonly known as: PROTONIX  Take 1 tablet (40 mg total) by mouth once daily.              Signature: Sada Tellez NP

## 2023-04-03 NOTE — ASSESSMENT & PLAN NOTE
Reports she will not be able to follow up with cardiology as scheduled this month as she has no transportation  Referral placed to case management to assist

## 2023-04-04 ENCOUNTER — DOCUMENTATION ONLY (OUTPATIENT)
Dept: CASE MANAGEMENT | Facility: HOSPITAL | Age: 78
End: 2023-04-04
Payer: MEDICARE

## 2023-04-04 NOTE — PLAN OF CARE
Management Plan      Patient Name: Maria A Smith     [facility] DUAL COMPLETE HMO SNP        [place]  Sneha Peguero MD             CM updated by Obs team 3/24 on pt for concerns raised by her dgtr on living alone and ability to care for herself - CM to f/u. CM attempted to call pt and dgtr w/ N/A. CM contacted [facility] [phone] and spoke w/ the admit nurse Grisel. Pt was admitted Monday 3/27. Grisel had no concerns for safety, self-neglect or pt's ability to care for herself. She stated that the pt did have a limited amount of food available but pt's dgtr was expected to bring groceries that evening. Grisel stated pt understood her meds. Pt did refuse PT/OT but was willing to let them come again. CM attempted to f/u today w/ pt and dgtr - N/A. CM noted [facility] visit 4/3 - no concerns listed. CM will close case.    MONIK DonnellyN, RN, [facility]  Transitional Care Manager  [phone]  [email or web address]

## 2023-04-18 DIAGNOSIS — I49.8 OTHER CARDIAC ARRHYTHMIA: Primary | ICD-10-CM

## 2023-04-24 NOTE — PROGRESS NOTES
Ms. Smith is a patient of Dr. Watson.      Subjective:   Patient ID:  Maria A Smith is a 77 y.o. female who presents for follow up of Atrial Fibrillation  .     HPI:    Ms. Smith is a 77 y.o. female with HTN, HLD, CVA (6/2018), AF, chronic bilateral renal obstruction here for follow up after cardioversion.    Background:    3/22/2023:    Ms. Smith is a 77-year-old woman with a past medical history significant for hypertension, hyperlipidemia, and a prior ischemic CVA 6/13/2018 at [facility], who presented with left arm pain, noted to be in newly-diagnosed atrial fibrillation with RVR. A CT scan revealed severe left hydronephrosis. Her systolic function remains preserved, with LVEF 65%. In the event no interventions are planned with urology, we discussed undergoing a CHERRIE/cardioversion for restoration of sinus rhythm. Please keep her NPO after midnight in anticipation. All questions and concerns were addressed.     Evaluated by urology for L hydronephrosis seen on CT. NM renal scan showing poorly functioning left kidney and bilateral obstruction which appears to be chronic in nature. She does not require any urologic interventions.  UA positive for infection; started Rocephin. Urine cx with + Klebsiella pneumoniae susceptible to cephalosporins; completed 3 day course of IV rocephin. CT with moderate to severe emphysema and patient requiring O2 throughout stay to maintain sats. 6MWT completed; patient requiring home O2. Discharged with coreg for rate control, eliquis for anticoagulation, and increased dose of lisinopril.    Update (04/25/2023):    3/23/2023:  Cardioversion was successfully performed with restoration of normal sinus rhythm.    Unknown AF history. Pt in SR per ECG 2020.    Today she is here with her daughter. Pt says she remembers the cardioversion and felt well afterward and continues to feel well. Does not recall any symptoms when she went back into AF. (Review of chart indicates she was  tachy and irregular at  visit 4/4/23). Today she says she feels well. Daughter said patient did have some fatigue/nausea when walking to her clinic visit. No palpitations, CP, significant GUERRERO, LH, syncope reported.  She lives alone and has a home health nurse visit at times.    Unknown medication use. She is prescribed eliquis 5mg BID and carvedilol 6.25mg BID. Kidney function is stable, with a creatinine of 0.9 on 3/24/2023.    I have personally reviewed the patient's EKG today, which shows AF with RVR and RBBB at 128bpm. QRS is 110. QTc is 493.    Relevant Cardiac Test Results:    CHERRIE (3/23/2023):  Normal appearing left atrial appendage. No thrombus is present in the appendage. Abnormal appendage velocities.  The left ventricle is normal in size with normal systolic function.  The estimated ejection fraction is 55%.  Normal right ventricular size with normal right ventricular systolic function.  Mild left atrial enlargement.  Ultrasound enhancing agent was utilized to optimize images.    Current Outpatient Medications   Medication Sig    acetaminophen (TYLENOL) 500 MG tablet Take 1 tablet (500 mg total) by mouth every 6 (six) hours as needed for Pain or Temperature greater than (100.4 F).    apixaban (ELIQUIS) 5 mg Tab Take 1 tablet (5 mg total) by mouth 2 (two) times daily.    atorvastatin (LIPITOR) 80 MG tablet Take 1 tablet (80 mg total) by mouth once daily.    carvediloL (COREG) 6.25 MG tablet Take 1 tablet (6.25 mg total) by mouth 2 (two) times daily.    clopidogreL (PLAVIX) 75 mg tablet Take 1 tablet (75 mg total) by mouth once daily.    DULoxetine (CYMBALTA) 60 MG capsule Take 1 capsule (60 mg total) by mouth once daily.    ferrous sulfate 325 (65 FE) MG EC tablet Take 1 tablet (325 mg total) by mouth 2 (two) times daily.    LIDOcaine (LIDODERM) 5 % Place 1 patch onto the skin once daily. Remove & Discard patch within 12 hours or as directed by MD    lisinopriL (PRINIVIL,ZESTRIL) 40 MG tablet Take 1  "tablet (40 mg total) by mouth once daily.    pantoprazole (PROTONIX) 40 MG tablet Take 1 tablet (40 mg total) by mouth once daily.     No current facility-administered medications for this visit.       Review of Systems   Constitutional: Positive for malaise/fatigue (mild).   Cardiovascular:  Negative for chest pain, dyspnea on exertion, irregular heartbeat, leg swelling and palpitations.   Respiratory:  Negative for shortness of breath.    Hematologic/Lymphatic: Negative for bleeding problem.   Skin:  Negative for rash.   Musculoskeletal:  Negative for myalgias.   Gastrointestinal:  Negative for hematemesis, hematochezia and nausea.   Genitourinary:  Negative for hematuria.   Neurological:  Negative for light-headedness.   Psychiatric/Behavioral:  Negative for altered mental status.    Allergic/Immunologic: Negative for persistent infections.     Objective:          Pulse (!) 128   Ht 5' 3" (1.6 m)   Wt 52.5 kg (115 lb 11.9 oz)   BMI 20.50 kg/m²     Physical Exam  Vitals and nursing note reviewed.   Constitutional:       Appearance: Normal appearance. She is well-developed.   HENT:      Head: Normocephalic.      Nose: Nose normal.   Eyes:      Pupils: Pupils are equal, round, and reactive to light.   Cardiovascular:      Rate and Rhythm: Tachycardia present. Rhythm irregularly irregular.   Pulmonary:      Effort: No respiratory distress.      Breath sounds: Normal breath sounds.   Musculoskeletal:         General: Normal range of motion.   Skin:     General: Skin is warm and dry.      Findings: No erythema.   Neurological:      Mental Status: She is alert and oriented to person, place, and time.   Psychiatric:         Speech: Speech normal.         Behavior: Behavior normal.         Lab Results   Component Value Date     03/24/2023    K 3.4 (L) 03/24/2023    MG 1.9 03/24/2023    BUN 20 03/24/2023    CREATININE 0.9 03/24/2023    ALT 12 03/21/2023    AST 16 03/21/2023    HGB 9.5 (L) 03/24/2023    HGB 9.5 (L) " 03/24/2023    HCT 33.7 (L) 03/24/2023    HCT 33.7 (L) 03/24/2023    TSH 1.316 03/21/2023    LDLCALC 114 (H) 07/03/2018       Recent Labs   Lab 03/21/23  2259   INR 1.2       Assessment:     1. New onset atrial fibrillation    2. Essential hypertension    3. History of CVA (cerebrovascular accident)      Plan:     In summary, Ms. Smith is a 77 y.o. female with HTN, HLD, CVA (6/2018), AF, hydronephrosis here for follow up after cardioversion.  She is one month s/p cardioversion after presenting to the hospital with new onset AF with RVR and UTI. She was started on eliquis and cardioverted. CHERRIE showed preserved LV function. TSH WNL.  Today she is in AF with RVR. Appears to be asymptomatic. No palpitations or CHF symptoms. Likely reverted 4/4/23 per chart review. Unknown medication compliance per daughter as patient lives alone.   CHADSVASc 6. Will restart eliquis. Will also restart carvedilol for rate control and obtain monitor in one week. If rates controlled, will repeat echo in 3 mo to ensure stability of LV function. If rates remain uncontrolled, consider rhythm control although medication compliance may be a factor with rhythm medications.     Restart eliquis 5mg twice daily and carvedilol 6.25mg twice daily  Monitor in one week to check heart rates  Case management referral    *A copy of this note has been sent to Dr. Young*    No follow-ups on file.    ------------------------------------------------------------------    VINCENT Martinez, NP-C  Cardiac Electrophysiology

## 2023-04-25 ENCOUNTER — OFFICE VISIT (OUTPATIENT)
Dept: ELECTROPHYSIOLOGY | Facility: CLINIC | Age: 78
End: 2023-04-25
Payer: MEDICARE

## 2023-04-25 VITALS — HEART RATE: 128 BPM | WEIGHT: 115.75 LBS | HEIGHT: 63 IN | BODY MASS INDEX: 20.51 KG/M2

## 2023-04-25 DIAGNOSIS — Z79.01 CURRENT USE OF ANTICOAGULANT THERAPY: ICD-10-CM

## 2023-04-25 DIAGNOSIS — I49.8 OTHER CARDIAC ARRHYTHMIA: Primary | ICD-10-CM

## 2023-04-25 DIAGNOSIS — Z86.73 HISTORY OF CVA (CEREBROVASCULAR ACCIDENT): ICD-10-CM

## 2023-04-25 DIAGNOSIS — I49.8 OTHER CARDIAC ARRHYTHMIA: ICD-10-CM

## 2023-04-25 DIAGNOSIS — I10 ESSENTIAL HYPERTENSION: ICD-10-CM

## 2023-04-25 DIAGNOSIS — I48.91 NEW ONSET ATRIAL FIBRILLATION: Primary | ICD-10-CM

## 2023-04-25 PROCEDURE — 93010 ELECTROCARDIOGRAM REPORT: CPT | Mod: S$GLB,,, | Performed by: INTERNAL MEDICINE

## 2023-04-25 PROCEDURE — 99999 PR PBB SHADOW E&M-EST. PATIENT-LVL IV: ICD-10-PCS | Mod: PBBFAC,,, | Performed by: NURSE PRACTITIONER

## 2023-04-25 PROCEDURE — 1101F PT FALLS ASSESS-DOCD LE1/YR: CPT | Mod: CPTII,S$GLB,, | Performed by: NURSE PRACTITIONER

## 2023-04-25 PROCEDURE — 1159F MED LIST DOCD IN RCRD: CPT | Mod: CPTII,S$GLB,, | Performed by: NURSE PRACTITIONER

## 2023-04-25 PROCEDURE — 1160F PR REVIEW ALL MEDS BY PRESCRIBER/CLIN PHARMACIST DOCUMENTED: ICD-10-PCS | Mod: CPTII,S$GLB,, | Performed by: NURSE PRACTITIONER

## 2023-04-25 PROCEDURE — 99214 OFFICE O/P EST MOD 30 MIN: CPT | Mod: S$GLB,,, | Performed by: NURSE PRACTITIONER

## 2023-04-25 PROCEDURE — 1159F PR MEDICATION LIST DOCUMENTED IN MEDICAL RECORD: ICD-10-PCS | Mod: CPTII,S$GLB,, | Performed by: NURSE PRACTITIONER

## 2023-04-25 PROCEDURE — 93005 RHYTHM STRIP: ICD-10-PCS | Mod: S$GLB,,, | Performed by: NURSE PRACTITIONER

## 2023-04-25 PROCEDURE — 99999 PR PBB SHADOW E&M-EST. PATIENT-LVL IV: CPT | Mod: PBBFAC,,, | Performed by: NURSE PRACTITIONER

## 2023-04-25 PROCEDURE — 99214 PR OFFICE/OUTPT VISIT, EST, LEVL IV, 30-39 MIN: ICD-10-PCS | Mod: S$GLB,,, | Performed by: NURSE PRACTITIONER

## 2023-04-25 PROCEDURE — 93005 ELECTROCARDIOGRAM TRACING: CPT | Mod: S$GLB,,, | Performed by: NURSE PRACTITIONER

## 2023-04-25 PROCEDURE — 3288F PR FALLS RISK ASSESSMENT DOCUMENTED: ICD-10-PCS | Mod: CPTII,S$GLB,, | Performed by: NURSE PRACTITIONER

## 2023-04-25 PROCEDURE — 1101F PR PT FALLS ASSESS DOC 0-1 FALLS W/OUT INJ PAST YR: ICD-10-PCS | Mod: CPTII,S$GLB,, | Performed by: NURSE PRACTITIONER

## 2023-04-25 PROCEDURE — 1160F RVW MEDS BY RX/DR IN RCRD: CPT | Mod: CPTII,S$GLB,, | Performed by: NURSE PRACTITIONER

## 2023-04-25 PROCEDURE — 1126F PR PAIN SEVERITY QUANTIFIED, NO PAIN PRESENT: ICD-10-PCS | Mod: CPTII,S$GLB,, | Performed by: NURSE PRACTITIONER

## 2023-04-25 PROCEDURE — 93010 RHYTHM STRIP: ICD-10-PCS | Mod: S$GLB,,, | Performed by: INTERNAL MEDICINE

## 2023-04-25 PROCEDURE — 1126F AMNT PAIN NOTED NONE PRSNT: CPT | Mod: CPTII,S$GLB,, | Performed by: NURSE PRACTITIONER

## 2023-04-25 PROCEDURE — 3288F FALL RISK ASSESSMENT DOCD: CPT | Mod: CPTII,S$GLB,, | Performed by: NURSE PRACTITIONER

## 2023-04-25 NOTE — PATIENT INSTRUCTIONS
Restart eliquis 5mg twice daily and carvedilol 6.25mg twice daily  Monitor in one week to check heart rates

## 2023-05-02 ENCOUNTER — TELEPHONE (OUTPATIENT)
Dept: ELECTROPHYSIOLOGY | Facility: CLINIC | Age: 78
End: 2023-05-02
Payer: MEDICARE

## 2023-05-02 DIAGNOSIS — I48.91 NEW ONSET ATRIAL FIBRILLATION: Primary | ICD-10-CM

## 2023-05-02 NOTE — TELEPHONE ENCOUNTER
Called patient yesterday 5/1 to inform her that her insurance will not cover her 3-14 day monitor. I spoke with her and she stated that she would get my number and give it to her daughter to call me back. We then got disconnected. I called her back and she did not answer. I left a voice message with my call back number. I tried again yesterday afternoon and she did not answer. I just called again this morning and patient did not answer. I left another voice message with my call back number.

## 2023-05-02 NOTE — TELEPHONE ENCOUNTER
Returned patients call. She asked that I please call her daughter. I spoke with her daughter Sirisha Benavides and scheduled a 48 hour holter at the [place] location. I told her we would see if the insurance company would approve that. She agreed to appointment. Appointment for 3-15 day monitor for today cancelled and 48 hour holter scheduled at [place] on 5/11. I also called the patient and informed her of the appointment. Patient and daughter agreed.

## 2023-05-02 NOTE — TELEPHONE ENCOUNTER
----- Message from Rebeka Gonzalez MA sent at 5/2/2023  9:42 AM CDT -----    ----- Message -----  From: Brandi Gonzalez  Sent: 5/2/2023   9:32 AM CDT  To: Walter Fowler Staff    .Type:  Patient Returning Call    Who Called: Self     Who Left Message for Patient: Chioma Sen RN    Does the patient know what this is regarding?: No     Would the patient rather a call back or a response via [facility]? Call Back     Best Call Back Number:.[phone] (home)       Additional Information:

## 2023-06-06 ENCOUNTER — PATIENT OUTREACH (OUTPATIENT)
Dept: ADMINISTRATIVE | Facility: OTHER | Age: 78
End: 2023-06-06
Payer: MEDICARE

## 2023-06-06 NOTE — PROGRESS NOTES
CHW - Initial Contact    This Community Health Worker completed the Social Determinant of Health questionnaire with patient via telephone today.    Pt identified barriers of most importance are: no needs at this time  Referrals to community agencies completed with patient/caregiver consent outside of [facility] include: [facility]  Referrals were put through [facility] - Not at this time  Other information discussed the patient needs / wants help with: no needs and would like for me to follow up in a few weeks.  Follow-up Outreach - Due: 6/27/2023

## 2023-06-21 PROBLEM — I48.19 PERSISTENT ATRIAL FIBRILLATION: Status: ACTIVE | Noted: 2023-03-22

## 2023-07-06 ENCOUNTER — PATIENT OUTREACH (OUTPATIENT)
Dept: ADMINISTRATIVE | Facility: OTHER | Age: 78
End: 2023-07-06
Payer: MEDICARE

## 2023-07-26 NOTE — PROGRESS NOTES
CHW - Follow Up and Case Closure    This Community Health Worker completed a follow up visit with patient via telephone today.  Pt reported: no needs  Pt denied any additional needs at this time and agrees with episode closure at this time.  Provided patient with Community Health Worker's contact information and encouraged her to contact this Community Health Worker if additional needs arise.

## 2024-08-26 ENCOUNTER — HOSPITAL ENCOUNTER (INPATIENT)
Facility: HOSPITAL | Age: 79
LOS: 3 days | DRG: 602 | End: 2024-08-29
Attending: EMERGENCY MEDICINE | Admitting: STUDENT IN AN ORGANIZED HEALTH CARE EDUCATION/TRAINING PROGRAM
Payer: MEDICARE

## 2024-08-26 DIAGNOSIS — R07.9 CHEST PAIN: ICD-10-CM

## 2024-08-26 DIAGNOSIS — I48.91 A-FIB: ICD-10-CM

## 2024-08-26 DIAGNOSIS — I48.19 PERSISTENT ATRIAL FIBRILLATION: Primary | ICD-10-CM

## 2024-08-26 DIAGNOSIS — I73.9 PAD (PERIPHERAL ARTERY DISEASE): ICD-10-CM

## 2024-08-26 DIAGNOSIS — L03.115 CELLULITIS OF RIGHT LOWER EXTREMITY: ICD-10-CM

## 2024-08-26 DIAGNOSIS — R06.02 SHORTNESS OF BREATH: ICD-10-CM

## 2024-08-26 DIAGNOSIS — L03.90 WOUND CELLULITIS: ICD-10-CM

## 2024-08-26 PROBLEM — Z87.891 HISTORY OF TOBACCO USE: Status: ACTIVE | Noted: 2018-06-29

## 2024-08-26 LAB
ALBUMIN SERPL BCP-MCNC: 3 G/DL (ref 3.5–5.2)
ALLENS TEST: ABNORMAL
ALLENS TEST: ABNORMAL
ALLENS TEST: NORMAL
ALP SERPL-CCNC: 79 U/L (ref 55–135)
ALT SERPL W/O P-5'-P-CCNC: 11 U/L (ref 10–44)
ANION GAP SERPL CALC-SCNC: 6 MMOL/L (ref 8–16)
AST SERPL-CCNC: 14 U/L (ref 10–40)
BACTERIA #/AREA URNS AUTO: NORMAL /HPF
BASOPHILS # BLD AUTO: 0.04 K/UL (ref 0–0.2)
BASOPHILS NFR BLD: 0.5 % (ref 0–1.9)
BILIRUB SERPL-MCNC: 0.4 MG/DL (ref 0.1–1)
BILIRUB UR QL STRIP: NEGATIVE
BUN SERPL-MCNC: 31 MG/DL (ref 8–23)
CALCIUM SERPL-MCNC: 8.9 MG/DL (ref 8.7–10.5)
CHLORIDE SERPL-SCNC: 109 MMOL/L (ref 95–110)
CLARITY UR REFRACT.AUTO: CLEAR
CO2 SERPL-SCNC: 27 MMOL/L (ref 23–29)
COLOR UR AUTO: YELLOW
CREAT SERPL-MCNC: 0.9 MG/DL (ref 0.5–1.4)
CRP SERPL-MCNC: 4.4 MG/L (ref 0–8.2)
DIFFERENTIAL METHOD BLD: ABNORMAL
EOSINOPHIL # BLD AUTO: 0.2 K/UL (ref 0–0.5)
EOSINOPHIL NFR BLD: 3.1 % (ref 0–8)
ERYTHROCYTE [DISTWIDTH] IN BLOOD BY AUTOMATED COUNT: 15 % (ref 11.5–14.5)
EST. GFR  (NO RACE VARIABLE): >60 ML/MIN/1.73 M^2
GLUCOSE SERPL-MCNC: 100 MG/DL (ref 70–110)
GLUCOSE UR QL STRIP: NEGATIVE
HCO3 UR-SCNC: 27 MMOL/L (ref 24–28)
HCO3 UR-SCNC: 29.6 MMOL/L (ref 24–28)
HCT VFR BLD AUTO: 37.1 % (ref 37–48.5)
HGB BLD-MCNC: 11.5 G/DL (ref 12–16)
HGB UR QL STRIP: NEGATIVE
IMM GRANULOCYTES # BLD AUTO: 0.04 K/UL (ref 0–0.04)
IMM GRANULOCYTES NFR BLD AUTO: 0.5 % (ref 0–0.5)
KETONES UR QL STRIP: NEGATIVE
LDH SERPL L TO P-CCNC: 0.6 MMOL/L (ref 0.5–2.2)
LEUKOCYTE ESTERASE UR QL STRIP: ABNORMAL
LYMPHOCYTES # BLD AUTO: 1.1 K/UL (ref 1–4.8)
LYMPHOCYTES NFR BLD: 14.5 % (ref 18–48)
MCH RBC QN AUTO: 29.6 PG (ref 27–31)
MCHC RBC AUTO-ENTMCNC: 31 G/DL (ref 32–36)
MCV RBC AUTO: 95 FL (ref 82–98)
MICROSCOPIC COMMENT: NORMAL
MONOCYTES # BLD AUTO: 0.6 K/UL (ref 0.3–1)
MONOCYTES NFR BLD: 7.5 % (ref 4–15)
NEUTROPHILS # BLD AUTO: 5.4 K/UL (ref 1.8–7.7)
NEUTROPHILS NFR BLD: 73.9 % (ref 38–73)
NITRITE UR QL STRIP: NEGATIVE
NRBC BLD-RTO: 0 /100 WBC
PCO2 BLDA: 53.2 MMHG (ref 35–45)
PCO2 BLDA: 58.8 MMHG (ref 35–45)
PH SMN: 7.31 [PH] (ref 7.35–7.45)
PH SMN: 7.31 [PH] (ref 7.35–7.45)
PH UR STRIP: 6 [PH] (ref 5–8)
PLATELET # BLD AUTO: 190 K/UL (ref 150–450)
PMV BLD AUTO: 10.2 FL (ref 9.2–12.9)
PO2 BLDA: 26 MMHG (ref 40–60)
PO2 BLDA: 85 MMHG (ref 80–100)
POC BE: 1 MMOL/L
POC BE: 3 MMOL/L
POC SATURATED O2: 42 % (ref 95–100)
POC SATURATED O2: 95 % (ref 95–100)
POC TCO2: 29 MMOL/L (ref 24–29)
POC TCO2: 31 MMOL/L (ref 23–27)
POTASSIUM SERPL-SCNC: 3.8 MMOL/L (ref 3.5–5.1)
PROT SERPL-MCNC: 5.8 G/DL (ref 6–8.4)
PROT UR QL STRIP: ABNORMAL
RBC # BLD AUTO: 3.89 M/UL (ref 4–5.4)
RBC #/AREA URNS AUTO: 2 /HPF (ref 0–4)
SAMPLE: ABNORMAL
SAMPLE: ABNORMAL
SAMPLE: NORMAL
SITE: ABNORMAL
SITE: ABNORMAL
SITE: NORMAL
SODIUM SERPL-SCNC: 142 MMOL/L (ref 136–145)
SP GR UR STRIP: 1.02 (ref 1–1.03)
SQUAMOUS #/AREA URNS AUTO: 1 /HPF
URN SPEC COLLECT METH UR: ABNORMAL
WBC # BLD AUTO: 7.33 K/UL (ref 3.9–12.7)
WBC #/AREA URNS AUTO: 1 /HPF (ref 0–5)

## 2024-08-26 PROCEDURE — 83605 ASSAY OF LACTIC ACID: CPT | Performed by: PHYSICIAN ASSISTANT

## 2024-08-26 PROCEDURE — 83880 ASSAY OF NATRIURETIC PEPTIDE: CPT | Performed by: PHYSICIAN ASSISTANT

## 2024-08-26 PROCEDURE — 99900035 HC TECH TIME PER 15 MIN (STAT)

## 2024-08-26 PROCEDURE — 85025 COMPLETE CBC W/AUTO DIFF WBC: CPT | Performed by: EMERGENCY MEDICINE

## 2024-08-26 PROCEDURE — 96365 THER/PROPH/DIAG IV INF INIT: CPT

## 2024-08-26 PROCEDURE — 25000003 PHARM REV CODE 250: Performed by: EMERGENCY MEDICINE

## 2024-08-26 PROCEDURE — 12000002 HC ACUTE/MED SURGE SEMI-PRIVATE ROOM

## 2024-08-26 PROCEDURE — 83605 ASSAY OF LACTIC ACID: CPT

## 2024-08-26 PROCEDURE — 80053 COMPREHEN METABOLIC PANEL: CPT | Performed by: EMERGENCY MEDICINE

## 2024-08-26 PROCEDURE — 94761 N-INVAS EAR/PLS OXIMETRY MLT: CPT

## 2024-08-26 PROCEDURE — 63600175 PHARM REV CODE 636 W HCPCS: Performed by: EMERGENCY MEDICINE

## 2024-08-26 PROCEDURE — 36600 WITHDRAWAL OF ARTERIAL BLOOD: CPT

## 2024-08-26 PROCEDURE — 86140 C-REACTIVE PROTEIN: CPT | Performed by: EMERGENCY MEDICINE

## 2024-08-26 PROCEDURE — 87040 BLOOD CULTURE FOR BACTERIA: CPT | Performed by: EMERGENCY MEDICINE

## 2024-08-26 PROCEDURE — 82803 BLOOD GASES ANY COMBINATION: CPT

## 2024-08-26 PROCEDURE — 81001 URINALYSIS AUTO W/SCOPE: CPT | Performed by: EMERGENCY MEDICINE

## 2024-08-26 PROCEDURE — 99285 EMERGENCY DEPT VISIT HI MDM: CPT | Mod: 25

## 2024-08-26 RX ORDER — OXYCODONE HYDROCHLORIDE 5 MG/1
5 TABLET ORAL EVERY 6 HOURS PRN
Status: DISCONTINUED | OUTPATIENT
Start: 2024-08-27 | End: 2024-08-29 | Stop reason: HOSPADM

## 2024-08-26 RX ORDER — MORPHINE SULFATE 4 MG/ML
4 INJECTION, SOLUTION INTRAMUSCULAR; INTRAVENOUS EVERY 6 HOURS PRN
Status: DISCONTINUED | OUTPATIENT
Start: 2024-08-27 | End: 2024-08-28

## 2024-08-26 RX ORDER — IBUPROFEN 200 MG
16 TABLET ORAL
Status: DISCONTINUED | OUTPATIENT
Start: 2024-08-27 | End: 2024-08-29 | Stop reason: HOSPADM

## 2024-08-26 RX ORDER — ENOXAPARIN SODIUM 100 MG/ML
40 INJECTION SUBCUTANEOUS EVERY 24 HOURS
Status: DISCONTINUED | OUTPATIENT
Start: 2024-08-27 | End: 2024-08-26

## 2024-08-26 RX ORDER — GLUCAGON 1 MG
1 KIT INJECTION
Status: DISCONTINUED | OUTPATIENT
Start: 2024-08-27 | End: 2024-08-29 | Stop reason: HOSPADM

## 2024-08-26 RX ORDER — ALBUTEROL SULFATE 2.5 MG/.5ML
2.5 SOLUTION RESPIRATORY (INHALATION) EVERY 4 HOURS PRN
Status: DISCONTINUED | OUTPATIENT
Start: 2024-08-27 | End: 2024-08-27

## 2024-08-26 RX ORDER — TRAZODONE HYDROCHLORIDE 100 MG/1
100 TABLET ORAL NIGHTLY
Status: DISCONTINUED | OUTPATIENT
Start: 2024-08-27 | End: 2024-08-29 | Stop reason: HOSPADM

## 2024-08-26 RX ORDER — PENTOXIFYLLINE 400 MG/1
400 TABLET, EXTENDED RELEASE ORAL
Status: DISCONTINUED | OUTPATIENT
Start: 2024-08-27 | End: 2024-08-29 | Stop reason: HOSPADM

## 2024-08-26 RX ORDER — ATORVASTATIN CALCIUM 40 MG/1
80 TABLET, FILM COATED ORAL DAILY
Status: DISCONTINUED | OUTPATIENT
Start: 2024-08-27 | End: 2024-08-29 | Stop reason: HOSPADM

## 2024-08-26 RX ORDER — NALOXONE HCL 0.4 MG/ML
0.02 VIAL (ML) INJECTION
Status: DISCONTINUED | OUTPATIENT
Start: 2024-08-27 | End: 2024-08-29 | Stop reason: HOSPADM

## 2024-08-26 RX ORDER — DILTIAZEM HYDROCHLORIDE 120 MG/1
120 CAPSULE, COATED, EXTENDED RELEASE ORAL DAILY
Status: DISCONTINUED | OUTPATIENT
Start: 2024-08-27 | End: 2024-08-29 | Stop reason: HOSPADM

## 2024-08-26 RX ORDER — MORPHINE SULFATE 4 MG/ML
4 INJECTION, SOLUTION INTRAMUSCULAR; INTRAVENOUS
Status: COMPLETED | OUTPATIENT
Start: 2024-08-26 | End: 2024-08-26

## 2024-08-26 RX ORDER — METOPROLOL TARTRATE 25 MG/1
12.5 TABLET ORAL 2 TIMES DAILY
Status: DISCONTINUED | OUTPATIENT
Start: 2024-08-26 | End: 2024-08-26

## 2024-08-26 RX ORDER — PANTOPRAZOLE SODIUM 40 MG/1
40 TABLET, DELAYED RELEASE ORAL DAILY
Status: DISCONTINUED | OUTPATIENT
Start: 2024-08-27 | End: 2024-08-29 | Stop reason: HOSPADM

## 2024-08-26 RX ORDER — CARVEDILOL 6.25 MG/1
6.25 TABLET ORAL 2 TIMES DAILY
Status: DISCONTINUED | OUTPATIENT
Start: 2024-08-27 | End: 2024-08-28

## 2024-08-26 RX ORDER — DULOXETIN HYDROCHLORIDE 60 MG/1
60 CAPSULE, DELAYED RELEASE ORAL DAILY
Status: DISCONTINUED | OUTPATIENT
Start: 2024-08-27 | End: 2024-08-29 | Stop reason: HOSPADM

## 2024-08-26 RX ORDER — SODIUM CHLORIDE 0.9 % (FLUSH) 0.9 %
10 SYRINGE (ML) INJECTION EVERY 12 HOURS PRN
Status: DISCONTINUED | OUTPATIENT
Start: 2024-08-27 | End: 2024-08-29 | Stop reason: HOSPADM

## 2024-08-26 RX ORDER — CARVEDILOL 3.12 MG/1
6.25 TABLET ORAL ONCE
Status: COMPLETED | OUTPATIENT
Start: 2024-08-26 | End: 2024-08-26

## 2024-08-26 RX ORDER — IBUPROFEN 200 MG
24 TABLET ORAL
Status: DISCONTINUED | OUTPATIENT
Start: 2024-08-27 | End: 2024-08-29 | Stop reason: HOSPADM

## 2024-08-26 RX ADMIN — CARVEDILOL 6.25 MG: 3.12 TABLET, FILM COATED ORAL at 10:08

## 2024-08-26 RX ADMIN — MORPHINE SULFATE 4 MG: 4 INJECTION INTRAVENOUS at 09:08

## 2024-08-26 RX ADMIN — VANCOMYCIN HYDROCHLORIDE 1000 MG: 1 INJECTION, POWDER, LYOPHILIZED, FOR SOLUTION INTRAVENOUS at 08:08

## 2024-08-26 NOTE — FIRST PROVIDER EVALUATION
"Medical screening examination initiated.  I have conducted a focused provider triage encounter, findings are as follows:    Brief history of present illness:  78-year-old female with a history of hypertension, paroxysmal AFib with RVR, hyperlipidemia, history of CVA with left hemiparesis, tobacco abuse, malnutrition with a BMI of 18, osteopenia presenting via EMS from [facility] with concern for right lower extremity wound.    Vitals:    08/26/24 1730   BP: 130/82   Pulse: 87   Resp: 16   Temp: 97.7 °F (36.5 °C)   TempSrc: Oral   SpO2: 97%   Weight: 52.2 kg (115 lb)   Height: 5' 3" (1.6 m)       Pertinent physical exam:  Wound has a dressing on it on the right lower extremity around the region of the tib-fib.  No discharge on the dressing.    Brief workup plan:  Basic labs, CRP, x-ray    Preliminary workup initiated; this workup will be continued and followed by the physician or advanced practice provider that is assigned to the patient when roomed.    Torey Moreira DO, FAAEM  Emergency Staff Physician   Dept of Emergency Medicine   [facility]  Spectralink: [id number]        Disclaimer: This note has been generated using voice-recognition software. There may be typographical errors that have been missed during proof-reading.    "

## 2024-08-26 NOTE — ED TRIAGE NOTES
Pt arrives to ED [facility] from [facility]  with a wound on her Right leg. Pt reports she got the wound from  a piece of metal by the side of her bed and when she got up to go to the bathroom  she hit her leg on it. PT reports this incident was about 2 weeks ago. Pt does not have any other complaints besides 10/10 right leg pain.

## 2024-08-27 PROBLEM — D64.9 ANEMIA: Status: RESOLVED | Noted: 2017-11-22 | Resolved: 2024-08-27

## 2024-08-27 PROBLEM — F32.A DEPRESSION: Status: ACTIVE | Noted: 2024-08-27

## 2024-08-27 PROBLEM — E46 MALNUTRITION: Chronic | Status: ACTIVE | Noted: 2023-07-08

## 2024-08-27 PROBLEM — J96.12 CHRONIC HYPERCAPNIC RESPIRATORY FAILURE: Status: ACTIVE | Noted: 2024-08-27

## 2024-08-27 PROBLEM — I73.9 PERIPHERAL ARTERY DISEASE: Status: ACTIVE | Noted: 2024-08-27

## 2024-08-27 LAB
ALBUMIN SERPL BCP-MCNC: 3 G/DL (ref 3.5–5.2)
ALLENS TEST: ABNORMAL
ALP SERPL-CCNC: 82 U/L (ref 55–135)
ALT SERPL W/O P-5'-P-CCNC: 11 U/L (ref 10–44)
ANION GAP SERPL CALC-SCNC: 9 MMOL/L (ref 8–16)
AST SERPL-CCNC: 16 U/L (ref 10–40)
BASOPHILS # BLD AUTO: 0.04 K/UL (ref 0–0.2)
BASOPHILS NFR BLD: 0.6 % (ref 0–1.9)
BILIRUB SERPL-MCNC: 0.4 MG/DL (ref 0.1–1)
BNP SERPL-MCNC: 282 PG/ML (ref 0–99)
BUN SERPL-MCNC: 26 MG/DL (ref 8–23)
CALCIUM SERPL-MCNC: 9.6 MG/DL (ref 8.7–10.5)
CHLORIDE SERPL-SCNC: 104 MMOL/L (ref 95–110)
CO2 SERPL-SCNC: 26 MMOL/L (ref 23–29)
CREAT SERPL-MCNC: 0.8 MG/DL (ref 0.5–1.4)
DELSYS: ABNORMAL
DIFFERENTIAL METHOD BLD: ABNORMAL
EOSINOPHIL # BLD AUTO: 0.2 K/UL (ref 0–0.5)
EOSINOPHIL NFR BLD: 3.2 % (ref 0–8)
ERYTHROCYTE [DISTWIDTH] IN BLOOD BY AUTOMATED COUNT: 14.6 % (ref 11.5–14.5)
ERYTHROCYTE [SEDIMENTATION RATE] IN BLOOD BY PHOTOMETRIC METHOD: 14 MM/HR (ref 0–36)
EST. GFR  (NO RACE VARIABLE): >60 ML/MIN/1.73 M^2
GLUCOSE SERPL-MCNC: 98 MG/DL (ref 70–110)
HCO3 UR-SCNC: 32.5 MMOL/L (ref 24–28)
HCT VFR BLD AUTO: 36.3 % (ref 37–48.5)
HGB BLD-MCNC: 11.2 G/DL (ref 12–16)
IMM GRANULOCYTES # BLD AUTO: 0.03 K/UL (ref 0–0.04)
IMM GRANULOCYTES NFR BLD AUTO: 0.4 % (ref 0–0.5)
LACTATE SERPL-SCNC: 0.9 MMOL/L (ref 0.5–2.2)
LYMPHOCYTES # BLD AUTO: 1.2 K/UL (ref 1–4.8)
LYMPHOCYTES NFR BLD: 17.7 % (ref 18–48)
MAGNESIUM SERPL-MCNC: 1.6 MG/DL (ref 1.6–2.6)
MCH RBC QN AUTO: 29.5 PG (ref 27–31)
MCHC RBC AUTO-ENTMCNC: 30.9 G/DL (ref 32–36)
MCV RBC AUTO: 96 FL (ref 82–98)
MONOCYTES # BLD AUTO: 0.3 K/UL (ref 0.3–1)
MONOCYTES NFR BLD: 4.9 % (ref 4–15)
NEUTROPHILS # BLD AUTO: 5 K/UL (ref 1.8–7.7)
NEUTROPHILS NFR BLD: 73.2 % (ref 38–73)
NRBC BLD-RTO: 0 /100 WBC
PCO2 BLDA: 61.5 MMHG (ref 35–45)
PH SMN: 7.33 [PH] (ref 7.35–7.45)
PHOSPHATE SERPL-MCNC: 3.5 MG/DL (ref 2.7–4.5)
PLATELET # BLD AUTO: 155 K/UL (ref 150–450)
PMV BLD AUTO: 10.5 FL (ref 9.2–12.9)
PO2 BLDA: 20 MMHG (ref 40–60)
POC BE: 7 MMOL/L
POC SATURATED O2: 26 % (ref 95–100)
POC TCO2: 34 MMOL/L (ref 24–29)
POTASSIUM SERPL-SCNC: 3.9 MMOL/L (ref 3.5–5.1)
PROT SERPL-MCNC: 6 G/DL (ref 6–8.4)
RBC # BLD AUTO: 3.8 M/UL (ref 4–5.4)
SAMPLE: ABNORMAL
SITE: ABNORMAL
SODIUM SERPL-SCNC: 139 MMOL/L (ref 136–145)
WBC # BLD AUTO: 6.79 K/UL (ref 3.9–12.7)

## 2024-08-27 PROCEDURE — 93010 ELECTROCARDIOGRAM REPORT: CPT | Mod: ,,, | Performed by: INTERNAL MEDICINE

## 2024-08-27 PROCEDURE — 99900035 HC TECH TIME PER 15 MIN (STAT)

## 2024-08-27 PROCEDURE — 25000003 PHARM REV CODE 250: Performed by: STUDENT IN AN ORGANIZED HEALTH CARE EDUCATION/TRAINING PROGRAM

## 2024-08-27 PROCEDURE — 85652 RBC SED RATE AUTOMATED: CPT | Performed by: STUDENT IN AN ORGANIZED HEALTH CARE EDUCATION/TRAINING PROGRAM

## 2024-08-27 PROCEDURE — 94761 N-INVAS EAR/PLS OXIMETRY MLT: CPT

## 2024-08-27 PROCEDURE — 97161 PT EVAL LOW COMPLEX 20 MIN: CPT

## 2024-08-27 PROCEDURE — 97116 GAIT TRAINING THERAPY: CPT

## 2024-08-27 PROCEDURE — 25500020 PHARM REV CODE 255: Performed by: STUDENT IN AN ORGANIZED HEALTH CARE EDUCATION/TRAINING PROGRAM

## 2024-08-27 PROCEDURE — 63600175 PHARM REV CODE 636 W HCPCS

## 2024-08-27 PROCEDURE — 27000221 HC OXYGEN, UP TO 24 HOURS

## 2024-08-27 PROCEDURE — 83735 ASSAY OF MAGNESIUM: CPT | Performed by: STUDENT IN AN ORGANIZED HEALTH CARE EDUCATION/TRAINING PROGRAM

## 2024-08-27 PROCEDURE — 25000242 PHARM REV CODE 250 ALT 637 W/ HCPCS: Performed by: STUDENT IN AN ORGANIZED HEALTH CARE EDUCATION/TRAINING PROGRAM

## 2024-08-27 PROCEDURE — 93005 ELECTROCARDIOGRAM TRACING: CPT

## 2024-08-27 PROCEDURE — 94640 AIRWAY INHALATION TREATMENT: CPT

## 2024-08-27 PROCEDURE — 85025 COMPLETE CBC W/AUTO DIFF WBC: CPT | Performed by: STUDENT IN AN ORGANIZED HEALTH CARE EDUCATION/TRAINING PROGRAM

## 2024-08-27 PROCEDURE — 25000003 PHARM REV CODE 250

## 2024-08-27 PROCEDURE — 84100 ASSAY OF PHOSPHORUS: CPT | Performed by: STUDENT IN AN ORGANIZED HEALTH CARE EDUCATION/TRAINING PROGRAM

## 2024-08-27 PROCEDURE — 63600175 PHARM REV CODE 636 W HCPCS: Performed by: STUDENT IN AN ORGANIZED HEALTH CARE EDUCATION/TRAINING PROGRAM

## 2024-08-27 PROCEDURE — 36415 COLL VENOUS BLD VENIPUNCTURE: CPT | Performed by: STUDENT IN AN ORGANIZED HEALTH CARE EDUCATION/TRAINING PROGRAM

## 2024-08-27 PROCEDURE — 97165 OT EVAL LOW COMPLEX 30 MIN: CPT

## 2024-08-27 PROCEDURE — 21400001 HC TELEMETRY ROOM

## 2024-08-27 PROCEDURE — 97535 SELF CARE MNGMENT TRAINING: CPT

## 2024-08-27 PROCEDURE — 80053 COMPREHEN METABOLIC PANEL: CPT | Performed by: STUDENT IN AN ORGANIZED HEALTH CARE EDUCATION/TRAINING PROGRAM

## 2024-08-27 RX ORDER — MULTIVITAMIN
1 TABLET ORAL DAILY
COMMUNITY

## 2024-08-27 RX ORDER — MAGNESIUM SULFATE HEPTAHYDRATE 40 MG/ML
2 INJECTION, SOLUTION INTRAVENOUS ONCE
Status: COMPLETED | OUTPATIENT
Start: 2024-08-27 | End: 2024-08-27

## 2024-08-27 RX ORDER — ACETAMINOPHEN 500 MG
500 TABLET ORAL EVERY 6 HOURS PRN
COMMUNITY

## 2024-08-27 RX ORDER — IPRATROPIUM BROMIDE AND ALBUTEROL SULFATE 2.5; .5 MG/3ML; MG/3ML
3 SOLUTION RESPIRATORY (INHALATION)
Status: DISCONTINUED | OUTPATIENT
Start: 2024-08-27 | End: 2024-08-28

## 2024-08-27 RX ORDER — BUDESONIDE 0.5 MG/2ML
0.5 INHALANT ORAL EVERY 12 HOURS
Status: DISCONTINUED | OUTPATIENT
Start: 2024-08-27 | End: 2024-08-27

## 2024-08-27 RX ORDER — MELOXICAM 15 MG/1
15 TABLET ORAL DAILY
Status: ON HOLD | COMMUNITY
End: 2024-08-29 | Stop reason: HOSPADM

## 2024-08-27 RX ORDER — SODIUM CHLORIDE, SODIUM LACTATE, POTASSIUM CHLORIDE, CALCIUM CHLORIDE 600; 310; 30; 20 MG/100ML; MG/100ML; MG/100ML; MG/100ML
INJECTION, SOLUTION INTRAVENOUS CONTINUOUS
Status: ACTIVE | OUTPATIENT
Start: 2024-08-27 | End: 2024-08-27

## 2024-08-27 RX ORDER — FERROUS SULFATE 325(65) MG
325 TABLET, DELAYED RELEASE (ENTERIC COATED) ORAL 2 TIMES DAILY
COMMUNITY

## 2024-08-27 RX ORDER — BUMETANIDE 1 MG/1
1 TABLET ORAL DAILY
Status: ON HOLD | COMMUNITY
End: 2024-08-29

## 2024-08-27 RX ORDER — BUDESONIDE 0.5 MG/2ML
0.5 INHALANT ORAL DAILY
COMMUNITY

## 2024-08-27 RX ORDER — FERROUS SULFATE, DRIED 160(50) MG
1 TABLET, EXTENDED RELEASE ORAL DAILY
COMMUNITY

## 2024-08-27 RX ORDER — TRAZODONE HYDROCHLORIDE 100 MG/1
100 TABLET ORAL NIGHTLY
COMMUNITY

## 2024-08-27 RX ORDER — PENTOXIFYLLINE 400 MG/1
400 TABLET, EXTENDED RELEASE ORAL
COMMUNITY

## 2024-08-27 RX ORDER — HYDRALAZINE HYDROCHLORIDE 20 MG/ML
10 INJECTION INTRAMUSCULAR; INTRAVENOUS EVERY 6 HOURS PRN
Status: DISCONTINUED | OUTPATIENT
Start: 2024-08-27 | End: 2024-08-28

## 2024-08-27 RX ORDER — DILTIAZEM HYDROCHLORIDE 120 MG/1
120 CAPSULE, EXTENDED RELEASE ORAL DAILY
COMMUNITY

## 2024-08-27 RX ORDER — ACETAMINOPHEN 325 MG/1
650 TABLET ORAL EVERY 6 HOURS PRN
Status: DISCONTINUED | OUTPATIENT
Start: 2024-08-27 | End: 2024-08-28

## 2024-08-27 RX ORDER — ALENDRONATE SODIUM 70 MG/1
70 TABLET ORAL
COMMUNITY

## 2024-08-27 RX ORDER — CARVEDILOL 25 MG/1
25 TABLET ORAL 2 TIMES DAILY
Status: ON HOLD | COMMUNITY
End: 2024-08-29 | Stop reason: HOSPADM

## 2024-08-27 RX ORDER — BUDESONIDE 0.5 MG/2ML
0.5 INHALANT ORAL DAILY
Status: DISCONTINUED | OUTPATIENT
Start: 2024-08-27 | End: 2024-08-29 | Stop reason: HOSPADM

## 2024-08-27 RX ADMIN — DULOXETINE HYDROCHLORIDE 60 MG: 60 CAPSULE, DELAYED RELEASE ORAL at 09:08

## 2024-08-27 RX ADMIN — PANTOPRAZOLE SODIUM 40 MG: 40 TABLET, DELAYED RELEASE ORAL at 09:08

## 2024-08-27 RX ADMIN — OXYCODONE HYDROCHLORIDE 5 MG: 5 TABLET ORAL at 09:08

## 2024-08-27 RX ADMIN — MORPHINE SULFATE 4 MG: 4 INJECTION INTRAVENOUS at 05:08

## 2024-08-27 RX ADMIN — TRAZODONE HYDROCHLORIDE 100 MG: 100 TABLET ORAL at 09:08

## 2024-08-27 RX ADMIN — PENTOXIFYLLINE 400 MG: 400 TABLET, EXTENDED RELEASE ORAL at 04:08

## 2024-08-27 RX ADMIN — MAGNESIUM SULFATE HEPTAHYDRATE 2 G: 40 INJECTION, SOLUTION INTRAVENOUS at 10:08

## 2024-08-27 RX ADMIN — IOHEXOL 100 ML: 350 INJECTION, SOLUTION INTRAVENOUS at 03:08

## 2024-08-27 RX ADMIN — PENTOXIFYLLINE 400 MG: 400 TABLET, EXTENDED RELEASE ORAL at 01:08

## 2024-08-27 RX ADMIN — IPRATROPIUM BROMIDE AND ALBUTEROL SULFATE 3 ML: 2.5; .5 SOLUTION RESPIRATORY (INHALATION) at 08:08

## 2024-08-27 RX ADMIN — OXYCODONE HYDROCHLORIDE 5 MG: 5 TABLET ORAL at 12:08

## 2024-08-27 RX ADMIN — DILTIAZEM HYDROCHLORIDE 120 MG: 120 CAPSULE, COATED, EXTENDED RELEASE ORAL at 09:08

## 2024-08-27 RX ADMIN — PENTOXIFYLLINE 400 MG: 400 TABLET, EXTENDED RELEASE ORAL at 09:08

## 2024-08-27 RX ADMIN — APIXABAN 5 MG: 5 TABLET, FILM COATED ORAL at 09:08

## 2024-08-27 RX ADMIN — MORPHINE SULFATE 4 MG: 4 INJECTION INTRAVENOUS at 03:08

## 2024-08-27 RX ADMIN — BUDESONIDE 0.5 MG: 0.5 INHALANT RESPIRATORY (INHALATION) at 08:08

## 2024-08-27 RX ADMIN — CARVEDILOL 6.25 MG: 6.25 TABLET, FILM COATED ORAL at 09:08

## 2024-08-27 RX ADMIN — IPRATROPIUM BROMIDE AND ALBUTEROL SULFATE 3 ML: 2.5; .5 SOLUTION RESPIRATORY (INHALATION) at 07:08

## 2024-08-27 RX ADMIN — TRAZODONE HYDROCHLORIDE 100 MG: 100 TABLET ORAL at 12:08

## 2024-08-27 RX ADMIN — OXYCODONE HYDROCHLORIDE 5 MG: 5 TABLET ORAL at 04:08

## 2024-08-27 RX ADMIN — SODIUM CHLORIDE, POTASSIUM CHLORIDE, SODIUM LACTATE AND CALCIUM CHLORIDE: 600; 310; 30; 20 INJECTION, SOLUTION INTRAVENOUS at 04:08

## 2024-08-27 RX ADMIN — CEFTRIAXONE 1 G: 1 INJECTION, POWDER, FOR SOLUTION INTRAMUSCULAR; INTRAVENOUS at 04:08

## 2024-08-27 RX ADMIN — MORPHINE SULFATE 4 MG: 4 INJECTION INTRAVENOUS at 10:08

## 2024-08-27 RX ADMIN — ATORVASTATIN CALCIUM 80 MG: 40 TABLET, FILM COATED ORAL at 09:08

## 2024-08-27 NOTE — ASSESSMENT & PLAN NOTE
Chronic RISSA, on iron tablets at home.   - Held in setting of sepsis, cellulitis  - Resume when appropriate

## 2024-08-27 NOTE — ASSESSMENT & PLAN NOTE
Noted history of Afib s/p successful cardioversion in March of 2023. She has since converted back to Afib RVR, unclear when. Possibly worse in the setting of sepsis and cellulitis. [id number] of 8.     - Continue home diltiazem ER 120mg  - Continue coreg 6.25 given in the ED, can uptitrate to home 25mg if not hypotensive; transitioned to metoprolol  - Eliquis 5 bid held for angio 08/28; given Lovenox today once  - avoid hydralazine due to reflex tachycardia

## 2024-08-27 NOTE — CONSULTS
[facility] - [facility] Surg  Wound Care    Patient Name:  Maria A Smith   MRN:  [id number]  Date: 2024  Diagnosis: Cellulitis of right lower extremity    History:     Past Medical History:   Diagnosis Date    Hypertension     Squamous cell carcinoma 2017    right forearm ([place]  type)     Stroke        Social History     Socioeconomic History    Marital status:    Tobacco Use    Smoking status: Former     Current packs/day: 0.00     Types: Cigarettes     Quit date: 2017     Years since quittin.9    Smokeless tobacco: Never   Substance and Sexual Activity    Alcohol use: Yes     Alcohol/week: 1.0 standard drink of alcohol     Types: 1 Cans of beer per week    Drug use: No     Social Determinants of Health     Financial Resource Strain: Low Risk  (2023)    Overall Financial Resource Strain (CARDIA)     Difficulty of Paying Living Expenses: Not hard at all   Food Insecurity: No Food Insecurity (2023)    Hunger Vital Sign     Worried About Running Out of Food in the Last Year: Never true     Ran Out of Food in the Last Year: Never true   Transportation Needs: No Transportation Needs (2023)    PRAPARE - Transportation     Lack of Transportation (Medical): No     Lack of Transportation (Non-Medical): No   Physical Activity: Inactive (2023)    Exercise Vital Sign     Days of Exercise per Week: 5 days     Minutes of Exercise per Session: 0 min   Stress: No Stress Concern Present (2023)    [employer] [place] [employer] - Occupational Stress Questionnaire     Feeling of Stress : Not at all   Housing Stability: Low Risk  (2023)    Housing Stability Vital Sign     Unable to Pay for Housing in the Last Year: No     Number of Places Lived in the Last Year: 1     Unstable Housing in the Last Year: No       Precautions:     Allergies as of 2024    (No Known Allergies)       WO Assessment Details/Treatment   Patient seen for wound care consultation for the right  leg.  Chart reviewed for this encounter.  See flow sheet for findings.    Pt in bed and agreeable to care. Per pt's chart review, the right leg was caught on the side of the bed at pt's rehab facility. The wound beds are pink, red, tan and yellow with eschar present. No drainage noted. Pt complains of tenderness. The periwound is intact, pink, and warm.     Recommendations:  - RLE: bedside nursing to 1) cleanse with NS 2) pat dry 3) apply medihoney to the wound beds 4) cover with an island/mepore/border dressing every 2 days        08/27/24 1046        Wound 08/26/24 1832 Traumatic Right Calf   Date First Assessed/Time First Assessed: 08/26/24 1832   Present on Original Admission: Yes  Primary Wound Type: (c) Traumatic  Side: Right  Location: Calf  Wound Approximate Age at First Assessment (Weeks): 3 weeks   Wound Image    Dressing Appearance Open to air   Drainage Amount Scant   Appearance Pink;Red;Dry;Yellow   Periwound Area Intact;Dry   Care Cleansed with:;Sterile normal saline   Dressing Applied;Foam       Recommendations made to primary team for above plan via secure chat. Wound care will follow-up as needed.     08/27/2024

## 2024-08-27 NOTE — ASSESSMENT & PLAN NOTE
Noted history of Afib s/p successful cardioversion in March of 2023. She has since converted back to Afib RVR, unclear when. Possibly worse in the setting of sepsis and cellulitis. [id number] of 8.     - Continue home diltiazem ER 120mg  - Continue coreg 6.25 given in the ED, can uptitrate to home 25mg if not hypotensive  - Eliquis 5 bid

## 2024-08-27 NOTE — ASSESSMENT & PLAN NOTE
Patient has a reported history of COPD, no PFTs on record. She does have emphysema on CT chest from prior. She is on daily budesonide inhalation treatment at rehab. She does have some wheezing on exam today. Blood gas with pH 7.31, CO2 58.8, HCO3 29.6.     - Will continue budesonide daily treatments  - Duo-nebs q6h awake for now as well given wheezing  - Keep O2 sat 88% and above

## 2024-08-27 NOTE — PT/OT/SLP EVAL
Occupational Therapy   Evaluation    Name: Maria A Smith  MRN: [id number]  Admitting Diagnosis: Cellulitis of right lower extremity  Recent Surgery: Procedure(s) (LRB):  Angiogram Extremity Unilateral (Right)      Recommendations:     Discharge Recommendations: Low Intensity Therapy  Discharge Equipment Recommendations:  walker, rolling, to be determined by next level of care  Barriers to discharge:  None    Assessment:   CO-TX with PT for pt safety and max participation with both disciplines.     Maria A Smith is a 78 y.o. female with a medical diagnosis of Cellulitis of right lower extremity.  She presents with R leg pain. Performance deficits affecting function: weakness, impaired balance, pain, decreased lower extremity function, gait instability, impaired functional mobility, impaired skin, impaired self care skills. PTA, pt reports being Indp at baseline. Upon eval, pt requires some assistance 2/2 pain although pain seemed to decrease throughout session.    Rehab Prognosis: Good; patient would benefit from acute skilled OT services to address these deficits and reach maximum level of function.       Plan:     Patient to be seen 3 x/week to address the above listed problems via self-care/home management, therapeutic activities, therapeutic exercises  Plan of Care Expires: 09/26/24  Plan of Care Reviewed with: patient    Subjective     Chief Complaint: Leg pain  Patient/Family Comments/goals: return home    Occupational Profile:  Living Environment: NH resident  Previous level of function: Indp  Equipment Used at Home:  (NH equipment)  Assistance upon Discharge: NH staff    Pain/Comfort:  Pain Rating 1:  (not rated)  Location - Side 1: Right  Location - Orientation 1: lower  Location 1: leg  Pain Addressed 1: Reposition, Distraction, Cessation of Activity    Patients cultural, spiritual, Buddhism conflicts given the current situation: no    Objective:     Communicated with: Cm prior to session.   Patient found supine with PureWick, oxygen upon OT entry to room.    General Precautions: Standard, fall  Orthopedic Precautions: N/A  Braces: N/A  Respiratory Status: Room air    Occupational Performance:    Bed Mobility:    Patient completed Supine to Sit with modified independence  Patient completed Sit to Supine with contact guard assistance    Functional Mobility/Transfers:  Patient completed Sit <> Stand Transfer with contact guard assistance  with  rolling walker   Patient completed Toilet Transfer Step Transfer technique with stand by assistance with  grab bars    Activities of Daily Living:  Grooming: stand by assistance hand hygiene staind at sink  Lower Body Dressing: maximal assistance don socks  Toileting: stand by assistance increased to CGA for pt to manage clothing    Cognitive/Visual Perceptual:  A&O x4    Physical Exam:  BUE WFL  Balance: Fair+ in standing; intact for sitting    AMPAC 6 Click ADL:  AMPAC Total Score: 24    Treatment & Education:  Pt educated on role and purpose of therapy  Pt educated on goal setting  Pt educated on benefits of OOB activity  Pt educated on self advocacy     Patient left HOB elevated with all lines intact, call button in reach, and nsg notified    GOALS:   Multidisciplinary Problems       Occupational Therapy Goals          Problem: Occupational Therapy    Goal Priority Disciplines Outcome Interventions   Occupational Therapy Goal     OT, PT/OT Progressing    Description: Goals to be met by: 9/26/24     Patient will increase functional independence with ADLs by performing:    LE Dressing with Stand-by Assistance.   All functional transfers performed with SBA                           History:     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)     Stroke          Past Surgical History:   Procedure Laterality Date    APPENDECTOMY      COLONOSCOPY N/A 8/7/2019    Procedure: COLONOSCOPY;   Surgeon: Alex Degroot MD;  Location: [facility] ENDO;  Service: Endoscopy;  Laterality: N/A;    COLONOSCOPY N/A 2/12/2020    Procedure: COLONOSCOPY;  Surgeon: Evelio Rizvi MD;  Location: [facility];  Service: Endoscopy;  Laterality: N/A;    ESOPHAGOGASTRODUODENOSCOPY N/A 2/11/2020    Procedure: EGD (ESOPHAGOGASTRODUODENOSCOPY);  Surgeon: Evelio Rizvi MD;  Location: [facility];  Service: Endoscopy;  Laterality: N/A;    HYSTERECTOMY      TRANSESOPHAGEAL ECHOCARDIOGRAPHY N/A 3/23/2023    Procedure: ECHOCARDIOGRAM, TRANSESOPHAGEAL;  Surgeon: [facility] Diagnostic Provider;  Location: [facility] EP LAB;  Service: Cardiology;  Laterality: N/A;    TREATMENT OF CARDIAC ARRHYTHMIA N/A 3/23/2023    Procedure: Cardioversion or Defibrillation;  Surgeon: ASHLEY Watson MD;  Location: [facility] EP LAB;  Service: Cardiology;  Laterality: N/A;  AF, CHERRIE/DCCV, ANES, EH, 1108       Time Tracking:     OT Date of Treatment: 08/27/24  OT Start Time: 1046  OT Stop Time: 1104  OT Total Time (min): 18 min    Billable Minutes:Evaluation 9  Self Care/Home Management 9    8/27/2024

## 2024-08-27 NOTE — PLAN OF CARE
Placed call to sister Rocio at , no answer, attempted dght Sirisha at , no answer. Sw met with Pt in the room, Pt could not answer dc assessment questions with accuracy to see if pt was receiving SNF services at [facility], will follow.

## 2024-08-27 NOTE — SUBJECTIVE & OBJECTIVE
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)     Stroke        Past Surgical History:   Procedure Laterality Date    APPENDECTOMY      COLONOSCOPY N/A 8/7/2019    Procedure: COLONOSCOPY;  Surgeon: Alex Degroot MD;  Location: [facility] ENDO;  Service: Endoscopy;  Laterality: N/A;    COLONOSCOPY N/A 2/12/2020    Procedure: COLONOSCOPY;  Surgeon: Evelio Rizvi MD;  Location: [facility] ENDO;  Service: Endoscopy;  Laterality: N/A;    ESOPHAGOGASTRODUODENOSCOPY N/A 2/11/2020    Procedure: EGD (ESOPHAGOGASTRODUODENOSCOPY);  Surgeon: Evelio Rizvi MD;  Location: [facility] ENDO;  Service: Endoscopy;  Laterality: N/A;    HYSTERECTOMY      TRANSESOPHAGEAL ECHOCARDIOGRAPHY N/A 3/23/2023    Procedure: ECHOCARDIOGRAM, TRANSESOPHAGEAL;  Surgeon: [facility] Diagnostic Provider;  Location: [facility] EP LAB;  Service: Cardiology;  Laterality: N/A;    TREATMENT OF CARDIAC ARRHYTHMIA N/A 3/23/2023    Procedure: Cardioversion or Defibrillation;  Surgeon: ASHLEY Watson MD;  Location: [facility] EP LAB;  Service: Cardiology;  Laterality: N/A;  AF, CHERRIE/DCCV, ANES, EH, 1108       Review of patient's allergies indicates:  No Known Allergies    No current facility-administered medications on file prior to encounter.     Current Outpatient Medications on File Prior to Encounter   Medication Sig    acetaminophen (TYLENOL) 500 MG tablet Take 1 tablet (500 mg total) by mouth every 6 (six) hours as needed for Pain or Temperature greater than (100.4 F).    atorvastatin (LIPITOR) 80 MG tablet Take 1 tablet (80 mg total) by mouth once daily.    carvediloL (COREG) 6.25 MG tablet Take 1 tablet (6.25 mg total) by mouth 2 (two) times daily.    clopidogreL (PLAVIX) 75 mg tablet Take 1 tablet (75 mg total) by mouth once daily.    DULoxetine (CYMBALTA) 60 MG capsule Take 1 capsule (60 mg total) by mouth once daily.    LIDOcaine (LIDODERM) 5 % Place 1 patch onto the skin once daily. Remove &  Discard patch within 12 hours or as directed by MD    lisinopriL (PRINIVIL,ZESTRIL) 40 MG tablet Take 1 tablet (40 mg total) by mouth once daily.    pantoprazole (PROTONIX) 40 MG tablet Take 1 tablet (40 mg total) by mouth once daily.     Family History       Problem Relation (Age of Onset)    Cancer Sister    Diabetes Sister          Tobacco Use    Smoking status: Former     Current packs/day: 0.00     Types: Cigarettes     Quit date: 2017     Years since quittin.9    Smokeless tobacco: Never   Substance and Sexual Activity    Alcohol use: Yes     Alcohol/week: 1.0 standard drink of alcohol     Types: 1 Cans of beer per week    Drug use: No    Sexual activity: Not on file     Review of Systems   Constitutional:  Negative for chills and fatigue.   HENT:  Negative for sore throat.    Respiratory:  Positive for shortness of breath and wheezing.    Cardiovascular:  Negative for chest pain.   Gastrointestinal:  Negative for abdominal pain, diarrhea, nausea and vomiting.   Genitourinary:  Negative for dysuria and flank pain.   Musculoskeletal:  Negative for arthralgias and joint swelling.        Right leg pain   Skin:  Positive for wound (right leg wound).   Neurological:  Negative for dizziness and headaches.   Psychiatric/Behavioral:  Negative for agitation and confusion.      Objective:     Vital Signs (Most Recent):  Temp: 97.7 °F (36.5 °C) (24 1730)  Pulse: 103 (24)  Resp: (!) 22 (24)  BP: 122/88 (24)  SpO2: 95 % (24 0000) Vital Signs (24h Range):  Temp:  [97.7 °F (36.5 °C)] 97.7 °F (36.5 °C)  Pulse:  [] 103  Resp:  [16-22] 22  SpO2:  [94 %-97 %] 95 %  BP: (122-174)/(82-88) 122/88     Weight: 52.2 kg (115 lb)  Body mass index is 20.37 kg/m².     Physical Exam  HENT:      Head: Normocephalic.      Nose: Nose normal.      Mouth/Throat:      Mouth: Mucous membranes are moist.   Eyes:      Pupils: Pupils are equal, round, and reactive to light.    Cardiovascular:      Rate and Rhythm: Normal rate.      Comments: Diminished DP pulses bilateral LE  Pulmonary:      Effort: Pulmonary effort is normal.      Breath sounds: Wheezing present.   Abdominal:      General: Abdomen is flat.      Tenderness: There is no abdominal tenderness.   Musculoskeletal:         General: Normal range of motion.      Cervical back: Normal range of motion.   Skin:     General: Skin is warm.      Findings: Lesion (right leg, erythema to the right toes) present.   Neurological:      Mental Status: She is alert. Mental status is at baseline.      Comments: Left sided weakness              CRANIAL NERVES     CN III, IV, VI   Pupils are equal, round, and reactive to light.       Significant Labs: All pertinent labs within the past 24 hours have been reviewed.    Significant Imaging: I have reviewed all pertinent imaging results/findings within the past 24 hours.

## 2024-08-27 NOTE — SUBJECTIVE & OBJECTIVE
Medications Prior to Admission   Medication Sig Dispense Refill Last Dose    acetaminophen (TYLENOL) 500 MG tablet Take 500 mg by mouth every 6 (six) hours as needed for Pain.       alendronate (FOSAMAX) 70 MG tablet Take 70 mg by mouth every 7 days. Give every Thursday       apixaban (ELIQUIS) 5 mg Tab Take 5 mg by mouth 2 (two) times daily.       atorvastatin (LIPITOR) 80 MG tablet Take 1 tablet (80 mg total) by mouth once daily. 90 tablet 3     budesonide (PULMICORT) 0.5 mg/2 mL nebulizer solution Take 0.5 mg by nebulization once daily. Inhale 1 vial one time a day for emphysema       bumetanide (BUMEX) 1 MG tablet Take 1 mg by mouth once daily.       calcium-vitamin D3 (OS-ANNA 500 + D3) 500 mg-5 mcg (200 unit) per tablet Take 1 tablet by mouth once daily.       carvediloL (COREG) 25 MG tablet Take 25 mg by mouth 2 (two) times daily.       diltiaZEM HCl (TIAZAC) 120 mg 24 hr capsule Take 120 mg by mouth once daily.       DULoxetine (CYMBALTA) 60 MG capsule Take 1 capsule (60 mg total) by mouth once daily. 30 capsule 11     ferrous sulfate 325 (65 FE) MG EC tablet Take 325 mg by mouth 2 (two) times daily.       lisinopriL (PRINIVIL,ZESTRIL) 40 MG tablet Take 1 tablet (40 mg total) by mouth once daily. 90 tablet 3     meloxicam (MOBIC) 15 MG tablet Take 15 mg by mouth once daily.       multivitamin (ONE DAILY MULTIVITAMIN) per tablet Take 1 tablet by mouth once daily.       pantoprazole (PROTONIX) 40 MG tablet Take 1 tablet (40 mg total) by mouth once daily. 30 tablet 11     pentoxifylline (TRENTAL) 400 mg TbSR Take 400 mg by mouth 3 (three) times daily with meals.       traZODone (DESYREL) 100 MG tablet Take 100 mg by mouth every evening.       [DISCONTINUED] clopidogreL (PLAVIX) 75 mg tablet Take 1 tablet (75 mg total) by mouth once daily. 30 tablet 5        Review of patient's allergies indicates:  No Known Allergies    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/2017     right forearm ([place]  type)     Stroke      Past Surgical History:   Procedure Laterality Date    APPENDECTOMY      COLONOSCOPY N/A 2019    Procedure: COLONOSCOPY;  Surgeon: Alex Degroot MD;  Location: [facility] ENDO;  Service: Endoscopy;  Laterality: N/A;    COLONOSCOPY N/A 2020    Procedure: COLONOSCOPY;  Surgeon: Evelio Rizvi MD;  Location: [facility] ENDO;  Service: Endoscopy;  Laterality: N/A;    ESOPHAGOGASTRODUODENOSCOPY N/A 2020    Procedure: EGD (ESOPHAGOGASTRODUODENOSCOPY);  Surgeon: Evelio Rizvi MD;  Location: [facility] ENDO;  Service: Endoscopy;  Laterality: N/A;    HYSTERECTOMY      TRANSESOPHAGEAL ECHOCARDIOGRAPHY N/A 3/23/2023    Procedure: ECHOCARDIOGRAM, TRANSESOPHAGEAL;  Surgeon: [facility] Diagnostic Provider;  Location: [facility] EP LAB;  Service: Cardiology;  Laterality: N/A;    TREATMENT OF CARDIAC ARRHYTHMIA N/A 3/23/2023    Procedure: Cardioversion or Defibrillation;  Surgeon: ASHLEY Watson MD;  Location: [facility] EP LAB;  Service: Cardiology;  Laterality: N/A;  AF, CHERRIE/DCCV, ANES, EH, 1108     Family History       Problem Relation (Age of Onset)    Cancer Sister    Diabetes Sister          Tobacco Use    Smoking status: Former     Current packs/day: 0.00     Types: Cigarettes     Quit date: 2017     Years since quittin.9    Smokeless tobacco: Never   Substance and Sexual Activity    Alcohol use: Yes     Alcohol/week: 1.0 standard drink of alcohol     Types: 1 Cans of beer per week    Drug use: No    Sexual activity: Not on file     Review of Systems   Constitutional:  Positive for activity change (decrease walking ability). Negative for chills and diaphoresis.   Respiratory:  Negative for apnea and chest tightness.    Cardiovascular:  Negative for chest pain and leg swelling.   Neurological:  Negative for dizziness.   Psychiatric/Behavioral:  Negative for agitation and confusion.      Objective:     Vital Signs (Most Recent):  Temp: 97.9 °F (36.6 °C) (24 1232)  Pulse: 83  (08/27/24 1232)  Resp: 14 (08/27/24 1232)  BP: 120/72 (08/27/24 1232)  SpO2: (!) 91 % (08/27/24 1232) Vital Signs (24h Range):  Temp:  [97.2 °F (36.2 °C)-97.9 °F (36.6 °C)] 97.9 °F (36.6 °C)  Pulse:  [] 83  Resp:  [14-22] 14  SpO2:  [91 %-98 %] 91 %  BP: (120-190)/() 120/72     Weight: 52.2 kg (115 lb)  Body mass index is 20.37 kg/m².      Physical Exam  Vitals and nursing note reviewed.   Constitutional:       General: She is not in acute distress.     Appearance: She is not ill-appearing or toxic-appearing.   Cardiovascular:      Rate and Rhythm: Normal rate.      Comments: Palpable femoral pulses   No doppler signals detected in left DP or PT  Right DP monophasic   Pulmonary:      Effort: Pulmonary effort is normal.   Musculoskeletal:      Right lower leg: No edema.      Left lower leg: No edema.   Skin:     Comments: Right lower extremity lesion   Neurological:      Mental Status: She is alert.          Significant Labs:  All pertinent labs from the last 24 hours have been reviewed.    Significant Diagnostics:  I have reviewed and interpreted all pertinent imaging results/findings within the past 24 hours.

## 2024-08-27 NOTE — PHARMACY MED REC
"Admission Medication History     The home medication history was taken by Luzmaria Wiley.    You may go to "Admission" then "Reconcile Home Medications" tabs to review and/or act upon these items.     The home medication list has been updated by the Pharmacy department.   Please read ALL comments highlighted in yellow.   Please address this information as you see fit.    Feel free to contact us if you have any questions or require assistance.      The medications listed below were removed from the home medication list. Please reorder if appropriate:  Patient reports no longer taking the following medication(s):  Carvedilol 6.25mg  Clopidogrel 75mg  Lidocaine patch      Medications listed below were obtained from: Patient/family and Analytic software[employer] Medications   Medication Sig    acetaminophen (TYLENOL) 500 MG tablet Take 500 mg by mouth every 6 (six) hours as needed for Pain.    alendronate (FOSAMAX) 70 MG tablet Take 70 mg by mouth every 7 days. Give every Thursday    apixaban (ELIQUIS) 5 mg Tab Take 5 mg by mouth 2 (two) times daily.    atorvastatin (LIPITOR) 80 MG tablet Take 1 tablet (80 mg total) by mouth once daily.    budesonide (PULMICORT) 0.5 mg/2 mL nebulizer solution Take 0.5 mg by nebulization once daily. Inhale 1 vial one time a day for emphysema    bumetanide (BUMEX) 1 MG tablet Take 1 mg by mouth once daily.    calcium-vitamin D3 (OS-ANNA 500 + D3) 500 mg-5 mcg (200 unit) per tablet Take 1 tablet by mouth once daily.    carvediloL (COREG) 25 MG tablet Take 25 mg by mouth 2 (two) times daily.    diltiaZEM HCl (TIAZAC) 120 mg 24 hr capsule Take 120 mg by mouth once daily.    DULoxetine (CYMBALTA) 60 MG capsule Take 1 capsule (60 mg total) by mouth once daily.    ferrous sulfate 325 (65 FE) MG EC tablet Take 325 mg by mouth 2 (two) times daily.    lisinopriL (PRINIVIL,ZESTRIL) 40 MG tablet Take 1 tablet (40 mg total) by mouth once daily.    meloxicam (MOBIC) 15 MG tablet Take 15 mg by mouth " once daily.    multivitamin (ONE DAILY MULTIVITAMIN) per tablet Take 1 tablet by mouth once daily.    pantoprazole (PROTONIX) 40 MG tablet Take 1 tablet (40 mg total) by mouth once daily.    pentoxifylline (TRENTAL) 400 mg TbSR Take 400 mg by mouth 3 (three) times daily with meals.    traZODone (DESYREL) 100 MG tablet Take 100 mg by mouth every evening.         Luzmaria Wiley  [id number]                 .

## 2024-08-27 NOTE — PT/OT/SLP EVAL
"Physical Therapy Evaluation/co eval with OT    Patient Name:  Maria A Smith   MRN:  [id number]    Recommendations:     Discharge Recommendations: Low Intensity Therapy   Discharge Equipment Recommendations: walker, rolling   Patient demonstrates a mobility limitation that significantly impairs their ability to participate in one or more mobility related activities of daily living. Patient's mobility limitation cannot be sufficiently resolved with the use of a cane, but can be sufficiently resolved with the use of a rolling walker.The use of a rolling walker will considerably improve their ability to participate in MRADLs. Patient will use the walker on a regular basis at home.    Barriers to discharge: None    Assessment:     Maria A Smith is a 78 y.o. female admitted with a medical diagnosis of Cellulitis of right lower extremity.  She presents with the following impairments/functional limitations: weakness, impaired balance, pain, decreased lower extremity function, gait instability, impaired functional mobility, impaired self care skills, impaired skin . Pt is unsafe with functional mobility at this time due to pt requires no assist/CGA for bed mobility, CGA for transfers, and CGA for gait due to pt ambulated NWB R LE initially due to c/o pain with weight bearing. Pt is motivated to progress with functional mobility.     Rehab Prognosis: Good; patient would benefit from acute skilled PT services to address these deficits and reach maximum level of function.    Recent Surgery: Procedure(s) (LRB):  Angiogram Extremity Unilateral (Right)      Plan:     During this hospitalization, patient to be seen 4 x/week to address the identified rehab impairments via gait training, therapeutic activities, therapeutic exercises, neuromuscular re-education and progress toward the following goals:    Plan of Care Expires:  09/26/24    Subjective   "It hurts to move my foot and put weight on it"    Pain/Comfort:  Pain Rating " 1:  (pt c/o pain in her R LE with weight bearing, unable to grade pain)  Location - Side 1: Right  Location - Orientation 1: lower  Location 1: leg  Pain Addressed 1: Reposition, Cessation of Activity, Nurse notified  Pain Rating Post-Intervention 1:  (pt c/o pain upon return to supine, did not grade, nurse notified)    Patients cultural, spiritual, Taoist conflicts given the current situation: no    Living Environment:  Pt lives in NH.  Prior to admission, patients level of function was independent with gait and ADLs per pt.  Equipment used at home:  (equipment per NH).  Upon discharge, patient will have assistance from NH staff.    Objective:     Communicated with nurse prior to session.  Patient found HOB elevated with oxygen, PureWick  upon PT entry to room.    General Precautions: Standard, fall  Orthopedic Precautions:N/A   Braces: N/A  Respiratory Status: Nasal cannula, flow 3 L/min    Exams:  Cognitive Exam:  Patient is oriented to Person and Place  Sensation:    -       Intact  light/touch B LE  RLE ROM: WFL except knee ext and ankle ROM limited due to pain  RLE Strength: Deficits: hip flex 3+/5; knee flex 3-/5; knee ext 3-/5; ankle DF no active movement noted due to pain  LLE ROM: WFL  LLE Strength: WFL except hip flex 4-/5    Functional Mobility:  Bed Mobility:     Supine to Sit: modified independence  Sit to Supine: contact guard assistance  Transfers:     Sit to Stand:  contact guard assistance with rolling walker  Gait: 12ft x 2 trials with RW with CGA.. Pt performed gait with NWB R LE initially due to c/o pain with weight bearing; pt given verbal cues to try to put weight on her R LE and pt was able to put ~ 25% weight bearing through her forefoot towards end of gait trials. Pt rested in sitting on the commode between gait trials     AM-PAC 6 CLICK MOBILITY  Total Score:19     Treatment & Education:  Pt urinated on the commode and was able to clean herself with set up assist.Pt stood at the sink  with RW support to wash her hands with CGA  Co-treat with OT due to medical complexity of pt and need for skilled hands for safe intervention.   Patient left HOB elevated with all lines intact, call button in reach, and nurse notified.    GOALS:   Multidisciplinary Problems       Physical Therapy Goals          Problem: Physical Therapy    Goal Priority Disciplines Outcome Goal Variances Interventions   Physical Therapy Goal     PT, PT/OT Progressing     Description: PT goals until 9/7/24    1. Pt sit to supine with mod independent-not met  2. Pt sit to stand with RW with mod independent-not met  3. Pt to perform gait 100ft with RW with supervision.-not met  4. Pt to perform B LE exs in sitting or supine x 10 reps to strengthen B LE to improve functional mobility.-not met    DME Justifications (see above for complete DME recommendations)    Rolling Walker- Patient demonstrates a mobility limitation that significantly impairs their ability to participate in one or more mobility related activities of daily living. Patient's mobility limitation cannot be sufficiently resolved with the use of a cane, but can be sufficiently resolved with the use of a rolling walker.The use of a rolling walker will considerably improve their ability to participate in MRADLs. Patient will use the walker on a regular basis at home.          .                       History:     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)     Stroke        Past Surgical History:   Procedure Laterality Date    APPENDECTOMY      COLONOSCOPY N/A 8/7/2019    Procedure: COLONOSCOPY;  Surgeon: Alex Degroot MD;  Location: [facility];  Service: Endoscopy;  Laterality: N/A;    COLONOSCOPY N/A 2/12/2020    Procedure: COLONOSCOPY;  Surgeon: Evelio Rizvi MD;  Location: [facility];  Service: Endoscopy;  Laterality: N/A;    ESOPHAGOGASTRODUODENOSCOPY N/A 2/11/2020    Procedure: EGD  (ESOPHAGOGASTRODUODENOSCOPY);  Surgeon: Evelio Rizvi MD;  Location: [facility] ENDO;  Service: Endoscopy;  Laterality: N/A;    HYSTERECTOMY      TRANSESOPHAGEAL ECHOCARDIOGRAPHY N/A 3/23/2023    Procedure: ECHOCARDIOGRAM, TRANSESOPHAGEAL;  Surgeon: [facility] Diagnostic Provider;  Location: [facility] EP LAB;  Service: Cardiology;  Laterality: N/A;    TREATMENT OF CARDIAC ARRHYTHMIA N/A 3/23/2023    Procedure: Cardioversion or Defibrillation;  Surgeon: ASHLEY Watson MD;  Location: [facility] EP LAB;  Service: Cardiology;  Laterality: N/A;  AF, CHERRIE/DCCV, ANES, EH, 1108       Time Tracking:     PT Received On: 08/27/24  PT Start Time: 1046     PT Stop Time: 1105  PT Total Time (min): 19 min     Billable Minutes: Evaluation 9 and Gait Training 10      08/27/2024

## 2024-08-27 NOTE — NURSING
Nurses Note -- 4 Eyes      8/27/2024   6:23 AM      Skin assessed during: Admit      [] No Altered Skin Integrity Present    []Prevention Measures Documented      [x] Yes- Altered Skin Integrity Present or Discovered   [] LDA Added if Not in Epic (Describe Wound)   [x] New Altered Skin Integrity was Present on Admit and Documented in LDA   [x] Wound Image Taken    Wound Care Consulted? Yes    Attending Nurse: Cary Islas RN/Staff Member:  Olena

## 2024-08-27 NOTE — HOSPITAL COURSE
Vascular surgery consulted for BLLE arterial ultrasound shows significant arterial stenosis/occlusion in both lower extremities. Treating for cellulitis with plans to transition to PO on discharge. Respiratory status improving with initiation of nebulizers, will order home oxygen. [facility] planning on angio inpatient was cancelled on 8/29 and will have it done in the outpatient setting. We will continue to hold Eliquis until the procedure and will do Lovenox in the meantime and will continue Eliquis after the procedure as appropriate. We will continue treating for cellulitis with doxycyline and wound care. We will continue with Metoprolol for afib control. We will resum oxygen via NC since patient failed her walking test and o2 sats been dropping while weaning off it.

## 2024-08-27 NOTE — HPI
"Ms. Smith is a 78 year old with reported PMhx of HTN, Paroxysmal Afib, HLD, CVA w/ L sided weakness, prior tobacco use disorder, PVD, COPD who presents from [facility] rehab for right lower extremity wounds. Patient states she was sent to [facility] because her daughter thought she was unable to take care of herself at home. She states while at rehab her right leg was caught on the side of the bed and she developed wounds that continued to worsen. She is unsure if there was any discharge but does complain of progressive pain in that area. She states her entire right leg is red and painful. She denies any fevers or chills or other symptoms. She states she feels a bit more short of breath that normal but states "I never had any lung issues". She denies any other issues and states "I am generally very healthy".     In the ED was noted to have worsening wounds with purulent drainage on right leg. Tachycardic to the 120s, received coreg 6.25 and vancomycin. Admitted for cellulitis.  "

## 2024-08-27 NOTE — PLAN OF CARE
Problem: Occupational Therapy  Goal: Occupational Therapy Goal  Description: Goals to be met by: 9/26/24     Patient will increase functional independence with ADLs by performing:    LE Dressing with Stand-by Assistance.   All functional transfers performed with SBA      Outcome: Progressing

## 2024-08-27 NOTE — PROGRESS NOTES
Therapy with Vancomycin is  complete and/or consult discontinued by provider.  Pharmacy will sign off, please re-consult as needed.    Thank you  Nicole Ramey D

## 2024-08-27 NOTE — ASSESSMENT & PLAN NOTE
Chronic hypercapnia in setting of COPD. Blood gas with chronic compensation. Currently at baseline mental status  - patient does not use home NIPPV. Can consider QHS bipap if more lethargic or worsening blood gas

## 2024-08-27 NOTE — ASSESSMENT & PLAN NOTE
Chronic hypercapnia in setting of COPD. Blood gas with chronic compensation. Currently at baseline mental status  - Can consider QHS bipap if more lethargic or worsening blood gas

## 2024-08-27 NOTE — CONSULTS
"[facility] Surg  Vascular Surgery  Consult Note    Inpatient consult to Vascular Surgery  Consult performed by: Minh Cramer MD  Consult ordered by: Madiha Amin MD  Reason for consult: RLE non-healing wound and concering u/s findings        Subjective:     Chief Complaint/Reason for Admission: RLE non-healing wound    History of Present Illness: Maria A Smith is a 78 year old female with a pertinent PMH of HTN, Paroxysmal Afib, HLD, CVA w/ L sided weakness, prior tobacco use disorder, PVD, COPD. Patient initially presented from [facility] rehab for right lower extremity wounds. Patient states at rehab her leg was caught on the side of the bed and has not healed since. States she has had progressive pain in the area. She denies any fevers or chills or other symptoms. Patient states prior to the injury she would walk frequently without any issues. In the ED upon admission the the wound was noted to have purulent drainage. She received vancoymycin and was admitted for cellulitis. Right lower extremity u/s demonstrated "Multiple areas of hemodynamically significant arterial stenosis/occlusion in both lower extremities as detailed above." Our team was then consulted.        Medications Prior to Admission   Medication Sig Dispense Refill Last Dose    acetaminophen (TYLENOL) 500 MG tablet Take 500 mg by mouth every 6 (six) hours as needed for Pain.       alendronate (FOSAMAX) 70 MG tablet Take 70 mg by mouth every 7 days. Give every Thursday       apixaban (ELIQUIS) 5 mg Tab Take 5 mg by mouth 2 (two) times daily.       atorvastatin (LIPITOR) 80 MG tablet Take 1 tablet (80 mg total) by mouth once daily. 90 tablet 3     budesonide (PULMICORT) 0.5 mg/2 mL nebulizer solution Take 0.5 mg by nebulization once daily. Inhale 1 vial one time a day for emphysema       bumetanide (BUMEX) 1 MG tablet Take 1 mg by mouth once daily.       calcium-vitamin D3 (OS-ANNA 500 + D3) 500 mg-5 mcg (200 " unit) per tablet Take 1 tablet by mouth once daily.       carvediloL (COREG) 25 MG tablet Take 25 mg by mouth 2 (two) times daily.       diltiaZEM HCl (TIAZAC) 120 mg 24 hr capsule Take 120 mg by mouth once daily.       DULoxetine (CYMBALTA) 60 MG capsule Take 1 capsule (60 mg total) by mouth once daily. 30 capsule 11     ferrous sulfate 325 (65 FE) MG EC tablet Take 325 mg by mouth 2 (two) times daily.       lisinopriL (PRINIVIL,ZESTRIL) 40 MG tablet Take 1 tablet (40 mg total) by mouth once daily. 90 tablet 3     meloxicam (MOBIC) 15 MG tablet Take 15 mg by mouth once daily.       multivitamin (ONE DAILY MULTIVITAMIN) per tablet Take 1 tablet by mouth once daily.       pantoprazole (PROTONIX) 40 MG tablet Take 1 tablet (40 mg total) by mouth once daily. 30 tablet 11     pentoxifylline (TRENTAL) 400 mg TbSR Take 400 mg by mouth 3 (three) times daily with meals.       traZODone (DESYREL) 100 MG tablet Take 100 mg by mouth every evening.       [DISCONTINUED] clopidogreL (PLAVIX) 75 mg tablet Take 1 tablet (75 mg total) by mouth once daily. 30 tablet 5        Review of patient's allergies indicates:  No Known Allergies    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)     Stroke      Past Surgical History:   Procedure Laterality Date    APPENDECTOMY      COLONOSCOPY N/A 8/7/2019    Procedure: COLONOSCOPY;  Surgeon: Alex Degroot MD;  Location: [facility];  Service: Endoscopy;  Laterality: N/A;    COLONOSCOPY N/A 2/12/2020    Procedure: COLONOSCOPY;  Surgeon: Evelio Rizvi MD;  Location: [facility];  Service: Endoscopy;  Laterality: N/A;    ESOPHAGOGASTRODUODENOSCOPY N/A 2/11/2020    Procedure: EGD (ESOPHAGOGASTRODUODENOSCOPY);  Surgeon: Evelio Rizvi MD;  Location: [facility];  Service: Endoscopy;  Laterality: N/A;    HYSTERECTOMY      TRANSESOPHAGEAL ECHOCARDIOGRAPHY N/A 3/23/2023    Procedure: ECHOCARDIOGRAM, TRANSESOPHAGEAL;  Surgeon:  [facility] Diagnostic Provider;  Location: [facility] EP LAB;  Service: Cardiology;  Laterality: N/A;    TREATMENT OF CARDIAC ARRHYTHMIA N/A 3/23/2023    Procedure: Cardioversion or Defibrillation;  Surgeon: ASHLEY Watson MD;  Location: [facility] EP LAB;  Service: Cardiology;  Laterality: N/A;  AF, CHERRIE/DCCV, ANES, EH, 1108     Family History       Problem Relation (Age of Onset)    Cancer Sister    Diabetes Sister          Tobacco Use    Smoking status: Former     Current packs/day: 0.00     Types: Cigarettes     Quit date: 2017     Years since quittin.9    Smokeless tobacco: Never   Substance and Sexual Activity    Alcohol use: Yes     Alcohol/week: 1.0 standard drink of alcohol     Types: 1 Cans of beer per week    Drug use: No    Sexual activity: Not on file     Review of Systems   Constitutional:  Positive for activity change (decrease walking ability). Negative for chills and diaphoresis.   Respiratory:  Negative for apnea and chest tightness.    Cardiovascular:  Negative for chest pain and leg swelling.   Neurological:  Negative for dizziness.   Psychiatric/Behavioral:  Negative for agitation and confusion.      Objective:     Vital Signs (Most Recent):  Temp: 97.9 °F (36.6 °C) (24 1232)  Pulse: 83 (24 1232)  Resp: 14 (24 1232)  BP: 120/72 (24 1232)  SpO2: (!) 91 % (24 1232) Vital Signs (24h Range):  Temp:  [97.2 °F (36.2 °C)-97.9 °F (36.6 °C)] 97.9 °F (36.6 °C)  Pulse:  [] 83  Resp:  [14-22] 14  SpO2:  [91 %-98 %] 91 %  BP: (120-190)/() 120/72     Weight: 52.2 kg (115 lb)  Body mass index is 20.37 kg/m².      Physical Exam  Vitals and nursing note reviewed.   Constitutional:       General: She is not in acute distress.     Appearance: She is not ill-appearing or toxic-appearing.   Cardiovascular:      Rate and Rhythm: Normal rate.      Comments: Palpable femoral pulses   No doppler signals detected in left DP or PT  Right DP monophasic   Pulmonary:      Effort: Pulmonary  effort is normal.   Musculoskeletal:      Right lower leg: No edema.      Left lower leg: No edema.   Skin:     Comments: Right lower extremity lesion   Neurological:      Mental Status: She is alert.          Significant Labs:  All pertinent labs from the last 24 hours have been reviewed.    Significant Diagnostics:  I have reviewed and interpreted all pertinent imaging results/findings within the past 24 hours.  Assessment/Plan:     * Cellulitis of right lower extremity  Maria A Smith is a 78 year old female with a pertinent PMH of HTN, Paroxysmal Afib, HLD, CVA w/ L sided weakness, prior tobacco use disorder, PVD, COPD. Vascular surgery team consulted due to non-healing RLE wound and RLE u/s findings of hemodynamically significant arterial stenosis/occlusion in both lower extremities.    Plan:  -F/u ABIs  -F/u PVR  -F/u CTA with runoff   -Please hold Eliquis. Will determine need for surgical intervention studies from above have been completed          Thank you for your consult. I will follow-up with patient. Please contact us if you have any additional questions.    Minh Cramer MD  Vascular Surgery  [facility] S[facility]

## 2024-08-27 NOTE — ASSESSMENT & PLAN NOTE
This is a 78 year old female with a history of stroke, PVD, LE edema, Afib who presents for worsening right LE pain concerning for worsening cellulitis. She initially injured her right leg scraping it against her bed. Her wounds have since worsened and were noted to have purulence in the ED. Xray of her tib-fib without obvious osteomyelitis. Her CRP was normal. She was started on vancomycin in the ED and admitted.     - Continue vancomycin for now  - Wound care consulted  - She had diminished pulses in bilateral legs, will evaluate with ANDREW  - Continue home pentoxifylline   - Fall precautions

## 2024-08-27 NOTE — ASSESSMENT & PLAN NOTE
This is a 78 year old female with a history of stroke, PVD, LE edema, Afib who presents for worsening right LE pain concerning for worsening cellulitis. She initially injured her right leg scraping it against her bed. Her wounds have since worsened and were noted to have purulence in the ED. Xray of her tib-fib without obvious osteomyelitis. Her CRP was normal. She was started on vancomycin in the ED and admitted.     - discontinue vancomycin, start ceftriaxone, transition to PO cefpodoxime on discharge  - Wound care consulted  - She had diminished pulses in bilateral legs, will evaluate with ANDREW  - Continue home pentoxifylline   - Fall precautions

## 2024-08-27 NOTE — ED PROVIDER NOTES
Encounter Date: 8/26/2024       History     Chief Complaint   Patient presents with    Wound Check     Arrives from [facility] w complaints of wound check.      This patient is a 78-year-old female Past Medical History:  No date: Hypertension  01/19/2017: Squamous cell carcinoma      Comment:  right forearm ([place]  type)   No date: Stroke  Presenting to the emergency department complaints of pain, worsening redness and swelling of the right lower extremity.  She reports few weeks ago she hit her leg on the bed in her nursing home and since that time the areas of initial wound have continued to enlarge, now with more drainage and also progressing redness of her right lower extremity, predominantly associated with her toes.  She denies chest pain, difficulty breathing, abdominal pain, pain localized to 1 of the joints.  She reports she has not seen wound care.      Review of patient's allergies indicates:  No Known Allergies  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)     Stroke      Past Surgical History:   Procedure Laterality Date    APPENDECTOMY      COLONOSCOPY N/A 8/7/2019    Procedure: COLONOSCOPY;  Surgeon: Alex Degroot MD;  Location: [facility];  Service: Endoscopy;  Laterality: N/A;    COLONOSCOPY N/A 2/12/2020    Procedure: COLONOSCOPY;  Surgeon: Evelio Rizvi MD;  Location: [facility];  Service: Endoscopy;  Laterality: N/A;    ESOPHAGOGASTRODUODENOSCOPY N/A 2/11/2020    Procedure: EGD (ESOPHAGOGASTRODUODENOSCOPY);  Surgeon: Evelio Rizvi MD;  Location: [facility];  Service: Endoscopy;  Laterality: N/A;    HYSTERECTOMY      TRANSESOPHAGEAL ECHOCARDIOGRAPHY N/A 3/23/2023    Procedure: ECHOCARDIOGRAM, TRANSESOPHAGEAL;  Surgeon: Liliana Diagnostic Provider;  Location: [facility] EP LAB;  Service: Cardiology;  Laterality: N/A;    TREATMENT OF CARDIAC ARRHYTHMIA N/A 3/23/2023    Procedure: Cardioversion or Defibrillation;   Surgeon: ASHLEY Watson MD;  Location: [facility] EP LAB;  Service: Cardiology;  Laterality: N/A;  AF, CHERRIE/DCCV, ANES, EH, 1108     Family History   Problem Relation Name Age of Onset    Cancer Sister      Diabetes Sister      Melanoma Neg Hx      Psoriasis Neg Hx      Lupus Neg Hx      Eczema Neg Hx       Social History     Tobacco Use    Smoking status: Former     Current packs/day: 0.00     Types: Cigarettes     Quit date: 2017     Years since quittin.9    Smokeless tobacco: Never   Substance Use Topics    Alcohol use: Yes     Alcohol/week: 1.0 standard drink of alcohol     Types: 1 Cans of beer per week    Drug use: No     Review of Systems   Skin:  Positive for rash and wound.       Physical Exam     Initial Vitals [24 1730]   BP Pulse Resp Temp SpO2   130/82 87 16 97.7 °F (36.5 °C) 97 %      MAP       --         Physical Exam    Nursing note and vitals reviewed.  Constitutional: She appears well-developed and well-nourished.   HENT:   Head: Normocephalic and atraumatic.   Eyes: Conjunctivae and EOM are normal.   Neck:   Normal range of motion.  Cardiovascular:  Normal rate and regular rhythm.           Pulmonary/Chest: No respiratory distress.   Abdominal: She exhibits no distension.   Musculoskeletal:         General: Edema present. Normal range of motion.      Cervical back: Normal range of motion.     Neurological: She is alert and oriented to person, place, and time. She has normal strength. No cranial nerve deficit. GCS score is 15. GCS eye subscore is 4. GCS verbal subscore is 5. GCS motor subscore is 6.   Skin: Skin is warm and dry. Capillary refill takes less than 2 seconds. There is erythema.   Two 2 x 3/4 cm ulceration low posterior leg with scant purulent drainage, hallmarks of adjacent cellulitis, extending down to all toes, warm and red, no focally assoc with ankle joint. Old vein harvest scar, R medial leg, prox   Psychiatric: She has a normal mood and affect. Thought content normal.          ED Course   Procedures  Labs Reviewed   CBC W/ AUTO DIFFERENTIAL - Abnormal       Result Value    WBC 7.33      RBC 3.89 (*)     Hemoglobin 11.5 (*)     Hematocrit 37.1      MCV 95      MCH 29.6      MCHC 31.0 (*)     RDW 15.0 (*)     Platelets 190      MPV 10.2      Immature Granulocytes 0.5      Gran # (ANC) 5.4      Immature Grans (Abs) 0.04      Lymph # 1.1      Mono # 0.6      Eos # 0.2      Baso # 0.04      nRBC 0      Gran % 73.9 (*)     Lymph % 14.5 (*)     Mono % 7.5      Eosinophil % 3.1      Basophil % 0.5      Differential Method Automated     COMPREHENSIVE METABOLIC PANEL - Abnormal    Sodium 142      Potassium 3.8      Chloride 109      CO2 27      Glucose 100      BUN 31 (*)     Creatinine 0.9      Calcium 8.9      Total Protein 5.8 (*)     Albumin 3.0 (*)     Total Bilirubin 0.4      Alkaline Phosphatase 79      AST 14      ALT 11      eGFR >60.0      Anion Gap 6 (*)    URINALYSIS, REFLEX TO URINE CULTURE - Abnormal    Specimen UA Urine, Clean Catch      Color, UA Yellow      Appearance, UA Clear      pH, UA 6.0      Specific Gravity, UA 1.025      Protein, UA Trace (*)     Glucose, UA Negative      Ketones, UA Negative      Bilirubin (UA) Negative      Occult Blood UA Negative      Nitrite, UA Negative      Leukocytes, UA 1+ (*)     Narrative:     Specimen Source->Urine   B-TYPE NATRIURETIC PEPTIDE - Abnormal     (*)    ISTAT PROCEDURE - Abnormal    POC PH 7.313 (*)     POC PCO2 53.2 (*)     POC PO2 26 (*)     POC HCO3 27.0      POC BE 1      POC SATURATED O2 42      POC TCO2 29      Sample VENOUS      Site Other      Allens Test N/A     ISTAT PROCEDURE - Abnormal    POC PH 7.311 (*)     POC PCO2 58.8 (*)     POC PO2 85      POC HCO3 29.6 (*)     POC BE 3 (*)     POC SATURATED O2 95      POC TCO2 31 (*)     Sample ARTERIAL      Site RR      Allens Test Pass     C-REACTIVE PROTEIN    CRP 4.4     URINALYSIS MICROSCOPIC    RBC, UA 2      WBC, UA 1      Bacteria Rare      Squam Epithel,  UA 1      Microscopic Comment SEE COMMENT      Narrative:     Specimen Source->Urine   LACTIC ACID, PLASMA    Lactate (Lactic Acid) 0.9     ISTAT LACTATE    POC Lactate 0.60      Sample VENOUS      Site Other      Allens Test N/A            Imaging Results              X-Ray Chest AP Portable (Final result)  Result time 08/27/24 00:12:36      Final result by Oscar Steele DO (08/27/24 00:12:36)                   Impression:      No acute abnormality.      Electronically signed by: Oscar Steele  Date:    08/27/2024  Time:    00:12               Narrative:    EXAMINATION:  XR CHEST AP PORTABLE    CLINICAL HISTORY:  hypoxia;    TECHNIQUE:  Single frontal view of the chest was performed.    COMPARISON:  03/21/2023.    FINDINGS:  The lungs are hyperexpanded and clear. No focal opacities are seen. The pleural spaces are clear. The cardiac silhouette is unremarkable.  There are calcifications of the aortic arch.  The visualized osseous structures demonstrate osteopenia and degenerative changes.  Left humerus hardware noted.                                       X-Ray Tibia Fibula 2 View Right (Final result)  Result time 08/26/24 19:43:08   Procedure changed from X-Ray Tibia Fibula 2 View Left     Final result by Rogers Whitfield MD (08/26/24 19:43:08)                   Impression:      As above.      Electronically signed by: Rogers Whitfield MD  Date:    08/26/2024  Time:    19:43               Narrative:    EXAMINATION:  XR TIBIA FIBULA 2 VIEW RIGHT    CLINICAL HISTORY:  Wound cellulitis;  Cellulitis, unspecified    TECHNIQUE:  AP and lateral views of the right tibia and fibula were performed.    COMPARISON:  Right tibia series 06/10/2015    FINDINGS:  Diffuse soft tissue prominence with subcutaneous stranding about the mid to distal right lower leg and ankle suggesting nonspecific swelling from edema or cellulitis.  No subcutaneous emphysema.  Focal skin irregularity along the posterior and lateral aspect of the  distal right leg may reflect soft tissue wound correlating with clinical history.  Numerous scattered surgical clips about the popliteal fossa and scattered atherosclerotic vascular calcifications noted.    Osseous structures are well aligned and grossly intact.                                       Medications   vancomycin - pharmacy to dose (has no administration in time range)   oxyCODONE immediate release tablet 5 mg (5 mg Oral Given 8/27/24 0903)   morphine injection 4 mg (4 mg Intravenous Given 8/27/24 0330)   sodium chloride 0.9% flush 10 mL (has no administration in time range)   naloxone 0.4 mg/mL injection 0.02 mg (has no administration in time range)   glucose chewable tablet 16 g (has no administration in time range)   glucose chewable tablet 24 g (has no administration in time range)   glucagon (human recombinant) injection 1 mg (has no administration in time range)   dextrose 10% bolus 125 mL 125 mL (has no administration in time range)   dextrose 10% bolus 250 mL 250 mL (has no administration in time range)   apixaban tablet 5 mg (5 mg Oral Given 8/27/24 0903)   atorvastatin tablet 80 mg (80 mg Oral Given 8/27/24 0902)   carvediloL tablet 6.25 mg (6.25 mg Oral Given 8/27/24 0903)   diltiaZEM 24 hr capsule 120 mg (120 mg Oral Given 8/27/24 0903)   DULoxetine DR capsule 60 mg (60 mg Oral Given 8/27/24 0904)   pantoprazole EC tablet 40 mg (40 mg Oral Given 8/27/24 0903)   pentoxifylline CR tablet 400 mg (400 mg Oral Given 8/27/24 0902)   traZODone tablet 100 mg (100 mg Oral Given 8/27/24 0033)   vancomycin 750 mg in D5W 250 mL IVPB (Vial-Mate) (750 mg Intravenous Trough Due As Scheduled Before Dose 8/28/24 1930)   acetaminophen tablet 650 mg (has no administration in time range)   budesonide nebulizer solution 0.5 mg (0.5 mg Nebulization Given 8/27/24 0806)   albuterol-ipratropium 2.5 mg-0.5 mg/3 mL nebulizer solution 3 mL (3 mLs Nebulization Given 8/27/24 0806)   hydrALAZINE injection 10 mg (has no  administration in time range)   magnesium sulfate 2g in water 50mL IVPB (premix) (has no administration in time range)   vancomycin (VANCOCIN) 1,000 mg in D5W 250 mL IVPB (Vial-Mate) (0 mg Intravenous Stopped 8/26/24 2225)   carvediloL tablet 6.25 mg (6.25 mg Oral Given by Other 8/26/24 2202)   morphine injection 4 mg (4 mg Intravenous Given 8/26/24 2157)     Medical Decision Making  Pt rapidly assessed. VS sig for HTN. Coreg provided. Pt has diminished flow in complaint extremity at baseline. Larger area of cellulitis considered as demonstrated by extremity XR. IV vanc provided for purulent cellulitis. Will require admission for further IV antibiotics and close wound care. Case d/w and accepted by [facility]. Pt in agreement with POC.     Amount and/or Complexity of Data Reviewed  Labs: ordered.    Risk  Prescription drug management.  Decision regarding hospitalization.                                      Clinical Impression:  Final diagnoses:  [L03.90] Wound cellulitis          ED Disposition Condition    Admit                 Wesley Tim MD  08/27/24 3354

## 2024-08-27 NOTE — PLAN OF CARE
Problem: Physical Therapy  Goal: Physical Therapy Goal  Description: PT goals until 9/7/24    1. Pt sit to supine with mod independent-not met  2. Pt sit to stand with RW with mod independent-not met  3. Pt to perform gait 100ft with RW with supervision.-not met  4. Pt to perform B LE exs in sitting or supine x 10 reps to strengthen B LE to improve functional mobility.-not met    DME Justifications (see above for complete DME recommendations)    Rolling Walker- Patient demonstrates a mobility limitation that significantly impairs their ability to participate in one or more mobility related activities of daily living. Patient's mobility limitation cannot be sufficiently resolved with the use of a cane, but can be sufficiently resolved with the use of a rolling walker.The use of a rolling walker will considerably improve their ability to participate in MRADLs. Patient will use the walker on a regular basis at home.    Outcome: Progressing  Pt's goals set and pt will benefit from skilled PT services to work towards improved functional mobility including: bed mobility, transfers, and gait. Sara Hong PT  8/27/2024

## 2024-08-27 NOTE — PROGRESS NOTES
"Pharmacokinetic Initial Assessment: IV Vancomycin    Assessment/Plan:    Initiate intravenous vancomycin with loading dose of 1000 mg once, done in ED, followed by a maintenance dose of vancomycin 750 mg IV every 24 hours.  Desired empiric serum trough concentration is 10 to 20 mcg/mL.  Draw vancomycin trough level 60 min prior to third dose on 08/28/2024 at 1930.  Pharmacy will continue to follow and monitor vancomycin.      Please contact pharmacy at extension [phone] with any questions regarding this assessment.     Thank you for the consult,   Doris Gordon       Patient brief summary:  Maria A Smith is a 78 y.o. female initiated on antimicrobial therapy with IV Vancomycin for treatment of suspected skin & soft tissue infection.    Drug Allergies:   Review of patient's allergies indicates:  No Known Allergies    Actual Body Weight:   52.2 kg    Renal Function:   Estimated Creatinine Clearance: 43.8 mL/min (based on SCr of 0.9 mg/dL).    CBC (last 72 hours):  Recent Labs   Lab Result Units 08/26/24  1916   WBC K/uL 7.33   Hemoglobin g/dL 11.5*   Hematocrit % 37.1   Platelets K/uL 190   Gran % % 73.9*   Lymph % % 14.5*   Mono % % 7.5   Eosinophil % % 3.1   Basophil % % 0.5   Differential Method  Automated       Metabolic Panel (last 72 hours):  Recent Labs   Lab Result Units 08/26/24 2017 08/26/24  2040   Sodium mmol/L 142  --    Potassium mmol/L 3.8  --    Chloride mmol/L 109  --    CO2 mmol/L 27  --    Glucose mg/dL 100  --    Glucose, UA   --  Negative   BUN mg/dL 31*  --    Creatinine mg/dL 0.9  --    Albumin g/dL 3.0*  --    Total Bilirubin mg/dL 0.4  --    Alkaline Phosphatase U/L 79  --    AST U/L 14  --    ALT U/L 11  --        Drug levels (last 3 results):  No results for input(s): "VANCOMYCINRA", "VANCORANDOM", "VANCOMYCINPE", "VANCOPEAK", "VANCOMYCINTR", "VANCOTROUGH" in the last 72 hours.    Microbiologic Results:  Microbiology Results (last 7 days)       Procedure Component Value Units " Date/Time    Blood culture x two cultures. Draw prior to antibiotics. [[id number]] Collected: 08/26/24 2017    Order Status: Sent Specimen: Blood from Peripheral, Forearm, Left Updated: 08/26/24 2051    Blood culture x two cultures. Draw prior to antibiotics. [[id number]] Collected: 08/26/24 2017    Order Status: Sent Specimen: Blood from Peripheral, Antecubital, Right Updated: 08/26/24 2051

## 2024-08-27 NOTE — ASSESSMENT & PLAN NOTE
BLLE arterial US demonstrates significant stenosis and occlusion    -Vascular surgery consulted; planning on angio 08/28 -- NPO at [facility]  -holding eloquis in anticipation of intervention  -F/u ABIs  -F/u PVR  -F/u CTA with runoff; indicating severity of PAD

## 2024-08-27 NOTE — H&P
"  [facility] - Emergency Dept  [facility] Medicine  History & Physical    Patient Name: Maria A Smith  MRN: [id number]  Patient Class: IP- Inpatient  Admission Date: 8/26/2024  Attending Physician: Ivette Palumbo MD   Primary Care Provider: Sneha López MD         Patient information was obtained from patient and ER records.     Subjective:     Principal Problem:Cellulitis of right lower extremity    Chief Complaint:   Chief Complaint   Patient presents with    Wound Check     Arrives from [facility] w complaints of wound check.         HPI: Ms. Smith is a 78 year old with reported PMhx of HTN, Paroxysmal Afib, HLD, CVA w/ L sided weakness, prior tobacco use disorder, PVD, COPD who presents from [facility] rehab for right lower extremity wounds. Patient states she was sent to [facility] because her daughter thought she was unable to take care of herself at home. She states while at rehab her right leg was caught on the side of the bed and she developed wounds that continued to worsen. She is unsure if there was any discharge but does complain of progressive pain in that area. She states her entire right leg is red and painful. She denies any fevers or chills or other symptoms. She states she feels a bit more short of breath that normal but states "I never had any lung issues". She denies any other issues and states "I am generally very healthy".     In the ED was noted to have worsening wounds with purulent drainage on right leg. Tachycardic to the 120s, received coreg 6.25 and vancomycin. Admitted for cellulitis.    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)     Stroke        Past Surgical History:   Procedure Laterality Date    APPENDECTOMY      COLONOSCOPY N/A 8/7/2019    Procedure: COLONOSCOPY;  Surgeon: Alex Degroot MD;  Location: [facility];  Service: Endoscopy;  Laterality: N/A;    COLONOSCOPY " N/A 2/12/2020    Procedure: COLONOSCOPY;  Surgeon: Evelio Rizvi MD;  Location: [facility] ENDO;  Service: Endoscopy;  Laterality: N/A;    ESOPHAGOGASTRODUODENOSCOPY N/A 2/11/2020    Procedure: EGD (ESOPHAGOGASTRODUODENOSCOPY);  Surgeon: Evelio Rizvi MD;  Location: [facility] ENDO;  Service: Endoscopy;  Laterality: N/A;    HYSTERECTOMY      TRANSESOPHAGEAL ECHOCARDIOGRAPHY N/A 3/23/2023    Procedure: ECHOCARDIOGRAM, TRANSESOPHAGEAL;  Surgeon: [facility] Provider;  Location: [facility] EP LAB;  Service: Cardiology;  Laterality: N/A;    TREATMENT OF CARDIAC ARRHYTHMIA N/A 3/23/2023    Procedure: Cardioversion or Defibrillation;  Surgeon: ASHLEY Watson MD;  Location: [facility] EP LAB;  Service: Cardiology;  Laterality: N/A;  AF, CHERRIE/DCCV, ANES, EH, 1108       Review of patient's allergies indicates:  No Known Allergies    No current facility-administered medications on file prior to encounter.     Current Outpatient Medications on File Prior to Encounter   Medication Sig    acetaminophen (TYLENOL) 500 MG tablet Take 1 tablet (500 mg total) by mouth every 6 (six) hours as needed for Pain or Temperature greater than (100.4 F).    atorvastatin (LIPITOR) 80 MG tablet Take 1 tablet (80 mg total) by mouth once daily.    carvediloL (COREG) 6.25 MG tablet Take 1 tablet (6.25 mg total) by mouth 2 (two) times daily.    clopidogreL (PLAVIX) 75 mg tablet Take 1 tablet (75 mg total) by mouth once daily.    DULoxetine (CYMBALTA) 60 MG capsule Take 1 capsule (60 mg total) by mouth once daily.    LIDOcaine (LIDODERM) 5 % Place 1 patch onto the skin once daily. Remove & Discard patch within 12 hours or as directed by MD    lisinopriL (PRINIVIL,ZESTRIL) 40 MG tablet Take 1 tablet (40 mg total) by mouth once daily.    pantoprazole (PROTONIX) 40 MG tablet Take 1 tablet (40 mg total) by mouth once daily.     Family History       Problem Relation (Age of Onset)    Cancer Sister    Diabetes Sister          Tobacco Use    Smoking status: Former      Current packs/day: 0.00     Types: Cigarettes     Quit date: 2017     Years since quittin.9    Smokeless tobacco: Never   Substance and Sexual Activity    Alcohol use: Yes     Alcohol/week: 1.0 standard drink of alcohol     Types: 1 Cans of beer per week    Drug use: No    Sexual activity: Not on file     Review of Systems   Constitutional:  Negative for chills and fatigue.   HENT:  Negative for sore throat.    Respiratory:  Positive for shortness of breath and wheezing.    Cardiovascular:  Negative for chest pain.   Gastrointestinal:  Negative for abdominal pain, diarrhea, nausea and vomiting.   Genitourinary:  Negative for dysuria and flank pain.   Musculoskeletal:  Negative for arthralgias and joint swelling.        Right leg pain   Skin:  Positive for wound (right leg wound).   Neurological:  Negative for dizziness and headaches.   Psychiatric/Behavioral:  Negative for agitation and confusion.      Objective:     Vital Signs (Most Recent):  Temp: 97.7 °F (36.5 °C) (24 1730)  Pulse: 103 (24)  Resp: (!) 22 (24)  BP: 122/88 (24)  SpO2: 95 % (24 0000) Vital Signs (24h Range):  Temp:  [97.7 °F (36.5 °C)] 97.7 °F (36.5 °C)  Pulse:  [] 103  Resp:  [16-22] 22  SpO2:  [94 %-97 %] 95 %  BP: (122-174)/(82-88) 122/88     Weight: 52.2 kg (115 lb)  Body mass index is 20.37 kg/m².     Physical Exam  HENT:      Head: Normocephalic.      Nose: Nose normal.      Mouth/Throat:      Mouth: Mucous membranes are moist.   Eyes:      Pupils: Pupils are equal, round, and reactive to light.   Cardiovascular:      Rate and Rhythm: Normal rate.      Comments: Diminished DP pulses bilateral LE  Pulmonary:      Effort: Pulmonary effort is normal.      Breath sounds: Wheezing present.   Abdominal:      General: Abdomen is flat.      Tenderness: There is no abdominal tenderness.   Musculoskeletal:         General: Normal range of motion.      Cervical back: Normal range of motion.    Skin:     General: Skin is warm.      Findings: Lesion (right leg, erythema to the right toes) present.   Neurological:      Mental Status: She is alert. Mental status is at baseline.      Comments: Left sided weakness              CRANIAL NERVES     CN III, IV, VI   Pupils are equal, round, and reactive to light.       Significant Labs: All pertinent labs within the past 24 hours have been reviewed.    Significant Imaging: I have reviewed all pertinent imaging results/findings within the past 24 hours.  Assessment/Plan:     * Cellulitis of right lower extremity  This is a 78 year old female with a history of stroke, PVD, LE edema, Afib who presents for worsening right LE pain concerning for worsening cellulitis. She initially injured her right leg scraping it against her bed. Her wounds have since worsened and were noted to have purulence in the ED. Xray of her tib-fib without obvious osteomyelitis. Her CRP was normal. She was started on vancomycin in the ED and admitted.     - Continue vancomycin for now  - Wound care consulted  - She had diminished pulses in bilateral legs, will evaluate with ANDREW  - Continue home pentoxifylline   - Fall precautions         Depression  Continue home duloxetine and trazodone.    Chronic hypercapnic respiratory failure  Chronic hypercapnia in setting of COPD. Blood gas with chronic compensation. Currently at baseline mental status  - Can consider QHS bipap if more lethargic or worsening blood gas    Chronic obstructive pulmonary emphysema  Patient has a reported history of COPD, no PFTs on record. She does have emphysema on CT chest from prior. She is on daily budesonide inhalation treatment at rehab. She does have some wheezing on exam today. Blood gas with pH 7.31, CO2 58.8, HCO3 29.6.     - Will continue budesonide daily treatments  - Duo-nebs q6h awake for now as well given wheezing  - Keep O2 sat 88% and above    Persistent atrial fibrillation  Noted history of Afib s/p  successful cardioversion in March of 2023. She has since converted back to Afib RVR, unclear when. Possibly worse in the setting of sepsis and cellulitis. [id number] of 8.     - Continue home diltiazem ER 120mg  - Continue coreg 6.25 given in the ED, can uptitrate to home 25mg if not hypotensive  - Eliquis 5 bid      Osteoporosis  History of osteoporosis.  - On Alendronate at rehab. Continue on discharge.      History of CVA (cerebrovascular accident)  W/ residual aphasia and left sided hemiparesis. Continue statin. On eliquis.       Iron deficiency anemia  Chronic RISSA, on iron tablets at home.   - Held in setting of sepsis, cellulitis  - Resume when appropriate    Memory impairment  Poor historian at baseline. Noted history of memory impairment in 2018.       Left hemiparesis  S/p CVA in 2018, was at rehab prior to this admission.  - PT/OT   - Fall precautions      History of tobacco use  Per patient she was a lifelong smoker until around 10 years ago. Denies current tobacco use.      Hyperlipidemia  - Continue home atorvastatin 80        VTE Risk Mitigation (From admission, onward)           Ordered     apixaban tablet 5 mg  2 times daily         08/26/24 2341     IP VTE HIGH RISK PATIENT  Once         08/26/24 2324     Place sequential compression device  Until discontinued         08/26/24 2324                               Pharmacokinetic Initial Assessment: IV Vancomycin    Assessment/Plan:    Initiate intravenous vancomycin with loading dose of 1000 mg once, done in ED, followed by a maintenance dose of vancomycin 750 mg IV every 24 hours.  Desired empiric serum trough concentration is 10 to 20 mcg/mL.  Draw vancomycin trough level 60 min prior to third dose on 08/28/2024 at 1930.  Pharmacy will continue to follow and monitor vancomycin.      Please contact pharmacy at extension [phone] with any questions regarding this assessment.     Thank you for the consult,   Doris Gordon       Patient brief  "summary:  Maria A Smith is a 78 y.o. female initiated on antimicrobial therapy with IV Vancomycin for treatment of suspected skin & soft tissue infection.    Drug Allergies:   Review of patient's allergies indicates:  No Known Allergies    Actual Body Weight:   52.2 kg    Renal Function:   Estimated Creatinine Clearance: 43.8 mL/min (based on SCr of 0.9 mg/dL).    CBC (last 72 hours):  Recent Labs   Lab Result Units 08/26/24  1916   WBC K/uL 7.33   Hemoglobin g/dL 11.5*   Hematocrit % 37.1   Platelets K/uL 190   Gran % % 73.9*   Lymph % % 14.5*   Mono % % 7.5   Eosinophil % % 3.1   Basophil % % 0.5   Differential Method  Automated       Metabolic Panel (last 72 hours):  Recent Labs   Lab Result Units 08/26/24 2017 08/26/24  2040   Sodium mmol/L 142  --    Potassium mmol/L 3.8  --    Chloride mmol/L 109  --    CO2 mmol/L 27  --    Glucose mg/dL 100  --    Glucose, UA   --  Negative   BUN mg/dL 31*  --    Creatinine mg/dL 0.9  --    Albumin g/dL 3.0*  --    Total Bilirubin mg/dL 0.4  --    Alkaline Phosphatase U/L 79  --    AST U/L 14  --    ALT U/L 11  --        Drug levels (last 3 results):  No results for input(s): "VANCOMYCINRA", "VANCORANDOM", "VANCOMYCINPE", "VANCOPEAK", "VANCOMYCINTR", "VANCOTROUGH" in the last 72 hours.    Microbiologic Results:  Microbiology Results (last 7 days)       Procedure Component Value Units Date/Time    Blood culture x two cultures. Draw prior to antibiotics. [[id number]] Collected: 08/26/24 2017    Order Status: Sent Specimen: Blood from Peripheral, Forearm, Left Updated: 08/26/24 2051    Blood culture x two cultures. Draw prior to antibiotics. [[id number]] Collected: 08/26/24 2017    Order Status: Sent Specimen: Blood from Peripheral, Antecubital, Right Updated: 08/26/24 2051              Payton Gomes DO  Department of Hospital Medicine  [facility] - Emergency Dept          "

## 2024-08-27 NOTE — NURSING
Received admit from er via stretcher 77 y/o wf with dx of cellulitis of right lower extremity. Aaox4, no distress noted. Bp elevated 190/111 auto and 184/90 nicole. Stated she was in pain, will give pain and continue to monitor. Md on call informed.

## 2024-08-27 NOTE — HPI
"Maria A Smith is a 78 year old female with a pertinent PMH of HTN, Paroxysmal Afib, HLD, CVA w/ L sided weakness, prior tobacco use disorder, PVD, COPD. Patient initially presented from [facility] rehab for right lower extremity wounds. Patient states at rehab her leg was caught on the side of the bed and has not healed since. States she has had progressive pain in the area. She denies any fevers or chills or other symptoms. Patient states prior to the injury she would walk frequently without any issues. In the ED upon admission the the wound was noted to have purulent drainage. She received vancoymycin and was admitted for cellulitis. Right lower extremity u/s demonstrated "Multiple areas of hemodynamically significant arterial stenosis/occlusion in both lower extremities as detailed above." Our team was then consulted.      "

## 2024-08-27 NOTE — ASSESSMENT & PLAN NOTE
Maria A Smith is a 78 year old female with a pertinent PMH of HTN, Paroxysmal Afib, HLD, CVA w/ L sided weakness, prior tobacco use disorder, PVD, COPD. Vascular surgery team consulted due to non-healing RLE wound and RLE u/s findings of hemodynamically significant arterial stenosis/occlusion in both lower extremities.    Plan:  -F/u ABIs  -F/u PVR  -F/u CTA with runoff   -Please hold Eliquis. Will determine need for surgical intervention studies from above have been completed

## 2024-08-28 ENCOUNTER — ANESTHESIA EVENT (OUTPATIENT)
Dept: SURGERY | Facility: HOSPITAL | Age: 79
End: 2024-08-28
Payer: MEDICARE

## 2024-08-28 LAB
ALBUMIN SERPL BCP-MCNC: 3 G/DL (ref 3.5–5.2)
ANION GAP SERPL CALC-SCNC: 10 MMOL/L (ref 8–16)
AV INDEX (PROSTH): 0.65
AV MEAN GRADIENT: 2 MMHG
AV PEAK GRADIENT: 4 MMHG
AV VALVE AREA BY VELOCITY RATIO: 1.85 CM²
AV VALVE AREA: 1.97 CM²
AV VELOCITY RATIO: 0.61
BASOPHILS # BLD AUTO: 0.04 K/UL (ref 0–0.2)
BASOPHILS NFR BLD: 0.5 % (ref 0–1.9)
BSA FOR ECHO PROCEDURE: 1.52 M2
BUN SERPL-MCNC: 17 MG/DL (ref 8–23)
CALCIUM SERPL-MCNC: 9.7 MG/DL (ref 8.7–10.5)
CHLORIDE SERPL-SCNC: 105 MMOL/L (ref 95–110)
CO2 SERPL-SCNC: 24 MMOL/L (ref 23–29)
CREAT SERPL-MCNC: 0.8 MG/DL (ref 0.5–1.4)
CV ECHO LV RWT: 0.35 CM
DIFFERENTIAL METHOD BLD: ABNORMAL
DOP CALC AO PEAK VEL: 0.99 M/S
DOP CALC AO VTI: 13.69 CM
DOP CALC LVOT AREA: 3 CM2
DOP CALC LVOT DIAMETER: 1.97 CM
DOP CALC LVOT PEAK VEL: 0.6 M/S
DOP CALC LVOT STROKE VOLUME: 26.99 CM3
DOP CALCLVOT PEAK VEL VTI: 8.86 CM
E WAVE DECELERATION TIME: 154.88 MSEC
E/A RATIO: 2.24
E/E' RATIO: 9.68 M/S
ECHO LV POSTERIOR WALL: 0.7 CM (ref 0.6–1.1)
EOSINOPHIL # BLD AUTO: 0.2 K/UL (ref 0–0.5)
EOSINOPHIL NFR BLD: 2.5 % (ref 0–8)
ERYTHROCYTE [DISTWIDTH] IN BLOOD BY AUTOMATED COUNT: 14.7 % (ref 11.5–14.5)
EST. GFR  (NO RACE VARIABLE): >60 ML/MIN/1.73 M^2
FRACTIONAL SHORTENING: 47 % (ref 28–44)
GLUCOSE SERPL-MCNC: 94 MG/DL (ref 70–110)
HCT VFR BLD AUTO: 35.6 % (ref 37–48.5)
HGB BLD-MCNC: 11.1 G/DL (ref 12–16)
IMM GRANULOCYTES # BLD AUTO: 0.03 K/UL (ref 0–0.04)
IMM GRANULOCYTES NFR BLD AUTO: 0.3 % (ref 0–0.5)
INTERVENTRICULAR SEPTUM: 0.7 CM (ref 0.6–1.1)
LA MAJOR: 6.27 CM
LA MINOR: 5.12 CM
LA WIDTH: 4.13 CM
LEFT ATRIUM SIZE: 3.35 CM
LEFT ATRIUM VOLUME INDEX MOD: 42.5 ML/M2
LEFT ATRIUM VOLUME INDEX: 43.3 ML/M2
LEFT ATRIUM VOLUME MOD: 65 CM3
LEFT ATRIUM VOLUME: 66.29 CM3
LEFT INTERNAL DIMENSION IN SYSTOLE: 2.13 CM (ref 2.1–4)
LEFT VENTRICLE DIASTOLIC VOLUME INDEX: 29.05 ML/M2
LEFT VENTRICLE DIASTOLIC VOLUME: 44.44 ML
LEFT VENTRICLE MASS INDEX: 51 G/M2
LEFT VENTRICLE SYSTOLIC VOLUME INDEX: 9.8 ML/M2
LEFT VENTRICLE SYSTOLIC VOLUME: 14.93 ML
LEFT VENTRICULAR INTERNAL DIMENSION IN DIASTOLE: 4 CM (ref 3.5–6)
LEFT VENTRICULAR MASS: 78.36 G
LV LATERAL E/E' RATIO: 10.22 M/S
LV SEPTAL E/E' RATIO: 9.2 M/S
LYMPHOCYTES # BLD AUTO: 0.9 K/UL (ref 1–4.8)
LYMPHOCYTES NFR BLD: 10.8 % (ref 18–48)
MAGNESIUM SERPL-MCNC: 1.8 MG/DL (ref 1.6–2.6)
MCH RBC QN AUTO: 29.4 PG (ref 27–31)
MCHC RBC AUTO-ENTMCNC: 31.2 G/DL (ref 32–36)
MCV RBC AUTO: 94 FL (ref 82–98)
MONOCYTES # BLD AUTO: 0.5 K/UL (ref 0.3–1)
MONOCYTES NFR BLD: 6.2 % (ref 4–15)
MV PEAK A VEL: 0.41 M/S
MV PEAK E VEL: 0.92 M/S
MV STENOSIS PRESSURE HALF TIME: 44.92 MS
MV VALVE AREA P 1/2 METHOD: 4.9 CM2
NEUTROPHILS # BLD AUTO: 6.9 K/UL (ref 1.8–7.7)
NEUTROPHILS NFR BLD: 79.7 % (ref 38–73)
NRBC BLD-RTO: 0 /100 WBC
OHS LV EJECTION FRACTION SIMPSONS BIPLANE MOD: 51 %
OHS QRS DURATION: 116 MS
OHS QRS DURATION: 118 MS
OHS QTC CALCULATION: 465 MS
OHS QTC CALCULATION: 472 MS
PHOSPHATE SERPL-MCNC: 2.7 MG/DL (ref 2.7–4.5)
PISA TR MAX VEL: 3.31 M/S
PLATELET # BLD AUTO: 164 K/UL (ref 150–450)
PMV BLD AUTO: 10.5 FL (ref 9.2–12.9)
POTASSIUM SERPL-SCNC: 4 MMOL/L (ref 3.5–5.1)
RA MAJOR: 4.47 CM
RA PRESSURE ESTIMATED: 15 MMHG
RA WIDTH: 3.15 CM
RBC # BLD AUTO: 3.77 M/UL (ref 4–5.4)
RIGHT VENTRICLE DIASTOLIC BASEL DIMENSION: 2.5 CM
RV TB RVSP: 18 MMHG
SINUS: 2.74 CM
SODIUM SERPL-SCNC: 139 MMOL/L (ref 136–145)
STJ: 2.39 CM
TDI LATERAL: 0.09 M/S
TDI SEPTAL: 0.1 M/S
TDI: 0.1 M/S
TR MAX PG: 44 MMHG
TRICUSPID ANNULAR PLANE SYSTOLIC EXCURSION: 1.3 CM
TV REST PULMONARY ARTERY PRESSURE: 59 MMHG
WBC # BLD AUTO: 8.69 K/UL (ref 3.9–12.7)
Z-SCORE OF LEFT VENTRICULAR DIMENSION IN END DIASTOLE: -1.1
Z-SCORE OF LEFT VENTRICULAR DIMENSION IN END SYSTOLE: -2.08

## 2024-08-28 PROCEDURE — 83735 ASSAY OF MAGNESIUM: CPT | Performed by: STUDENT IN AN ORGANIZED HEALTH CARE EDUCATION/TRAINING PROGRAM

## 2024-08-28 PROCEDURE — 27000221 HC OXYGEN, UP TO 24 HOURS

## 2024-08-28 PROCEDURE — 63600175 PHARM REV CODE 636 W HCPCS

## 2024-08-28 PROCEDURE — 99900035 HC TECH TIME PER 15 MIN (STAT)

## 2024-08-28 PROCEDURE — 25000003 PHARM REV CODE 250

## 2024-08-28 PROCEDURE — 93010 ELECTROCARDIOGRAM REPORT: CPT | Mod: ,,, | Performed by: INTERNAL MEDICINE

## 2024-08-28 PROCEDURE — 21400001 HC TELEMETRY ROOM

## 2024-08-28 PROCEDURE — 25000242 PHARM REV CODE 250 ALT 637 W/ HCPCS: Performed by: STUDENT IN AN ORGANIZED HEALTH CARE EDUCATION/TRAINING PROGRAM

## 2024-08-28 PROCEDURE — 80069 RENAL FUNCTION PANEL: CPT | Performed by: STUDENT IN AN ORGANIZED HEALTH CARE EDUCATION/TRAINING PROGRAM

## 2024-08-28 PROCEDURE — 63600175 PHARM REV CODE 636 W HCPCS: Performed by: STUDENT IN AN ORGANIZED HEALTH CARE EDUCATION/TRAINING PROGRAM

## 2024-08-28 PROCEDURE — 94640 AIRWAY INHALATION TREATMENT: CPT

## 2024-08-28 PROCEDURE — 93005 ELECTROCARDIOGRAM TRACING: CPT

## 2024-08-28 PROCEDURE — 94761 N-INVAS EAR/PLS OXIMETRY MLT: CPT

## 2024-08-28 PROCEDURE — 85025 COMPLETE CBC W/AUTO DIFF WBC: CPT | Performed by: STUDENT IN AN ORGANIZED HEALTH CARE EDUCATION/TRAINING PROGRAM

## 2024-08-28 PROCEDURE — 25000003 PHARM REV CODE 250: Performed by: STUDENT IN AN ORGANIZED HEALTH CARE EDUCATION/TRAINING PROGRAM

## 2024-08-28 PROCEDURE — 93923 UPR/LXTR ART STDY 3+ LVLS: CPT | Mod: 26,,, | Performed by: SURGERY

## 2024-08-28 PROCEDURE — 36415 COLL VENOUS BLD VENIPUNCTURE: CPT | Performed by: STUDENT IN AN ORGANIZED HEALTH CARE EDUCATION/TRAINING PROGRAM

## 2024-08-28 RX ORDER — ENOXAPARIN SODIUM 100 MG/ML
1 INJECTION SUBCUTANEOUS EVERY 12 HOURS
Status: COMPLETED | OUTPATIENT
Start: 2024-08-28 | End: 2024-08-28

## 2024-08-28 RX ORDER — OXYCODONE HYDROCHLORIDE 10 MG/1
10 TABLET ORAL EVERY 6 HOURS PRN
Status: DISCONTINUED | OUTPATIENT
Start: 2024-08-28 | End: 2024-08-29 | Stop reason: HOSPADM

## 2024-08-28 RX ORDER — LOSARTAN POTASSIUM 25 MG/1
25 TABLET ORAL DAILY
Status: DISCONTINUED | OUTPATIENT
Start: 2024-08-28 | End: 2024-08-29 | Stop reason: HOSPADM

## 2024-08-28 RX ORDER — ASPIRIN 81 MG/1
81 TABLET ORAL DAILY
Status: DISCONTINUED | OUTPATIENT
Start: 2024-08-29 | End: 2024-08-29 | Stop reason: HOSPADM

## 2024-08-28 RX ORDER — METOPROLOL TARTRATE 50 MG/1
50 TABLET ORAL 2 TIMES DAILY
Status: DISCONTINUED | OUTPATIENT
Start: 2024-08-28 | End: 2024-08-29

## 2024-08-28 RX ORDER — METOPROLOL TARTRATE 1 MG/ML
5 INJECTION, SOLUTION INTRAVENOUS EVERY 5 MIN PRN
Status: DISCONTINUED | OUTPATIENT
Start: 2024-08-28 | End: 2024-08-28

## 2024-08-28 RX ORDER — IPRATROPIUM BROMIDE 0.5 MG/2.5ML
0.5 SOLUTION RESPIRATORY (INHALATION)
Status: DISCONTINUED | OUTPATIENT
Start: 2024-08-28 | End: 2024-08-29 | Stop reason: HOSPADM

## 2024-08-28 RX ORDER — SENNOSIDES 8.6 MG/1
8.6 TABLET ORAL DAILY
Status: DISCONTINUED | OUTPATIENT
Start: 2024-08-28 | End: 2024-08-29 | Stop reason: HOSPADM

## 2024-08-28 RX ORDER — POLYETHYLENE GLYCOL 3350 17 G/17G
17 POWDER, FOR SOLUTION ORAL DAILY
Status: DISCONTINUED | OUTPATIENT
Start: 2024-08-28 | End: 2024-08-29 | Stop reason: HOSPADM

## 2024-08-28 RX ORDER — MAGNESIUM SULFATE HEPTAHYDRATE 40 MG/ML
2 INJECTION, SOLUTION INTRAVENOUS ONCE
Status: COMPLETED | OUTPATIENT
Start: 2024-08-28 | End: 2024-08-28

## 2024-08-28 RX ORDER — ACETAMINOPHEN 500 MG
1000 TABLET ORAL EVERY 8 HOURS
Status: DISCONTINUED | OUTPATIENT
Start: 2024-08-28 | End: 2024-08-29 | Stop reason: HOSPADM

## 2024-08-28 RX ADMIN — IPRATROPIUM BROMIDE AND ALBUTEROL SULFATE 3 ML: 2.5; .5 SOLUTION RESPIRATORY (INHALATION) at 07:08

## 2024-08-28 RX ADMIN — TRAZODONE HYDROCHLORIDE 100 MG: 100 TABLET ORAL at 09:08

## 2024-08-28 RX ADMIN — ACETAMINOPHEN 1000 MG: 500 TABLET ORAL at 03:08

## 2024-08-28 RX ADMIN — ENOXAPARIN SODIUM 50 MG: 60 INJECTION SUBCUTANEOUS at 05:08

## 2024-08-28 RX ADMIN — PENTOXIFYLLINE 400 MG: 400 TABLET, EXTENDED RELEASE ORAL at 12:08

## 2024-08-28 RX ADMIN — PENTOXIFYLLINE 400 MG: 400 TABLET, EXTENDED RELEASE ORAL at 05:08

## 2024-08-28 RX ADMIN — ACETAMINOPHEN 1000 MG: 500 TABLET ORAL at 09:08

## 2024-08-28 RX ADMIN — METOPROLOL TARTRATE 50 MG: 50 TABLET, FILM COATED ORAL at 09:08

## 2024-08-28 RX ADMIN — METOROPROLOL TARTRATE 5 MG: 5 INJECTION, SOLUTION INTRAVENOUS at 04:08

## 2024-08-28 RX ADMIN — DILTIAZEM HYDROCHLORIDE 120 MG: 120 CAPSULE, COATED, EXTENDED RELEASE ORAL at 08:08

## 2024-08-28 RX ADMIN — OXYCODONE HYDROCHLORIDE 10 MG: 10 TABLET ORAL at 03:08

## 2024-08-28 RX ADMIN — LOSARTAN POTASSIUM 25 MG: 25 TABLET, FILM COATED ORAL at 08:08

## 2024-08-28 RX ADMIN — IPRATROPIUM BROMIDE 0.5 MG: 0.5 SOLUTION RESPIRATORY (INHALATION) at 03:08

## 2024-08-28 RX ADMIN — CEFTRIAXONE 1 G: 1 INJECTION, POWDER, FOR SOLUTION INTRAMUSCULAR; INTRAVENOUS at 03:08

## 2024-08-28 RX ADMIN — POLYETHYLENE GLYCOL 3350 17 G: 17 POWDER, FOR SOLUTION ORAL at 03:08

## 2024-08-28 RX ADMIN — IPRATROPIUM BROMIDE 0.5 MG: 0.5 SOLUTION RESPIRATORY (INHALATION) at 07:08

## 2024-08-28 RX ADMIN — PANTOPRAZOLE SODIUM 40 MG: 40 TABLET, DELAYED RELEASE ORAL at 08:08

## 2024-08-28 RX ADMIN — DULOXETINE HYDROCHLORIDE 60 MG: 60 CAPSULE, DELAYED RELEASE ORAL at 08:08

## 2024-08-28 RX ADMIN — BUDESONIDE 0.5 MG: 0.5 INHALANT RESPIRATORY (INHALATION) at 07:08

## 2024-08-28 RX ADMIN — SENNOSIDES 8.6 MG: 8.6 TABLET, FILM COATED ORAL at 03:08

## 2024-08-28 RX ADMIN — METOPROLOL TARTRATE 50 MG: 50 TABLET, FILM COATED ORAL at 05:08

## 2024-08-28 RX ADMIN — PENTOXIFYLLINE 400 MG: 400 TABLET, EXTENDED RELEASE ORAL at 08:08

## 2024-08-28 RX ADMIN — MAGNESIUM SULFATE HEPTAHYDRATE 2 G: 40 INJECTION, SOLUTION INTRAVENOUS at 12:08

## 2024-08-28 RX ADMIN — HYDRALAZINE HYDROCHLORIDE 10 MG: 20 INJECTION, SOLUTION INTRAMUSCULAR; INTRAVENOUS at 03:08

## 2024-08-28 RX ADMIN — ATORVASTATIN CALCIUM 80 MG: 40 TABLET, FILM COATED ORAL at 08:08

## 2024-08-28 NOTE — PROGRESS NOTES
"[facility] Medicine  Progress Note    Patient Name: Maria A Smith  MRN: [id number]  Patient Class: IP- Inpatient   Admission Date: 8/26/2024  Length of Stay: 2 days  Attending Physician: Ivette Palumbo MD  Primary Care Provider: Sneha López MD        Subjective:     Principal Problem:PAD (peripheral artery disease)        HPI:  Ms. Smith is a 78 year old with reported PMhx of HTN, Paroxysmal Afib, HLD, CVA w/ L sided weakness, prior tobacco use disorder, PVD, COPD who presents from [facility] rehab for right lower extremity wounds. Patient states she was sent to [facility] because her daughter thought she was unable to take care of herself at home. She states while at rehab her right leg was caught on the side of the bed and she developed wounds that continued to worsen. She is unsure if there was any discharge but does complain of progressive pain in that area. She states her entire right leg is red and painful. She denies any fevers or chills or other symptoms. She states she feels a bit more short of breath that normal but states "I never had any lung issues". She denies any other issues and states "I am generally very healthy".     In the ED was noted to have worsening wounds with purulent drainage on right leg. Tachycardic to the 120s, received coreg 6.25 and vancomycin. Admitted for cellulitis.    Overview/Hospital Course:  Vascular surgery consulted for BLLE arterial ultrasound shows significant arterial stenosis/occlusion in both lower extremities. Treating for cellulitis with plans to transition to PO on discharge. Respiratory status improving with initiation of nebulizers. Vasc planning on angio 08/28. F/u TTE.    Interval History: Overnight was given hydralazine for elevated BP, then required IV metoprolol for elevated rate in the setting of known Afib. Likely reflexive tachycardia from hydralazine. Will avoid. Pt pending angio tomorrow. NPO at [facility]. Given " one dose of LVNX for AC for Afib pending procedure.     Review of Systems  Objective:     Vital Signs (Most Recent):  Temp: 99.2 °F (37.3 °C) (08/28/24 1141)  Pulse: 89 (08/28/24 1508)  Resp: 19 (08/28/24 1508)  BP: (!) 116/56 (08/28/24 1141)  SpO2: 95 % (08/28/24 1508) Vital Signs (24h Range):  Temp:  [97.3 °F (36.3 °C)-99.2 °F (37.3 °C)] 99.2 °F (37.3 °C)  Pulse:  [] 89  Resp:  [16-20] 19  SpO2:  [87 %-97 %] 95 %  BP: (116-210)/() 116/56     Weight: 52.2 kg (115 lb)  Body mass index is 20.37 kg/m².    Intake/Output Summary (Last 24 hours) at 8/28/2024 1643  Last data filed at 8/28/2024 0458  Gross per 24 hour   Intake --   Output 250 ml   Net -250 ml         Physical Exam  HENT:      Head: Normocephalic.      Nose: Nose normal.      Mouth/Throat:      Mouth: Mucous membranes are moist.   Eyes:      Pupils: Pupils are equal, round, and reactive to light.   Cardiovascular:      Rate and Rhythm: Normal rate.      Comments: Diminished DP pulses bilateral LE  Pulmonary:      Effort: Pulmonary effort is normal.      Breath sounds: Wheezing present.   Abdominal:      General: Abdomen is flat.      Tenderness: There is no abdominal tenderness.   Musculoskeletal:         General: Normal range of motion.      Cervical back: Normal range of motion.   Skin:     General: Skin is warm.      Findings: Lesion (right leg, erythema to the right toes) present.   Neurological:      Mental Status: She is alert. Mental status is at baseline.      Comments: Left sided weakness             Significant Labs: All pertinent labs within the past 24 hours have been reviewed.    Significant Imaging: I have reviewed all pertinent imaging results/findings within the past 24 hours.    Assessment/Plan:      * PAD (peripheral artery disease)  BLLE arterial US demonstrates significant stenosis and occlusion    -Vascular surgery consulted; planning on angio 08/28 -- NPO at [facility]  -holding eloquis in anticipation of intervention  -F/u  ABIs  -F/u PVR  -F/u CTA with runoff; indicating severity of PAD    Cellulitis of right lower extremity  This is a 78 year old female with a history of stroke, PVD, LE edema, Afib who presents for worsening right LE pain concerning for worsening cellulitis. She initially injured her right leg scraping it against her bed. Her wounds have since worsened and were noted to have purulence in the ED. Xray of her tib-fib without obvious osteomyelitis. Her CRP was normal. She was started on vancomycin in the ED and admitted.     - discontinue vancomycin, start ceftriaxone, transition to PO cefpodoxime on discharge  - Wound care consulted  - She had diminished pulses in bilateral legs, will evaluate with ANDREW  - Continue home pentoxifylline   - Fall precautions         Persistent atrial fibrillation  Noted history of Afib s/p successful cardioversion in March of 2023. She has since converted back to Afib RVR, unclear when. Possibly worse in the setting of sepsis and cellulitis. [id number] of 8.     - Continue home diltiazem ER 120mg  - Continue coreg 6.25 given in the ED, can uptitrate to home 25mg if not hypotensive; transitioned to metoprolol  - Eliquis 5 bid held for angio 08/28; given Lovenox today once  - avoid hydralazine due to reflex tachycardia      Chronic hypercapnic respiratory failure  Chronic hypercapnia in setting of COPD. Blood gas with chronic compensation. Currently at baseline mental status  - patient does not use home NIPPV. Can consider QHS bipap if more lethargic or worsening blood gas    Chronic obstructive pulmonary emphysema  Patient has a reported history of COPD, no PFTs on record. She does have emphysema on CT chest from prior. She is on daily budesonide inhalation treatment at rehab. She does have some wheezing on exam today. Blood gas with pH 7.31, CO2 58.8, HCO3 29.6.     - Will continue budesonide daily treatments  - Duo-nebs q6h awake for now as well given wheezing  - Keep O2 sat 88% and  above    Depression  Continue home duloxetine and trazodone.    Osteoporosis  History of osteoporosis.  - On Alendronate at rehab. Continue on discharge.      History of CVA (cerebrovascular accident)  W/ residual aphasia and left sided hemiparesis. Continue statin. On eliquis.       Iron deficiency anemia  Chronic RISSA, on iron tablets at home.   - Held in setting of sepsis, cellulitis  - Resume when appropriate    Memory impairment  Poor historian at baseline. Noted history of memory impairment in 2018.       Left hemiparesis  S/p CVA in 2018, was at rehab prior to this admission.  - PT/OT   - Fall precautions      History of tobacco use  Per patient she was a lifelong smoker until around 10 years ago. Denies current tobacco use.      Hyperlipidemia  - Continue home atorvastatin 80        VTE Risk Mitigation (From admission, onward)           Ordered     enoxaparin injection 50 mg  Every 12 hours         08/28/24 1508     IP VTE HIGH RISK PATIENT  Once         08/26/24 2324     Place sequential compression device  Until discontinued         08/26/24 2324                    Discharge Planning   ROSA: 8/30/2024     Code Status: Full Code   Is the patient medically ready for discharge?:     Reason for patient still in hospital (select all that apply): Patient trending condition, Treatment, and Consult recommendations                     Madiha Amin MD  Department of Hospital Medicine   [facility]

## 2024-08-28 NOTE — ANESTHESIA PREPROCEDURE EVALUATION
"[facility]  Anesthesia Pre-Operative Evaluation    Patient Name: Maria A Smith  YOB: 1945  MRN: [id number]    SUBJECTIVE:   08/28/2024    Pre-operative evaluation for Procedure(s) (LRB):  Angiogram Extremity Unilateral (Right)    Maira A Smith is a 78 y.o. 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 Patient now presents for the above procedure(s).    [place] [facility] [place] review- "Patient had a colonoscopy but required intubation and was transferred to [facility]."    LDA       Peripheral IV - Single Lumen 08/27/24 1115 20 G 1 in No Anterior;Right Forearm (Active)   Site Assessment No swelling;No redness 08/28/24 0703   Extremity Assessment Distal to IV No abnormal discoloration;No redness;No swelling;No warmth 08/28/24 0703   Line Status Saline locked 08/28/24 0703   Dressing Status Clean;Dry;Intact 08/28/24 0703   Dressing Intervention Integrity maintained 08/28/24 0400   Number of days: 0     Transthoracic Echo:  Results for orders placed during the hospital encounter of 03/21/23    Echo    Interpretation Summary  · Irregularly irregular rhythm and tachycardia were present during the study.  · Atrial fibrillation observed.  · Mild left atrial enlargement.  · The left ventricle is normal in size with concentric remodeling and normal systolic function.  · Normal right ventricular size with normal right ventricular systolic function.  · The estimated ejection fraction is 65%.  · Mild mitral regurgitation.  · Normal central venous pressure (3 mmHg).    Patient Active Problem List   Diagnosis    Essential hypertension    Squamous cell carcinoma    Hyperlipidemia    History of tobacco use    Left hemiparesis    Memory impairment    Cerebrovascular " accident (CVA)    Iron deficiency anemia    Elevated brain natriuretic peptide (BNP) level    Polyp of colon    Chronic post-traumatic stress disorder    History of CVA (cerebrovascular accident)    Late effects of cerebral ischemic stroke    Nicotine dependence    Osteopenia    Osteoporosis    Persistent atrial fibrillation    Hydronephrosis    Chronic obstructive pulmonary emphysema    Acute cystitis with hematuria    Assistance needed with transportation    Cellulitis of right lower extremity    Chronic hypercapnic respiratory failure    Depression    Peripheral artery disease    Malnutrition     Review of patient's allergies indicates:  No Known Allergies    Current Outpatient Medications   Medication Instructions    acetaminophen (TYLENOL) 500 mg, Oral, Every 6 hours PRN    alendronate (FOSAMAX) 70 mg, Oral, Every 7 days, Give every Thursday    apixaban (ELIQUIS) 5 mg, Oral, 2 times daily    atorvastatin (LIPITOR) 80 mg, Oral, Daily    budesonide (PULMICORT) 0.5 mg, Nebulization, Daily, Inhale 1 vial one time a day for emphysema    bumetanide (BUMEX) 1 mg, Oral, Daily    calcium-vitamin D3 (OS-ANNA 500 + D3) 500 mg-5 mcg (200 unit) per tablet 1 tablet, Oral, Daily    carvediloL (COREG) 25 mg, Oral, 2 times daily    diltiaZEM HCl (TIAZAC) 120 mg, Oral, Daily    DULoxetine (CYMBALTA) 60 mg, Oral, Daily    ferrous sulfate 325 mg, Oral, 2 times daily    lisinopriL (PRINIVIL,ZESTRIL) 40 mg, Oral, Daily    meloxicam (MOBIC) 15 mg, Oral, Daily    multivitamin (ONE DAILY MULTIVITAMIN) per tablet 1 tablet, Oral, Daily    pantoprazole (PROTONIX) 40 mg, Oral, Daily    pentoxifylline (TRENTAL) 400 mg, Oral, 3 times daily with meals    traZODone (DESYREL) 100 mg, Oral, Nightly       Past Surgical History:   Procedure Laterality Date    APPENDECTOMY      COLONOSCOPY N/A 8/7/2019    Procedure: COLONOSCOPY;  Surgeon: Alex Degroot MD;  Location: [facility];  Service: Endoscopy;  Laterality: N/A;    COLONOSCOPY N/A 2/12/2020     "Procedure: COLONOSCOPY;  Surgeon: Evelio Rizvi MD;  Location: [facility] ENDO;  Service: Endoscopy;  Laterality: N/A;    ESOPHAGOGASTRODUODENOSCOPY N/A 2/11/2020    Procedure: EGD (ESOPHAGOGASTRODUODENOSCOPY);  Surgeon: Evelio Rizvi MD;  Location: [facility] ENDO;  Service: Endoscopy;  Laterality: N/A;    HYSTERECTOMY      TRANSESOPHAGEAL ECHOCARDIOGRAPHY N/A 3/23/2023    Procedure: ECHOCARDIOGRAM, TRANSESOPHAGEAL;  Surgeon: [facility];  Location: [facility] EP LAB;  Service: Cardiology;  Laterality: N/A;    TREATMENT OF CARDIAC ARRHYTHMIA N/A 3/23/2023    Procedure: Cardioversion or Defibrillation;  Surgeon: ASHLEY Watson MD;  Location: [facility] EP LAB;  Service: Cardiology;  Laterality: N/A;  AF, CHERRIE/DCCV, ANES, EH, 1108       Social History     Substance and Sexual Activity   Drug Use No     Alcohol Use: Not At Risk (6/6/2023)    AUDIT-C     Frequency of Alcohol Consumption: Never     Average Number of Drinks: Patient does not drink     Frequency of Binge Drinking: Never     Tobacco Use: Medium Risk (8/26/2024)    Patient History     Smoking Tobacco Use: Former     Smokeless Tobacco Use: Never     Passive Exposure: Not on file       OBJECTIVE:     Vital Signs Range:      4/3/2023     1:00 PM 4/25/2023     2:12 PM 8/26/2024     5:30 PM   Vitals - 1 value per visit   SYSTOLIC 142  130   DIASTOLIC 80  82   Pulse 88 128 87   Temp   36.5 °C (97.7 °F)   Resp 18  16   SPO2 97 %  97 %   Weight (lb)  115.74 115   Weight (kg)  52.5 52.164   Height  5' 3" (1.6 m) 5' 3" (1.6 m)   BMI (Calculated)  20.5 20.4   Pain Score  Zero          CBC:   Lab Results   Component Value Date    WBC 8.69 08/28/2024    HGB 11.1 (L) 08/28/2024    HCT 35.6 (L) 08/28/2024    MCV 94 08/28/2024     08/28/2024         CMP:   Sodium   Date Value Ref Range Status   08/28/2024 139 136 - 145 mmol/L Final     Potassium   Date Value Ref Range Status   08/28/2024 4.0 3.5 - 5.1 mmol/L Final     Chloride   Date Value Ref Range Status "   08/28/2024 105 95 - 110 mmol/L Final     CO2   Date Value Ref Range Status   08/28/2024 24 23 - 29 mmol/L Final     Glucose   Date Value Ref Range Status   08/28/2024 94 70 - 110 mg/dL Final     BUN   Date Value Ref Range Status   08/28/2024 17 8 - 23 mg/dL Final     Creatinine   Date Value Ref Range Status   08/28/2024 0.8 0.5 - 1.4 mg/dL Final     Calcium   Date Value Ref Range Status   08/28/2024 9.7 8.7 - 10.5 mg/dL Final     Total Protein   Date Value Ref Range Status   08/27/2024 6.0 6.0 - 8.4 g/dL Final     Albumin   Date Value Ref Range Status   08/28/2024 3.0 (L) 3.5 - 5.2 g/dL Final     Total Bilirubin   Date Value Ref Range Status   08/27/2024 0.4 0.1 - 1.0 mg/dL Final     Comment:     For infants and newborns, interpretation of results should be based  on gestational age, weight and in agreement with clinical  observations.    Premature Infant recommended reference ranges:  Up to 24 hours.............<8.0 mg/dL  Up to 48 hours............<12.0 mg/dL  3-5 days..................<15.0 mg/dL  6-29 days.................<15.0 mg/dL       Alkaline Phosphatase   Date Value Ref Range Status   08/27/2024 82 55 - 135 U/L Final     AST   Date Value Ref Range Status   08/27/2024 16 10 - 40 U/L Final     ALT   Date Value Ref Range Status   08/27/2024 11 10 - 44 U/L Final     Anion Gap   Date Value Ref Range Status   08/28/2024 10 8 - 16 mmol/L Final     eGFR if    Date Value Ref Range Status   02/13/2020 >60 >60 mL/min/1.73 m^2 Final     eGFR if non    Date Value Ref Range Status   02/13/2020 >60 >60 mL/min/1.73 m^2 Final     Comment:     Calculation used to obtain the estimated glomerular filtration  rate (eGFR) is the CKD-EPI equation.          INR:  Lab Results   Component Value Date    INR 1.2 03/21/2023    INR 1.0 02/10/2020       Cardiac Studies    EKG:   Results for orders placed or performed during the hospital encounter of 08/26/24   EKG 12-lead    Collection Time: 08/28/24   4:24 AM   Result Value Ref Range    QRS Duration 116 ms    [facility] QTC Calculation 465 ms    Narrative    Test Reason : I48.19,    Vent. Rate : 129 BPM     Atrial Rate : 000 BPM     P-R Int : 000 ms          QRS Dur : 116 ms      QT Int : 318 ms       P-R-T Axes : 000 085 004 degrees     QTc Int : 465 ms    Atrial fibrillation with rapid ventricular response  Right bundle branch block  Abnormal ECG  When compared with ECG of 27-AUG-2024 17:42,  Non-specific change in ST segment in Inferior leads  ST now depressed in Anterior leads  Confirmed by Arash DOWNING MD ([id number]) on 8/28/2024 9:04:11 AM    Referred By: [email or web address]   SELF           Confirmed By:Arash DOWNING MD       Transthoracic Echo:  Results for orders placed during the hospital encounter of 03/21/23    Echo    Interpretation Summary  · Irregularly irregular rhythm and tachycardia were present during the study.  · Atrial fibrillation observed.  · Mild left atrial enlargement.  · The left ventricle is normal in size with concentric remodeling and normal systolic function.  · Normal right ventricular size with normal right ventricular systolic function.  · The estimated ejection fraction is 65%.  · Mild mitral regurgitation.  · Normal central venous pressure (3 mmHg).      Transesophageal Echo:  No results found for this or any previous visit.      Nuclear Stress Test:  No results found for this or any previous visit.      Stress Echo:  No results found for this or any previous visit.      Nuclear Stress Echo:  No results found for this or any previous visit.      Cardiac Catheterization:  No results found for this or any previous visit.      Cardiac Device Check:  No results found for this or any previous visit.      ASSESSMENT/PLAN:                                                                                                                08/28/2024  Maria A Smith is a 78 y.o., female.      Pre-op Assessment    I have reviewed the Patient Summary Reports.     I  have reviewed the Nursing Notes. I have reviewed the NPO Status.   I have reviewed the Medications.     Review of Systems  Anesthesia Hx:  No problems with previous Anesthesia   History of prior surgery of interest to airway management or planning:             Hematology/Oncology:  Hematology Normal   Oncology Normal                Hematology Comments: On eliquis for afib, last took it Sept 6.                    Cardiovascular:     Denies Pacemaker. Hypertension, well controlled   Denies MI.     Denies CABG/stent. Dysrhythmias atrial fibrillation  Denies Angina.    PVD hyperlipidemia                             Pulmonary:   COPD, moderate  Denies Asthma.  Shortness of breath   Chronic hypoxic respiratory failure,, No inhalers, oxygen frequently.  SOB after 300 feet.               Renal/:   Denies Chronic Renal Disease.                Hepatic/GI:      Denies Liver Disease.            Neurological:  Denies TIA. CVA (2016, not much symptoms.), residual symptoms    Denies Seizures.                                Endocrine:  Denies Diabetes.               Physical Exam  General: Well nourished, Cooperative and Alert    Airway:  Mallampati: III / II  Mouth Opening: Normal  TM Distance: Normal  Tongue: Normal  Neck ROM: Normal ROM    Dental:  Dentures    Chest/Lungs:  Normal Respiratory Rate    Heart:  Rate: Normal        Anesthesia Plan  Type of Anesthesia, risks & benefits discussed:    Anesthesia Type: MAC, Gen Natural Airway, Gen ETT, Gen Supraglottic Airway  Intra-op Monitoring Plan: Standard ASA Monitors  Post Op Pain Control Plan: multimodal analgesia  Induction:  IV  Airway Plan: Direct, Post-Induction  Informed Consent: Informed consent signed with the Patient representative and all parties understand the risks and agree with anesthesia plan.  All questions answered.   ASA Score: 3  Day of Surgery Review of History & Physical: H&P Update referred to the surgeon/provider.    Ready For Surgery From Anesthesia  Perspective.     .

## 2024-08-28 NOTE — SUBJECTIVE & OBJECTIVE
Interval History: Overnight was given hydralazine for elevated BP, then required IV metoprolol for elevated rate in the setting of known Afib. Likely reflexive tachycardia from hydralazine. Will avoid. Pt pending angio tomorrow. NPO at MN. Given one dose of LVNX for AC for Afib pending procedure.     Review of Systems  Objective:     Vital Signs (Most Recent):  Temp: 99.2 °F (37.3 °C) (08/28/24 1141)  Pulse: 89 (08/28/24 1508)  Resp: 19 (08/28/24 1508)  BP: (!) 116/56 (08/28/24 1141)  SpO2: 95 % (08/28/24 1508) Vital Signs (24h Range):  Temp:  [97.3 °F (36.3 °C)-99.2 °F (37.3 °C)] 99.2 °F (37.3 °C)  Pulse:  [] 89  Resp:  [16-20] 19  SpO2:  [87 %-97 %] 95 %  BP: (116-210)/() 116/56     Weight: 52.2 kg (115 lb)  Body mass index is 20.37 kg/m².    Intake/Output Summary (Last 24 hours) at 8/28/2024 1643  Last data filed at 8/28/2024 0458  Gross per 24 hour   Intake --   Output 250 ml   Net -250 ml         Physical Exam  HENT:      Head: Normocephalic.      Nose: Nose normal.      Mouth/Throat:      Mouth: Mucous membranes are moist.   Eyes:      Pupils: Pupils are equal, round, and reactive to light.   Cardiovascular:      Rate and Rhythm: Normal rate.      Comments: Diminished DP pulses bilateral LE  Pulmonary:      Effort: Pulmonary effort is normal.      Breath sounds: Wheezing present.   Abdominal:      General: Abdomen is flat.      Tenderness: There is no abdominal tenderness.   Musculoskeletal:         General: Normal range of motion.      Cervical back: Normal range of motion.   Skin:     General: Skin is warm.      Findings: Lesion (right leg, erythema to the right toes) present.   Neurological:      Mental Status: She is alert. Mental status is at baseline.      Comments: Left sided weakness             Significant Labs: All pertinent labs within the past 24 hours have been reviewed.    Significant Imaging: I have reviewed all pertinent imaging results/findings within the past 24 hours.

## 2024-08-28 NOTE — NURSING
Received call from monitor tech that pt heart rate went up to 170, afib. Its all over the place anywhere from 112- 160, 130's-140's. Bp is also elevated. 210/101 auto and 190/90 nicole. Will  give hydralazine as ordered. Messaged on call md to inform. She messaged me back that she wasn't caring for this pt. Md on call paged and informed of above. He ordered a EKG and stated he was coming to assess the pt.

## 2024-08-29 ENCOUNTER — ANESTHESIA (OUTPATIENT)
Dept: SURGERY | Facility: HOSPITAL | Age: 79
End: 2024-08-29
Payer: MEDICARE

## 2024-08-29 ENCOUNTER — TELEPHONE (OUTPATIENT)
Dept: VASCULAR SURGERY | Facility: CLINIC | Age: 79
End: 2024-08-29
Payer: MEDICARE

## 2024-08-29 VITALS
DIASTOLIC BLOOD PRESSURE: 82 MMHG | HEART RATE: 77 BPM | BODY MASS INDEX: 20.38 KG/M2 | TEMPERATURE: 97 F | WEIGHT: 115 LBS | HEIGHT: 63 IN | RESPIRATION RATE: 16 BRPM | SYSTOLIC BLOOD PRESSURE: 141 MMHG | OXYGEN SATURATION: 91 %

## 2024-08-29 LAB
ALBUMIN SERPL BCP-MCNC: 2.6 G/DL (ref 3.5–5.2)
ANION GAP SERPL CALC-SCNC: 8 MMOL/L (ref 8–16)
BASOPHILS # BLD AUTO: 0.02 K/UL (ref 0–0.2)
BASOPHILS NFR BLD: 0.3 % (ref 0–1.9)
BUN SERPL-MCNC: 16 MG/DL (ref 8–23)
CALCIUM SERPL-MCNC: 9.4 MG/DL (ref 8.7–10.5)
CHLORIDE SERPL-SCNC: 105 MMOL/L (ref 95–110)
CO2 SERPL-SCNC: 26 MMOL/L (ref 23–29)
CREAT SERPL-MCNC: 0.8 MG/DL (ref 0.5–1.4)
DIFFERENTIAL METHOD BLD: ABNORMAL
EOSINOPHIL # BLD AUTO: 0.2 K/UL (ref 0–0.5)
EOSINOPHIL NFR BLD: 2.7 % (ref 0–8)
ERYTHROCYTE [DISTWIDTH] IN BLOOD BY AUTOMATED COUNT: 15.1 % (ref 11.5–14.5)
EST. GFR  (NO RACE VARIABLE): >60 ML/MIN/1.73 M^2
GLUCOSE SERPL-MCNC: 101 MG/DL (ref 70–110)
HCT VFR BLD AUTO: 33 % (ref 37–48.5)
HGB BLD-MCNC: 10.4 G/DL (ref 12–16)
IMM GRANULOCYTES # BLD AUTO: 0.02 K/UL (ref 0–0.04)
IMM GRANULOCYTES NFR BLD AUTO: 0.3 % (ref 0–0.5)
LYMPHOCYTES # BLD AUTO: 0.8 K/UL (ref 1–4.8)
LYMPHOCYTES NFR BLD: 12.7 % (ref 18–48)
MAGNESIUM SERPL-MCNC: 2.4 MG/DL (ref 1.6–2.6)
MCH RBC QN AUTO: 29.6 PG (ref 27–31)
MCHC RBC AUTO-ENTMCNC: 31.5 G/DL (ref 32–36)
MCV RBC AUTO: 94 FL (ref 82–98)
MONOCYTES # BLD AUTO: 0.6 K/UL (ref 0.3–1)
MONOCYTES NFR BLD: 8.8 % (ref 4–15)
NEUTROPHILS # BLD AUTO: 4.8 K/UL (ref 1.8–7.7)
NEUTROPHILS NFR BLD: 75.2 % (ref 38–73)
NRBC BLD-RTO: 0 /100 WBC
PHOSPHATE SERPL-MCNC: 3.4 MG/DL (ref 2.7–4.5)
PLATELET # BLD AUTO: 147 K/UL (ref 150–450)
PMV BLD AUTO: 10.8 FL (ref 9.2–12.9)
POTASSIUM SERPL-SCNC: 3.8 MMOL/L (ref 3.5–5.1)
RBC # BLD AUTO: 3.51 M/UL (ref 4–5.4)
SODIUM SERPL-SCNC: 139 MMOL/L (ref 136–145)
WBC # BLD AUTO: 6.39 K/UL (ref 3.9–12.7)

## 2024-08-29 PROCEDURE — 25000003 PHARM REV CODE 250: Performed by: STUDENT IN AN ORGANIZED HEALTH CARE EDUCATION/TRAINING PROGRAM

## 2024-08-29 PROCEDURE — 63600175 PHARM REV CODE 636 W HCPCS: Performed by: STUDENT IN AN ORGANIZED HEALTH CARE EDUCATION/TRAINING PROGRAM

## 2024-08-29 PROCEDURE — 85025 COMPLETE CBC W/AUTO DIFF WBC: CPT | Performed by: STUDENT IN AN ORGANIZED HEALTH CARE EDUCATION/TRAINING PROGRAM

## 2024-08-29 PROCEDURE — 25000003 PHARM REV CODE 250

## 2024-08-29 PROCEDURE — 97116 GAIT TRAINING THERAPY: CPT

## 2024-08-29 PROCEDURE — 83735 ASSAY OF MAGNESIUM: CPT | Performed by: STUDENT IN AN ORGANIZED HEALTH CARE EDUCATION/TRAINING PROGRAM

## 2024-08-29 PROCEDURE — 27000221 HC OXYGEN, UP TO 24 HOURS

## 2024-08-29 PROCEDURE — 80069 RENAL FUNCTION PANEL: CPT | Performed by: STUDENT IN AN ORGANIZED HEALTH CARE EDUCATION/TRAINING PROGRAM

## 2024-08-29 PROCEDURE — 99900035 HC TECH TIME PER 15 MIN (STAT)

## 2024-08-29 PROCEDURE — 94761 N-INVAS EAR/PLS OXIMETRY MLT: CPT

## 2024-08-29 PROCEDURE — 36415 COLL VENOUS BLD VENIPUNCTURE: CPT | Performed by: STUDENT IN AN ORGANIZED HEALTH CARE EDUCATION/TRAINING PROGRAM

## 2024-08-29 RX ORDER — ENOXAPARIN SODIUM 100 MG/ML
80 INJECTION SUBCUTANEOUS DAILY
Start: 2024-08-29 | End: 2024-08-29

## 2024-08-29 RX ORDER — BUMETANIDE 1 MG/1
1 TABLET ORAL
Start: 2024-08-30

## 2024-08-29 RX ORDER — METOPROLOL TARTRATE 50 MG/1
50 TABLET ORAL 3 TIMES DAILY
Status: DISCONTINUED | OUTPATIENT
Start: 2024-08-29 | End: 2024-08-29 | Stop reason: HOSPADM

## 2024-08-29 RX ORDER — POLYETHYLENE GLYCOL 3350 17 G/17G
17 POWDER, FOR SOLUTION ORAL DAILY
Start: 2024-08-30

## 2024-08-29 RX ORDER — METOPROLOL SUCCINATE 50 MG/1
75 TABLET, EXTENDED RELEASE ORAL DAILY
Start: 2024-08-29

## 2024-08-29 RX ORDER — POTASSIUM CHLORIDE 20 MEQ/1
20 TABLET, EXTENDED RELEASE ORAL ONCE
Status: COMPLETED | OUTPATIENT
Start: 2024-08-29 | End: 2024-08-29

## 2024-08-29 RX ORDER — ENOXAPARIN SODIUM 100 MG/ML
80 INJECTION SUBCUTANEOUS DAILY
Start: 2024-08-29 | End: 2024-09-12

## 2024-08-29 RX ORDER — ASPIRIN 81 MG/1
81 TABLET ORAL DAILY
Start: 2024-08-30

## 2024-08-29 RX ORDER — DOXYCYCLINE 100 MG/1
100 CAPSULE ORAL EVERY 12 HOURS
Start: 2024-08-29 | End: 2024-09-12

## 2024-08-29 RX ORDER — DOXYCYCLINE 100 MG/1
100 CAPSULE ORAL EVERY 12 HOURS
Qty: 28 CAPSULE | Refills: 0 | Status: SHIPPED | OUTPATIENT
Start: 2024-08-29 | End: 2024-08-29

## 2024-08-29 RX ORDER — SENNOSIDES 8.6 MG/1
1 TABLET ORAL DAILY
Start: 2024-08-30

## 2024-08-29 RX ORDER — OXYCODONE HYDROCHLORIDE 5 MG/1
5 TABLET ORAL EVERY 6 HOURS PRN
Qty: 28 TABLET | Refills: 0 | Status: SHIPPED | OUTPATIENT
Start: 2024-08-29

## 2024-08-29 RX ORDER — ENOXAPARIN SODIUM 100 MG/ML
50 INJECTION SUBCUTANEOUS EVERY 12 HOURS
Status: DISCONTINUED | OUTPATIENT
Start: 2024-08-29 | End: 2024-08-29 | Stop reason: HOSPADM

## 2024-08-29 RX ADMIN — ATORVASTATIN CALCIUM 80 MG: 40 TABLET, FILM COATED ORAL at 08:08

## 2024-08-29 RX ADMIN — METOPROLOL TARTRATE 50 MG: 50 TABLET, FILM COATED ORAL at 02:08

## 2024-08-29 RX ADMIN — OXYCODONE HYDROCHLORIDE 10 MG: 10 TABLET ORAL at 08:08

## 2024-08-29 RX ADMIN — PENTOXIFYLLINE 400 MG: 400 TABLET, EXTENDED RELEASE ORAL at 04:08

## 2024-08-29 RX ADMIN — METOPROLOL TARTRATE 50 MG: 50 TABLET, FILM COATED ORAL at 08:08

## 2024-08-29 RX ADMIN — PENTOXIFYLLINE 400 MG: 400 TABLET, EXTENDED RELEASE ORAL at 08:08

## 2024-08-29 RX ADMIN — DILTIAZEM HYDROCHLORIDE 120 MG: 120 CAPSULE, COATED, EXTENDED RELEASE ORAL at 08:08

## 2024-08-29 RX ADMIN — OXYCODONE HYDROCHLORIDE 10 MG: 10 TABLET ORAL at 02:08

## 2024-08-29 RX ADMIN — ASPIRIN 81 MG: 81 TABLET, COATED ORAL at 08:08

## 2024-08-29 RX ADMIN — OXYCODONE HYDROCHLORIDE 10 MG: 10 TABLET ORAL at 04:08

## 2024-08-29 RX ADMIN — CEFTRIAXONE SODIUM 1 G: 1 INJECTION, POWDER, FOR SOLUTION INTRAMUSCULAR; INTRAVENOUS at 02:08

## 2024-08-29 RX ADMIN — ACETAMINOPHEN 1000 MG: 500 TABLET ORAL at 02:08

## 2024-08-29 RX ADMIN — PENTOXIFYLLINE 400 MG: 400 TABLET, EXTENDED RELEASE ORAL at 12:08

## 2024-08-29 RX ADMIN — PANTOPRAZOLE SODIUM 40 MG: 40 TABLET, DELAYED RELEASE ORAL at 08:08

## 2024-08-29 RX ADMIN — ACETAMINOPHEN 1000 MG: 500 TABLET ORAL at 05:08

## 2024-08-29 RX ADMIN — LOSARTAN POTASSIUM 25 MG: 25 TABLET, FILM COATED ORAL at 08:08

## 2024-08-29 RX ADMIN — POLYETHYLENE GLYCOL 3350 17 G: 17 POWDER, FOR SOLUTION ORAL at 08:08

## 2024-08-29 RX ADMIN — ENOXAPARIN SODIUM 50 MG: 60 INJECTION SUBCUTANEOUS at 12:08

## 2024-08-29 RX ADMIN — SENNOSIDES 8.6 MG: 8.6 TABLET, FILM COATED ORAL at 08:08

## 2024-08-29 RX ADMIN — POTASSIUM CHLORIDE 20 MEQ: 1500 TABLET, EXTENDED RELEASE ORAL at 08:08

## 2024-08-29 RX ADMIN — DULOXETINE HYDROCHLORIDE 60 MG: 60 CAPSULE, DELAYED RELEASE ORAL at 08:08

## 2024-08-29 NOTE — PROGRESS NOTES
[facility]  Vascular Surgery  Progress Note    Patient Name: Maria A Smith  MRN: [id number]  Admission Date: 8/26/2024  Primary Care Provider: Sneha López MD    Subjective:     Interval History: NOEON. Plan for OR tomorrow for angiogram. Keep NPO at midnight    Post-Op Info:  Procedure(s) (LRB):  Angiogram Extremity Unilateral (Right)         Medications:  Continuous Infusions:  Scheduled Meds:   acetaminophen  1,000 mg Oral Q8H    [START ON 8/29/2024] aspirin  81 mg Oral Daily    atorvastatin  80 mg Oral Daily    budesonide  0.5 mg Nebulization Daily    [START ON 8/29/2024] cefTRIAXone (Rocephin) IV (PEDS and ADULTS)  1 g Intravenous Q24H    diltiaZEM  120 mg Oral Daily    DULoxetine  60 mg Oral Daily    ipratropium  0.5 mg Nebulization Q6H WAKE    losartan  25 mg Oral Daily    metoprolol tartrate  50 mg Oral BID    pantoprazole  40 mg Oral Daily    pentoxifylline  400 mg Oral TID WM    polyethylene glycol  17 g Oral Daily    senna  8.6 mg Oral Daily    traZODone  100 mg Oral QHS     PRN Meds:  Current Facility-Administered Medications:     dextrose 10%, 12.5 g, Intravenous, PRN    dextrose 10%, 25 g, Intravenous, PRN    glucagon (human recombinant), 1 mg, Intramuscular, PRN    glucose, 16 g, Oral, PRN    glucose, 24 g, Oral, PRN    naloxone, 0.02 mg, Intravenous, PRN    oxyCODONE, 5 mg, Oral, Q6H PRN    oxyCODONE, 10 mg, Oral, Q6H PRN    sodium chloride 0.9%, 10 mL, Intravenous, Q12H PRN     Objective:     Vital Signs (Most Recent):  Temp: 99 °F (37.2 °C) (08/28/24 1600)  Pulse: 98 (08/28/24 1903)  Resp: 18 (08/28/24 1903)  BP: 129/69 (08/28/24 1600)  SpO2: 95 % (08/28/24 1903) Vital Signs (24h Range):  Temp:  [97.3 °F (36.3 °C)-99.2 °F (37.3 °C)] 99 °F (37.2 °C)  Pulse:  [] 98  Resp:  [16-20] 18  SpO2:  [87 %-99 %] 95 %  BP: (116-210)/() 129/69     Date 08/28/24 0700 - 08/29/24 0659   Shift 0236-4329 1680-5879 8571-5145 24 Hour Total   INTAKE   Shift Total(mL/kg)       OUTPUT    Urine(mL/kg/hr)  200  200   Shift Total(mL/kg)  200(3.8)  200(3.8)   Weight (kg) 52.2 52.2 52.2 52.2        Physical Exam  Vitals and nursing note reviewed.   Constitutional:       General: She is not in acute distress.     Appearance: She is not ill-appearing or toxic-appearing.   Cardiovascular:      Rate and Rhythm: Normal rate.      Comments: Palpable femoral pulses   Monophasic right DP and PT  Right DP monophasic   Pulmonary:      Effort: Pulmonary effort is normal.   Musculoskeletal:      Right lower leg: No edema.      Left lower leg: No edema.   Skin:     Comments: Right lower extremity lesion   Neurological:      Mental Status: She is alert.      Significant Labs:  All pertinent labs from the last 24 hours have been reviewed.    Significant Diagnostics:  I have reviewed and interpreted all pertinent imaging results/findings within the past 24 hours.  Assessment/Plan:     Cellulitis of right lower extremity  Maria A Smith is a 78 year old female with a pertinent PMH of HTN, Paroxysmal Afib, HLD, CVA w/ L sided weakness, prior tobacco use disorder, PVD, COPD. Vascular surgery team consulted due to non-healing RLE wound and RLE u/s findings of hemodynamically significant arterial stenosis/occlusion in both lower extremities.    Plan:  -OR tomorrow for RLE angiogram with possible intervention  -Consented and marked  -NPO at midnight          Minh Cramer MD  Vascular Surgery  [facility] Surg

## 2024-08-29 NOTE — TELEPHONE ENCOUNTER
Attempted to contact pt to reschedule RLE angiogram to Tuesday 9/3/24 as requested by Dr. Thakur. Voice message left for pt requesting return call to reschedule.----- Message from Meagan Miller NP sent at 8/29/2024 10:13 AM CDT -----  RLE Angio next week

## 2024-08-29 NOTE — SUBJECTIVE & OBJECTIVE
Medications:  Continuous Infusions:  Scheduled Meds:   acetaminophen  1,000 mg Oral Q8H    aspirin  81 mg Oral Daily    atorvastatin  80 mg Oral Daily    budesonide  0.5 mg Nebulization Daily    cefTRIAXone (Rocephin) IV (PEDS and ADULTS)  1 g Intravenous Q24H    diltiaZEM  120 mg Oral Daily    DULoxetine  60 mg Oral Daily    ipratropium  0.5 mg Nebulization Q6H WAKE    losartan  25 mg Oral Daily    metoprolol tartrate  50 mg Oral BID    pantoprazole  40 mg Oral Daily    pentoxifylline  400 mg Oral TID WM    polyethylene glycol  17 g Oral Daily    potassium chloride  20 mEq Oral Once    senna  8.6 mg Oral Daily    traZODone  100 mg Oral QHS     PRN Meds:  Current Facility-Administered Medications:     dextrose 10%, 12.5 g, Intravenous, PRN    dextrose 10%, 25 g, Intravenous, PRN    glucagon (human recombinant), 1 mg, Intramuscular, PRN    glucose, 16 g, Oral, PRN    glucose, 24 g, Oral, PRN    naloxone, 0.02 mg, Intravenous, PRN    oxyCODONE, 5 mg, Oral, Q6H PRN    oxyCODONE, 10 mg, Oral, Q6H PRN    sodium chloride 0.9%, 10 mL, Intravenous, Q12H PRN     Objective:     Vital Signs (Most Recent):  Temp: 97.2 °F (36.2 °C) (08/29/24 0554)  Pulse: 87 (08/29/24 0358)  Resp: 18 (08/29/24 0358)  BP: (!) 145/75 (08/29/24 0358)  SpO2: (!) 90 % (08/29/24 0358) Vital Signs (24h Range):  Temp:  [97.2 °F (36.2 °C)-99.2 °F (37.3 °C)] 97.2 °F (36.2 °C)  Pulse:  [] 87  Resp:  [16-19] 18  SpO2:  [89 %-99 %] 90 %  BP: (116-156)/(56-83) 145/75          Physical Exam  Vitals and nursing note reviewed.   Constitutional:       General: She is not in acute distress.     Appearance: She is not ill-appearing or toxic-appearing.   Cardiovascular:      Rate and Rhythm: Normal rate.      Comments: Palpable femoral pulses   No doppler signals detected in left DP or PT  Right DP monophasic   Pulmonary:      Effort: Pulmonary effort is normal.   Musculoskeletal:      Right lower leg: No edema.      Left lower leg: No edema.   Skin:      Comments: Right lower extremity lesion   Neurological:      Mental Status: She is alert.          Significant Labs:  CBC:   Recent Labs   Lab 08/29/24  0335   WBC 6.39   RBC 3.51*   HGB 10.4*   HCT 33.0*   *   MCV 94   MCH 29.6   MCHC 31.5*     CMP:   Recent Labs   Lab 08/29/24  0335      CALCIUM 9.4   ALBUMIN 2.6*      K 3.8   CO2 26      BUN 16   CREATININE 0.8       Significant Diagnostics:  I have reviewed all pertinent imaging results/findings within the past 24 hours.

## 2024-08-29 NOTE — PROGRESS NOTES
[facility] S[facility]  Vascular Surgery  Progress Note    Patient Name: Maria A Smith  MRN: [id number]  Admission Date: 8/26/2024  Primary Care Provider: Sneha López MD    Subjective:     Interval History: Angio will be re-scheduled, okay for patient to eat.  Was denying rest pain this morning.     Post-Op Info:  Procedure(s) (LRB):  Angiogram Extremity Unilateral (Right)         Medications:  Continuous Infusions:  Scheduled Meds:   acetaminophen  1,000 mg Oral Q8H    aspirin  81 mg Oral Daily    atorvastatin  80 mg Oral Daily    budesonide  0.5 mg Nebulization Daily    cefTRIAXone (Rocephin) IV (PEDS and ADULTS)  1 g Intravenous Q24H    diltiaZEM  120 mg Oral Daily    DULoxetine  60 mg Oral Daily    ipratropium  0.5 mg Nebulization Q6H WAKE    losartan  25 mg Oral Daily    metoprolol tartrate  50 mg Oral BID    pantoprazole  40 mg Oral Daily    pentoxifylline  400 mg Oral TID WM    polyethylene glycol  17 g Oral Daily    potassium chloride  20 mEq Oral Once    senna  8.6 mg Oral Daily    traZODone  100 mg Oral QHS     PRN Meds:  Current Facility-Administered Medications:     dextrose 10%, 12.5 g, Intravenous, PRN    dextrose 10%, 25 g, Intravenous, PRN    glucagon (human recombinant), 1 mg, Intramuscular, PRN    glucose, 16 g, Oral, PRN    glucose, 24 g, Oral, PRN    naloxone, 0.02 mg, Intravenous, PRN    oxyCODONE, 5 mg, Oral, Q6H PRN    oxyCODONE, 10 mg, Oral, Q6H PRN    sodium chloride 0.9%, 10 mL, Intravenous, Q12H PRN     Objective:     Vital Signs (Most Recent):  Temp: 97.2 °F (36.2 °C) (08/29/24 0554)  Pulse: 87 (08/29/24 0358)  Resp: 18 (08/29/24 0358)  BP: (!) 145/75 (08/29/24 0358)  SpO2: (!) 90 % (08/29/24 0358) Vital Signs (24h Range):  Temp:  [97.2 °F (36.2 °C)-99.2 °F (37.3 °C)] 97.2 °F (36.2 °C)  Pulse:  [] 87  Resp:  [16-19] 18  SpO2:  [89 %-99 %] 90 %  BP: (116-156)/(56-83) 145/75          Physical Exam  Vitals and nursing note reviewed.   Constitutional:       General: She is not in  acute distress.     Appearance: She is not ill-appearing or toxic-appearing.   Cardiovascular:      Rate and Rhythm: Normal rate.      Comments: Palpable femoral pulses   No doppler signals detected in left DP or PT  Right DP monophasic   Pulmonary:      Effort: Pulmonary effort is normal.   Musculoskeletal:      Right lower leg: No edema.      Left lower leg: No edema.   Skin:     Comments: Right lower extremity lesion   Neurological:      Mental Status: She is alert.          Significant Labs:  CBC:   Recent Labs   Lab 08/29/24  0335   WBC 6.39   RBC 3.51*   HGB 10.4*   HCT 33.0*   *   MCV 94   MCH 29.6   MCHC 31.5*     CMP:   Recent Labs   Lab 08/29/24  0335      CALCIUM 9.4   ALBUMIN 2.6*      K 3.8   CO2 26      BUN 16   CREATININE 0.8       Significant Diagnostics:  I have reviewed all pertinent imaging results/findings within the past 24 hours.  Assessment/Plan:     Cellulitis of right lower extremity  Maria A Smith is a 78 year old female with a pertinent PMH of HTN, Paroxysmal Afib, HLD, CVA w/ L sided weakness, prior tobacco use disorder, PVD, COPD. Vascular surgery team consulted due to non-healing RLE wound and RLE u/s findings of hemodynamically significant arterial stenosis/occlusion in both lower extremities.    Plan:  - Case for today cancelled due to scheduling issue  - OK for diet from our perspective  - Will re-schedule ELIZABETH Miller NP  Vascular Surgery  [facility] - Med Surg

## 2024-08-29 NOTE — ASSESSMENT & PLAN NOTE
Maria A Smith is a 78 year old female with a pertinent PMH of HTN, Paroxysmal Afib, HLD, CVA w/ L sided weakness, prior tobacco use disorder, PVD, COPD. Vascular surgery team consulted due to non-healing RLE wound and RLE u/s findings of hemodynamically significant arterial stenosis/occlusion in both lower extremities.    Plan:  -OR tomorrow for RLE angiogram with possible intervention  -Consented and marked  -NPO at midnight

## 2024-08-29 NOTE — PT/OT/SLP PROGRESS
"Physical Therapy Treatment    Patient Name:  Maria A Smith   MRN:  [id number]    Recommendations:     Discharge Recommendations: Low Intensity Therapy  Discharge Equipment Recommendations: walker, rolling  Barriers to discharge: None    Assessment:     Maria A Smith is a 78 y.o. female admitted with a medical diagnosis of PAD (peripheral artery disease).  She presents with the following impairments/functional limitations: weakness, gait instability, impaired endurance, impaired functional mobility, impaired self care skills, pain, decreased safety awareness, decreased lower extremity function, impaired skin .Pt is unsafe with functional mobility at this time due to pt requires supervision for bed mobility, CGA for transfers, and CGA for gait due to pain with weight bearing on the R LE. Pt is motivated to progress with functional mobility.     Rehab Prognosis: Good; patient would benefit from acute skilled PT services to address these deficits and reach maximum level of function.    Recent Surgery: Procedure(s) (LRB):  Angiogram Extremity Unilateral (Right)      Plan:     During this hospitalization, patient to be seen 4 x/week to address the identified rehab impairments via gait training, therapeutic activities, therapeutic exercises, neuromuscular re-education and progress toward the following goals:    Plan of Care Expires:  09/26/24    Subjective   "I really don't want to walk but I will"    Pain/Comfort:  Pain Rating 1:  (pt c/o pain but did not grade)  Location - Side 1: Right  Location - Orientation 1: generalized  Location 1: foot  Pain Addressed 1: Reposition, Cessation of Activity  Pain Rating Post-Intervention 1: 10/10 (after return to supine)      Objective:     Communicated with nurse prior to session.  Patient found HOB elevated with oxygen, PureWick upon PT entry to room.     General Precautions: Standard, fall  Orthopedic Precautions: N/A  Braces: N/A  Respiratory Status: Nasal cannula, flow " 2.5 L/min     Functional Mobility:  Bed Mobility:     Supine to Sit: supervision  Sit to Supine: supervision  Transfers:     Sit to Stand:  contact guard assistance with rolling walker  Gait: 40ft then 25ft with RW with CGA. Pt performed gait with decreased weight bearing on the R LE due to pain but able to achieve foot flat towards end of gait trials. Pt rested in sitting between gait trials.    AM-PAC 6 CLICK MOBILITY  Turning over in bed (including adjusting bedclothes, sheets and blankets)?: 4  Sitting down on and standing up from a chair with arms (e.g., wheelchair, bedside commode, etc.): 3  Moving from lying on back to sitting on the side of the bed?: 4  Moving to and from a bed to a chair (including a wheelchair)?: 3  Need to walk in hospital room?: 3  Climbing 3-5 steps with a railing?: 2  Basic Mobility Total Score: 19     Patient left HOB elevated with all lines intact, call button in reach, and nurse notified..    GOALS:   Multidisciplinary Problems       Physical Therapy Goals          Problem: Physical Therapy    Goal Priority Disciplines Outcome Goal Variances Interventions   Physical Therapy Goal     PT, PT/OT Progressing     Description: PT goals until 9/7/24    1. Pt sit to supine with mod independent-not met  2. Pt sit to stand with RW with mod independent-not met  3. Pt to perform gait 100ft with RW with supervision.-not met  4. Pt to perform B LE exs in sitting or supine x 10 reps to strengthen B LE to improve functional mobility.-not met    DME Justifications (see above for complete DME recommendations)    Rolling Walker- Patient demonstrates a mobility limitation that significantly impairs their ability to participate in one or more mobility related activities of daily living. Patient's mobility limitation cannot be sufficiently resolved with the use of a cane, but can be sufficiently resolved with the use of a rolling walker.The use of a rolling walker will considerably improve their ability  to participate in MRADLs. Patient will use the walker on a regular basis at home.          .                       Time Tracking:     PT Received On: 08/29/24  PT Start Time: 0905     PT Stop Time: 0920  PT Total Time (min): 15 min     Billable Minutes: Gait Training 15    Treatment Type: Treatment  PT/PTA: PT           08/29/2024

## 2024-08-29 NOTE — DISCHARGE SUMMARY
"[facility]  Discharge Summary      Patient Name: Maria A Smith  MRN: [id number]  JASON: [id number]  Patient Class: IP- Inpatient  Admission Date: 8/26/2024  Hospital Length of Stay: 3 days  Discharge Date and Time:  08/29/2024 2:28 PM  Attending Physician: Joselin Grey MD   Discharging Provider: Audrey Gamboa MD  Primary Care Provider: Sneha López MD  [facility] Medicine Team: [facility] 2 Audrey Gamboa MD  Primary Care Team: [facility] 2    HPI:   Ms. Smith is a 78 year old with reported PMhx of HTN, Paroxysmal Afib, HLD, CVA w/ L sided weakness, prior tobacco use disorder, PVD, COPD who presents from [facility] rehab for right lower extremity wounds. Patient states she was sent to [facility] because her daughter thought she was unable to take care of herself at home. She states while at rehab her right leg was caught on the side of the bed and she developed wounds that continued to worsen. She is unsure if there was any discharge but does complain of progressive pain in that area. She states her entire right leg is red and painful. She denies any fevers or chills or other symptoms. She states she feels a bit more short of breath that normal but states "I never had any lung issues". She denies any other issues and states "I am generally very healthy".     In the ED was noted to have worsening wounds with purulent drainage on right leg. Tachycardic to the 120s, received coreg 6.25 and vancomycin. Admitted for cellulitis.    Procedure(s) (LRB):  Angiogram Extremity Unilateral (Right)      Hospital Course:   Vascular surgery consulted for BLLE arterial ultrasound shows significant arterial stenosis/occlusion in both lower extremities. Treating for cellulitis with plans to transition to PO on discharge. Respiratory status improving with initiation of nebulizers, will order home oxygen. Vasc planning on angio inpatient was cancelled on 8/29 and will have " it done in the outpatient setting. We will continue to hold Eliquis until the procedure and will do Lovenox in the meantime and will continue Eliquis after the procedure as appropriate. We will continue treating for cellulitis with doxycyline and wound care. We will continue with Metoprolol for afib control. We will resum oxygen via NC since patient failed her walking test and o2 sats been dropping while weaning off it.      Physical Exam  HENT:      Head: Normocephalic.      Nose: Nose normal.      Mouth/Throat:      Mouth: Mucous membranes are moist.   Eyes:      Pupils: Pupils are equal, round, and reactive to light.   Cardiovascular:      Rate and Rhythm: Normal rate.      Comments: Diminished DP pulses bilateral LE  Pulmonary:      Effort: Pulmonary effort is normal.      Breath sounds: Wheezing present.   Abdominal:      General: Abdomen is flat.      Tenderness: There is no abdominal tenderness.   Musculoskeletal:         General: Normal range of motion.      Cervical back: Normal range of motion.   Skin:     General: Skin is warm.      Findings: Lesion (right leg, erythema to the right toes) present.   Neurological:      Mental Status: She is alert. Mental status is at baseline.      Comments: Left sided weakness   Goals of Care Treatment Preferences:  Code Status: Full Code         Consults:   Consults (From admission, onward)          Status Ordering Provider     Inpatient consult to Vascular Surgery  Once        Provider:  (Not yet assigned)    Completed SAEED CALERO            No new Assessment & Plan notes have been filed under this hospital service since the last note was generated.  Service: Hospital Medicine    Final Active Diagnoses:    Diagnosis Date Noted POA    PRINCIPAL PROBLEM:  PAD (peripheral artery disease) [I73.9] 08/27/2024 Yes    Chronic hypercapnic respiratory failure [J96.12] 08/27/2024 Yes    Depression [F32.A] 08/27/2024 Yes    Cellulitis of right lower extremity  [L03.115] 08/26/2024 Yes    Chronic obstructive pulmonary emphysema [J43.9] 03/23/2023 Yes    Persistent atrial fibrillation [I48.19] 03/22/2023 Yes    History of CVA (cerebrovascular accident) [Z86.73] 03/10/2022 Not Applicable    Osteoporosis [M81.0] 02/02/2020 Yes    Iron deficiency anemia [D50.9] 08/24/2018 Yes    Memory impairment [R41.3] 06/29/2018 Yes    Left hemiparesis [G81.94] 06/29/2018 Yes    History of tobacco use [Z87.891] 06/29/2018 Not Applicable    Hyperlipidemia [E78.5] 11/16/2017 Yes      Problems Resolved During this Admission:    Diagnosis Date Noted Date Resolved POA    Anemia [D64.9] 11/22/2017 08/27/2024 Yes       Discharged Condition: stable    Disposition: [facility]*    Follow Up:   Follow-up Information       [facility] Follow up today.    Specialties: Nursing Home Agency, SNF Agency  Contact information:  [address] [place] [address] [phone]                           Patient Instructions:      Ambulatory referral/consult to Vascular Surgery   Standing Status: Future   Referral Priority: Routine Referral Type: Consultation   Referral Reason: Specialty Services Required   Requested Specialty: Vascular Surgery   Number of Visits Requested: 1     Ambulatory referral/consult to Internal Medicine   Standing Status: Future   Referral Priority: Routine Referral Type: Consultation   Referral Reason: Specialty Services Required   Requested Specialty: Internal Medicine   Number of Visits Requested: 1       Significant Diagnostic Studies: N/A    Pending Diagnostic Studies:       None           Medications:  Reconciled Home Medications:      Medication List        START taking these medications      aspirin 81 MG EC tablet  Commonly known as: ECOTRIN  Take 1 tablet (81 mg total) by mouth once daily.  Start taking on: August 30, 2024     doxycycline 100 MG Cap  Commonly known as: VIBRAMYCIN  Take 1 capsule (100 mg total) by mouth every 12 (twelve) hours. for 14  days     enoxaparin 80 mg/0.8 mL Syrg  Commonly known as: LOVENOX  Inject 0.8 mLs (80 mg total) into the skin once daily. for 14 days     metoprolol succinate 50 MG 24 hr tablet  Commonly known as: TOPROL-XL  Take 1.5 tablets (75 mg total) by mouth once daily.     oxyCODONE 5 MG immediate release tablet  Commonly known as: ROXICODONE  Take 1 tablet (5 mg total) by mouth every 6 (six) hours as needed for Pain.     polyethylene glycol 17 gram Pwpk  Commonly known as: GLYCOLAX  Take 17 g by mouth once daily.  Start taking on: August 30, 2024     senna 8.6 mg tablet  Commonly known as: SENOKOT  Take 1 tablet by mouth once daily.  Start taking on: August 30, 2024            CHANGE how you take these medications      apixaban 5 mg Tab  Commonly known as: ELIQUIS  Take 5 mg by mouth 2 (two) times daily.            CONTINUE taking these medications      acetaminophen 500 MG tablet  Commonly known as: TYLENOL  Take 500 mg by mouth every 6 (six) hours as needed for Pain.     alendronate 70 MG tablet  Commonly known as: FOSAMAX  Take 70 mg by mouth every 7 days. Give every Thursday     atorvastatin 80 MG tablet  Commonly known as: LIPITOR  Take 1 tablet (80 mg total) by mouth once daily.     budesonide 0.5 mg/2 mL nebulizer solution  Commonly known as: PULMICORT  Take 0.5 mg by nebulization once daily. Inhale 1 vial one time a day for emphysema     bumetanide 1 MG tablet  Commonly known as: BUMEX  Take 1 mg by mouth once daily.     calcium-vitamin D3 500 mg-5 mcg (200 unit) per tablet  Commonly known as: OS-ANNA 500 + D3  Take 1 tablet by mouth once daily.     diltiaZEM HCl 120 mg 24 hr capsule  Commonly known as: TIAZAC  Take 120 mg by mouth once daily.     DULoxetine 60 MG capsule  Commonly known as: CYMBALTA  Take 1 capsule (60 mg total) by mouth once daily.     ferrous sulfate 325 (65 FE) MG EC tablet  Take 325 mg by mouth 2 (two) times daily.     lisinopriL 40 MG tablet  Commonly known as: PRINIVIL,ZESTRIL  Take 1 tablet  (40 mg total) by mouth once daily.     ONE DAILY MULTIVITAMIN per tablet  Generic drug: multivitamin  Take 1 tablet by mouth once daily.     pantoprazole 40 MG tablet  Commonly known as: PROTONIX  Take 1 tablet (40 mg total) by mouth once daily.     pentoxifylline 400 mg Tbsr  Commonly known as: TRENTAL  Take 400 mg by mouth 3 (three) times daily with meals.     traZODone 100 MG tablet  Commonly known as: DESYREL  Take 100 mg by mouth every evening.            STOP taking these medications      carvediloL 25 MG tablet  Commonly known as: COREG     meloxicam 15 MG tablet  Commonly known as: MOBIC              Indwelling Lines/Drains at time of discharge:   Lines/Drains/Airways       Drain  Duration             Female External Urinary Catheter w/ Suction -- days                    Time spent on the discharge of patient: 35 minutes         Audrey Gamboa MD  Department of Hospital Medicine  [facility]

## 2024-08-29 NOTE — PROGRESS NOTES
Home Oxygen Evaluation    Date Performed: 8/29/2024    1) Patient's Home O2 Sat on room air, while at rest: 76%        If O2 sats on room air at rest are 88% or below, patient qualifies. No additional testing needed. Document N/A in steps 2 and 3. If 89% or above, complete steps 2.      2) Patient's O2 Sat on room air while exercising: n/a        If O2 sats on room air while exercising remain 89% or above patient does not qualify, no further testing needed Document N/A in step 3. If O2 sats on room air while exercising are 88% or below, continue to step 3.      3) Patient's O2 Sat while exercising on O2:  n/a         (Must show improvement from #2 for patients to qualify)    If O2 sats improve on oxygen, patient qualifies for portable oxygen. If not, the patient does not qualify.

## 2024-08-29 NOTE — PLAN OF CARE
Problem: Physical Therapy  Goal: Physical Therapy Goal  Description: PT goals until 9/7/24    1. Pt sit to supine with mod independent-not met  2. Pt sit to stand with RW with mod independent-not met  3. Pt to perform gait 100ft with RW with supervision.-not met  4. Pt to perform B LE exs in sitting or supine x 10 reps to strengthen B LE to improve functional mobility.-not met    DME Justifications (see above for complete DME recommendations)    Rolling Walker- Patient demonstrates a mobility limitation that significantly impairs their ability to participate in one or more mobility related activities of daily living. Patient's mobility limitation cannot be sufficiently resolved with the use of a cane, but can be sufficiently resolved with the use of a rolling walker.The use of a rolling walker will considerably improve their ability to participate in MRADLs. Patient will use the walker on a regular basis at home.    Outcome: Progressing   Pt's goals remain appropriate and pt will continue to benefit from skilled PT services to work towards improved functional mobility including: bed mobility, transfers, and gait. Sara Hong PT   8/29/2024

## 2024-08-29 NOTE — SUBJECTIVE & OBJECTIVE
Medications:  Continuous Infusions:  Scheduled Meds:   acetaminophen  1,000 mg Oral Q8H    [START ON 8/29/2024] aspirin  81 mg Oral Daily    atorvastatin  80 mg Oral Daily    budesonide  0.5 mg Nebulization Daily    [START ON 8/29/2024] cefTRIAXone (Rocephin) IV (PEDS and ADULTS)  1 g Intravenous Q24H    diltiaZEM  120 mg Oral Daily    DULoxetine  60 mg Oral Daily    ipratropium  0.5 mg Nebulization Q6H WAKE    losartan  25 mg Oral Daily    metoprolol tartrate  50 mg Oral BID    pantoprazole  40 mg Oral Daily    pentoxifylline  400 mg Oral TID WM    polyethylene glycol  17 g Oral Daily    senna  8.6 mg Oral Daily    traZODone  100 mg Oral QHS     PRN Meds:  Current Facility-Administered Medications:     dextrose 10%, 12.5 g, Intravenous, PRN    dextrose 10%, 25 g, Intravenous, PRN    glucagon (human recombinant), 1 mg, Intramuscular, PRN    glucose, 16 g, Oral, PRN    glucose, 24 g, Oral, PRN    naloxone, 0.02 mg, Intravenous, PRN    oxyCODONE, 5 mg, Oral, Q6H PRN    oxyCODONE, 10 mg, Oral, Q6H PRN    sodium chloride 0.9%, 10 mL, Intravenous, Q12H PRN     Objective:     Vital Signs (Most Recent):  Temp: 99 °F (37.2 °C) (08/28/24 1600)  Pulse: 98 (08/28/24 1903)  Resp: 18 (08/28/24 1903)  BP: 129/69 (08/28/24 1600)  SpO2: 95 % (08/28/24 1903) Vital Signs (24h Range):  Temp:  [97.3 °F (36.3 °C)-99.2 °F (37.3 °C)] 99 °F (37.2 °C)  Pulse:  [] 98  Resp:  [16-20] 18  SpO2:  [87 %-99 %] 95 %  BP: (116-210)/() 129/69     Date 08/28/24 0700 - 08/29/24 0659   Shift 0742-0447 8547-2408 2425-1230 24 Hour Total   INTAKE   Shift Total(mL/kg)       OUTPUT   Urine(mL/kg/hr)  200  200   Shift Total(mL/kg)  200(3.8)  200(3.8)   Weight (kg) 52.2 52.2 52.2 52.2        Physical Exam  Vitals and nursing note reviewed.   Constitutional:       General: She is not in acute distress.     Appearance: She is not ill-appearing or toxic-appearing.   Cardiovascular:      Rate and Rhythm: Normal rate.      Comments: Palpable femoral  pulses   Monophasic right DP and PT  Right DP monophasic   Pulmonary:      Effort: Pulmonary effort is normal.   Musculoskeletal:      Right lower leg: No edema.      Left lower leg: No edema.   Skin:     Comments: Right lower extremity lesion   Neurological:      Mental Status: She is alert.      Significant Labs:  All pertinent labs from the last 24 hours have been reviewed.    Significant Diagnostics:  I have reviewed and interpreted all pertinent imaging results/findings within the past 24 hours.

## 2024-08-29 NOTE — PLAN OF CARE
Pt is NPO since midnight. Angiogram scheduled for 1000 today.     Problem: Adult Inpatient Plan of Care  Goal: Plan of Care Review  Outcome: Progressing  Goal: Patient-Specific Goal (Individualized)  Outcome: Progressing  Goal: Absence of Hospital-Acquired Illness or Injury  Outcome: Progressing  Goal: Optimal Comfort and Wellbeing  Outcome: Progressing  Goal: Readiness for Transition of Care  Outcome: Progressing     Problem: Wound  Goal: Optimal Coping  Outcome: Progressing  Goal: Optimal Functional Ability  Outcome: Progressing  Goal: Absence of Infection Signs and Symptoms  Outcome: Progressing  Goal: Improved Oral Intake  Outcome: Progressing  Goal: Optimal Pain Control and Function  Outcome: Progressing  Goal: Skin Health and Integrity  Outcome: Progressing  Goal: Optimal Wound Healing  Outcome: Progressing     Problem: Skin Injury Risk Increased  Goal: Skin Health and Integrity  Outcome: Progressing

## 2024-08-29 NOTE — PLAN OF CARE
[facility]  Discharge Final Note    Primary Care Provider: Sneha López MD    Expected Discharge Date: 8/29/2024    Final Discharge Note (most recent)       Final Note - 08/29/24 1118          Final Note    Assessment Type Final Discharge Note     Anticipated Discharge Disposition [facility] Nursing Facility     Hospital Resources/Appts/Education Provided Appointments scheduled and added to AVS        Post-Acute Status    Post-Acute Authorization Placement     Post-Acute Placement Status Set-up Complete/Auth obtained     Discharge Delays None known at this time                     Important Message from Medicare             Contact Info       [facility]   Specialty: Nursing Home Agency, SNF Agency    [address] [address]   Phone: [phone]       Next Steps: Follow up today

## 2024-08-29 NOTE — PLAN OF CARE
Pt ok to dc and return, room is 124a, nurse to call report to [facility] at , 445pm via w/c with oxygen. Pt and dght updated.

## 2024-08-29 NOTE — ASSESSMENT & PLAN NOTE
Maria A Smith is a 78 year old female with a pertinent PMH of HTN, Paroxysmal Afib, HLD, CVA w/ L sided weakness, prior tobacco use disorder, PVD, COPD. Vascular surgery team consulted due to non-healing RLE wound and RLE u/s findings of hemodynamically significant arterial stenosis/occlusion in both lower extremities.    Plan:  - Case for today cancelled due to scheduling issue  - OK for diet from our perspective  - Will re-schedule ASAP

## 2024-08-29 NOTE — PT/OT/SLP PROGRESS
Occupational Therapy      Patient Name:  Maria A Smith   MRN:  [id number]    Patient not seen today secondary to Patient unwilling to participate. Will follow-up .    8/29/2024

## 2024-08-29 NOTE — PLAN OF CARE
NURSING HOME ORDERS    08/29/2024  [facility] - MED SURG  [address] [facility] [address]  Dept: [phone]  Loc: [phone]     Admit to Nursing Home:  [facility]    Diagnoses:  Active Hospital Problems    Diagnosis  POA    *PAD (peripheral artery disease) [I73.9]  Yes    Chronic hypercapnic respiratory failure [J96.12]  Yes    Depression [F32.A]  Yes    Cellulitis of right lower extremity [L03.115]  Yes    Chronic obstructive pulmonary emphysema [J43.9]  Yes    Persistent atrial fibrillation [I48.19]  Yes    History of CVA (cerebrovascular accident) [Z86.73]  Not Applicable    Osteoporosis [M81.0]  Yes    Iron deficiency anemia [D50.9]  Yes    Memory impairment [R41.3]  Yes    Left hemiparesis [G81.94]  Yes    History of tobacco use [Z87.891]  Not Applicable    Hyperlipidemia [E78.5]  Yes      Resolved Hospital Problems    Diagnosis Date Resolved POA    Anemia [D64.9] 08/27/2024 Yes       Patient is homebound due to:  PAD (peripheral artery disease)    Allergies:Review of patient's allergies indicates:  No Known Allergies    Vitals:  Routine    Diet: cardiac diet    Activities:   Activity as tolerated    Goals of Care Treatment Preferences:  Code Status: Full Code      Labs:  none    Nursing Precautions:  Fall and Pressure ulcer prevention    Consults:   PT to evaluate and treat- 3 times a week, OT to evaluate and treat- 3 times a week, and Wound Care     Miscellaneous Care: Routine Skin for Bedridden Patients:  Apply moisture barrier cream to all  Wound Care: yes:  Pressure Ulcer(s) Stage II :   Location: right leg   Apply Miconzazole:  Barrier ointment                       Frequency:  Daily                             If incontinent of stool or urine, apply thin layer Barrier cream                   twice daily and PRN to wound         Pressure relief measure:  for pressure redistribution    For Cellulties: Location: Right calf   Frequency:  Every 2 days   bedside nursing: to 1) cleanse with NS 2) pat dry 3) apply medihoney to the wound beds 4) cover with an island/mepore/border dressing.     Home Oxygen:  Oxygen at 2 L/min nasal canula to be used:  Continuously., Assess oxygen saturation via pulse oximeter as needed for increase in SOB., and Notify physician if oxygen saturation less than 88%                   Diabetes Care:  SN to perform and educate Diabetic management with blood glucose monitoring:      Medications: Discontinue all previous medication orders, if any. See new list below.     Medication List        START taking these medications      aspirin 81 MG EC tablet  Commonly known as: ECOTRIN  Take 1 tablet (81 mg total) by mouth once daily.  Start taking on: August 30, 2024     doxycycline 100 MG Cap  Commonly known as: VIBRAMYCIN  Take 1 capsule (100 mg total) by mouth every 12 (twelve) hours. for 14 days     enoxaparin 80 mg/0.8 mL Syrg  Commonly known as: LOVENOX  Inject 0.8 mLs (80 mg total) into the skin once daily. for 14 days     metoprolol succinate 50 MG 24 hr tablet  Commonly known as: TOPROL-XL  Take 1.5 tablets (75 mg total) by mouth once daily.     oxyCODONE 5 MG immediate release tablet  Commonly known as: ROXICODONE  Take 1 tablet (5 mg total) by mouth every 6 (six) hours as needed for Pain.     polyethylene glycol 17 gram Pwpk  Commonly known as: GLYCOLAX  Take 17 g by mouth once daily.  Start taking on: August 30, 2024     senna 8.6 mg tablet  Commonly known as: SENOKOT  Take 1 tablet by mouth once daily.  Start taking on: August 30, 2024            CHANGE how you take these medications      apixaban 5 mg Tab  Commonly known as: ELIQUIS  Take 5 mg by mouth 2 (two) times daily.     bumetanide 1 MG tablet  Commonly known as: BUMEX  Take 1 tablet (1 mg total) by mouth 3 (three) times a week.  Start taking on: August 30, 2024  What changed: when to take this            CONTINUE taking these medications      acetaminophen 500 MG  tablet  Commonly known as: TYLENOL  Take 500 mg by mouth every 6 (six) hours as needed for Pain.     alendronate 70 MG tablet  Commonly known as: FOSAMAX  Take 70 mg by mouth every 7 days. Give every Thursday     atorvastatin 80 MG tablet  Commonly known as: LIPITOR  Take 1 tablet (80 mg total) by mouth once daily.     budesonide 0.5 mg/2 mL nebulizer solution  Commonly known as: PULMICORT  Take 0.5 mg by nebulization once daily. Inhale 1 vial one time a day for emphysema     calcium-vitamin D3 500 mg-5 mcg (200 unit) per tablet  Commonly known as: OS-ANNA 500 + D3  Take 1 tablet by mouth once daily.     diltiaZEM HCl 120 mg 24 hr capsule  Commonly known as: TIAZAC  Take 120 mg by mouth once daily.     DULoxetine 60 MG capsule  Commonly known as: CYMBALTA  Take 1 capsule (60 mg total) by mouth once daily.     ferrous sulfate 325 (65 FE) MG EC tablet  Take 325 mg by mouth 2 (two) times daily.     lisinopriL 40 MG tablet  Commonly known as: PRINIVIL,ZESTRIL  Take 1 tablet (40 mg total) by mouth once daily.     ONE DAILY MULTIVITAMIN per tablet  Generic drug: multivitamin  Take 1 tablet by mouth once daily.     pantoprazole 40 MG tablet  Commonly known as: PROTONIX  Take 1 tablet (40 mg total) by mouth once daily.     pentoxifylline 400 mg Tbsr  Commonly known as: TRENTAL  Take 400 mg by mouth 3 (three) times daily with meals.     traZODone 100 MG tablet  Commonly known as: DESYREL  Take 100 mg by mouth every evening.            STOP taking these medications      carvediloL 25 MG tablet  Commonly known as: COREG     meloxicam 15 MG tablet  Commonly known as: MOBIC            Holding Eliquis until vascular surgery perform an angiogram procedure, will do Lovenox in the meantime. Should be okay to resume Eliquis after vascular surgery intervention.   Change Bumex 3 times a week.     Immunizations Administered as of 8/29/2024       Name Date Dose VIS Date Route Exp Date    COVID-19, MRNA, LN-S, PF (Pfizer) (Purple Cap)  2/4/2021  4:46 PM 0.3 mL 12/12/2020 Intramuscular 5/31/2021    Site: Right deltoid     Given By: Ekaterina Mercedes LPN     : [employer]     Lot: [id number]     COVID-19, MRNA, LN-S, PF (Pfizer) (Purple Cap) 1/14/2021  4:48 PM 0.3 mL 12/12/2020 Intramuscular 3/31/2021    Site: Right deltoid     Given By: Sara Guy, RN     : Pf[employer]     Lot: [id number]             This patient has had both covid vaccinations    Some patients may experience side effects after vaccination.  These may include fever, headache, muscle or joint aches.  Most symptoms resolve with 24-48 hours and do not require urgent medical evaluation unless they persist for more than 72 hours or symptoms are concerning for an unrelated medical condition.          _________________________________  Audrey Gamboa MD  08/29/2024

## 2024-08-30 ENCOUNTER — HOSPITAL ENCOUNTER (EMERGENCY)
Facility: HOSPITAL | Age: 79
Discharge: NURSING, REHAB OR LONG-TERM CARE | End: 2024-08-30
Attending: EMERGENCY MEDICINE
Payer: MEDICARE

## 2024-08-30 ENCOUNTER — TELEPHONE (OUTPATIENT)
Dept: VASCULAR SURGERY | Facility: CLINIC | Age: 79
End: 2024-08-30
Payer: MEDICARE

## 2024-08-30 VITALS
DIASTOLIC BLOOD PRESSURE: 76 MMHG | BODY MASS INDEX: 19.16 KG/M2 | HEART RATE: 103 BPM | TEMPERATURE: 99 F | SYSTOLIC BLOOD PRESSURE: 162 MMHG | RESPIRATION RATE: 26 BRPM | HEIGHT: 65 IN | WEIGHT: 115 LBS | OXYGEN SATURATION: 97 %

## 2024-08-30 DIAGNOSIS — R06.02 SHORTNESS OF BREATH: ICD-10-CM

## 2024-08-30 DIAGNOSIS — I48.91 ATRIAL FIBRILLATION, UNSPECIFIED TYPE: Primary | ICD-10-CM

## 2024-08-30 LAB
ALBUMIN SERPL BCP-MCNC: 3.1 G/DL (ref 3.5–5.2)
ALP SERPL-CCNC: 97 U/L (ref 55–135)
ALT SERPL W/O P-5'-P-CCNC: 12 U/L (ref 10–44)
ANION GAP SERPL CALC-SCNC: 8 MMOL/L (ref 8–16)
AST SERPL-CCNC: 15 U/L (ref 10–40)
BASOPHILS # BLD AUTO: 0.05 K/UL (ref 0–0.2)
BASOPHILS NFR BLD: 0.5 % (ref 0–1.9)
BILIRUB SERPL-MCNC: 0.8 MG/DL (ref 0.1–1)
BNP SERPL-MCNC: 758 PG/ML (ref 0–99)
BUN SERPL-MCNC: 15 MG/DL (ref 8–23)
CALCIUM SERPL-MCNC: 9.8 MG/DL (ref 8.7–10.5)
CHLORIDE SERPL-SCNC: 106 MMOL/L (ref 95–110)
CO2 SERPL-SCNC: 25 MMOL/L (ref 23–29)
CREAT SERPL-MCNC: 0.7 MG/DL (ref 0.5–1.4)
DIFFERENTIAL METHOD BLD: ABNORMAL
EOSINOPHIL # BLD AUTO: 0.2 K/UL (ref 0–0.5)
EOSINOPHIL NFR BLD: 1.4 % (ref 0–8)
ERYTHROCYTE [DISTWIDTH] IN BLOOD BY AUTOMATED COUNT: 15 % (ref 11.5–14.5)
EST. GFR  (NO RACE VARIABLE): >60 ML/MIN/1.73 M^2
GLUCOSE SERPL-MCNC: 109 MG/DL (ref 70–110)
HCT VFR BLD AUTO: 37.1 % (ref 37–48.5)
HGB BLD-MCNC: 11.9 G/DL (ref 12–16)
IMM GRANULOCYTES # BLD AUTO: 0.07 K/UL (ref 0–0.04)
IMM GRANULOCYTES NFR BLD AUTO: 0.7 % (ref 0–0.5)
LYMPHOCYTES # BLD AUTO: 0.9 K/UL (ref 1–4.8)
LYMPHOCYTES NFR BLD: 8.5 % (ref 18–48)
MCH RBC QN AUTO: 30.4 PG (ref 27–31)
MCHC RBC AUTO-ENTMCNC: 32.1 G/DL (ref 32–36)
MCV RBC AUTO: 95 FL (ref 82–98)
MONOCYTES # BLD AUTO: 0.6 K/UL (ref 0.3–1)
MONOCYTES NFR BLD: 5.8 % (ref 4–15)
NEUTROPHILS # BLD AUTO: 8.8 K/UL (ref 1.8–7.7)
NEUTROPHILS NFR BLD: 83.1 % (ref 38–73)
NRBC BLD-RTO: 0 /100 WBC
PLATELET # BLD AUTO: 197 K/UL (ref 150–450)
PMV BLD AUTO: 10.5 FL (ref 9.2–12.9)
POTASSIUM SERPL-SCNC: 4.3 MMOL/L (ref 3.5–5.1)
PROT SERPL-MCNC: 6.6 G/DL (ref 6–8.4)
RBC # BLD AUTO: 3.91 M/UL (ref 4–5.4)
SODIUM SERPL-SCNC: 139 MMOL/L (ref 136–145)
TROPONIN I SERPL DL<=0.01 NG/ML-MCNC: 0.06 NG/ML (ref 0–0.03)
TROPONIN I SERPL DL<=0.01 NG/ML-MCNC: 0.06 NG/ML (ref 0–0.03)
WBC # BLD AUTO: 10.53 K/UL (ref 3.9–12.7)

## 2024-08-30 PROCEDURE — 96375 TX/PRO/DX INJ NEW DRUG ADDON: CPT

## 2024-08-30 PROCEDURE — 63600175 PHARM REV CODE 636 W HCPCS: Performed by: EMERGENCY MEDICINE

## 2024-08-30 PROCEDURE — 99285 EMERGENCY DEPT VISIT HI MDM: CPT | Mod: 25

## 2024-08-30 PROCEDURE — 84484 ASSAY OF TROPONIN QUANT: CPT | Mod: 91 | Performed by: EMERGENCY MEDICINE

## 2024-08-30 PROCEDURE — 85025 COMPLETE CBC W/AUTO DIFF WBC: CPT | Performed by: EMERGENCY MEDICINE

## 2024-08-30 PROCEDURE — 93010 ELECTROCARDIOGRAM REPORT: CPT | Mod: ,,, | Performed by: INTERNAL MEDICINE

## 2024-08-30 PROCEDURE — 25000003 PHARM REV CODE 250: Performed by: EMERGENCY MEDICINE

## 2024-08-30 PROCEDURE — 83880 ASSAY OF NATRIURETIC PEPTIDE: CPT | Performed by: EMERGENCY MEDICINE

## 2024-08-30 PROCEDURE — 96374 THER/PROPH/DIAG INJ IV PUSH: CPT

## 2024-08-30 PROCEDURE — 80053 COMPREHEN METABOLIC PANEL: CPT | Performed by: EMERGENCY MEDICINE

## 2024-08-30 PROCEDURE — 93005 ELECTROCARDIOGRAM TRACING: CPT

## 2024-08-30 RX ORDER — HYDRALAZINE HYDROCHLORIDE 20 MG/ML
5 INJECTION INTRAMUSCULAR; INTRAVENOUS
Status: COMPLETED | OUTPATIENT
Start: 2024-08-30 | End: 2024-08-30

## 2024-08-30 RX ORDER — DILTIAZEM HYDROCHLORIDE 5 MG/ML
15 INJECTION INTRAVENOUS
Status: COMPLETED | OUTPATIENT
Start: 2024-08-30 | End: 2024-08-30

## 2024-08-30 RX ORDER — OXYCODONE HYDROCHLORIDE 5 MG/1
5 TABLET ORAL
Status: COMPLETED | OUTPATIENT
Start: 2024-08-30 | End: 2024-08-30

## 2024-08-30 RX ADMIN — OXYCODONE HYDROCHLORIDE 5 MG: 5 TABLET ORAL at 11:08

## 2024-08-30 RX ADMIN — HYDRALAZINE HYDROCHLORIDE 5 MG: 20 INJECTION, SOLUTION INTRAMUSCULAR; INTRAVENOUS at 04:08

## 2024-08-30 RX ADMIN — DILTIAZEM HYDROCHLORIDE 15 MG: 5 INJECTION, SOLUTION INTRAVENOUS at 10:08

## 2024-08-30 NOTE — ED NOTES
Patient returned to [facility] via [facility]. Report given to SHAVONNE Buck. IV removed, pressure dressing applied, and discharge papers sent along with all personal belongings. Patient in NAD.

## 2024-08-30 NOTE — TELEPHONE ENCOUNTER
Per Dr. Thakur's request nurse contacted pt's daughter Sirisha and Nelia at [facility] to reschedule cancelled RLE angiogram. Case rescheduled to 9/9/24.   Pre-op instructions given including time and location of arrival, fasting starting at midnight before procedure, two showers with antibacterial soap, morning medications, updates to visitor policy, etc. Nelia repeated instructions to nurse and verbalized understanding. Will contact staff at [facility] and Sirisha with time of arrival.

## 2024-08-30 NOTE — ED PROVIDER NOTES
Encounter Date: 8/30/2024       History     Chief Complaint   Patient presents with    Shortness of Breath     Pt arrived via EMS from [facility] with complaints of SOB- sats 87% on 1.5 liters, increased to 3 liters and 93%      Patient presents from nursing home complaints of shortness of breath,Patient is normally on 2 L nasal cannula, nursing home states that they had to increase to 3 L.  history of atrial fibrillation      The history is provided by the patient and the nursing home.   Review of patient's allergies indicates:  No Known Allergies  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)     Stroke      Past Surgical History:   Procedure Laterality Date    APPENDECTOMY      COLONOSCOPY N/A 8/7/2019    Procedure: COLONOSCOPY;  Surgeon: Alex Degroot MD;  Location: [facility];  Service: Endoscopy;  Laterality: N/A;    COLONOSCOPY N/A 2/12/2020    Procedure: COLONOSCOPY;  Surgeon: Evelio Rizvi MD;  Location: [facility];  Service: Endoscopy;  Laterality: N/A;    ESOPHAGOGASTRODUODENOSCOPY N/A 2/11/2020    Procedure: EGD (ESOPHAGOGASTRODUODENOSCOPY);  Surgeon: Evelio Rizvi MD;  Location: [facility];  Service: Endoscopy;  Laterality: N/A;    HYSTERECTOMY      TRANSESOPHAGEAL ECHOCARDIOGRAPHY N/A 3/23/2023    Procedure: ECHOCARDIOGRAM, TRANSESOPHAGEAL;  Surgeon: Liliana Diagnostic Provider;  Location: [facility];  Service: Cardiology;  Laterality: N/A;    TREATMENT OF CARDIAC ARRHYTHMIA N/A 3/23/2023    Procedure: Cardioversion or Defibrillation;  Surgeon: ASHLEY Watson MD;  Location: [facility];  Service: Cardiology;  Laterality: N/A;  AF, CHERRIE/DCCV, ANES, EH, 1108     Family History   Problem Relation Name Age of Onset    Cancer Sister      Diabetes Sister      Melanoma Neg Hx      Psoriasis Neg Hx      Lupus Neg Hx      Eczema Neg Hx       Social History     Tobacco Use    Smoking status: Former     Current packs/day:  0.00     Types: Cigarettes     Quit date: 2017     Years since quittin.9    Smokeless tobacco: Never   Substance Use Topics    Alcohol use: Yes     Alcohol/week: 1.0 standard drink of alcohol     Types: 1 Cans of beer per week    Drug use: No     Review of Systems   Respiratory:  Positive for shortness of breath.        Physical Exam     Initial Vitals [24 0927]   BP Pulse Resp Temp SpO2   (!) 167/77 96 20 98.9 °F (37.2 °C) 97 %      MAP       --         Physical Exam    Vitals reviewed.  Constitutional: No distress.   Cardiovascular:            Tachycardic rate with irregular rhythm   Pulmonary/Chest:   Decreased breath sounds bilaterally   Abdominal: Abdomen is soft. There is no abdominal tenderness.   Musculoskeletal:         General: Normal range of motion.     Neurological: She is alert and oriented to person, place, and time.   Skin: Skin is warm.         ED Course   Procedures  Labs Reviewed   CBC W/ AUTO DIFFERENTIAL - Abnormal       Result Value    WBC 10.53      RBC 3.91 (*)     Hemoglobin 11.9 (*)     Hematocrit 37.1      MCV 95      MCH 30.4      MCHC 32.1      RDW 15.0 (*)     Platelets 197      MPV 10.5      Immature Granulocytes 0.7 (*)     Gran # (ANC) 8.8 (*)     Immature Grans (Abs) 0.07 (*)     Lymph # 0.9 (*)     Mono # 0.6      Eos # 0.2      Baso # 0.05      nRBC 0      Gran % 83.1 (*)     Lymph % 8.5 (*)     Mono % 5.8      Eosinophil % 1.4      Basophil % 0.5      Differential Method Automated     COMPREHENSIVE METABOLIC PANEL - Abnormal    Sodium 139      Potassium 4.3      Chloride 106      CO2 25      Glucose 109      BUN 15      Creatinine 0.7      Calcium 9.8      Total Protein 6.6      Albumin 3.1 (*)     Total Bilirubin 0.8      Alkaline Phosphatase 97      AST 15      ALT 12      eGFR >60      Anion Gap 8     TROPONIN I - Abnormal    Troponin I 0.055 (*)    B-TYPE NATRIURETIC PEPTIDE - Abnormal     (*)    TROPONIN I - Abnormal    Troponin I 0.062 (*)    MAGNESIUM  "    EKG Readings: (Independently Interpreted)   Atrial fibrillation rate of 102, right axis, nonspecific ST and T-wave abnormalities     ECG Results              EKG 12-lead (Final result)        Collection Time Result Time QRS Duration OHS QTC Calculation    08/30/24 09:36:14 08/31/24 09:33:37 110 393                     Final result by Interface, Lab In [facility] (08/31/24 09:33:40)                   Narrative:    Test Reason : R06.02,    Vent. Rate : 102 BPM     Atrial Rate : 156 BPM     P-R Int : 000 ms          QRS Dur : 110 ms      QT Int : 302 ms       P-R-T Axes : 000 085 059 degrees     QTc Int : 393 ms    Atrial fibrillation with rapid ventricular response  Right bundle branch block  Abnormal ECG  When compared with ECG of 28-AUG-2024 04:24,  No significant change was found  Confirmed by Mandie Gregory MD ([id number]) on 8/31/2024 9:33:32 AM    Referred By: [email or web address]   SELF           Confirmed By:Mandie Gregory MD                                  Imaging Results              X-Ray Chest AP Portable (Final result)  Result time 08/30/24 11:07:10      Final result by Stu Granado MD (08/30/24 11:07:10)                   Impression:      Borderline cardiomegaly with possible mild interstitial edema versus pneumonitis.  Trace bilateral pleural fluid.    Probable pulmonary emphysema.      Electronically signed by: Stu Granado MD  Date:    08/30/2024  Time:    11:07               Narrative:    EXAMINATION:  XR CHEST AP PORTABLE    CLINICAL HISTORY:  Provided history is "CHF;  ".    TECHNIQUE:  One view of the chest.    COMPARISON:  08/26/2024.    FINDINGS:  Cardiac wires overlie the chest.  Cardiomediastinal silhouette is mildly enlarged and similar to the prior study.  Atherosclerotic calcifications overlie the aortic arch.  Possible pulmonary emphysema.  Coarse bibasilar interstitial and ground-glass densities in the lung bases.  Possible trace bilateral pleural effusions, right side worse than " left.  No confluent area of consolidation.  No large pleural effusion.  No pneumothorax.  Operative changes in the left proximal humerus.                                       Medications   diltiaZEM injection 15 mg (15 mg Intravenous Given 8/30/24 1053)   oxyCODONE immediate release tablet 5 mg (5 mg Oral Given 8/30/24 1117)   hydrALAZINE injection 5 mg (5 mg Intravenous Given 8/30/24 1604)     Medical Decision Making  Patient's rate has been controlled, troponin x2 show chronic elevation.  Okay for discharge back to nursing home.  She is currently on 2 L nasal cannula which is her usual home dose with the appropriate oxygen saturations and no increased work of breathing.  She is instructed to return immediately for any new or worsening symptoms and she verbalized understanding.    Amount and/or Complexity of Data Reviewed  Labs: ordered. Decision-making details documented in ED Course.     Details: CBC, and CMP showed nonspecific findings, 2nd troponin is chronically elevated.  Radiology: ordered. Decision-making details documented in ED Course.    Risk  Prescription drug management.  Risk Details: Differential diagnosis includes but is not limited to:  ACS, heart failure, electrolyte abnormality, pneumonia, noncompliance with medications               ED Course as of 09/01/24 0716   Fri Aug 30, 2024   1113 X-Ray Chest AP Portable  Borderline cardiomegaly with possible mild interstitial edema versus pneumonitis.  Trace bilateral pleural fluid.     Probable pulmonary emphysema.   [CD]   1439 Troponin I(!)  Chronic elevation [CD]      ED Course User Index  [CD] Jason Powell DO                           Clinical Impression:  Final diagnoses:  [R06.02] Shortness of breath  [I48.91] Atrial fibrillation, unspecified type (Primary)          ED Disposition Condition    Discharge Stable          ED Prescriptions    None       Follow-up Information       Follow up With Specialties Details Why Contact Info     Sneha López MD Internal Medicine Schedule an appointment as soon as possible for a visit   [address] [facility]  [address] [place], [place] [id number] [phone]               Jason Powell,   09/01/24 0718

## 2024-08-31 LAB
BACTERIA BLD CULT: NORMAL
BACTERIA BLD CULT: NORMAL
OHS QRS DURATION: 110 MS
OHS QTC CALCULATION: 393 MS

## 2024-09-05 NOTE — PHYSICIAN QUERY
Due to the conflicting clinical picture, please clinically validate Sepsis.  If validated, please provide additional clinical support for the diagnosis.     The condition is not confirmed and/or it has been ruled out

## 2024-09-05 NOTE — PHYSICIAN QUERY
Please provide the respiratory diagnosis:    Hugh all that apply        Acute and (on) Chronic Respiratory Failure, with hypoxia and hypercapnia

## 2024-09-06 ENCOUNTER — TELEPHONE (OUTPATIENT)
Dept: VASCULAR SURGERY | Facility: CLINIC | Age: 79
End: 2024-09-06
Payer: MEDICARE

## 2024-09-06 NOTE — TELEPHONE ENCOUNTER
Contacted pt's daughter Sirisha and mike Pickens at [facility] with time of arrival and pre-op instructions for surgery with Dr. Thakur.

## 2024-09-06 NOTE — PRE-PROCEDURE INSTRUCTIONS
PreOp Instructions given: To Nelia RN at [facility]  - Verbal medication information (what to hold and what to take)   - NPO guidelines 2400  - Arrival place directions given; time to be given the day before procedure by the   Surgeon's Office 0730 - [facility]  - Bathing with antibacterial soap   - Don't wear any jewelry or bring any valuables AM of surgery   - No makeup or moisturizer to face   - No perfume/cologne, powder, lotions or aftershave   Pt. verbalized understanding.   Pt denies any h/o Anesthesia/Sedation complications or side effects.

## 2024-09-09 ENCOUNTER — HOSPITAL ENCOUNTER (OUTPATIENT)
Facility: HOSPITAL | Age: 79
Discharge: HOME | End: 2024-09-09
Attending: SURGERY | Admitting: SURGERY
Payer: MEDICARE

## 2024-09-09 VITALS
SYSTOLIC BLOOD PRESSURE: 152 MMHG | DIASTOLIC BLOOD PRESSURE: 74 MMHG | HEIGHT: 65 IN | BODY MASS INDEX: 19.16 KG/M2 | WEIGHT: 115 LBS | RESPIRATION RATE: 16 BRPM | TEMPERATURE: 98 F | OXYGEN SATURATION: 97 % | HEART RATE: 107 BPM

## 2024-09-09 DIAGNOSIS — I73.9 PAD (PERIPHERAL ARTERY DISEASE): Primary | ICD-10-CM

## 2024-09-09 DIAGNOSIS — I70.90 OCCLUSION OF ARTERY: ICD-10-CM

## 2024-09-09 PROCEDURE — C1876 STENT, NON-COA/NON-COV W/DEL: HCPCS | Performed by: SURGERY

## 2024-09-09 PROCEDURE — 71000015 HC POSTOP RECOV 1ST HR: Performed by: SURGERY

## 2024-09-09 PROCEDURE — 71000033 HC RECOVERY, INTIAL HOUR: Performed by: SURGERY

## 2024-09-09 PROCEDURE — 71000016 HC POSTOP RECOV ADDL HR: Performed by: SURGERY

## 2024-09-09 PROCEDURE — 63600175 PHARM REV CODE 636 W HCPCS: Performed by: ANESTHESIOLOGY

## 2024-09-09 PROCEDURE — 37000009 HC ANESTHESIA EA ADD 15 MINS: Performed by: SURGERY

## 2024-09-09 PROCEDURE — 94761 N-INVAS EAR/PLS OXIMETRY MLT: CPT

## 2024-09-09 PROCEDURE — 25000003 PHARM REV CODE 250: Performed by: STUDENT IN AN ORGANIZED HEALTH CARE EDUCATION/TRAINING PROGRAM

## 2024-09-09 PROCEDURE — 36000707: Performed by: SURGERY

## 2024-09-09 PROCEDURE — C1769 GUIDE WIRE: HCPCS | Performed by: SURGERY

## 2024-09-09 PROCEDURE — C1894 INTRO/SHEATH, NON-LASER: HCPCS | Performed by: SURGERY

## 2024-09-09 PROCEDURE — 25000003 PHARM REV CODE 250: Performed by: ANESTHESIOLOGY

## 2024-09-09 PROCEDURE — 36000706: Performed by: SURGERY

## 2024-09-09 PROCEDURE — 71000044 HC DOSC ROUTINE RECOVERY FIRST HOUR: Performed by: SURGERY

## 2024-09-09 PROCEDURE — 37000008 HC ANESTHESIA 1ST 15 MINUTES: Performed by: SURGERY

## 2024-09-09 PROCEDURE — 63600175 PHARM REV CODE 636 W HCPCS: Performed by: STUDENT IN AN ORGANIZED HEALTH CARE EDUCATION/TRAINING PROGRAM

## 2024-09-09 PROCEDURE — C1725 CATH, TRANSLUMIN NON-LASER: HCPCS | Performed by: SURGERY

## 2024-09-09 PROCEDURE — 25000003 PHARM REV CODE 250: Performed by: SURGERY

## 2024-09-09 PROCEDURE — 25000003 PHARM REV CODE 250

## 2024-09-09 PROCEDURE — 27201423 OPTIME MED/SURG SUP & DEVICES STERILE SUPPLY: Performed by: SURGERY

## 2024-09-09 PROCEDURE — C1887 CATHETER, GUIDING: HCPCS | Performed by: SURGERY

## 2024-09-09 PROCEDURE — 25500020 PHARM REV CODE 255: Performed by: SURGERY

## 2024-09-09 DEVICE — LIFESTENT® 5F VASCULAR STENT SYSTEM, 6 MM X 120 MM (80 CM DELIVERY CATHETER)
Type: IMPLANTABLE DEVICE | Site: ARTERIAL | Status: FUNCTIONAL
Brand: LIFESTENT®

## 2024-09-09 DEVICE — LIFESTENT® 5F VASCULAR STENT SYSTEM, 6 MM X 150 MM (135 CM DELIVERY CATHETER)
Type: IMPLANTABLE DEVICE | Site: ARTERIAL | Status: FUNCTIONAL
Brand: LIFESTENT®

## 2024-09-09 RX ORDER — SODIUM CHLORIDE 9 MG/ML
INJECTION, SOLUTION INTRAVENOUS CONTINUOUS
Status: DISCONTINUED | OUTPATIENT
Start: 2024-09-09 | End: 2024-09-09 | Stop reason: HOSPADM

## 2024-09-09 RX ORDER — PHENYLEPHRINE HCL IN 0.9% NACL 1 MG/10 ML
SYRINGE (ML) INTRAVENOUS
Status: DISCONTINUED | OUTPATIENT
Start: 2024-09-09 | End: 2024-09-09

## 2024-09-09 RX ORDER — HALOPERIDOL 5 MG/ML
0.5 INJECTION INTRAMUSCULAR EVERY 10 MIN PRN
Status: DISCONTINUED | OUTPATIENT
Start: 2024-09-09 | End: 2024-09-09 | Stop reason: HOSPADM

## 2024-09-09 RX ORDER — IODIXANOL 320 MG/ML
INJECTION, SOLUTION INTRAVASCULAR
Status: DISCONTINUED | OUTPATIENT
Start: 2024-09-09 | End: 2024-09-09 | Stop reason: HOSPADM

## 2024-09-09 RX ORDER — ONDANSETRON HYDROCHLORIDE 2 MG/ML
INJECTION, SOLUTION INTRAVENOUS
Status: DISCONTINUED | OUTPATIENT
Start: 2024-09-09 | End: 2024-09-09

## 2024-09-09 RX ORDER — LIDOCAINE HYDROCHLORIDE 10 MG/ML
INJECTION, SOLUTION EPIDURAL; INFILTRATION; INTRACAUDAL; PERINEURAL
Status: DISCONTINUED | OUTPATIENT
Start: 2024-09-09 | End: 2024-09-09 | Stop reason: HOSPADM

## 2024-09-09 RX ORDER — ESMOLOL HYDROCHLORIDE 10 MG/ML
INJECTION INTRAVENOUS
Status: DISCONTINUED | OUTPATIENT
Start: 2024-09-09 | End: 2024-09-09

## 2024-09-09 RX ORDER — OXYCODONE AND ACETAMINOPHEN 5; 325 MG/1; MG/1
1 TABLET ORAL
Status: DISCONTINUED | OUTPATIENT
Start: 2024-09-09 | End: 2024-09-09 | Stop reason: HOSPADM

## 2024-09-09 RX ORDER — DEXMEDETOMIDINE HYDROCHLORIDE 100 UG/ML
INJECTION, SOLUTION INTRAVENOUS
Status: DISCONTINUED | OUTPATIENT
Start: 2024-09-09 | End: 2024-09-09

## 2024-09-09 RX ORDER — HYDRALAZINE HYDROCHLORIDE 20 MG/ML
INJECTION INTRAMUSCULAR; INTRAVENOUS
Status: DISCONTINUED | OUTPATIENT
Start: 2024-09-09 | End: 2024-09-09

## 2024-09-09 RX ORDER — METOPROLOL TARTRATE 1 MG/ML
INJECTION, SOLUTION INTRAVENOUS
Status: DISCONTINUED | OUTPATIENT
Start: 2024-09-09 | End: 2024-09-09

## 2024-09-09 RX ORDER — SODIUM CHLORIDE 0.9 % (FLUSH) 0.9 %
3 SYRINGE (ML) INJECTION
Status: DISCONTINUED | OUTPATIENT
Start: 2024-09-09 | End: 2024-09-09 | Stop reason: HOSPADM

## 2024-09-09 RX ORDER — GLUCAGON 1 MG
1 KIT INJECTION
Status: DISCONTINUED | OUTPATIENT
Start: 2024-09-09 | End: 2024-09-09 | Stop reason: HOSPADM

## 2024-09-09 RX ORDER — PROTAMINE SULFATE 10 MG/ML
INJECTION, SOLUTION INTRAVENOUS
Status: DISCONTINUED | OUTPATIENT
Start: 2024-09-09 | End: 2024-09-09

## 2024-09-09 RX ORDER — ONDANSETRON HYDROCHLORIDE 2 MG/ML
4 INJECTION, SOLUTION INTRAVENOUS DAILY PRN
Status: DISCONTINUED | OUTPATIENT
Start: 2024-09-09 | End: 2024-09-09 | Stop reason: HOSPADM

## 2024-09-09 RX ORDER — CEFAZOLIN SODIUM 1 G/3ML
INJECTION, POWDER, FOR SOLUTION INTRAMUSCULAR; INTRAVENOUS
Status: DISCONTINUED | OUTPATIENT
Start: 2024-09-09 | End: 2024-09-09

## 2024-09-09 RX ORDER — FENTANYL CITRATE 50 UG/ML
INJECTION, SOLUTION INTRAMUSCULAR; INTRAVENOUS
Status: DISCONTINUED | OUTPATIENT
Start: 2024-09-09 | End: 2024-09-09

## 2024-09-09 RX ORDER — HEPARIN SODIUM 1000 [USP'U]/ML
INJECTION, SOLUTION INTRAVENOUS; SUBCUTANEOUS
Status: DISCONTINUED | OUTPATIENT
Start: 2024-09-09 | End: 2024-09-09

## 2024-09-09 RX ORDER — PROPOFOL 10 MG/ML
VIAL (ML) INTRAVENOUS CONTINUOUS PRN
Status: DISCONTINUED | OUTPATIENT
Start: 2024-09-09 | End: 2024-09-09

## 2024-09-09 RX ORDER — HYDROMORPHONE HYDROCHLORIDE 1 MG/ML
0.2 INJECTION, SOLUTION INTRAMUSCULAR; INTRAVENOUS; SUBCUTANEOUS EVERY 5 MIN PRN
Status: DISCONTINUED | OUTPATIENT
Start: 2024-09-09 | End: 2024-09-09 | Stop reason: HOSPADM

## 2024-09-09 RX ORDER — LIDOCAINE HYDROCHLORIDE 20 MG/ML
INJECTION INTRAVENOUS
Status: DISCONTINUED | OUTPATIENT
Start: 2024-09-09 | End: 2024-09-09

## 2024-09-09 RX ADMIN — Medication 100 MCG: at 10:09

## 2024-09-09 RX ADMIN — ONDANSETRON 4 MG: 2 INJECTION INTRAMUSCULAR; INTRAVENOUS at 10:09

## 2024-09-09 RX ADMIN — HEPARIN SODIUM 1000 UNITS: 1000 INJECTION, SOLUTION INTRAVENOUS; SUBCUTANEOUS at 10:09

## 2024-09-09 RX ADMIN — METOROPROLOL TARTRATE 1 MG: 5 INJECTION, SOLUTION INTRAVENOUS at 11:09

## 2024-09-09 RX ADMIN — FENTANYL CITRATE 25 MCG: 50 INJECTION, SOLUTION INTRAMUSCULAR; INTRAVENOUS at 09:09

## 2024-09-09 RX ADMIN — PHENYLEPHRINE HYDROCHLORIDE 25 MCG/MIN: 10 INJECTION INTRAVENOUS at 10:09

## 2024-09-09 RX ADMIN — OXYCODONE HYDROCHLORIDE AND ACETAMINOPHEN 1 TABLET: 5; 325 TABLET ORAL at 01:09

## 2024-09-09 RX ADMIN — PROTAMINE SULFATE 5 MG: 10 INJECTION, SOLUTION INTRAVENOUS at 11:09

## 2024-09-09 RX ADMIN — FENTANYL CITRATE 25 MCG: 50 INJECTION, SOLUTION INTRAMUSCULAR; INTRAVENOUS at 10:09

## 2024-09-09 RX ADMIN — DEXMEDETOMIDINE 8 MCG: 100 INJECTION, SOLUTION, CONCENTRATE INTRAVENOUS at 09:09

## 2024-09-09 RX ADMIN — DEXMEDETOMIDINE 8 MCG: 100 INJECTION, SOLUTION, CONCENTRATE INTRAVENOUS at 11:09

## 2024-09-09 RX ADMIN — Medication 200 MCG: at 10:09

## 2024-09-09 RX ADMIN — HYDROMORPHONE HYDROCHLORIDE 0.2 MG: 1 INJECTION, SOLUTION INTRAMUSCULAR; INTRAVENOUS; SUBCUTANEOUS at 01:09

## 2024-09-09 RX ADMIN — HEPARIN SODIUM 5000 UNITS: 1000 INJECTION, SOLUTION INTRAVENOUS; SUBCUTANEOUS at 10:09

## 2024-09-09 RX ADMIN — DEXMEDETOMIDINE 4 MCG: 100 INJECTION, SOLUTION, CONCENTRATE INTRAVENOUS at 11:09

## 2024-09-09 RX ADMIN — CEFAZOLIN 2 G: 330 INJECTION, POWDER, FOR SOLUTION INTRAMUSCULAR; INTRAVENOUS at 10:09

## 2024-09-09 RX ADMIN — LIDOCAINE HYDROCHLORIDE 50 MG: 20 INJECTION INTRAVENOUS at 09:09

## 2024-09-09 RX ADMIN — METOROPROLOL TARTRATE 1 MG: 5 INJECTION, SOLUTION INTRAVENOUS at 10:09

## 2024-09-09 RX ADMIN — SODIUM CHLORIDE: 0.9 INJECTION, SOLUTION INTRAVENOUS at 09:09

## 2024-09-09 RX ADMIN — PROPOFOL 75 MCG/KG/MIN: 10 INJECTION, EMULSION INTRAVENOUS at 09:09

## 2024-09-09 RX ADMIN — ESMOLOL HYDROCHLORIDE 10 MG: 100 INJECTION, SOLUTION INTRAVENOUS at 09:09

## 2024-09-09 RX ADMIN — HYDRALAZINE HYDROCHLORIDE 10 MG: 20 INJECTION, SOLUTION INTRAMUSCULAR; INTRAVENOUS at 12:09

## 2024-09-09 RX ADMIN — PROTAMINE SULFATE 25 MG: 10 INJECTION, SOLUTION INTRAVENOUS at 11:09

## 2024-09-09 NOTE — OP NOTE
OPERATIVE REPORT    Patient: Maria A Smith  Surgery Date: 09/09/2024  Surgeons and Role:     * Quinn Thakur MD - Primary     * Jose M Sterling MD - Fellow     Assisting Surgeon: None     Pre-op Diagnosis:  Persistent atrial fibrillation [I48.19]     Post-op Diagnosis:  Post-Op Diagnosis Codes:     * Persistent atrial fibrillation [I48.19]     Procedure(s) (LRB):  Ultrasound guided access L CFA   Aortogram, RLE angiogram, limited LLE angiogram  PTA L external iliac 4x40mm Ultraverse  PTA R external iliac 6x60 mm [place]  PTA R CFA 6x60mm [place]  Stent R CFA pop bypass with [id number] Lifestent, [id number] Lifestent  PTA R popliteal artery 4x40mm Ultraverse     Anesthesia: Choice     Operative Findings: Occluded bypass recanalized and stented. Peroneal runoff, occludes distally at terminal branches.      Estimated Blood Loss: 30 mL    Indications for Procedure:  Maria A Smith is a 78 y.o. female who presented with a RLE posterior calf wound, found to have an occluded previous bypass.  She was offered a right lower extremity angiogram with possible intervention. All risks, benefits, and alternatives were discussed and the patient understood and wished to proceed and gave written consent.       Operative Details:   The patient was brought to the operating room and placed in the operating room table in supine position.  She underwent sedation provided by Anesthesiology.  Perioperative antibiotics were given.  The bilateral groins were prepped and draped in the usual sterile fashion.  A time-out was performed.    Under ultrasound guidance, the left common femoral artery was accessed with a micropuncture needle, followed by a Mandril wire, followed by 4 [place] micropuncture sheath.  Of note, she did have a previous left external iliac into the proximal common femoral artery stent which was carefully crossed with the needle in the wire.  A stiff angled glidewire was then carefully advanced with the previous external  iliac stents, placed into the distal aorta.  Over the wire, a short 5 [place] sheath was then advanced under fluoroscopy, taking care not to injure any of the previously placed stents.  A left lower extremity sheathogram demonstrated access into the left common femoral artery, without malpositioning of the previous stents.  There was a significant external iliac stenosis greater than 90%.  In order to get a sheath up and over, we pre-dilated this lesion with a 4 x 40 mm Ultraverse balloon.    The patient was then systemically heparinized with IV heparin.  An Omni catheter was used up and over, and the wire advanced.  It did not pass easily through the previous bypass.  A right lower extremity angiogram was then performed, which demonstrated a greater than 50% external iliac stenosis, greater than 50% common femoral artery stenosis, occluded distal bypass, with reconstitution a we are able to successfully cross the lesion t the above-knee popliteal artery.    Given these findings, the decision was made to intervene.  An up and over 5 x 45 cm sheath was then placed into the common femoral artery.  We are able to successfully cross the occlusion, with wire placed into the popliteal artery.  A glide cath was then placed over the artery an aspiration performed, which demonstrated clot that was successfully aspirated from catheter.  We continued aspirating until there was no longer clot present within the catheter.  Following this, a right lower extremity angiogram was performed, which demonstrated access back into the native system, with flow through the popliteal artery, down the peroneal artery.  There did appear to be a high-grade stenosis at the above-knee popliteal artery, that was likely the culprit lesion for the occluded bypass.  There was still some residual clot within the bypass graft.  This was then stented with a initially distally a 6 x 150 mm LifeStent, proximally extending up with a 6 x 120 mm LifeStent.   The popliteal artery lesion was ballooned with a 4 x 40 mm Ultraverse balloon.  Follow-up angiogram demonstrated now patent bypass graft with runoff through the popliteal into the peroneal artery.  The peroneal artery did occlude distally into the foot, however there was filling of the wounds via the peroneal artery.    In order to achieve adequate inflow, the greater than 50% lesions of the common femoral artery as well as the external iliac stenosis were ballooned with a 6 x 60 mm [place] balloon, held to the nominal pressure for 2 minutes.  Follow-up angiogram demonstrated improvement of the lesions.    This concluded the intervention.  The heparin was reversed with protamine.  Manual pressure was held over the left common femoral artery, the wire balloon and sheath were then removed.  45 minutes of pressure was required for hemostasis over the left common femoral artery, and a pressure dressing applied.    At the conclusion of the case, the patient had a right peroneal artery signal and palpable left femoral pulse.  Dr. Thakur was present and scrubbed for all aspects of the case.    All instrument and sponge counts were confirmed correct.

## 2024-09-09 NOTE — DISCHARGE INSTRUCTIONS
Continue taking all of your home medications  Restart your Eliquis tomorrow  Continue taking the rest of your home meds.   Remove dressing in one days  You may shower at that time, no soaking the wound (Bath, pool, hot tub, etc) for two weeks  Reasons to call the office:  - Fever over 101F  - Signs of infection at the wound (redness, pain, warmth, pus)  - Numbness, tingling, or pain in the leg/foot/groin  - Extensive bruising at the groin  - Firmness or bulge in the groin  - Bleeding from the access site in your groin    Once you go home, please abide by the following restrictions:     Rest in bed or a recliner for the remainder of the day following the procedure.      You should not go home alone the day of the procedure.     Lifting and activity restrictions depend on the insertion site for the procedure:     Groin:   - No heavy lifting of more than 5 pounds, running, swimming, or strenuous walking for at least 2 days after the angiogram.   - You may return to your usual activity after the two days.   - Do not take a hot bath or shower for at least 12 hours after discharge.     Keep an adhesive bandage on the catheter insertion site for one day to help prevent infection.     Because of the sedation you were given for your procedure, we recommend that you have someone stay with you overnight following your procedure.  - Do not drive a car or operate machinery for the remainder of the day.  - Do not consume any alcoholic beverages for the remainder of the day.  - Postpone signing any important papers or making any important decision for the remainder of the day.  - Do not take any muscle relaxants, sedatives, hypnotics, or mood-altering medication today unless ordered by your physician who is aware you are taking the medication today.     If bleeding occurs:  - Apply manual pressure, and immediately seek medical attention at the nearest hospital.   - Seek immediate medical attention if you experience loss of  sensation, redness, swelling or discharge from the insertion site.

## 2024-09-09 NOTE — H&P
"Date:   2024    History of Present Illness:    78 y.o. female who presents for RLE angiogram.      78 year old female with a pertinent PMH of HTN, Paroxysmal Afib, HLD, CVA w/ L sided weakness, prior tobacco use disorder, PVD, COPD. Vascular surgery team consulted due to non-healing RLE wound and RLE u/s findings of hemodynamically significant arterial stenosis/occlusion in both lower extremities. Now presenting for lower extremity angiogram.       Past Medical History:    has a past medical history of Hypertension, Squamous cell carcinoma (2017), and Stroke.    Past Surgical History:    has a past surgical history that includes Colonoscopy (N/A, 2019); Hysterectomy; Appendectomy; Esophagogastroduodenoscopy (N/A, 2020); Colonoscopy (N/A, 2020); Treatment of cardiac arrhythmia (N/A, 3/23/2023); and Transesophageal echocardiography (N/A, 3/23/2023).    Social History:  Social History     Tobacco Use    Smoking status: Former     Current packs/day: 0.00     Types: Cigarettes     Quit date: 2017     Years since quittin.9    Smokeless tobacco: Never   Substance Use Topics    Alcohol use: Yes     Alcohol/week: 1.0 standard drink of alcohol     Types: 1 Cans of beer per week     Social History     Substance and Sexual Activity   Drug Use No       Family History:  Family History   Problem Relation Name Age of Onset    Cancer Sister      Diabetes Sister      Melanoma Neg Hx      Psoriasis Neg Hx      Lupus Neg Hx      Eczema Neg Hx         Allergies:  Review of patient's allergies indicates:  No Known Allergies    Home Medications:  Scheduled Meds:  Continuous Infusions:   0.9% NaCl   Intravenous Continuous         PRN Meds:.      Review of Systems:  Negative except for what is noted in HPI    Physical Exam:  VITAL SIGNS:   Vitals:    24 0803   Pulse: 90   Resp: 18   Temp: 97.2 °F (36.2 °C)   TempSrc: Temporal   SpO2: 97%   Weight: 52.2 kg (115 lb)   Height: 5' 5" (1.651 m)     TMAX: " "Temp (24hrs), Av.2 °F (36.2 °C), Min:97.2 °F (36.2 °C), Max:97.2 °F (36.2 °C)      General: Alert; No acute distress  Cardiovascular: Regular rate   Respiratory: Normal respiratory effort. Chest rise symmetric.   Abdomen: Soft, nontender, nondistended  Extremity: Moves all extremities equally.  Neurologic: No focal deficit. Speech normal         Diagnostic Data:  Recent Results (from the past 336 hour(s))   CBC auto differential    Collection Time: 24 10:20 AM   Result Value Ref Range    WBC 10.53 3.90 - 12.70 K/uL    Hemoglobin 11.9 (L) 12.0 - 16.0 g/dL    Hematocrit 37.1 37.0 - 48.5 %    Platelets 197 150 - 450 K/uL   CBC Auto Differential    Collection Time: 24  3:35 AM   Result Value Ref Range    WBC 6.39 3.90 - 12.70 K/uL    Hemoglobin 10.4 (L) 12.0 - 16.0 g/dL    Hematocrit 33.0 (L) 37.0 - 48.5 %    Platelets 147 (L) 150 - 450 K/uL   CBC Auto Differential    Collection Time: 24  3:38 AM   Result Value Ref Range    WBC 8.69 3.90 - 12.70 K/uL    Hemoglobin 11.1 (L) 12.0 - 16.0 g/dL    Hematocrit 35.6 (L) 37.0 - 48.5 %    Platelets 164 150 - 450 K/uL     No results found for this or any previous visit (from the past 336 hour(s)).  Lab Results   Component Value Date    ALBUMIN 3.1 (L) 2024     Lab Results   Component Value Date    CRP 4.4 2024     Lab Results   Component Value Date    INR 1.2 2023     No results found for: "PTT"    Microbiology Results (last 7 days)       ** No results found for the last 168 hours. **            Assessment:  78 y.o.female presenting for RLE angiogram for RLE wounds.  Plan:  Plan for RLE angiogram for RLE wounds. in OR  Consent obtained, marked.     "

## 2024-09-09 NOTE — NURSING TRANSFER
Nursing Transfer Note      9/9/2024   3:41 PM    Nurse giving handoff:jorge salazar pacu  Nurse receiving handoff:jorge arroyo [facility]    Reason patient is being transferred: per md order    Transfer To: [facility] 35    Transfer via bed    Transfer with n/a    Transported by ELIF Herrera RN    Any special needs or follow-up needed: bedrest until 1815    Patient belongings transferred with patient: Yes. Pt wearing glasses and dentures    Chart send with patient: Yes    Notified: n/a

## 2024-09-09 NOTE — TRANSFER OF CARE
"Anesthesia Transfer of Care Note    Patient: Maria A Smith    Procedure(s) Performed: Procedure(s) (LRB):  Angiogram Extremity Unilateral (Right)  STENT-BYPASS GRAFT (Right)  PTA, ILIAC ARTERY (Bilateral)  PTA, POPLITEAL (Right)  PTA, ARTERY, FEMORAL (Right)    Patient location: PACU    Anesthesia Type: general    Transport from OR: Transported from OR on room air with adequate spontaneous ventilation    Post pain: adequate analgesia    Post assessment: no apparent anesthetic complications    Post vital signs: stable    Level of consciousness: awake    Nausea/Vomiting: no nausea/vomiting    Complications: none    Transfer of care protocol was followed      Last vitals: Visit Vitals  BP (!) 158/91   Pulse 90   Temp 36.1 °C (97 °F) (Temporal)   Resp 18   Ht 5' 5" (1.651 m)   Wt 52.2 kg (115 lb)   SpO2 99%   Breastfeeding No   BMI 19.14 kg/m²     "

## 2024-09-09 NOTE — BRIEF OP NOTE
[facility] - Surgery ([facility])  Brief Operative Note    Surgery Date: 9/9/2024     Surgeons and Role:     * Quinn Thakur MD - Primary     * Jose M Sterling MD - Fellow    Assisting Surgeon: None    Pre-op Diagnosis:  Persistent atrial fibrillation [I48.19]    Post-op Diagnosis:  Post-Op Diagnosis Codes:     * Persistent atrial fibrillation [I48.19]    Procedure(s) (LRB):  Ultrasound guided access L CFA   Aortogram, RLE angiogram, limited LLE angiogram  PTA L external iliac 4x40mm Ultraverse  PTA R external iliac 6x60 mm [place]  PTA R CFA 6x60mm [place]  Stent R CFA pop bypass with [id number] Lifestent, [id number] Lifestent  PTA R popliteal artery 4x40mm Ultraverse    Anesthesia: Choice    Operative Findings: Occluded bypass recanalized and stented. Peroneal runoff, occludes distally at terminal branches.     Estimated Blood Loss: 30 mL         Specimens:   Specimen (24h ago, onward)      None              Discharge Note    OUTCOME: Patient tolerated treatment/procedure well without complication and is now ready for discharge.    DISPOSITION: Home or Self Care    FINAL DIAGNOSIS:  <principal problem not specified>    FOLLOWUP: In clinic    DISCHARGE INSTRUCTIONS:  No discharge procedures on file.  Continue taking all of your home medications  Restart your Eliquis tomorrow  Continue taking the rest of your home meds.   Remove dressing in one days  You may shower at that time, no soaking the wound (Bath, pool, hot tub, etc) for two weeks  Reasons to call the office:  - Fever over 101F  - Signs of infection at the wound (redness, pain, warmth, pus)  - Numbness, tingling, or pain in the leg/foot/groin  - Extensive bruising at the groin  - Firmness or bulge in the groin  - Bleeding from the access site in your groin    Once you go home, please abide by the following restrictions:     Rest in bed or a recliner for the remainder of the day following the procedure.      You should not go home alone the day of the procedure.      Lifting and activity restrictions depend on the insertion site for the procedure:     Groin:   - No heavy lifting of more than 5 pounds, running, swimming, or strenuous walking for at least 2 days after the angiogram.   - You may return to your usual activity after the two days.   - Do not take a hot bath or shower for at least 12 hours after discharge.     Keep an adhesive bandage on the catheter insertion site for one day to help prevent infection.     Because of the sedation you were given for your procedure, we recommend that you have someone stay with you overnight following your procedure.  - Do not drive a car or operate machinery for the remainder of the day.  - Do not consume any alcoholic beverages for the remainder of the day.  - Postpone signing any important papers or making any important decision for the remainder of the day.  - Do not take any muscle relaxants, sedatives, hypnotics, or mood-altering medication today unless ordered by your physician who is aware you are taking the medication today.     If bleeding occurs:  - Apply manual pressure, and immediately seek medical attention at the nearest hospital.   - Seek immediate medical attention if you experience loss of sensation, redness, swelling or discharge from the insertion site.

## 2024-09-09 NOTE — PROGRESS NOTES
RN Melly Norman withnessed anesthesia consent (pt made small lite shelley on ipad for  Daily. Daughter, Sirisha Benavides also present & witnessed anesthesia consent.

## 2024-09-09 NOTE — PROGRESS NOTES
1710: Pt with hx of A-fib.  Dr. Lynn notified of current rhythm and heart rate. No new orders.    1715: Dr. Lynn at bedside for evaluation.

## 2024-09-09 NOTE — ANESTHESIA POSTPROCEDURE EVALUATION
Anesthesia Post Evaluation    Patient: Maria A Smith    Procedure(s) Performed: Procedure(s) (LRB):  Angiogram Extremity Unilateral (Right)  STENT-BYPASS GRAFT (Right)  PTA, ILIAC ARTERY (Bilateral)  PTA, POPLITEAL (Right)  PTA, ARTERY, FEMORAL (Right)    Final Anesthesia Type: general      Patient location during evaluation: PACU  Patient participation: Yes- Able to Participate  Level of consciousness: awake and alert and oriented  Post-procedure vital signs: reviewed and stable  Pain management: adequate  Airway patency: patent    PONV status at discharge: No PONV  Anesthetic complications: no      Cardiovascular status: stable  Respiratory status: unassisted, spontaneous ventilation and room air  Hydration status: euvolemic  Follow-up not needed.              Vitals Value Taken Time   /65 09/09/24 1246   Temp 36.1 °C (97 °F) 09/09/24 1233   Pulse 88 09/09/24 1300   Resp 20 09/09/24 1300   SpO2 94 % 09/09/24 1300   Vitals shown include unfiled device data.      No case tracking events are documented in the log.      Pain/Rylee Score: No data recorded

## 2024-09-10 NOTE — PLAN OF CARE
Discharge instructions given and explained to patient with verbalized understanding. VSS. Denies N/V, tolerating PO fluids and snacks. Rates pain level as tolerable. IV removed. Pt left floor via W/C, stable for transport. [facility] transport services available.

## 2025-01-17 DIAGNOSIS — M25.561 RIGHT KNEE PAIN, UNSPECIFIED CHRONICITY: Primary | ICD-10-CM

## 2025-01-20 ENCOUNTER — TELEPHONE (OUTPATIENT)
Dept: ORTHOPEDICS | Facility: CLINIC | Age: 80
End: 2025-01-20
Payer: MEDICARE

## 2025-01-28 ENCOUNTER — OFFICE VISIT (OUTPATIENT)
Dept: ORTHOPEDICS | Facility: CLINIC | Age: 80
End: 2025-01-28
Payer: MEDICARE

## 2025-01-28 ENCOUNTER — HOSPITAL ENCOUNTER (OUTPATIENT)
Dept: RADIOLOGY | Facility: HOSPITAL | Age: 80
Discharge: HOME | End: 2025-01-28
Attending: ORTHOPAEDIC SURGERY
Payer: MEDICARE

## 2025-01-28 VITALS — HEIGHT: 65 IN | WEIGHT: 115.06 LBS | BODY MASS INDEX: 19.17 KG/M2

## 2025-01-28 DIAGNOSIS — M25.561 RIGHT KNEE PAIN, UNSPECIFIED CHRONICITY: ICD-10-CM

## 2025-01-28 DIAGNOSIS — G89.29 CHRONIC PAIN OF RIGHT ANKLE: Primary | ICD-10-CM

## 2025-01-28 DIAGNOSIS — M25.571 ACUTE RIGHT ANKLE PAIN: ICD-10-CM

## 2025-01-28 DIAGNOSIS — M25.571 ACUTE RIGHT ANKLE PAIN: Primary | ICD-10-CM

## 2025-01-28 DIAGNOSIS — M25.571 CHRONIC PAIN OF RIGHT ANKLE: Primary | ICD-10-CM

## 2025-01-28 PROCEDURE — 99213 OFFICE O/P EST LOW 20 MIN: CPT | Mod: PBBFAC,25,PN | Performed by: ORTHOPAEDIC SURGERY

## 2025-01-28 PROCEDURE — 73610 X-RAY EXAM OF ANKLE: CPT | Mod: TC,PN,RT

## 2025-01-28 PROCEDURE — 99204 OFFICE O/P NEW MOD 45 MIN: CPT | Mod: S$PBB,,, | Performed by: ORTHOPAEDIC SURGERY

## 2025-01-28 PROCEDURE — 99999 PR PBB SHADOW E&M-EST. PATIENT-LVL III: CPT | Mod: PBBFAC,,, | Performed by: ORTHOPAEDIC SURGERY

## 2025-01-28 PROCEDURE — 73564 X-RAY EXAM KNEE 4 OR MORE: CPT | Mod: 26,RT,, | Performed by: RADIOLOGY

## 2025-01-28 PROCEDURE — 73564 X-RAY EXAM KNEE 4 OR MORE: CPT | Mod: TC,PN,RT

## 2025-01-28 PROCEDURE — 73610 X-RAY EXAM OF ANKLE: CPT | Mod: 26,RT,, | Performed by: RADIOLOGY

## 2025-01-28 NOTE — PROGRESS NOTES
Subjective:      Patient ID: Maria A Smith is a 79 y.o. female.    Chief Complaint: Pain of the Right Knee    Patient is here for really her right knee lateral pain.  She is not sure why the nursing home sent her here for her knee.  Complains of laterally based ankle pain.  She did undergo bypass for arterial blockages in the past.  No recent trauma.  No fevers chills or systemic complaints.      Social History     Occupational History    Not on file   Tobacco Use    Smoking status: Former     Current packs/day: 0.00     Types: Cigarettes     Quit date: 2017     Years since quittin.3    Smokeless tobacco: Never   Substance and Sexual Activity    Alcohol use: Yes     Alcohol/week: 1.0 standard drink of alcohol     Types: 1 Cans of beer per week    Drug use: No    Sexual activity: Not on file      Social Drivers of Health     Tobacco Use: Medium Risk (2025)    Patient History     Smoking Tobacco Use: Former     Smokeless Tobacco Use: Never     Passive Exposure: Not on file   Alcohol Use: Not At Risk (2023)    AUDIT-C     Frequency of Alcohol Consumption: Never     Average Number of Drinks: Patient does not drink     Frequency of Binge Drinking: Never   Financial Resource Strain: Low Risk  (2023)    Overall Financial Resource Strain (CARDIA)     Difficulty of Paying Living Expenses: Not hard at all   Food Insecurity: No Food Insecurity (2023)    Hunger Vital Sign     Worried About Running Out of Food in the Last Year: Never true     Ran Out of Food in the Last Year: Never true   Transportation Needs: No Transportation Needs (2023)    PRAPARE - Transportation     Lack of Transportation (Medical): No     Lack of Transportation (Non-Medical): No   Physical Activity: Inactive (2023)    Exercise Vital Sign     Days of Exercise per Week: 5 days     Minutes of Exercise per Session: 0 min   Stress: No Stress Concern Present (2023)    [employer] -  Occupational Stress Questionnaire     Feeling of Stress : Not at all   Housing Stability: Low Risk  (2023)    Housing Stability Vital Sign     Unable to Pay for Housing in the Last Year: No     Number of Places Lived in the Last Year: 1     Unstable Housing in the Last Year: No   Depression: None or minimal depression (2023)    Received from [facility]    PHQ-9     PHQ-9 Total Score - If Score > 5, Proceed to Suicide Risk Screenin   Utilities: Not on file   Health Literacy: Not on file   Social Isolation: Not on file      Review of Systems   Constitutional: Negative for diaphoresis.   HENT:  Negative for ear discharge, nosebleeds and stridor.    Eyes:  Negative for photophobia.   Cardiovascular:  Negative for syncope.   Respiratory:  Negative for hemoptysis, shortness of breath and wheezing.    Neurological:  Negative for tremors.   Psychiatric/Behavioral: Negative.           Objective:            Right Ankle/Foot Exam     Pain   The patient exhibits pain of the lateral malleolus and medial malleolus.    Range of Motion   Ankle Joint   Dorsiflexion:  normal   Plantar flexion:  normal     Other   Ankle Crepitus: absent  Foot Crepitus:  absent      Right Knee Exam   Right knee exam is normal.    Inspection   Scars: absent  Swelling: absent  Bruising: absent    Range of Motion   Extension:  normal   Flexion:  normal     Tests   Meniscus   [place]:  Medial - negative Lateral - negative  Ligament Examination   MCL - Valgus: normal (0 to 2mm)  LCL - Varus: normal         Assessment:       1. Chronic pain of right ankle          Plan:   Majority of the patient's pain and discomfort is isolated to the ankle.  She has very little knee symptoms.  Her knee exam is unremarkable.  We will refer her to Dr. Francis for further evaluation and treatment of her ankle pain.  Knee x-rays show previous arterial bypass graft and severe patellofemoral OA however she has no symptoms or any difficulty related to the  knee.    Ankle x-rays are relatively unremarkable.

## 2025-02-15 ENCOUNTER — ANESTHESIA EVENT (OUTPATIENT)
Dept: EMERGENCY MEDICINE | Facility: HOSPITAL | Age: 80
End: 2025-02-15
Payer: MEDICARE

## 2025-02-15 ENCOUNTER — ANESTHESIA (OUTPATIENT)
Dept: EMERGENCY MEDICINE | Facility: HOSPITAL | Age: 80
End: 2025-02-15
Payer: MEDICARE

## 2025-02-15 ENCOUNTER — HOSPITAL ENCOUNTER (INPATIENT)
Facility: HOSPITAL | Age: 80
LOS: 2 days | DRG: 189 | End: 2025-02-17
Attending: EMERGENCY MEDICINE
Payer: MEDICARE

## 2025-02-15 DIAGNOSIS — I48.91 ATRIAL FIBRILLATION WITH RAPID VENTRICULAR RESPONSE: ICD-10-CM

## 2025-02-15 DIAGNOSIS — Z79.01 ON CONTINUOUS ORAL ANTICOAGULATION: ICD-10-CM

## 2025-02-15 DIAGNOSIS — J96.21 ACUTE ON CHRONIC RESPIRATORY FAILURE WITH HYPOXIA: Primary | ICD-10-CM

## 2025-02-15 DIAGNOSIS — R79.89 ELEVATED BRAIN NATRIURETIC PEPTIDE (BNP) LEVEL: ICD-10-CM

## 2025-02-15 DIAGNOSIS — I10 ESSENTIAL HYPERTENSION: ICD-10-CM

## 2025-02-15 DIAGNOSIS — I48.91 ATRIAL FIBRILLATION WITH RVR: ICD-10-CM

## 2025-02-15 DIAGNOSIS — I50.31 ACUTE DIASTOLIC HEART FAILURE: ICD-10-CM

## 2025-02-15 DIAGNOSIS — R06.02 SHORTNESS OF BREATH: ICD-10-CM

## 2025-02-15 DIAGNOSIS — R06.09 DOE (DYSPNEA ON EXERTION): ICD-10-CM

## 2025-02-15 PROBLEM — J10.1 INFLUENZA A: Status: ACTIVE | Noted: 2025-02-15

## 2025-02-15 PROBLEM — J44.1 COPD EXACERBATION: Status: ACTIVE | Noted: 2025-02-15

## 2025-02-15 PROBLEM — G93.40 ENCEPHALOPATHY: Status: ACTIVE | Noted: 2025-02-15

## 2025-02-15 PROBLEM — I50.22 CHRONIC SYSTOLIC HEART FAILURE: Chronic | Status: ACTIVE | Noted: 2025-02-15

## 2025-02-15 PROBLEM — J96.20 ACUTE ON CHRONIC RESPIRATORY FAILURE: Status: ACTIVE | Noted: 2025-02-15

## 2025-02-15 LAB
ALBUMIN SERPL BCP-MCNC: 3.8 G/DL (ref 3.5–5.2)
ALLENS TEST: YES
ALP SERPL-CCNC: 119 U/L (ref 40–150)
ALT SERPL W/O P-5'-P-CCNC: 19 U/L (ref 10–44)
ANION GAP SERPL CALC-SCNC: 16 MMOL/L (ref 8–16)
AST SERPL-CCNC: 32 U/L (ref 10–40)
BASOPHILS # BLD AUTO: 0.03 K/UL (ref 0–0.2)
BASOPHILS NFR BLD: 0.3 % (ref 0–1.9)
BILIRUB SERPL-MCNC: 1 MG/DL (ref 0.1–1)
BNP SERPL-MCNC: 385 PG/ML (ref 0–99)
BUN SERPL-MCNC: 19 MG/DL (ref 8–23)
CALCIUM SERPL-MCNC: 9.9 MG/DL (ref 8.7–10.5)
CHLORIDE SERPL-SCNC: 99 MMOL/L (ref 95–110)
CHOLEST SERPL-MCNC: 146 MG/DL (ref 120–199)
CHOLEST/HDLC SERPL: 4.4 {RATIO} (ref 2–5)
CO2 SERPL-SCNC: 25 MMOL/L (ref 23–29)
CREAT SERPL-MCNC: 1 MG/DL (ref 0.5–1.4)
CTP QC/QA: YES
CTP QC/QA: YES
DIFFERENTIAL METHOD BLD: ABNORMAL
EOSINOPHIL # BLD AUTO: 0 K/UL (ref 0–0.5)
EOSINOPHIL NFR BLD: 0 % (ref 0–8)
ERYTHROCYTE [DISTWIDTH] IN BLOOD BY AUTOMATED COUNT: 15.3 % (ref 11.5–14.5)
EST. GFR  (NO RACE VARIABLE): 58 ML/MIN/1.73 M^2
FIO2: 28 %
GLUCOSE SERPL-MCNC: 120 MG/DL (ref 70–110)
HCT VFR BLD AUTO: 41.1 % (ref 37–48.5)
HDLC SERPL-MCNC: 33 MG/DL (ref 40–75)
HDLC SERPL: 22.6 % (ref 20–50)
HGB BLD-MCNC: 13.6 G/DL (ref 12–16)
IMM GRANULOCYTES # BLD AUTO: 0.08 K/UL (ref 0–0.04)
IMM GRANULOCYTES NFR BLD AUTO: 0.9 % (ref 0–0.5)
LDLC SERPL CALC-MCNC: 94 MG/DL (ref 63–159)
LPM: 2
LYMPHOCYTES # BLD AUTO: 0.8 K/UL (ref 1–4.8)
LYMPHOCYTES NFR BLD: 9 % (ref 18–48)
MCH RBC QN AUTO: 29.8 PG (ref 27–31)
MCHC RBC AUTO-ENTMCNC: 33.1 G/DL (ref 32–36)
MCV RBC AUTO: 90 FL (ref 82–98)
MONOCYTES # BLD AUTO: 0.7 K/UL (ref 0.3–1)
MONOCYTES NFR BLD: 7.8 % (ref 4–15)
NEUTROPHILS # BLD AUTO: 7.5 K/UL (ref 1.8–7.7)
NEUTROPHILS NFR BLD: 82 % (ref 38–73)
NONHDLC SERPL-MCNC: 113 MG/DL
NRBC BLD-RTO: 0 /100 WBC
PCO2 BLDA: 48.3 MMHG (ref 35–45)
PH SMN: 7.41 [PH] (ref 7.35–7.45)
PLATELET # BLD AUTO: 160 K/UL (ref 150–450)
PMV BLD AUTO: 10.4 FL (ref 9.2–12.9)
PO2 BLDA: 81.4 MMHG (ref 80–100)
POC BASE DEFICIT: 4.5 MMOL/L (ref -2–2)
POC HCO3: 30.2 MMOL/L (ref 24–28)
POC MOLECULAR INFLUENZA A AGN: POSITIVE
POC MOLECULAR INFLUENZA B AGN: NEGATIVE
POC PERFORMED BY: ABNORMAL
POC SATURATED O2: 96.4 % (ref 95–100)
POTASSIUM SERPL-SCNC: 4.5 MMOL/L (ref 3.5–5.1)
PROCALCITONIN SERPL IA-MCNC: 0.1 NG/ML
PROT SERPL-MCNC: 7.8 G/DL (ref 6–8.4)
RBC # BLD AUTO: 4.57 M/UL (ref 4–5.4)
SARS-COV-2 RDRP RESP QL NAA+PROBE: NEGATIVE
SODIUM SERPL-SCNC: 140 MMOL/L (ref 136–145)
SPECIMEN SOURCE: ABNORMAL
TRIGL SERPL-MCNC: 95 MG/DL (ref 30–150)
TROPONIN I SERPL DL<=0.01 NG/ML-MCNC: 0.04 NG/ML (ref 0–0.03)
WBC # BLD AUTO: 9.1 K/UL (ref 3.9–12.7)

## 2025-02-15 PROCEDURE — 36600 WITHDRAWAL OF ARTERIAL BLOOD: CPT

## 2025-02-15 PROCEDURE — 25000242 PHARM REV CODE 250 ALT 637 W/ HCPCS: Performed by: FAMILY MEDICINE

## 2025-02-15 PROCEDURE — 87635 SARS-COV-2 COVID-19 AMP PRB: CPT | Performed by: EMERGENCY MEDICINE

## 2025-02-15 PROCEDURE — 99900035 HC TECH TIME PER 15 MIN (STAT)

## 2025-02-15 PROCEDURE — 25000003 PHARM REV CODE 250: Performed by: EMERGENCY MEDICINE

## 2025-02-15 PROCEDURE — 94640 AIRWAY INHALATION TREATMENT: CPT | Mod: XB

## 2025-02-15 PROCEDURE — 93005 ELECTROCARDIOGRAM TRACING: CPT

## 2025-02-15 PROCEDURE — 25000003 PHARM REV CODE 250: Performed by: FAMILY MEDICINE

## 2025-02-15 PROCEDURE — 63600175 PHARM REV CODE 636 W HCPCS: Performed by: FAMILY MEDICINE

## 2025-02-15 PROCEDURE — 25000242 PHARM REV CODE 250 ALT 637 W/ HCPCS: Performed by: EMERGENCY MEDICINE

## 2025-02-15 PROCEDURE — 27000207 HC ISOLATION

## 2025-02-15 PROCEDURE — 83880 ASSAY OF NATRIURETIC PEPTIDE: CPT | Performed by: EMERGENCY MEDICINE

## 2025-02-15 PROCEDURE — 80053 COMPREHEN METABOLIC PANEL: CPT | Performed by: EMERGENCY MEDICINE

## 2025-02-15 PROCEDURE — 36410 VNPNXR 3YR/> PHY/QHP DX/THER: CPT | Mod: LT

## 2025-02-15 PROCEDURE — 82803 BLOOD GASES ANY COMBINATION: CPT

## 2025-02-15 PROCEDURE — 99285 EMERGENCY DEPT VISIT HI MDM: CPT | Mod: 25

## 2025-02-15 PROCEDURE — 87502 INFLUENZA DNA AMP PROBE: CPT

## 2025-02-15 PROCEDURE — 36000 PLACE NEEDLE IN VEIN: CPT | Performed by: STUDENT IN AN ORGANIZED HEALTH CARE EDUCATION/TRAINING PROGRAM

## 2025-02-15 PROCEDURE — 80061 LIPID PANEL: CPT | Performed by: FAMILY MEDICINE

## 2025-02-15 PROCEDURE — 94761 N-INVAS EAR/PLS OXIMETRY MLT: CPT | Mod: XB

## 2025-02-15 PROCEDURE — 11000001 HC ACUTE MED/SURG PRIVATE ROOM

## 2025-02-15 PROCEDURE — 96374 THER/PROPH/DIAG INJ IV PUSH: CPT

## 2025-02-15 PROCEDURE — 25500020 PHARM REV CODE 255

## 2025-02-15 PROCEDURE — 96375 TX/PRO/DX INJ NEW DRUG ADDON: CPT

## 2025-02-15 PROCEDURE — 25000003 PHARM REV CODE 250

## 2025-02-15 PROCEDURE — 85025 COMPLETE CBC W/AUTO DIFF WBC: CPT | Performed by: EMERGENCY MEDICINE

## 2025-02-15 PROCEDURE — 93010 ELECTROCARDIOGRAM REPORT: CPT | Mod: ,,, | Performed by: INTERNAL MEDICINE

## 2025-02-15 PROCEDURE — 84484 ASSAY OF TROPONIN QUANT: CPT | Performed by: EMERGENCY MEDICINE

## 2025-02-15 PROCEDURE — 63600175 PHARM REV CODE 636 W HCPCS: Mod: JZ,TB | Performed by: EMERGENCY MEDICINE

## 2025-02-15 PROCEDURE — 84145 PROCALCITONIN (PCT): CPT | Performed by: FAMILY MEDICINE

## 2025-02-15 PROCEDURE — 27000221 HC OXYGEN, UP TO 24 HOURS

## 2025-02-15 RX ORDER — IPRATROPIUM BROMIDE AND ALBUTEROL SULFATE 2.5; .5 MG/3ML; MG/3ML
3 SOLUTION RESPIRATORY (INHALATION)
Status: DISCONTINUED | OUTPATIENT
Start: 2025-02-15 | End: 2025-02-17 | Stop reason: HOSPADM

## 2025-02-15 RX ORDER — ACETAMINOPHEN 325 MG/1
650 TABLET ORAL EVERY 8 HOURS PRN
Status: DISCONTINUED | OUTPATIENT
Start: 2025-02-15 | End: 2025-02-17 | Stop reason: HOSPADM

## 2025-02-15 RX ORDER — PANTOPRAZOLE SODIUM 40 MG/1
40 TABLET, DELAYED RELEASE ORAL DAILY
Status: DISCONTINUED | OUTPATIENT
Start: 2025-02-15 | End: 2025-02-17 | Stop reason: HOSPADM

## 2025-02-15 RX ORDER — OSELTAMIVIR PHOSPHATE 30 MG/1
30 CAPSULE ORAL 2 TIMES DAILY
Status: DISCONTINUED | OUTPATIENT
Start: 2025-02-15 | End: 2025-02-17 | Stop reason: HOSPADM

## 2025-02-15 RX ORDER — ATORVASTATIN CALCIUM 40 MG/1
80 TABLET, FILM COATED ORAL NIGHTLY
Status: DISCONTINUED | OUTPATIENT
Start: 2025-02-15 | End: 2025-02-17 | Stop reason: HOSPADM

## 2025-02-15 RX ORDER — MORPHINE SULFATE 2 MG/ML
1 INJECTION, SOLUTION INTRAMUSCULAR; INTRAVENOUS EVERY 4 HOURS PRN
Refills: 0 | Status: DISCONTINUED | OUTPATIENT
Start: 2025-02-15 | End: 2025-02-17 | Stop reason: HOSPADM

## 2025-02-15 RX ORDER — DILTIAZEM HYDROCHLORIDE 5 MG/ML
10 INJECTION INTRAVENOUS
Status: COMPLETED | OUTPATIENT
Start: 2025-02-15 | End: 2025-02-15

## 2025-02-15 RX ORDER — SODIUM CHLORIDE 0.9 % (FLUSH) 0.9 %
3 SYRINGE (ML) INJECTION
Status: DISCONTINUED | OUTPATIENT
Start: 2025-02-15 | End: 2025-02-17 | Stop reason: HOSPADM

## 2025-02-15 RX ORDER — OSELTAMIVIR PHOSPHATE 75 MG/1
75 CAPSULE ORAL 2 TIMES DAILY
Status: DISCONTINUED | OUTPATIENT
Start: 2025-02-15 | End: 2025-02-15

## 2025-02-15 RX ORDER — ACETAMINOPHEN 325 MG/1
650 TABLET ORAL EVERY 6 HOURS PRN
Status: DISCONTINUED | OUTPATIENT
Start: 2025-02-15 | End: 2025-02-17 | Stop reason: HOSPADM

## 2025-02-15 RX ORDER — PENTOXIFYLLINE 400 MG/1
400 TABLET, EXTENDED RELEASE ORAL
Status: DISCONTINUED | OUTPATIENT
Start: 2025-02-15 | End: 2025-02-17 | Stop reason: HOSPADM

## 2025-02-15 RX ORDER — DULOXETIN HYDROCHLORIDE 30 MG/1
60 CAPSULE, DELAYED RELEASE ORAL DAILY
Status: DISCONTINUED | OUTPATIENT
Start: 2025-02-15 | End: 2025-02-17 | Stop reason: HOSPADM

## 2025-02-15 RX ORDER — PREDNISONE 20 MG/1
40 TABLET ORAL DAILY
Status: DISCONTINUED | OUTPATIENT
Start: 2025-02-15 | End: 2025-02-17 | Stop reason: HOSPADM

## 2025-02-15 RX ORDER — OXYCODONE HYDROCHLORIDE 5 MG/1
5 TABLET ORAL EVERY 6 HOURS PRN
Refills: 0 | Status: DISCONTINUED | OUTPATIENT
Start: 2025-02-15 | End: 2025-02-17 | Stop reason: HOSPADM

## 2025-02-15 RX ORDER — DILTIAZEM HYDROCHLORIDE 120 MG/1
120 CAPSULE, COATED, EXTENDED RELEASE ORAL DAILY
Status: DISCONTINUED | OUTPATIENT
Start: 2025-02-16 | End: 2025-02-17 | Stop reason: HOSPADM

## 2025-02-15 RX ORDER — LANOLIN ALCOHOL/MO/W.PET/CERES
1 CREAM (GRAM) TOPICAL DAILY
Status: DISCONTINUED | OUTPATIENT
Start: 2025-02-15 | End: 2025-02-17 | Stop reason: HOSPADM

## 2025-02-15 RX ORDER — OSELTAMIVIR PHOSPHATE 75 MG/1
75 CAPSULE ORAL
Status: COMPLETED | OUTPATIENT
Start: 2025-02-15 | End: 2025-02-15

## 2025-02-15 RX ORDER — LEVALBUTEROL 1.25 MG/.5ML
1.25 SOLUTION, CONCENTRATE RESPIRATORY (INHALATION)
Status: COMPLETED | OUTPATIENT
Start: 2025-02-15 | End: 2025-02-15

## 2025-02-15 RX ORDER — TRAZODONE HYDROCHLORIDE 100 MG/1
100 TABLET ORAL NIGHTLY
Status: DISCONTINUED | OUTPATIENT
Start: 2025-02-15 | End: 2025-02-17 | Stop reason: HOSPADM

## 2025-02-15 RX ORDER — NALOXONE HCL 0.4 MG/ML
0.4 VIAL (ML) INJECTION
Status: DISCONTINUED | OUTPATIENT
Start: 2025-02-15 | End: 2025-02-17 | Stop reason: HOSPADM

## 2025-02-15 RX ORDER — METHYLPREDNISOLONE SOD SUCC 125 MG
125 VIAL (EA) INJECTION
Status: COMPLETED | OUTPATIENT
Start: 2025-02-15 | End: 2025-02-15

## 2025-02-15 RX ORDER — POLYETHYLENE GLYCOL 3350 17 G/17G
17 POWDER, FOR SOLUTION ORAL DAILY
Status: DISCONTINUED | OUTPATIENT
Start: 2025-02-15 | End: 2025-02-17 | Stop reason: HOSPADM

## 2025-02-15 RX ORDER — LISINOPRIL 20 MG/1
20 TABLET ORAL DAILY
COMMUNITY
Start: 2025-02-12

## 2025-02-15 RX ORDER — AMLODIPINE BESYLATE 5 MG/1
5 TABLET ORAL DAILY
COMMUNITY
Start: 2025-02-12

## 2025-02-15 RX ORDER — ONDANSETRON HYDROCHLORIDE 2 MG/ML
4 INJECTION, SOLUTION INTRAVENOUS EVERY 12 HOURS PRN
Status: DISCONTINUED | OUTPATIENT
Start: 2025-02-15 | End: 2025-02-17 | Stop reason: HOSPADM

## 2025-02-15 RX ORDER — ASPIRIN 81 MG/1
81 TABLET ORAL DAILY
Status: DISCONTINUED | OUTPATIENT
Start: 2025-02-15 | End: 2025-02-17 | Stop reason: HOSPADM

## 2025-02-15 RX ORDER — LISINOPRIL 20 MG/1
40 TABLET ORAL DAILY
Status: DISCONTINUED | OUTPATIENT
Start: 2025-02-15 | End: 2025-02-17 | Stop reason: HOSPADM

## 2025-02-15 RX ORDER — DILTIAZEM HYDROCHLORIDE 30 MG/1
30 TABLET, FILM COATED ORAL
Status: COMPLETED | OUTPATIENT
Start: 2025-02-15 | End: 2025-02-15

## 2025-02-15 RX ADMIN — LEVALBUTEROL 1.25 MG: 1.25 SOLUTION, CONCENTRATE RESPIRATORY (INHALATION) at 03:02

## 2025-02-15 RX ADMIN — FERROUS SULFATE TAB 325 MG (65 MG ELEMENTAL FE) 1 EACH: 325 (65 FE) TAB at 08:02

## 2025-02-15 RX ADMIN — DULOXETINE HYDROCHLORIDE 60 MG: 30 CAPSULE, DELAYED RELEASE ORAL at 08:02

## 2025-02-15 RX ADMIN — OXYCODONE 5 MG: 5 TABLET ORAL at 06:02

## 2025-02-15 RX ADMIN — IPRATROPIUM BROMIDE AND ALBUTEROL SULFATE 3 ML: 2.5; .5 SOLUTION RESPIRATORY (INHALATION) at 08:02

## 2025-02-15 RX ADMIN — PENTOXIFYLLINE 400 MG: 400 TABLET, EXTENDED RELEASE ORAL at 08:02

## 2025-02-15 RX ADMIN — METHYLPREDNISOLONE SODIUM SUCCINATE 125 MG: 125 INJECTION, POWDER, FOR SOLUTION INTRAMUSCULAR; INTRAVENOUS at 03:02

## 2025-02-15 RX ADMIN — DILTIAZEM HYDROCHLORIDE 10 MG: 5 INJECTION INTRAVENOUS at 03:02

## 2025-02-15 RX ADMIN — DILTIAZEM HYDROCHLORIDE 30 MG: 30 TABLET, FILM COATED ORAL at 05:02

## 2025-02-15 RX ADMIN — OXYCODONE 5 MG: 5 TABLET ORAL at 12:02

## 2025-02-15 RX ADMIN — APIXABAN 5 MG: 5 TABLET, FILM COATED ORAL at 08:02

## 2025-02-15 RX ADMIN — METOPROLOL SUCCINATE 75 MG: 25 TABLET, EXTENDED RELEASE ORAL at 08:02

## 2025-02-15 RX ADMIN — LISINOPRIL 40 MG: 20 TABLET ORAL at 08:02

## 2025-02-15 RX ADMIN — ASPIRIN 81 MG: 81 TABLET, COATED ORAL at 08:02

## 2025-02-15 RX ADMIN — PENTOXIFYLLINE 400 MG: 400 TABLET, EXTENDED RELEASE ORAL at 04:02

## 2025-02-15 RX ADMIN — ACETAMINOPHEN 650 MG: 325 TABLET ORAL at 04:02

## 2025-02-15 RX ADMIN — OSELTAMIVIR PHOSPHATE 30 MG: 30 CAPSULE ORAL at 08:02

## 2025-02-15 RX ADMIN — OSELTAMIVIR PHOSPHATE 75 MG: 75 CAPSULE ORAL at 05:02

## 2025-02-15 RX ADMIN — PREDNISONE 40 MG: 20 TABLET ORAL at 08:02

## 2025-02-15 RX ADMIN — ATORVASTATIN CALCIUM 80 MG: 40 TABLET, FILM COATED ORAL at 08:02

## 2025-02-15 RX ADMIN — PANTOPRAZOLE SODIUM 40 MG: 40 TABLET, DELAYED RELEASE ORAL at 08:02

## 2025-02-15 RX ADMIN — PENTOXIFYLLINE 400 MG: 400 TABLET, EXTENDED RELEASE ORAL at 11:02

## 2025-02-15 RX ADMIN — IOHEXOL 100 ML: 350 INJECTION, SOLUTION INTRAVENOUS at 12:02

## 2025-02-15 RX ADMIN — TRAZODONE HYDROCHLORIDE 100 MG: 100 TABLET ORAL at 08:02

## 2025-02-15 NOTE — H&P
[facility] Medicine  History & Physical    Patient Name: Maria A Smith  MRN: [id number]  Patient Class: OP- Observation  Admission Date: 2/15/2025  Attending Physician: Cortez Mattson,*   Primary Care Provider: Sneha López MD         Patient information was obtained from patient, past medical records, and ER records.     Subjective:     Principal Problem:Acute on chronic respiratory failure    Chief Complaint:   Chief Complaint   Patient presents with    Shortness of Breath     SOB x2 days. 84% on RA. 4L NC 95%. Hx of emphysema. Productive cough.         HPI: Maria A Smith is a 79-year-old female with a past medical history of HTN, Paroxysmal Afib, HLD, CVA w/ L sided weakness, prior tobacco use disorder, PVD, COPD on oxygen @2 L. Patient presents to emergency department  from [facility] rehab with complaint of shortness of breath for the past couple of weeks . In ER patient found to be positive for influenza A. CXR showed pulmonary emphysema.  Patient is a poor historian and most of the history is obtained from ER records and previous history.    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)     Stroke        Past Surgical History:   Procedure Laterality Date    ANGIOGRAPHY OF LOWER EXTREMITY Right 9/9/2024    Procedure: Angiogram Extremity Unilateral;  Surgeon: Quinn Thakur MD;  Location: [address];  Service: Vascular;  Laterality: Right;  L CFA access    APPENDECTOMY      COLONOSCOPY N/A 8/7/2019    Procedure: COLONOSCOPY;  Surgeon: Alex Degroot MD;  Location: [facility];  Service: Endoscopy;  Laterality: N/A;    COLONOSCOPY N/A 2/12/2020    Procedure: COLONOSCOPY;  Surgeon: Evelio Rizvi MD;  Location: [facility];  Service: Endoscopy;  Laterality: N/A;    ESOPHAGOGASTRODUODENOSCOPY N/A 2/11/2020    Procedure: EGD (ESOPHAGOGASTRODUODENOSCOPY);  Surgeon: Evelio  DAMIAN Rizvi MD;  [facility];  Service: Endoscopy;  Laterality: N/A;    HYSTERECTOMY      PTA, FEMORAL ARTERY Right 9/9/2024    Procedure: PTA, ARTERY, FEMORAL;  Surgeon: Quinn Thakur MD;  [facility];  Service: Vascular;  Laterality: Right;  PTA of common femoral artery    PTA, ILIAC ARTERY Bilateral 9/9/2024    Procedure: PTA, ILIAC ARTERY;  Surgeon: Quinn Thakur MD;  [facility];  Service: Vascular;  Laterality: Bilateral;  PTA of right and left external illiac    PTA, POPLITEAL Right 9/9/2024    Procedure: PTA, POPLITEAL;  Surgeon: Quinn Thakur MD;  [facility];  Service: Vascular;  Laterality: Right;    STENT-BYPASS GRAFT Right 9/9/2024    Procedure: STENT-BYPASS GRAFT;  Surgeon: Quinn Thakur MD;  [facility];  Service: Vascular;  Laterality: Right;  R SFA stent placement    contrast: 76mL   mGy: 262.84  Gycm2: 43.4182  Fluro time: 19.9 min    TRANSESOPHAGEAL ECHOCARDIOGRAPHY N/A 3/23/2023    Procedure: ECHOCARDIOGRAM, TRANSESOPHAGEAL;  Surgeon: [facility] Diagnostic Provider;  Location: [facility] EP LAB;  Service: Cardiology;  Laterality: N/A;    TREATMENT OF CARDIAC ARRHYTHMIA N/A 3/23/2023    Procedure: Cardioversion or Defibrillation;  Surgeon: ASHLEY Watson MD;  Location: [facility] EP LAB;  Service: Cardiology;  Laterality: N/A;  AF, CHERRIE/DCCV, ANES, EH, 1108       Review of patient's allergies indicates:  No Known Allergies    No current facility-administered medications on file prior to encounter.     Current Outpatient Medications on File Prior to Encounter   Medication Sig    acetaminophen (TYLENOL) 500 MG tablet Take 500 mg by mouth every 6 (six) hours as needed for Pain.    alendronate (FOSAMAX) 70 MG tablet Take 70 mg by mouth every 7 days. Give every Thursday    apixaban (ELIQUIS) 5 mg Tab Take 5 mg by mouth 2 (two) times daily.    aspirin (ECOTRIN) 81 MG EC tablet Take 1 tablet (81 mg total) by mouth once daily.    atorvastatin  (LIPITOR) 80 MG tablet Take 1 tablet (80 mg total) by mouth once daily. (Patient taking differently: Take 80 mg by mouth every evening.)    budesonide (PULMICORT) 0.5 mg/2 mL nebulizer solution Take 0.5 mg by nebulization once daily. Inhale 1 vial one time a day for emphysema    bumetanide (BUMEX) 1 MG tablet Take 1 tablet (1 mg total) by mouth 3 (three) times a week.    calcium-vitamin D3 (OS-ANNA 500 + D3) 500 mg-5 mcg (200 unit) per tablet Take 1 tablet by mouth once daily.    diltiaZEM HCl (TIAZAC) 120 mg 24 hr capsule Take 120 mg by mouth once daily.    DULoxetine (CYMBALTA) 60 MG capsule Take 1 capsule (60 mg total) by mouth once daily.    ferrous sulfate 325 (65 FE) MG EC tablet Take 325 mg by mouth 2 (two) times daily.    lisinopriL (PRINIVIL,ZESTRIL) 40 MG tablet Take 1 tablet (40 mg total) by mouth once daily.    metoprolol succinate (TOPROL-XL) 50 MG 24 hr tablet Take 1.5 tablets (75 mg total) by mouth once daily.    multivitamin (ONE DAILY MULTIVITAMIN) per tablet Take 1 tablet by mouth once daily.    oxyCODONE (ROXICODONE) 5 MG immediate release tablet Take 1 tablet (5 mg total) by mouth every 6 (six) hours as needed for Pain.    pantoprazole (PROTONIX) 40 MG tablet Take 1 tablet (40 mg total) by mouth once daily.    pentoxifylline (TRENTAL) 400 mg TbSR Take 400 mg by mouth 3 (three) times daily with meals.    polyethylene glycol (GLYCOLAX) 17 gram PwPk Take 17 g by mouth once daily.    senna (SENOKOT) 8.6 mg tablet Take 1 tablet by mouth once daily.    traZODone (DESYREL) 100 MG tablet Take 100 mg by mouth every evening.     Family History       Problem Relation (Age of Onset)    Cancer Sister    Diabetes Sister          Tobacco Use    Smoking status: Former     Current packs/day: 0.00     Types: Cigarettes     Quit date: 2017     Years since quittin.4    Smokeless tobacco: Never   Substance and Sexual Activity    Alcohol use: Yes     Alcohol/week: 1.0 standard drink of alcohol     Types: 1  Cans of beer per week    Drug use: No    Sexual activity: Not Currently     Review of Systems   Constitutional: Negative.    HENT: Negative.     Respiratory:  Positive for cough and shortness of breath.    Cardiovascular: Negative.    Gastrointestinal: Negative.    Genitourinary: Negative.    Musculoskeletal:  Positive for arthralgias.   Neurological:  Positive for weakness.   Psychiatric/Behavioral:  Positive for confusion.      Objective:     Vital Signs (Most Recent):  Temp: 97.9 °F (36.6 °C) (02/15/25 0244)  Pulse: 101 (02/15/25 0449)  Resp: (!) 22 (02/15/25 0449)  BP: (!) 169/85 (02/15/25 0524)  SpO2: 96 % (02/15/25 0449) Vital Signs (24h Range):  Temp:  [97.9 °F (36.6 °C)] 97.9 °F (36.6 °C)  Pulse:  [101-153] 101  Resp:  [22-37] 22  SpO2:  [96 %-99 %] 96 %  BP: (169-176)/(85-89) 169/85        There is no height or weight on file to calculate BMI.     Physical Exam  HENT:      Head: Normocephalic and atraumatic.      Nose: Nose normal.      Mouth/Throat:      Mouth: Mucous membranes are moist.   Eyes:      Extraocular Movements: Extraocular movements intact.      Pupils: Pupils are equal, round, and reactive to light.   Cardiovascular:      Rate and Rhythm: Regular rhythm.   Pulmonary:      Breath sounds: Rhonchi present.   Abdominal:      Palpations: Abdomen is soft.   Musculoskeletal:         General: Normal range of motion.   Skin:     General: Skin is warm.   Neurological:      Mental Status: She is alert. She is disoriented.   Psychiatric:         Mood and Affect: Mood normal.              CRANIAL NERVES     CN III, IV, VI   Pupils are equal, round, and reactive to light.       Significant Labs: All pertinent labs within the past 24 hours have been reviewed.  Recent Lab Results         02/15/25  0320   02/15/25  0306        POC Molecular Influenza A Ag Positive         POC Molecular Influenza B Ag Negative         Albumin   3.8       ALT   19       Anion Gap   16       AST   32  Comment: Specimen slightly  hemolyzed       Baso #   0.03       Basophil %   0.3       BILIRUBIN TOTAL   1.0  Comment: For infants and newborns, interpretation of results should be based  on gestational age, weight and in agreement with clinical  observations.    Premature Infant recommended reference ranges:  Up to 24 hours.............<8.0 mg/dL  Up to 48 hours............<12.0 mg/dL  3-5 days..................<15.0 mg/dL  6-29 days.................<15.0 mg/dL         BNP   385  Comment: Values of less than 100 pg/ml are consistent with non-CHF populations.       BUN   19       Calcium   9.9       Chloride   99       CO2   25       Creatinine   1.0       Differential Method   Automated       eGFR   58       Eos #   0.0       Eos %   0.0       Glucose   120       Gran # (ANC)   7.5       Gran %   82.0       Hematocrit   41.1       Hemoglobin   13.6       Immature Grans (Abs)   0.08  Comment: Mild elevation in immature granulocytes is non specific and   can be seen in a variety of conditions including stress response,   acute inflammation, trauma and pregnancy. Correlation with other   laboratory and clinical findings is essential.         Immature Granulocytes   0.9       Lymph #   0.8       Lymph %   9.0       MCH   29.8       MCHC   33.1       MCV   90       Mono #   0.7       Mono %   7.8       MPV   10.4       nRBC   0       Platelet Count   160       Potassium   4.5       PROTEIN TOTAL   7.8        Acceptable Yes          Yes         RBC   4.57       RDW   15.3       SARS-CoV-2 RNA, Amplification, Qual Negative         Sodium   140       Troponin I   0.035  Comment: The reference interval for Troponin I represents the 99th percentile   cutoff   for our facility and is consistent with 3rd generation assay   performance.         WBC   9.10               Significant Imaging: I have reviewed all pertinent imaging results/findings within the past 24 hours.  Assessment/Plan:     * Acute on chronic respiratory failure  Admit to  medical floor with telemetry.  Patient with Hypoxic Respiratory failure which is Acute on chronic.  she is on home oxygen at 2 LPM. Supplemental oxygen was provided and noted- Oxygen Concentration (%):  [36] 36    .   Signs/symptoms of respiratory failure include- tachypnea, wheezing, and lethargy. Contributing diagnoses includes - COPD Labs and images were reviewed. Patient Has not had a recent ABG. Will treat underlying causes and adjust management of respiratory failure as follows- supplemental oxygen and duo nebs.    Influenza A  Isolation protocol.  Tamiflu 75 mg p.o. b.i.d..    COPD exacerbation  Patient's COPD is with exacerbation noted by continued dyspnea currently.  Patient is currently on COPD Pathway. Continue scheduled inhalers Steroids, Antibiotics, and Supplemental oxygen and monitor respiratory status closely.     Essential hypertension  Patient's blood pressure range in the last 24 hours was: BP  Min: 169/85  Max: 176/89.The patient's inpatient anti-hypertensive regimen is listed below:  Current Antihypertensives  diltiaZEM 24 hr capsule 120 mg, Daily, Oral  lisinopriL tablet 40 mg, Daily, Oral  metoprolol succinate 24 hr tablet 75 mg, Daily, Oral    Plan  - BP is uncontrolled, will adjust as follows:  Home dose of antihypertensive agents.  - monitor blood pressure closely.    A-fib  Patient has paroxysmal (<7 days) atrial fibrillation. Patient is currently in sinus rhythm. [id number] Score: 4. The patients heart rate in the last 24 hours is as follows:  Pulse  Min: 101  Max: 153     Antiarrhythmics  diltiaZEM 24 hr capsule 120 mg, Daily, Oral  metoprolol succinate 24 hr tablet 75 mg, Daily, Oral    Anticoagulants  apixaban tablet 5 mg, 2 times daily, Oral    Plan  - Replete lytes with a goal of K>4, Mg >2  - Patient is anticoagulated, see medications listed above.  - Patient's afib is currently controlled  - monitor for any arrhythmias.        Hyperlipidemia    Lipid  profile.    Encephalopathy    Neuro checks.  ABG to see if patient hypercapnic.      VTE Risk Mitigation (From admission, onward)           Ordered     apixaban tablet 5 mg  2 times daily         02/15/25 0624                       On 02/15/2025, patient should be placed in hospital observation services under my care.             Jeff Reyes MD  Department of Hospital Medicine  [place] - Emergency Dept

## 2025-02-15 NOTE — SUBJECTIVE & OBJECTIVE
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)     Stroke        Past Surgical History:   Procedure Laterality Date    ANGIOGRAPHY OF LOWER EXTREMITY Right 9/9/2024    Procedure: Angiogram Extremity Unilateral;  Surgeon: Quinn Thakur MD;  [address];  Service: Vascular;  Laterality: Right;  L CFA access    APPENDECTOMY      COLONOSCOPY N/A 8/7/2019    Procedure: COLONOSCOPY;  Surgeon: Alex Degroot MD;  Location: [facility] ENDO;  Service: Endoscopy;  Laterality: N/A;    COLONOSCOPY N/A 2/12/2020    Procedure: COLONOSCOPY;  Surgeon: Evelio Rizvi MD;  Location: [facility] ENDO;  Service: Endoscopy;  Laterality: N/A;    ESOPHAGOGASTRODUODENOSCOPY N/A 2/11/2020    Procedure: EGD (ESOPHAGOGASTRODUODENOSCOPY);  Surgeon: Evelio Rizvi MD;  Location: [facility] ENDO;  Service: Endoscopy;  Laterality: N/A;    HYSTERECTOMY      PTA, FEMORAL ARTERY Right 9/9/2024    Procedure: PTA, ARTERY, FEMORAL;  Surgeon: Quinn Thakur MD;  [address];  Service: Vascular;  Laterality: Right;  PTA of common femoral artery    PTA, ILIAC ARTERY Bilateral 9/9/2024    Procedure: PTA, ILIAC ARTERY;  Surgeon: Quinn Thakur MD;  Location: [address];  Service: Vascular;  Laterality: Bilateral;  PTA of right and left external illiac    PTA, POPLITEAL Right 9/9/2024    Procedure: PTA, POPLITEAL;  Surgeon: Quinn Thakur MD;  [address];  Service: Vascular;  Laterality: Right;    STENT-BYPASS GRAFT Right 9/9/2024    Procedure: STENT-BYPASS GRAFT;  Surgeon: Quinn Thakur MD;  [address];  Service: Vascular;  Laterality: Right;  R SFA stent placement    contrast: 76mL   mGy: 262.84  Gycm2: 43.4182  Fluro time: 19.9 min    TRANSESOPHAGEAL ECHOCARDIOGRAPHY N/A 3/23/2023    Procedure: ECHOCARDIOGRAM, TRANSESOPHAGEAL;  Surgeon: Liliana Diagnostic Provider;  Location: [facility] EP LAB;  Service: Cardiology;  Laterality:  N/A;    TREATMENT OF CARDIAC ARRHYTHMIA N/A 3/23/2023    Procedure: Cardioversion or Defibrillation;  Surgeon: ASHLEY Watson MD;  Location: [facility];  Service: Cardiology;  Laterality: N/A;  AF, CHERRIE/DCCV, ANES, EH, 1108       Review of patient's allergies indicates:  No Known Allergies    No current facility-administered medications on file prior to encounter.     Current Outpatient Medications on File Prior to Encounter   Medication Sig    acetaminophen (TYLENOL) 500 MG tablet Take 500 mg by mouth every 6 (six) hours as needed for Pain.    alendronate (FOSAMAX) 70 MG tablet Take 70 mg by mouth every 7 days. Give every Thursday    apixaban (ELIQUIS) 5 mg Tab Take 5 mg by mouth 2 (two) times daily.    aspirin (ECOTRIN) 81 MG EC tablet Take 1 tablet (81 mg total) by mouth once daily.    atorvastatin (LIPITOR) 80 MG tablet Take 1 tablet (80 mg total) by mouth once daily. (Patient taking differently: Take 80 mg by mouth every evening.)    budesonide (PULMICORT) 0.5 mg/2 mL nebulizer solution Take 0.5 mg by nebulization once daily. Inhale 1 vial one time a day for emphysema    bumetanide (BUMEX) 1 MG tablet Take 1 tablet (1 mg total) by mouth 3 (three) times a week.    calcium-vitamin D3 (OS-ANNA 500 + D3) 500 mg-5 mcg (200 unit) per tablet Take 1 tablet by mouth once daily.    diltiaZEM HCl (TIAZAC) 120 mg 24 hr capsule Take 120 mg by mouth once daily.    DULoxetine (CYMBALTA) 60 MG capsule Take 1 capsule (60 mg total) by mouth once daily.    ferrous sulfate 325 (65 FE) MG EC tablet Take 325 mg by mouth 2 (two) times daily.    lisinopriL (PRINIVIL,ZESTRIL) 40 MG tablet Take 1 tablet (40 mg total) by mouth once daily.    metoprolol succinate (TOPROL-XL) 50 MG 24 hr tablet Take 1.5 tablets (75 mg total) by mouth once daily.    multivitamin (ONE DAILY MULTIVITAMIN) per tablet Take 1 tablet by mouth once daily.    oxyCODONE (ROXICODONE) 5 MG immediate release tablet Take 1 tablet (5 mg total) by mouth every 6 (six)  hours as needed for Pain.    pantoprazole (PROTONIX) 40 MG tablet Take 1 tablet (40 mg total) by mouth once daily.    pentoxifylline (TRENTAL) 400 mg TbSR Take 400 mg by mouth 3 (three) times daily with meals.    polyethylene glycol (GLYCOLAX) 17 gram PwPk Take 17 g by mouth once daily.    senna (SENOKOT) 8.6 mg tablet Take 1 tablet by mouth once daily.    traZODone (DESYREL) 100 MG tablet Take 100 mg by mouth every evening.     Family History       Problem Relation (Age of Onset)    Cancer Sister    Diabetes Sister          Tobacco Use    Smoking status: Former     Current packs/day: 0.00     Types: Cigarettes     Quit date: 2017     Years since quittin.4    Smokeless tobacco: Never   Substance and Sexual Activity    Alcohol use: Yes     Alcohol/week: 1.0 standard drink of alcohol     Types: 1 Cans of beer per week    Drug use: No    Sexual activity: Not Currently     Review of Systems   Constitutional: Negative.    HENT: Negative.     Respiratory:  Positive for cough and shortness of breath.    Cardiovascular: Negative.    Gastrointestinal: Negative.    Genitourinary: Negative.    Musculoskeletal:  Positive for arthralgias.   Neurological:  Positive for weakness.   Psychiatric/Behavioral:  Positive for confusion.      Objective:     Vital Signs (Most Recent):  Temp: 97.9 °F (36.6 °C) (02/15/25 0244)  Pulse: 101 (02/15/25 0449)  Resp: (!) 22 (02/15/25 0449)  BP: (!) 169/85 (02/15/25 0524)  SpO2: 96 % (02/15/25 0449) Vital Signs (24h Range):  Temp:  [97.9 °F (36.6 °C)] 97.9 °F (36.6 °C)  Pulse:  [101-153] 101  Resp:  [22-37] 22  SpO2:  [96 %-99 %] 96 %  BP: (169-176)/(85-89) 169/85        There is no height or weight on file to calculate BMI.     Physical Exam  HENT:      Head: Normocephalic and atraumatic.      Nose: Nose normal.      Mouth/Throat:      Mouth: Mucous membranes are moist.   Eyes:      Extraocular Movements: Extraocular movements intact.      Pupils: Pupils are equal, round, and reactive to  light.   Cardiovascular:      Rate and Rhythm: Regular rhythm.   Pulmonary:      Breath sounds: Rhonchi present.   Abdominal:      Palpations: Abdomen is soft.   Musculoskeletal:         General: Normal range of motion.   Skin:     General: Skin is warm.   Neurological:      Mental Status: She is alert. She is disoriented.   Psychiatric:         Mood and Affect: Mood normal.              CRANIAL NERVES     CN III, IV, VI   Pupils are equal, round, and reactive to light.       Significant Labs: All pertinent labs within the past 24 hours have been reviewed.  Recent Lab Results         02/15/25  0320   02/15/25  0306        POC Molecular Influenza A Ag Positive         POC Molecular Influenza B Ag Negative         Albumin   3.8       ALT   19       Anion Gap   16       AST   32  Comment: Specimen slightly hemolyzed       Baso #   0.03       Basophil %   0.3       BILIRUBIN TOTAL   1.0  Comment: For infants and newborns, interpretation of results should be based  on gestational age, weight and in agreement with clinical  observations.    Premature Infant recommended reference ranges:  Up to 24 hours.............<8.0 mg/dL  Up to 48 hours............<12.0 mg/dL  3-5 days..................<15.0 mg/dL  6-29 days.................<15.0 mg/dL         BNP   385  Comment: Values of less than 100 pg/ml are consistent with non-CHF populations.       BUN   19       Calcium   9.9       Chloride   99       CO2   25       Creatinine   1.0       Differential Method   Automated       eGFR   58       Eos #   0.0       Eos %   0.0       Glucose   120       Gran # (ANC)   7.5       Gran %   82.0       Hematocrit   41.1       Hemoglobin   13.6       Immature Grans (Abs)   0.08  Comment: Mild elevation in immature granulocytes is non specific and   can be seen in a variety of conditions including stress response,   acute inflammation, trauma and pregnancy. Correlation with other   laboratory and clinical findings is essential.          Immature Granulocytes   0.9       Lymph #   0.8       Lymph %   9.0       MCH   29.8       MCHC   33.1       MCV   90       Mono #   0.7       Mono %   7.8       MPV   10.4       nRBC   0       Platelet Count   160       Potassium   4.5       PROTEIN TOTAL   7.8        Acceptable Yes          Yes         RBC   4.57       RDW   15.3       SARS-CoV-2 RNA, Amplification, Qual Negative         Sodium   140       Troponin I   0.035  Comment: The reference interval for Troponin I represents the 99th percentile   cutoff   for our facility and is consistent with 3rd generation assay   performance.         WBC   9.10               Significant Imaging: I have reviewed all pertinent imaging results/findings within the past 24 hours.

## 2025-02-15 NOTE — ASSESSMENT & PLAN NOTE
Patient has paroxysmal (<7 days) atrial fibrillation. Patient is currently in sinus rhythm. [id number] Score: 4. The patients heart rate in the last 24 hours is as follows:  Pulse  Min: 101  Max: 153     Antiarrhythmics  diltiaZEM 24 hr capsule 120 mg, Daily, Oral  metoprolol succinate 24 hr tablet 75 mg, Daily, Oral    Anticoagulants  apixaban tablet 5 mg, 2 times daily, Oral    Plan  - Replete lytes with a goal of K>4, Mg >2  - Patient is anticoagulated, see medications listed above.  - Patient's afib is currently controlled  - monitor for any arrhythmias.

## 2025-02-15 NOTE — PROGRESS NOTES
VIRTUAL NURSE: Pt arrived to unit. Permission received per patient to turn camera to view patient. VIP model explained; patient informed this VN will be working with bedside nurse and the rest of the care team. Plan of care reviewed with patient.  Educated patient on fall risk and fall risk precautions in place. Call light within reach, side rails up x2. Admission questions completed. Patient instucted to ask staff for assistance. Patient verbalized complete understanding. Patient denies complaints or any needs at this time. Will continue to be available and intervene as needed.     Patient is from [facility]. Daughter, Sirisha Benavides, is at the bedside. Requests Living Will paperwork           02/15/25 1419   Admission   Initial VN Admission Questions Complete   Communication Issues? None   Shift   Virtual Nurse - Rounding Complete   Virtual Nurse - Patient Verbalized Approval Of Camera Use   Safety/Activity   Patient Rounds bed in low position;call light in patient/parent reach;clutter free environment maintained;visualized patient   Safety Promotion/Fall Prevention family to remain at bedside;side rails raised x 2;instructed to call staff for mobility;Fall Risk reviewed with patient/family   Positioning   Body Position supine   Head of Bed (HOB) Positioning HOB at 20-30 degrees         Labs, notes, orders, and careplan reviewed.

## 2025-02-15 NOTE — ASSESSMENT & PLAN NOTE
Cardiology consulted  -currently patient is euvolemic no signs of acute decompensation    Echo  Result Date: 8/28/2024    Left Ventricle: The left ventricle is normal in size. Ventricular mass   is normal. Normal wall thickness. Normal wall motion. There is low normal   systolic function with a visually estimated ejection fraction of 50 - 55%.   Unable to assess diastolic function due to atrial fibrillation.    Right Ventricle: Normal right ventricular cavity size. Wall thickness   is normal. Systolic function is mildly reduced.    Left Atrium: Left atrium is moderately dilated.    Aortic Valve: The aortic valve is a trileaflet valve. There is moderate   aortic valve sclerosis. There is moderate annular calcification present.    Mitral Valve: There is mild regurgitation.    Aorta: Aortic root is normal in size measuring 2.74 cm. Ascending aorta   is normal.    Pulmonary Artery: There is mild pulmonary hypertension. The estimated   pulmonary artery systolic pressure is 59 mmHg.    IVC/SVC: Elevated venous pressure at 15 mmHg.    Irregularly irregular rhythm was present during the study

## 2025-02-15 NOTE — ED NOTES
Received report from ISABELLE Altamirano RN and assumed care of patient. Patient resting in stretcher comfortably, respirations even and unlabored, equal rise and fall to chest. Will continue to monitor.

## 2025-02-15 NOTE — ED PROVIDER NOTES
Encounter Date: 2/15/2025       History     Chief Complaint   Patient presents with    Shortness of Breath     SOB x2 days. 84% on RA. 4L NC 95%. Hx of emphysema. Productive cough.      Maria A Smith is a 79 y.o. female who  has a past medical history of Hypertension, Squamous cell carcinoma (01/19/2017), and Stroke.    The patient presents to the ED due shortness of breath.  She has been feeling short of breath for the past couple of weeks although it worsened acutely in the past couple of days. She had oxygen saturations of 84% on room air, EMS denies home oxygen use.    The history is provided by the EMS personnel.     Review of patient's allergies indicates:  No Known Allergies  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)     Stroke      Past Surgical History:   Procedure Laterality Date    ANGIOGRAPHY OF LOWER EXTREMITY Right 9/9/2024    Procedure: Angiogram Extremity Unilateral;  Surgeon: Quinn Thakur MD;  Location: [facility] OR [address] [place] [address];  Service: Vascular;  Laterality: Right;  L CFA access    APPENDECTOMY      COLONOSCOPY N/A 8/7/2019    Procedure: COLONOSCOPY;  Surgeon: Alex Degroot MD;  Location: [facility];  Service: Endoscopy;  Laterality: N/A;    COLONOSCOPY N/A 2/12/2020    Procedure: COLONOSCOPY;  Surgeon: Evelio Rizvi MD;  Location: [facility];  Service: Endoscopy;  Laterality: N/A;    ESOPHAGOGASTRODUODENOSCOPY N/A 2/11/2020    Procedure: EGD (ESOPHAGOGASTRODUODENOSCOPY);  Surgeon: Evelio Rizvi MD;  Location: [facility];  Service: Endoscopy;  Laterality: N/A;    HYSTERECTOMY      PTA, FEMORAL ARTERY Right 9/9/2024    Procedure: PTA, ARTERY, FEMORAL;  Surgeon: Quinn Thakur MD;  Location: [address];  Service: Vascular;  Laterality: Right;  PTA of common femoral artery    PTA, ILIAC ARTERY Bilateral 9/9/2024    Procedure: PTA, ILIAC ARTERY;  Surgeon: Quinn Thakur MD;  Location: [facility] OR [address] [place] [address];   Service: Vascular;  Laterality: Bilateral;  PTA of right and left external illiac    PTA, POPLITEAL Right 9/9/2024    Procedure: PTA, POPLITEAL;  Surgeon: Quinn Thakur MD;  [facility];  Service: Vascular;  Laterality: Right;    STENT-BYPASS GRAFT Right 9/9/2024    Procedure: STENT-BYPASS GRAFT;  Surgeon: Quinn Thakur MD;  [facility];  Service: Vascular;  Laterality: Right;  R SFA stent placement    contrast: 76mL   mGy: 262.84  Gycm2: 43.4182  Fluro time: 19.9 min    TRANSESOPHAGEAL ECHOCARDIOGRAPHY N/A 3/23/2023    Procedure: ECHOCARDIOGRAM, TRANSESOPHAGEAL;  Surgeon: [facility];  Location: [facility] EP LAB;  Service: Cardiology;  Laterality: N/A;    TREATMENT OF CARDIAC ARRHYTHMIA N/A 3/23/2023    Procedure: Cardioversion or Defibrillation;  Surgeon: ASHLEY Watson MD;  Location: [facility] EP LAB;  Service: Cardiology;  Laterality: N/A;  AF, CHERRIE/DCCV, ANES, EH, 1108     Family History   Problem Relation Name Age of Onset    Cancer Sister      Diabetes Sister      Melanoma Neg Hx      Psoriasis Neg Hx      Lupus Neg Hx      Eczema Neg Hx       Social History[1]  Review of Systems   Constitutional:  Negative for chills and fever.   HENT:  Negative for sore throat.    Respiratory:  Positive for cough and shortness of breath.    Cardiovascular:  Negative for chest pain.   Gastrointestinal:  Negative for nausea and vomiting.   Genitourinary:  Negative for dysuria, frequency and urgency.   Musculoskeletal:  Negative for back pain, neck pain and neck stiffness.   Skin:  Negative for rash and wound.   Neurological:  Negative for syncope and weakness.   Hematological:  Does not bruise/bleed easily.   Psychiatric/Behavioral:  Negative for agitation, behavioral problems and confusion.        Physical Exam     Initial Vitals [02/15/25 0244]   BP Pulse Resp Temp SpO2   (!) 176/89 (!) 118 (!) 22 97.9 °F (36.6 °C) 96 %      MAP       --         Physical Exam    Constitutional:   Non-toxic appearance. No distress.   HENT:   Head: Normocephalic and atraumatic.   Eyes: Conjunctivae and EOM are normal. Pupils are equal, round, and reactive to light. Right eye exhibits no nystagmus. Left eye exhibits no nystagmus.   Neck: Neck supple.   Cardiovascular:  Regular rhythm, S1 normal and S2 normal.           No murmur heard.  Pulmonary/Chest: No respiratory distress. She has wheezes. She has no rales.   Acessory muscle use  Diminished breath sounds bilaterally   Abdominal: Abdomen is soft. She exhibits no distension. There is no abdominal tenderness.   Musculoskeletal:      Cervical back: Neck supple.     Neurological: She is alert. No cranial nerve deficit. GCS eye subscore is 4. GCS verbal subscore is 5. GCS motor subscore is 6.   Skin: Skin is warm. No rash noted.   Psychiatric: She has a normal mood and affect. Her behavior is normal.         ED Course   Procedures  Labs Reviewed   CBC W/ AUTO DIFFERENTIAL - Abnormal       Result Value    WBC 9.10      RBC 4.57      Hemoglobin 13.6      Hematocrit 41.1      MCV 90      MCH 29.8      MCHC 33.1      RDW 15.3 (*)     Platelets 160      MPV 10.4      Immature Granulocytes 0.9 (*)     Gran # (ANC) 7.5      Immature Grans (Abs) 0.08 (*)     Lymph # 0.8 (*)     Mono # 0.7      Eos # 0.0      Baso # 0.03      nRBC 0      Gran % 82.0 (*)     Lymph % 9.0 (*)     Mono % 7.8      Eosinophil % 0.0      Basophil % 0.3      Differential Method Automated     COMPREHENSIVE METABOLIC PANEL - Abnormal    Sodium 140      Potassium 4.5      Chloride 99      CO2 25      Glucose 120 (*)     BUN 19      Creatinine 1.0      Calcium 9.9      Total Protein 7.8      Albumin 3.8      Total Bilirubin 1.0      Alkaline Phosphatase 119      AST 32      ALT 19      eGFR 58 (*)     Anion Gap 16     B-TYPE NATRIURETIC PEPTIDE - Abnormal     (*)    TROPONIN I - Abnormal    Troponin I 0.035 (*)    LIPID PANEL - Abnormal    Cholesterol 146      Triglycerides 95      HDL 33 (*)      LDL Cholesterol 94.0      HDL/Cholesterol Ratio 22.6      Total Cholesterol/HDL Ratio 4.4      Non-HDL Cholesterol 113     POCT INFLUENZA A/B MOLECULAR - Abnormal    POC Molecular Influenza A Ag Positive (*)     POC Molecular Influenza B Ag Negative       Acceptable Yes     PROCALCITONIN    Procalcitonin 0.10     SARS-COV-2 RDRP GENE    POC Rapid COVID Negative       Acceptable Yes       EKG Readings: (Independently Interpreted)   Initial Reading: No STEMI. Heart Rate: 112.   Atrial fibrillation with rapid ventricular repsonse     ECG Results              EKG 12-lead (In process)        Collection Time Result Time QRS Duration OHS QTC Calculation    02/15/25 03:00:06 02/17/25 07:53:25 122 469                     In process by Interface, Lab In [facility] (02/17/25 07:53:28)                   Narrative:    Test Reason : R06.02,    Vent. Rate : 112 BPM     Atrial Rate :  53 BPM     P-R Int :    ms          QRS Dur : 122 ms      QT Int : 344 ms       P-R-T Axes :     91  73 degrees    QTcB Int : 469 ms    Atrial fibrillation with rapid ventricular response  Right bundle branch block  Abnormal ECG  When compared with ECG of 30-Aug-2024 09:36,  No significant change was found    Referred By: AAAREFERRAL SELF           Confirmed By:                                   Imaging Results              CTA Chest Non-Coronary (PE Studies) (Final result)  Result time 02/15/25 12:57:36      Final result by Viktor Eugene DO (02/15/25 12:57:36)                   Impression:      1. No evidence to suggest pulmonary arterial embolism.  2. No definite acute pathology seen within the chest.  3. Remaining findings as discussed above including contrast extravasation seen within the soft tissues of the right axilla and along the shaft of the right humerus.      Electronically signed by: Viktor Eugene DO  Date:    02/15/2025  Time:    12:57               Narrative:    EXAMINATION:  CTA CHEST NON CORONARY  "(PE STUDIES)    CLINICAL HISTORY:  Pulmonary embolism (PE) suspected, high prob;    TECHNIQUE:  CT of the chest was acquired helically utilizing a low-dose technique from the lung apices through the posterior costophrenic angles status post administration of 100 cc of Omnipaque 350.  Bolus timing was utilized to optimize opacification of the pulmonary arterial system. 3-D maximum intensity projection and multiplanar reconstructions were created from the source data set and interpreted.    COMPARISON:  Chest x-ray from 02/15/2025 and CT a of the chest from 03/21/2023    FINDINGS:  There is a stable area of scarring seen within the right upper lobe.  Extensive emphysematous changes seen within both lungs.  No discrete pulmonary nodules or masses are identified.    Prominent vascular calcification seen involving the aorta.  There is good opacification of the pulmonary arterial system. No intraluminal filling defects are notified within the pulmonary arterial system to suggest pulmonary embolism. There is no pericardial effusion.  No enlarged mediastinal, hilar or axillary lymph nodes are identified.    The visualized upper abdomen is unremarkable in appearance.    No suspicious appearing osseous abnormalities.  There is contrast media seen within the subcutaneous soft tissues of the right humerus and right axilla due to contrast extravasation.                                       X-Ray Chest AP Portable (Final result)  Result time 02/15/25 06:01:25      Final result by Stu Granado MD (02/15/25 06:01:25)                   Impression:      No focal consolidation identified on this single view.  No significant change when compared with 08/30/2024.    Pulmonary emphysema.      Electronically signed by: Stu Granado MD  Date:    02/15/2025  Time:    06:01               Narrative:    EXAMINATION:  XR CHEST AP PORTABLE    CLINICAL HISTORY:  Provided history is "  Shortness of breath".    TECHNIQUE:  One view of " the chest.    COMPARISON:  08/30/2024.    FINDINGS:  Cardiomediastinal silhouette is mildly enlarged.  Atherosclerotic calcifications overlie the aortic arch.  Coarse interstitial lung markings and pulmonary emphysema.  No large focal consolidation.  No sizable pleural effusion.  No pneumothorax.  Operative changes in the left shoulder as seen previously.                                       Medications   apixaban tablet 5 mg (5 mg Oral Given 2/17/25 0811)   aspirin EC tablet 81 mg (81 mg Oral Given 2/17/25 0811)   atorvastatin tablet 80 mg (80 mg Oral Given 2/16/25 2046)   diltiaZEM 24 hr capsule 120 mg (120 mg Oral Given 2/17/25 0812)   DULoxetine DR capsule 60 mg (60 mg Oral Given 2/17/25 0812)   ferrous sulfate tablet 1 each (1 each Oral Given 2/17/25 0812)   lisinopriL tablet 40 mg (40 mg Oral Given 2/17/25 0811)   metoprolol succinate 24 hr tablet 75 mg (75 mg Oral Given 2/17/25 0811)   pantoprazole EC tablet 40 mg (40 mg Oral Given 2/17/25 0811)   pentoxifylline CR tablet 400 mg (400 mg Oral Given 2/17/25 0812)   polyethylene glycol packet 17 g (17 g Oral Not Given 2/17/25 0900)   traZODone tablet 100 mg (100 mg Oral Given 2/16/25 2046)   sodium chloride 0.9% flush 3 mL (has no administration in time range)   predniSONE tablet 40 mg (40 mg Oral Given 2/17/25 0812)   albuterol-ipratropium 2.5 mg-0.5 mg/3 mL nebulizer solution 3 mL (3 mLs Nebulization Given 2/17/25 0715)   ondansetron injection 4 mg (has no administration in time range)   acetaminophen tablet 650 mg (650 mg Oral Given 2/17/25 0158)   oseltamivir capsule 30 mg (30 mg Oral Given 2/17/25 0812)   acetaminophen tablet 650 mg (650 mg Oral Given 2/15/25 1653)   morphine injection 1 mg (has no administration in time range)   oxyCODONE immediate release tablet 5 mg (5 mg Oral Given 2/17/25 0811)   naloxone 0.4 mg/mL injection 0.4 mg (has no administration in time range)   levalbuterol nebulizer solution 1.25 mg (1.25 mg Nebulization Given by Other  2/15/25 0323)   methylPREDNISolone sodium succinate injection 125 mg (125 mg Intravenous Given 2/15/25 0344)   diltiaZEM injection 10 mg (10 mg Intravenous Given 2/15/25 0342)   diltiaZEM tablet 30 mg (30 mg Oral Given 2/15/25 0524)   oseltamivir capsule 75 mg (75 mg Oral Given 2/15/25 0525)   iohexoL (OMNIPAQUE 350) injection 100 mL (100 mLs Intravenous Given 2/15/25 1237)     Medical Decision Making  Differential Diagnosis includes, but is not limited to:  PE, MI/ACS, pneumothorax, pericardial effusion/tamonade, pneumonia, lung abscess, pericarditis/myocarditis, pleural effusion, lung mass, CHF exacerbation, asthma exacerbation, COPD exacerbation, aspirated/ingested foreign body, airway obstruction, CO poisoning, anemia, metabolic derangement, allergy/atopy, influenza, viral URI, viral syndrome.      Amount and/or Complexity of Data Reviewed  Labs: ordered. Decision-making details documented in ED Course.  Radiology: ordered. Decision-making details documented in ED Course.    Risk  Prescription drug management.  Decision regarding hospitalization.  Diagnosis or treatment significantly limited by social determinants of health.  Risk Details: Rate controlled after IV diltiazem  Appears more comfortable, no longer using accessory muscles on reassessment, lung sounds improved after steriods, levoalbuterol    Still requiring oxygen so will plan to admit for further evaluation and treatment of breathing difficulty                Critical Care  Total time providing critical care: 30 minutes               ED Course as of 02/17/25 1012   Sat Feb 15, 2025   0324 CBC auto differential(!) [RN]   0324 POCT Influenza A/B Molecular(!) [RN]   0328 POCT COVID-19 Rapid Screening [RN]   0501 X-Ray Chest AP Portable  Chest X ray reviewed - negative for acute process by my independent interpretation [RN]   0501 CBC auto differential(!) [RN]   0501 Brain natriuretic peptide(!) [RN]   0501 Comprehensive metabolic panel(!) [RN]   0501  Troponin I(!) [RN]      ED Course User Index  [RN] Tank Barron Jr., MD                           Clinical Impression:  Final diagnoses:  [R06.02] Shortness of breath  [I48.91] Atrial fibrillation with RVR       Portions of this note were dictated using voice recognition software and may contain dictation related errors in spelling/grammar/syntax not found on text review     ED Disposition Condition    Admit                   [1]   Social History  Tobacco Use    Smoking status: Former     Current packs/day: 0.00     Types: Cigarettes     Quit date: 2017     Years since quittin.4    Smokeless tobacco: Never   Substance Use Topics    Alcohol use: Yes     Alcohol/week: 1.0 standard drink of alcohol     Types: 1 Cans of beer per week    Drug use: No        Tank Barron Jr., MD  25 1012

## 2025-02-15 NOTE — ASSESSMENT & PLAN NOTE
Patient has paroxysmal (<7 days) atrial fibrillation. Patient is currently in sinus rhythm. [id number] Score: 4. The patients heart rate in the last 24 hours is as follows:  Pulse  Min: 93  Max: 153     Antiarrhythmics  diltiaZEM 24 hr capsule 120 mg, Daily, Oral  metoprolol succinate 24 hr tablet 75 mg, Daily, Oral    Anticoagulants  apixaban tablet 5 mg, 2 times daily, Oral    Plan  - Replete lytes with a goal of K>4, Mg >2  - Patient is anticoagulated, see medications listed above.  - Patient's afib is currently controlled  - monitor for any arrhythmias.  -cardiology consulted echo ordered

## 2025-02-15 NOTE — ED NOTES
Patient brought in  by EMS from [facility] with c/o cough, SOB. Patient denies chest pain, nausea, vomiting, diarrhea, headache and weakness of limbs. Patient is conscious, coherent, oriented x4, alert , awake and responding to verbal commands appropriately. Connected to continuous cardiac monitor. Shortness of breath with productive cough noted. A small skin wound noted on right ankle and knee. Bed kept in lowest position, breaks on, side rails up and call bell in reach. Patient changed into hospital gown and Warm blanket provided.

## 2025-02-15 NOTE — ANESTHESIA PROCEDURE NOTES
Peripheral IV Insertion    Diagnosis: I99.8 Other disorder of circulatory system    Patient location during procedure: ED  Procedure end time: 2/15/2025 11:46 AM    Staffing  Authorizing Provider: Simeon Contreras MD  Performing Provider: Simeon Contreras MD    Staffing  Performed by: Simeon Contreras MD  Authorized by: Simeon Contreras MD    Anesthesiologist was present at the time of the procedure.    Preanesthetic Checklist  Completed: patient identified, risks and benefits discussed and monitors and equipment checkedPeripheral IV Insertion  Skin Prep: chlorhexidine gluconate and isopropyl alcohol  Orientation: left  Location: brachial  Catheter Type: peripheral IV (single lumen)  Catheter Size: 20 G  Catheter placement by Ultrasound guidance. Heme positive aspiration all ports.   Vessel Caliber: medium, patent, compressibility normal  Vascular Doppler:  not done  Needle advanced into vessel with real time Ultrasound guidance.Insertion Attempts: 1  Assessment  Dressing: secured with tape and tegaderm, secured with tape and steri-strips  Patient: Tolerated well  Line flushed easily.  Additional Notes  Called to ED due to infiltrated IV and lack of resources to replace IV.     With 0.5 mL lidocaine 2% for skin infiltration.

## 2025-02-15 NOTE — ASSESSMENT & PLAN NOTE
Patient's blood pressure range in the last 24 hours was: BP  Min: 140/83  Max: 181/88.The patient's inpatient anti-hypertensive regimen is listed below:  Current Antihypertensives  diltiaZEM 24 hr capsule 120 mg, Daily, Oral  lisinopriL tablet 40 mg, Daily, Oral  metoprolol succinate 24 hr tablet 75 mg, Daily, Oral  , Daily, Oral  , Daily, Oral    Plan  - BP is uncontrolled, will adjust as follows:  Home dose of antihypertensive agents.  - monitor blood pressure closely.

## 2025-02-15 NOTE — PROGRESS NOTES
[facility]  [facility] Medicine  Progress Note    Patient Name: Maria A Smith  MRN: [id number]  Patient Class: OP- Observation   Admission Date: 2/15/2025  Length of Stay: 0 days  Attending Physician: Magui Stauffer MD  Primary Care Provider: Sneha López MD        Subjective     Principal Problem:Acute on chronic respiratory failure        HPI:  Maria A Smith is a 79-year-old female with a past medical history of HTN, Paroxysmal Afib, HLD, CVA w/ L sided weakness, prior tobacco use disorder, PVD, COPD on oxygen @2 L. Patient presents to emergency department  from [facility] rehab with complaint of shortness of breath for the past couple of weeks . In ER patient found to be positive for influenza A. CXR showed pulmonary emphysema.  Patient is a poor historian and most of the history is obtained from ER records and previous history.    Overview/Hospital Course:  No notes on file    Interval History:  Patient is a poor historian and hard of hearing currently on nasal cannula blood pressure is elevated remained tachycardic above 100, in AFib, ordered a CTA to exclude PE, continue on current management    Review of Systems   Unable to perform ROS: Dementia     Objective:     Vital Signs (Most Recent):  Temp: 97.9 °F (36.6 °C) (02/15/25 0244)  Pulse: 100 (02/15/25 1203)  Resp: 20 (02/15/25 1203)  BP: (!) 148/75 (02/15/25 1203)  SpO2: (!) 94 % (02/15/25 1203) Vital Signs (24h Range):  Temp:  [97.9 °F (36.6 °C)] 97.9 °F (36.6 °C)  Pulse:  [] 100  Resp:  [20-37] 20  SpO2:  [94 %-99 %] 94 %  BP: (140-181)/(70-89) 148/75     Weight: 52.2 kg (115 lb 1.3 oz)  Body mass index is 19.15 kg/m².  No intake or output data in the 24 hours ending 02/15/25 1251      Physical Exam  HENT:      Head: Normocephalic and atraumatic.      Nose: Nose normal.      Mouth/Throat:      Mouth: Mucous membranes are moist.   Eyes:      Extraocular Movements: Extraocular movements intact.      Pupils:  Pupils are equal, round, and reactive to light.   Cardiovascular:      Rate and Rhythm: Regular rhythm.   Pulmonary:      Breath sounds: Rhonchi present.   Abdominal:      Palpations: Abdomen is soft.   Musculoskeletal:         General: Normal range of motion.   Skin:     General: Skin is warm.   Neurological:      Mental Status: She is alert. Mental status is at baseline. She is disoriented.   Psychiatric:         Mood and Affect: Mood normal.             Significant Labs: All pertinent labs within the past 24 hours have been reviewed.  Recent Lab Results         02/15/25  0703   02/15/25  0639   02/15/25  0320   02/15/25  0306        Base Deficit   4.5  Comment: Value above reference range           Performed By:   tomer           POC Molecular Influenza A Ag     Positive         POC Molecular Influenza B Ag     Negative         Procalcitonin 0.10  Comment: A concentration < 0.25 ng/mL represents a low risk of bacterial   infection.  Procalcitonin may not be accurate among patients with localized   infection, recent trauma or major surgery, immunosuppressed state,   invasive fungal infection, renal dysfunction. Decisions regarding   initiation or continuation of antibiotic therapy should not be based   solely on procalcitonin levels.               Specimen source   Arterial           Albumin       3.8       ALP       119       Allens Test   YES           ALT       19       Anion Gap       16       AST       32  Comment: Specimen slightly hemolyzed       Baso #       0.03       Basophil %       0.3       BILIRUBIN TOTAL       1.0  Comment: For infants and newborns, interpretation of results should be based  on gestational age, weight and in agreement with clinical  observations.    Premature Infant recommended reference ranges:  Up to 24 hours.............<8.0 mg/dL  Up to 48 hours............<12.0 mg/dL  3-5 days..................<15.0 mg/dL  6-29 days.................<15.0 mg/dL         BNP       385  Comment:  Values of less than 100 pg/ml are consistent with non-CHF populations.       BUN       19       Calcium       9.9       Chloride       99       Cholesterol Total 146  Comment: The National Cholesterol Education Program (NCEP) has set the  following guidelines (reference ranges) for Cholesterol:  Optimal.....................<200 mg/dL  Borderline High.............200-239 mg/dL  High........................> or = 240 mg/dL               CO2       25       Creatinine       1.0       Differential Method       Automated       eGFR       58       Eos #       0.0       Eos %       0.0       FiO2   28.0           Glucose       120       Gran # (ANC)       7.5       Gran %       82.0       HDL 33  Comment: The National Cholesterol Education Program (NCEP) has set the  following guidelines (reference values) for HDL Cholesterol:  Low...............<40 mg/dL  Optimal...........>60 mg/dL               HDL/Cholesterol Ratio 22.6             Hematocrit       41.1       Hemoglobin       13.6       Immature Grans (Abs)       0.08  Comment: Mild elevation in immature granulocytes is non specific and   can be seen in a variety of conditions including stress response,   acute inflammation, trauma and pregnancy. Correlation with other   laboratory and clinical findings is essential.         Immature Granulocytes       0.9       LDL Cholesterol 94.0  Comment: The National Cholesterol Education Program (NCEP) has set the  following guidelines (reference values) for LDL Cholesterol:  Optimal.......................<130 mg/dL  Borderline High...............130-159 mg/dL  High..........................160-189 mg/dL  Very High.....................>190 mg/dL               LPM   2.0           Lymph #       0.8       Lymph %       9.0       MCH       29.8       MCHC       33.1       MCV       90       Mono #       0.7       Mono %       7.8       MPV       10.4       Non-HDL Cholesterol 113  Comment: Risk category and Non-HDL cholesterol  goals:  Coronary heart disease (CHD)or equivalent (10-year risk of CHD >20%):  Non-HDL cholesterol goal     <130 mg/dL  Two or more CHD risk factors and 10-year risk of CHD <= 20%:  Non-HDL cholesterol goal     <160 mg/dL  0 to 1 CHD risk factor:  Non-HDL cholesterol goal     <190 mg/dL               nRBC       0       Platelet Count       160       POC HCO3   30.2  Comment: Value above reference range           POC PCO2   48.3  Comment: Value above reference range           POC PH   7.405           POC PO2   81.4           POC SATURATED O2   96.4           Potassium       4.5       PROTEIN TOTAL       7.8        Acceptable     Yes              Yes         RBC       4.57       RDW       15.3       SARS-CoV-2 RNA, Amplification, Qual     Negative         Sodium       140       Total Cholesterol/HDL Ratio 4.4             Triglycerides 95  Comment: The National Cholesterol Education Program (NCEP) has set the  following guidelines (reference values) for triglycerides:  Normal......................<150 mg/dL  Borderline High.............150-199 mg/dL  High........................200-499 mg/dL               Troponin I       0.035  Comment: The reference interval for Troponin I represents the 99th percentile   cutoff   for our facility and is consistent with 3rd generation assay   performance.         WBC       9.10               Significant Imaging: I have reviewed all pertinent imaging results/findings within the past 24 hours.    Imaging Results              CTA Chest Non-Coronary (PE Studies) (In process)  Result time 02/15/25 12:50:23                     X-Ray Chest AP Portable (Final result)  Result time 02/15/25 06:01:25      Final result by Stu Granado MD (02/15/25 06:01:25)                   Impression:      No focal consolidation identified on this single view.  No significant change when compared with 08/30/2024.    Pulmonary emphysema.      Electronically signed by: Stu Granado  "MD  Date:    02/15/2025  Time:    06:01               Narrative:    EXAMINATION:  XR CHEST AP PORTABLE    CLINICAL HISTORY:  Provided history is "  Shortness of breath".    TECHNIQUE:  One view of the chest.    COMPARISON:  08/30/2024.    FINDINGS:  Cardiomediastinal silhouette is mildly enlarged.  Atherosclerotic calcifications overlie the aortic arch.  Coarse interstitial lung markings and pulmonary emphysema.  No large focal consolidation.  No sizable pleural effusion.  No pneumothorax.  Operative changes in the left shoulder as seen previously.                                  Echo  Result Date: 8/28/2024    Left Ventricle: The left ventricle is normal in size. Ventricular mass   is normal. Normal wall thickness. Normal wall motion. There is low normal   systolic function with a visually estimated ejection fraction of 50 - 55%.   Unable to assess diastolic function due to atrial fibrillation.    Right Ventricle: Normal right ventricular cavity size. Wall thickness   is normal. Systolic function is mildly reduced.    Left Atrium: Left atrium is moderately dilated.    Aortic Valve: The aortic valve is a trileaflet valve. There is moderate   aortic valve sclerosis. There is moderate annular calcification present.    Mitral Valve: There is mild regurgitation.    Aorta: Aortic root is normal in size measuring 2.74 cm. Ascending aorta   is normal.    Pulmonary Artery: There is mild pulmonary hypertension. The estimated   pulmonary artery systolic pressure is 59 mmHg.    IVC/SVC: Elevated venous pressure at 15 mmHg.    Irregularly irregular rhythm was present during the study          Assessment and Plan     * Acute on chronic respiratory failure  Admit to medical floor with telemetry.  Patient with Hypoxic Respiratory failure which is Acute on chronic.  she is on home oxygen at 2 LPM. Supplemental oxygen was provided and noted- Oxygen Concentration (%):  [36] 36    .   Signs/symptoms of respiratory failure include- " tachypnea, wheezing, and lethargy. Contributing diagnoses includes - COPD Labs and images were reviewed. Patient Has not had a recent ABG. Will treat underlying causes and adjust management of respiratory failure as follows- supplemental oxygen and duo nebs.    On continuous oral anticoagulation  Secondary to AFib RVR  Restart her apixaban      GUERRERO (dyspnea on exertion)  Secondary to COPD exacerbation  Currently on nasal cannula      Chronic systolic heart failure  Cardiology consulted  -currently patient is euvolemic no signs of acute decompensation    Echo  Result Date: 8/28/2024    Left Ventricle: The left ventricle is normal in size. Ventricular mass   is normal. Normal wall thickness. Normal wall motion. There is low normal   systolic function with a visually estimated ejection fraction of 50 - 55%.   Unable to assess diastolic function due to atrial fibrillation.    Right Ventricle: Normal right ventricular cavity size. Wall thickness   is normal. Systolic function is mildly reduced.    Left Atrium: Left atrium is moderately dilated.    Aortic Valve: The aortic valve is a trileaflet valve. There is moderate   aortic valve sclerosis. There is moderate annular calcification present.    Mitral Valve: There is mild regurgitation.    Aorta: Aortic root is normal in size measuring 2.74 cm. Ascending aorta   is normal.    Pulmonary Artery: There is mild pulmonary hypertension. The estimated   pulmonary artery systolic pressure is 59 mmHg.    IVC/SVC: Elevated venous pressure at 15 mmHg.    Irregularly irregular rhythm was present during the study            Encephalopathy    Neuro checks.  ABG to see if patient hypercapnic.    COPD exacerbation  Patient's COPD is with exacerbation noted by continued dyspnea currently.  Patient is currently on COPD Pathway. Continue scheduled inhalers Steroids, Antibiotics, and Supplemental oxygen and monitor respiratory status closely.     Influenza A  Isolation protocol.  Tamiflu 75  mg p.o. b.i.d..    Atrial fibrillation with rapid ventricular response  Patient has paroxysmal (<7 days) atrial fibrillation. Patient is currently in sinus rhythm. [id number] Score: 4. The patients heart rate in the last 24 hours is as follows:  Pulse  Min: 93  Max: 153     Antiarrhythmics  diltiaZEM 24 hr capsule 120 mg, Daily, Oral  metoprolol succinate 24 hr tablet 75 mg, Daily, Oral    Anticoagulants  apixaban tablet 5 mg, 2 times daily, Oral    Plan  - Replete lytes with a goal of K>4, Mg >2  - Patient is anticoagulated, see medications listed above.  - Patient's afib is currently controlled  - monitor for any arrhythmias.  -cardiology consulted echo ordered      Hyperlipidemia    Lipid profile.  Restart patient on statin    Essential hypertension  Patient's blood pressure range in the last 24 hours was: BP  Min: 140/83  Max: 181/88.The patient's inpatient anti-hypertensive regimen is listed below:  Current Antihypertensives  diltiaZEM 24 hr capsule 120 mg, Daily, Oral  lisinopriL tablet 40 mg, Daily, Oral  metoprolol succinate 24 hr tablet 75 mg, Daily, Oral  , Daily, Oral  , Daily, Oral    Plan  - BP is uncontrolled, will adjust as follows:  Home dose of antihypertensive agents.  - monitor blood pressure closely.      VTE Risk Mitigation (From admission, onward)           Ordered     apixaban tablet 5 mg  2 times daily         02/15/25 0624                    Discharge Planning   ROSA:      Code Status: Full Code   Medical Readiness for Discharge Date:                            Magui Rosario MD  Department of Hospital Medicine   [facility] - Emergency Dept

## 2025-02-15 NOTE — ASSESSMENT & PLAN NOTE
Patient's blood pressure range in the last 24 hours was: BP  Min: 169/85  Max: 176/89.The patient's inpatient anti-hypertensive regimen is listed below:  Current Antihypertensives  diltiaZEM 24 hr capsule 120 mg, Daily, Oral  lisinopriL tablet 40 mg, Daily, Oral  metoprolol succinate 24 hr tablet 75 mg, Daily, Oral    Plan  - BP is uncontrolled, will adjust as follows:  Home dose of antihypertensive agents.  - monitor blood pressure closely.

## 2025-02-15 NOTE — NURSING TRANSFER
Nursing Transfer Note      2/15/2025   2:00 PM    Nurse giving handoff:SHAVONNE Cunningham  Nurse receiving handoff:SHAVONNE Jimenes    Reason patient is being transferred: Escalation to telemetry    Transfer From: ED    Transfer via stretcher    Transfer with cardiac monitoring    Transported by transport tech     Transfer Vital Signs:  Blood Pressure:146/79  Heart Rate:100  O2:98% on 3LNC  Temperature:98.3  Respirations:18    Additional Lines: Oxygen    Patient belongings transferred with patient: Yes    Chart send with patient: Yes    Notified: daughter at bedside    Upon arrival to floor: Receiving nurse at bedside. Cardiac monitor applied, patient oriented to room, call bell in reach, and bed armed in lowest position

## 2025-02-15 NOTE — CONSULTS
[facility] Initial Consultation Note    Reason for Consultation:  Atrial fibrillation.    HPI:  79 y.o. with atrial fibrillation, HTN and obstructive lung disease is admitted with increased in work of breathing and respiratory discomfort.  She had atrial fibrillation with RVR in the ER.  Troponin value of 0.035 with BNP of 385.  She tested positive for Infuenza A.    Past Surgical History:   Procedure Laterality Date    ANGIOGRAPHY OF LOWER EXTREMITY Right 9/9/2024    Procedure: Angiogram Extremity Unilateral;  Surgeon: Quinn Thakur MD;  [facility];  Service: Vascular;  Laterality: Right;  L CFA access    APPENDECTOMY      COLONOSCOPY N/A 8/7/2019    Procedure: COLONOSCOPY;  Surgeon: Alex Degroot MD;  Location: [facility] ENDO;  Service: Endoscopy;  Laterality: N/A;    COLONOSCOPY N/A 2/12/2020    Procedure: COLONOSCOPY;  Surgeon: Evelio Rizvi MD;  Location: [facility] ENDO;  Service: Endoscopy;  Laterality: N/A;    ESOPHAGOGASTRODUODENOSCOPY N/A 2/11/2020    Procedure: EGD (ESOPHAGOGASTRODUODENOSCOPY);  Surgeon: Evelio Rizvi MD;  Location: [facility] ENDO;  Service: Endoscopy;  Laterality: N/A;    HYSTERECTOMY      PTA, FEMORAL ARTERY Right 9/9/2024    Procedure: PTA, ARTERY, FEMORAL;  Surgeon: Quinn Thakur MD;  Location: [facility] OR [address] [place] [address];  Service: Vascular;  Laterality: Right;  PTA of common femoral artery    PTA, ILIAC ARTERY Bilateral 9/9/2024    Procedure: PTA, ILIAC ARTERY;  Surgeon: Quinn Thakur MD;  Location: [address];  Service: Vascular;  Laterality: Bilateral;  PTA of right and left external illiac    PTA, POPLITEAL Right 9/9/2024    Procedure: PTA, POPLITEAL;  Surgeon: Quinn Thakur MD;  [facility];  Service: Vascular;  Laterality: Right;    STENT-BYPASS GRAFT Right 9/9/2024    Procedure: STENT-BYPASS GRAFT;  Surgeon: Quinn Thakur MD;  Location: [address];  Service: Vascular;  Laterality: Right;  R SFA stent placement    contrast: 76mL   mGy:  262.84  Gycm2: 43.4182  Fluro time: 19.9 min    TRANSESOPHAGEAL ECHOCARDIOGRAPHY N/A 3/23/2023    Procedure: ECHOCARDIOGRAM, TRANSESOPHAGEAL;  Surgeon: Liliana Diagnostic Provider;  Location: [facility] EP LAB;  Service: Cardiology;  Laterality: N/A;    TREATMENT OF CARDIAC ARRHYTHMIA N/A 3/23/2023    Procedure: Cardioversion or Defibrillation;  Surgeon: ASHLEY Watson MD;  Location: [facility] EP LAB;  Service: Cardiology;  Laterality: N/A;  AF, CHERRIE/DCCV, ANES, EH, 1108     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)     Stroke      Social History[1]    Family History   Problem Relation Name Age of Onset    Cancer Sister      Diabetes Sister      Melanoma Neg Hx      Psoriasis Neg Hx      Lupus Neg Hx      Eczema Neg Hx       Review of patient's allergies indicates:  No Known Allergies        Current Hospital Medications  Prior to Admission medications    Medication Sig Start Date End Date Taking? Authorizing Provider   acetaminophen (TYLENOL) 500 MG tablet Take 500 mg by mouth every 6 (six) hours as needed for Pain.    Provider, Historical   alendronate (FOSAMAX) 70 MG tablet Take 70 mg by mouth every 7 days. Give every Thursday    Provider, Historical   apixaban (ELIQUIS) 5 mg Tab Take 5 mg by mouth 2 (two) times daily.    Provider, Historical   aspirin (ECOTRIN) 81 MG EC tablet Take 1 tablet (81 mg total) by mouth once daily. 8/30/24   Audrey Gamboa MD   atorvastatin (LIPITOR) 80 MG tablet Take 1 tablet (80 mg total) by mouth once daily.  Patient taking differently: Take 80 mg by mouth every evening. 3/24/23   Vivian Negron PA-C   budesonide (PULMICORT) 0.5 mg/2 mL nebulizer solution Take 0.5 mg by nebulization once daily. Inhale 1 vial one time a day for emphysema    Provider, Historical   bumetanide (BUMEX) 1 MG tablet Take 1 tablet (1 mg total) by mouth 3 (three) times a week. 8/30/24   Audrey Gamboa MD   calcium-vitamin D3  (OS-ANNA 500 + D3) 500 mg-5 mcg (200 unit) per tablet Take 1 tablet by mouth once daily.    Provider, Historical   diltiaZEM HCl (TIAZAC) 120 mg 24 hr capsule Take 120 mg by mouth once daily.    Provider, Historical   DULoxetine (CYMBALTA) 60 MG capsule Take 1 capsule (60 mg total) by mouth once daily. 3/24/23   Vivian Negron PA-C   ferrous sulfate 325 (65 FE) MG EC tablet Take 325 mg by mouth 2 (two) times daily.    Provider, Historical   lisinopriL (PRINIVIL,ZESTRIL) 40 MG tablet Take 1 tablet (40 mg total) by mouth once daily. 3/25/23   Vivian Negron PA-C   metoprolol succinate (TOPROL-XL) 50 MG 24 hr tablet Take 1.5 tablets (75 mg total) by mouth once daily. 8/29/24   Audrey Gamboa MD   multivitamin (ONE DAILY MULTIVITAMIN) per tablet Take 1 tablet by mouth once daily.    Provider, Historical   oxyCODONE (ROXICODONE) 5 MG immediate release tablet Take 1 tablet (5 mg total) by mouth every 6 (six) hours as needed for Pain. 8/29/24   Audrey Gamboa MD   pantoprazole (PROTONIX) 40 MG tablet Take 1 tablet (40 mg total) by mouth once daily. 3/24/23   Vivian Negron PA-C   pentoxifylline (TRENTAL) 400 mg TbSR Take 400 mg by mouth 3 (three) times daily with meals.    Provider, Historical   polyethylene glycol (GLYCOLAX) 17 gram PwPk Take 17 g by mouth once daily. 8/30/24   Audrey Gamboa MD   senna (SENOKOT) 8.6 mg tablet Take 1 tablet by mouth once daily. 8/30/24   Audrey Gamboa MD   traZODone (DESYREL) 100 MG tablet Take 100 mg by mouth every evening.    Provider, Historical       Scheduled Meds:   albuterol-ipratropium  3 mL Nebulization Q6H WAKE    apixaban  5 mg Oral BID    aspirin  81 mg Oral Daily    atorvastatin  80 mg Oral QHS    [START ON 2/16/2025] diltiaZEM  120 mg Oral Daily    DULoxetine  60 mg Oral Daily    ferrous sulfate  1 tablet Oral Daily    lisinopriL  40 mg Oral Daily    metoprolol succinate  75 mg Oral Daily    oseltamivir  30 mg Oral BID    pantoprazole  40 mg  "Oral Daily    pentoxifylline  400 mg Oral TID WM    polyethylene glycol  17 g Oral Daily    predniSONE  40 mg Oral Daily    traZODone  100 mg Oral QHS     Continuous Infusions:  PRN:   Current Facility-Administered Medications:     acetaminophen, 650 mg, Oral, Q8H PRN    iohexoL, 100 mL, Intravenous, ONCE PRN    ondansetron, 4 mg, Intravenous, Q12H PRN    sodium chloride 0.9%, 3 mL, Intravenous, PRN    VITAL SIGNS  BP (!) 167/70 (BP Location: Left arm, Patient Position: Lying)   Pulse 93   Temp 97.9 °F (36.6 °C) (Oral)   Resp 20   SpO2 (!) 94%   No intake or output data in the 24 hours ending 02/15/25 1043    OBJECTIVE    Physical Exam  Temp:  [97.9 °F (36.6 °C)] 97.9 °F (36.6 °C)  Pulse:  [] 93  Resp:  [20-37] 20  SpO2:  [94 %-99 %] 94 %  BP: (140-181)/(70-89) 167/70    Gen: Patient awake and alert, in NAD  Eyes: Pupils equal and round.  Sclerae anicteric, noninjected conjunctivae.  HENT: NC/AT, nasal septum midline, MMM, OP clear and without exudates.  CV: Regular rhythm.  Normal S1, S2.    Chest:  Symmetric chest wall expansion.  Good air movement.  + wheezes.  Abd:  Soft, Non-tender.  Non distended.  Normoactive bowel sounds, no rebound  Ext: +2 radial pulses, no C/C/E, warm to touch  Skin: intact, no lesions or rashes noted.  No decubitus ulcer.  Neuro:  Moves all extremities grossly.  Tongue midline.  Psych: Appropriate affect    Lab Results  Recent Labs   Lab 02/15/25  0306      K 4.5   CL 99   CO2 25   BUN 19   CREATININE 1.0   CALCIUM 9.9     Recent Labs   Lab 02/15/25  0306   TROPONINI 0.035*     Recent Labs   Lab 02/15/25  0306   WBC 9.10   RBC 4.57   HGB 13.6   HCT 41.1      MCV 90   MCH 29.8   MCHC 33.1     No results for input(s): "PT", "INR", "APTT" in the last 24 hours.    EKG 2/15/25:  Atrial fibrillation with average ventricular rate of 112 bpm.  RBBB.             ASSESSMENT/PLAN    1.  Paroxysmal atrial fibrillation:  Patient noted on EKG to have atrial fibrillation but " currently is in sinus rhythm.  She is taking Eliquis with no bleeding complications.  Goals of therapy are to continue home meds and to ensure that blood pressure is controlled.    2.  Influenza A:  It is speculative that this has tipped the patient over into having atrial fibrillation with a fast heart rate.  Anticipate resolution with anti-viral therapy.    3.  GUERRERO:  Patient has advanced COPD and is on supplemental oxygen therapy.   This is the majority of her exertional dyspnea.  Rapid atrial fibrillation also is a component.    4.  HTN:  Blood pressure is above goal at this time.  --  Recommend increasing metoprolol to 100mg.    5.  Elevated BNP:  There is likely a touch of increased LV (or RV) filling pressures for which reduction in preload via diuretic therapy should improve.  Treatment of hypertension will also be fruitful.  --  Continue lisinopril 40mg daily.  --  Consider adding empagliflozin 10mg daily.         Josemanule Edmondson MD  [facility]         [1]   Social History  Socioeconomic History    Marital status:    Tobacco Use    Smoking status: Former     Current packs/day: 0.00     Types: Cigarettes     Quit date: 2017     Years since quittin.4    Smokeless tobacco: Never   Substance and Sexual Activity    Alcohol use: Yes     Alcohol/week: 1.0 standard drink of alcohol     Types: 1 Cans of beer per week    Drug use: No    Sexual activity: Not Currently     Social Drivers of Health     Financial Resource Strain: Low Risk  (2023)    Overall Financial Resource Strain (CARDIA)     Difficulty of Paying Living Expenses: Not hard at all   Food Insecurity: No Food Insecurity (2023)    Hunger Vital Sign     Worried About Running Out of Food in the Last Year: Never true     Ran Out of Food in the Last Year: Never true   Transportation Needs: No Transportation Needs (2023)    PRAPARE - Transportation     Lack of Transportation (Medical): No     Lack of Transportation (Non-Medical): No    Physical Activity: Inactive (6/6/2023)    Exercise Vital Sign     Days of Exercise per Week: 5 days     Minutes of Exercise per Session: 0 min   Stress: No Stress Concern Present (6/6/2023)    [employer] - Occupational Stress Questionnaire     Feeling of Stress : Not at all   Housing Stability: Low Risk  (6/6/2023)    Housing Stability Vital Sign     Unable to Pay for Housing in the Last Year: No     Number of Places Lived in the Last Year: 1     Unstable Housing in the Last Year: No

## 2025-02-15 NOTE — HPI
Maria A Smith is a 79-year-old female with a past medical history of HTN, Paroxysmal Afib, HLD, CVA w/ L sided weakness, prior tobacco use disorder, PVD, COPD on oxygen @2 L. Patient presents to emergency department  from [facility] rehab with complaint of shortness of breath for the past couple of weeks . In ER patient found to be positive for influenza A. CXR showed pulmonary emphysema.  Patient is a poor historian and most of the history is obtained from ER records and previous history.

## 2025-02-15 NOTE — ASSESSMENT & PLAN NOTE
Admit to medical floor with telemetry.  Patient with Hypoxic Respiratory failure which is Acute on chronic.  she is on home oxygen at 2 LPM. Supplemental oxygen was provided and noted- Oxygen Concentration (%):  [36] 36    .   Signs/symptoms of respiratory failure include- tachypnea, wheezing, and lethargy. Contributing diagnoses includes - COPD Labs and images were reviewed. Patient Has not had a recent ABG. Will treat underlying causes and adjust management of respiratory failure as follows- supplemental oxygen and duo nebs.

## 2025-02-15 NOTE — PROGRESS NOTES
Pharmacist Renal Dose Adjustment Note    Maria A Smith is a 79 y.o. female being treated with the medication oseltamivir    Patient Data:    Vital Signs (Most Recent):  Temp: 97.9 °F (36.6 °C) (02/15/25 0244)  Pulse: 101 (02/15/25 0449)  Resp: (!) 22 (02/15/25 0449)  BP: (!) 169/85 (02/15/25 0524)  SpO2: 96 % (02/15/25 0449) Vital Signs (72h Range):  Temp:  [97.9 °F (36.6 °C)]   Pulse:  [101-153]   Resp:  [22-37]   BP: (169-176)/(85-89)   SpO2:  [96 %-99 %]      Recent Labs   Lab 02/15/25  0306   CREATININE 1.0     Creatinine clearance cannot be calculated (Unknown ideal weight.)    Medication:oseltamivir dose: 75mg frequency bid will be changed to medication:oseltamivir dose:30mg frequency:bid    Pharmacist's Name: Jaun Diego Jenkins  Pharmacist's Extension: [phone]

## 2025-02-15 NOTE — PHARMACY MED REC
"    [facility]           Pharmacy  Admission Medication History     The home medication history was taken by Ekta Manrique.      Medication history obtained from Medications listed below were obtained from: Nursing home. Medications verified by [facility] med list.    Based on information gathered for medication list, you may go to "Admission" then "Reconcile Home Medications" tabs to review and/or act upon those items.     The home medication list has been updated by the Pharmacy department.   Please read ALL comments highlighted in yellow.   Please address this information as you see fit.    Feel free to contact us if you have any questions or require assistance.        Medications Ordered Prior to Encounter[1]    Please address this information as you see fit.  Feel free to contact us if you have any questions or require assistance.    Ekta Manrique  [phone]              .               [1]   No current facility-administered medications on file prior to encounter.     Current Outpatient Medications on File Prior to Encounter   Medication Sig Dispense Refill    acetaminophen (TYLENOL) 500 MG tablet Take 500 mg by mouth every 6 (six) hours as needed for Pain.      alendronate (FOSAMAX) 70 MG tablet Take 70 mg by mouth every Thursday.      amLODIPine (NORVASC) 5 MG tablet Take 5 mg by mouth once daily. Hold if SBP is less than 110, Pulse is less than 60.      apixaban (ELIQUIS) 5 mg Tab Take 5 mg by mouth 2 (two) times daily.      aspirin (ECOTRIN) 81 MG EC tablet Take 1 tablet (81 mg total) by mouth once daily.      atorvastatin (LIPITOR) 80 MG tablet Take 1 tablet (80 mg total) by mouth once daily. 90 tablet 3    bumetanide (BUMEX) 1 MG tablet Take 1 tablet (1 mg total) by mouth 3 (three) times a week. (Patient taking differently: Take 1 mg by mouth once daily.)      calcium-vitamin D3 (OS-ANNA 500 + D3) 500 mg-5 mcg (200 unit) per tablet Take 1 tablet by mouth once daily.   "    diltiaZEM HCl (TIAZAC) 120 mg 24 hr capsule Take 120 mg by mouth once daily. Hold if SBP is less than 110, Pulse is less than 60.      DULoxetine (CYMBALTA) 60 MG capsule Take 1 capsule (60 mg total) by mouth once daily. 30 capsule 11    ferrous sulfate 325 (65 FE) MG EC tablet Take 325 mg by mouth 2 (two) times daily.      lisinopriL (PRINIVIL,ZESTRIL) 20 MG tablet Take 20 mg by mouth once daily. Hold if SBP is less than 110, Pulse is less than 60.      metoprolol succinate (TOPROL-XL) 50 MG 24 hr tablet Take 1.5 tablets (75 mg total) by mouth once daily.      multivitamin (ONE DAILY MULTIVITAMIN) per tablet Take 1 tablet by mouth once daily.      oxyCODONE (ROXICODONE) 5 MG immediate release tablet Take 1 tablet (5 mg total) by mouth every 6 (six) hours as needed for Pain. 28 tablet 0    pantoprazole (PROTONIX) 40 MG tablet Take 1 tablet (40 mg total) by mouth once daily. 30 tablet 11    pentoxifylline (TRENTAL) 400 mg TbSR Take 400 mg by mouth 3 (three) times daily with meals.      polyethylene glycol (GLYCOLAX) 17 gram PwPk Take 17 g by mouth once daily. (Patient taking differently: Take 17 g by mouth once daily. Mix in 8 oz of liquid)      senna (SENOKOT) 8.6 mg tablet Take 1 tablet by mouth once daily. (Patient taking differently: Take 8.6 mg by mouth once daily.)      traZODone (DESYREL) 100 MG tablet Take 100 mg by mouth every evening.      budesonide (PULMICORT) 0.5 mg/2 mL nebulizer solution Take 0.5 mg by nebulization once daily. Inhale 1 vial one time a day for emphysema (Patient not taking: Reported on 2/15/2025)      lisinopriL (PRINIVIL,ZESTRIL) 40 MG tablet Take 1 tablet (40 mg total) by mouth once daily. (Patient not taking: Reported on 2/15/2025) 90 tablet 3

## 2025-02-15 NOTE — SUBJECTIVE & OBJECTIVE
Interval History:  Patient is a poor historian and hard of hearing currently on nasal cannula blood pressure is elevated remained tachycardic above 100, in AFib, ordered a CTA to exclude PE, continue on current management    Review of Systems   Unable to perform ROS: Dementia     Objective:     Vital Signs (Most Recent):  Temp: 97.9 °F (36.6 °C) (02/15/25 0244)  Pulse: 100 (02/15/25 1203)  Resp: 20 (02/15/25 1203)  BP: (!) 148/75 (02/15/25 1203)  SpO2: (!) 94 % (02/15/25 1203) Vital Signs (24h Range):  Temp:  [97.9 °F (36.6 °C)] 97.9 °F (36.6 °C)  Pulse:  [] 100  Resp:  [20-37] 20  SpO2:  [94 %-99 %] 94 %  BP: (140-181)/(70-89) 148/75     Weight: 52.2 kg (115 lb 1.3 oz)  Body mass index is 19.15 kg/m².  No intake or output data in the 24 hours ending 02/15/25 1251      Physical Exam  HENT:      Head: Normocephalic and atraumatic.      Nose: Nose normal.      Mouth/Throat:      Mouth: Mucous membranes are moist.   Eyes:      Extraocular Movements: Extraocular movements intact.      Pupils: Pupils are equal, round, and reactive to light.   Cardiovascular:      Rate and Rhythm: Regular rhythm.   Pulmonary:      Breath sounds: Rhonchi present.   Abdominal:      Palpations: Abdomen is soft.   Musculoskeletal:         General: Normal range of motion.   Skin:     General: Skin is warm.   Neurological:      Mental Status: She is alert. Mental status is at baseline. She is disoriented.   Psychiatric:         Mood and Affect: Mood normal.             Significant Labs: All pertinent labs within the past 24 hours have been reviewed.  Recent Lab Results         02/15/25  0703   02/15/25  0639   02/15/25  0320   02/15/25  0306        Base Deficit   4.5  Comment: Value above reference range           Performed By:   tomer           POC Molecular Influenza A Ag     Positive         POC Molecular Influenza B Ag     Negative         Procalcitonin 0.10  Comment: A concentration < 0.25 ng/mL represents a low risk of bacterial    infection.  Procalcitonin may not be accurate among patients with localized   infection, recent trauma or major surgery, immunosuppressed state,   invasive fungal infection, renal dysfunction. Decisions regarding   initiation or continuation of antibiotic therapy should not be based   solely on procalcitonin levels.               Specimen source   Arterial           Albumin       3.8       ALP       119       Allens Test   YES           ALT       19       Anion Gap       16       AST       32  Comment: Specimen slightly hemolyzed       Baso #       0.03       Basophil %       0.3       BILIRUBIN TOTAL       1.0  Comment: For infants and newborns, interpretation of results should be based  on gestational age, weight and in agreement with clinical  observations.    Premature Infant recommended reference ranges:  Up to 24 hours.............<8.0 mg/dL  Up to 48 hours............<12.0 mg/dL  3-5 days..................<15.0 mg/dL  6-29 days.................<15.0 mg/dL         BNP       385  Comment: Values of less than 100 pg/ml are consistent with non-CHF populations.       BUN       19       Calcium       9.9       Chloride       99       Cholesterol Total 146  Comment: The National Cholesterol Education Program (NCEP) has set the  following guidelines (reference ranges) for Cholesterol:  Optimal.....................<200 mg/dL  Borderline High.............200-239 mg/dL  High........................> or = 240 mg/dL               CO2       25       Creatinine       1.0       Differential Method       Automated       eGFR       58       Eos #       0.0       Eos %       0.0       FiO2   28.0           Glucose       120       Gran # (ANC)       7.5       Gran %       82.0       HDL 33  Comment: The National Cholesterol Education Program (NCEP) has set the  following guidelines (reference values) for HDL Cholesterol:  Low...............<40 mg/dL  Optimal...........>60 mg/dL               HDL/Cholesterol Ratio 22.6              Hematocrit       41.1       Hemoglobin       13.6       Immature Grans (Abs)       0.08  Comment: Mild elevation in immature granulocytes is non specific and   can be seen in a variety of conditions including stress response,   acute inflammation, trauma and pregnancy. Correlation with other   laboratory and clinical findings is essential.         Immature Granulocytes       0.9       LDL Cholesterol 94.0  Comment: The National Cholesterol Education Program (NCEP) has set the  following guidelines (reference values) for LDL Cholesterol:  Optimal.......................<130 mg/dL  Borderline High...............130-159 mg/dL  High..........................160-189 mg/dL  Very High.....................>190 mg/dL               LPM   2.0           Lymph #       0.8       Lymph %       9.0       MCH       29.8       MCHC       33.1       MCV       90       Mono #       0.7       Mono %       7.8       MPV       10.4       Non-HDL Cholesterol 113  Comment: Risk category and Non-HDL cholesterol goals:  Coronary heart disease (CHD)or equivalent (10-year risk of CHD >20%):  Non-HDL cholesterol goal     <130 mg/dL  Two or more CHD risk factors and 10-year risk of CHD <= 20%:  Non-HDL cholesterol goal     <160 mg/dL  0 to 1 CHD risk factor:  Non-HDL cholesterol goal     <190 mg/dL               nRBC       0       Platelet Count       160       POC HCO3   30.2  Comment: Value above reference range           POC PCO2   48.3  Comment: Value above reference range           POC PH   7.405           POC PO2   81.4           POC SATURATED O2   96.4           Potassium       4.5       PROTEIN TOTAL       7.8        Acceptable     Yes              Yes         RBC       4.57       RDW       15.3       SARS-CoV-2 RNA, Amplification, Qual     Negative         Sodium       140       Total Cholesterol/HDL Ratio 4.4             Triglycerides 95  Comment: The National Cholesterol Education Program (NCEP) has set the  following  "guidelines (reference values) for triglycerides:  Normal......................<150 mg/dL  Borderline High.............150-199 mg/dL  High........................200-499 mg/dL               Troponin I       0.035  Comment: The reference interval for Troponin I represents the 99th percentile   cutoff   for our facility and is consistent with 3rd generation assay   performance.         WBC       9.10               Significant Imaging: I have reviewed all pertinent imaging results/findings within the past 24 hours.    Imaging Results              CTA Chest Non-Coronary (PE Studies) (In process)  Result time 02/15/25 12:50:23                     X-Ray Chest AP Portable (Final result)  Result time 02/15/25 06:01:25      Final result by Stu Granado MD (02/15/25 06:01:25)                   Impression:      No focal consolidation identified on this single view.  No significant change when compared with 08/30/2024.    Pulmonary emphysema.      Electronically signed by: Stu Granado MD  Date:    02/15/2025  Time:    06:01               Narrative:    EXAMINATION:  XR CHEST AP PORTABLE    CLINICAL HISTORY:  Provided history is "  Shortness of breath".    TECHNIQUE:  One view of the chest.    COMPARISON:  08/30/2024.    FINDINGS:  Cardiomediastinal silhouette is mildly enlarged.  Atherosclerotic calcifications overlie the aortic arch.  Coarse interstitial lung markings and pulmonary emphysema.  No large focal consolidation.  No sizable pleural effusion.  No pneumothorax.  Operative changes in the left shoulder as seen previously.                                  Echo  Result Date: 8/28/2024    Left Ventricle: The left ventricle is normal in size. Ventricular mass   is normal. Normal wall thickness. Normal wall motion. There is low normal   systolic function with a visually estimated ejection fraction of 50 - 55%.   Unable to assess diastolic function due to atrial fibrillation.    Right Ventricle: Normal right " ventricular cavity size. Wall thickness   is normal. Systolic function is mildly reduced.    Left Atrium: Left atrium is moderately dilated.    Aortic Valve: The aortic valve is a trileaflet valve. There is moderate   aortic valve sclerosis. There is moderate annular calcification present.    Mitral Valve: There is mild regurgitation.    Aorta: Aortic root is normal in size measuring 2.74 cm. Ascending aorta   is normal.    Pulmonary Artery: There is mild pulmonary hypertension. The estimated   pulmonary artery systolic pressure is 59 mmHg.    IVC/SVC: Elevated venous pressure at 15 mmHg.    Irregularly irregular rhythm was present during the study

## 2025-02-16 PROBLEM — D69.6 THROMBOCYTOPENIA: Status: ACTIVE | Noted: 2025-02-16

## 2025-02-16 LAB
ANION GAP SERPL CALC-SCNC: 10 MMOL/L (ref 8–16)
BASOPHILS # BLD AUTO: 0 K/UL (ref 0–0.2)
BASOPHILS NFR BLD: 0 % (ref 0–1.9)
BUN SERPL-MCNC: 31 MG/DL (ref 8–23)
CALCIUM SERPL-MCNC: 9 MG/DL (ref 8.7–10.5)
CHLORIDE SERPL-SCNC: 102 MMOL/L (ref 95–110)
CO2 SERPL-SCNC: 28 MMOL/L (ref 23–29)
CREAT SERPL-MCNC: 1 MG/DL (ref 0.5–1.4)
DIFFERENTIAL METHOD BLD: ABNORMAL
EOSINOPHIL # BLD AUTO: 0 K/UL (ref 0–0.5)
EOSINOPHIL NFR BLD: 0 % (ref 0–8)
ERYTHROCYTE [DISTWIDTH] IN BLOOD BY AUTOMATED COUNT: 15.3 % (ref 11.5–14.5)
EST. GFR  (NO RACE VARIABLE): 58 ML/MIN/1.73 M^2
GLUCOSE SERPL-MCNC: 111 MG/DL (ref 70–110)
HCT VFR BLD AUTO: 39.2 % (ref 37–48.5)
HGB BLD-MCNC: 12.8 G/DL (ref 12–16)
IMM GRANULOCYTES # BLD AUTO: 0.06 K/UL (ref 0–0.04)
IMM GRANULOCYTES NFR BLD AUTO: 0.7 % (ref 0–0.5)
LYMPHOCYTES # BLD AUTO: 0.5 K/UL (ref 1–4.8)
LYMPHOCYTES NFR BLD: 6.1 % (ref 18–48)
MAGNESIUM SERPL-MCNC: 1.8 MG/DL (ref 1.6–2.6)
MCH RBC QN AUTO: 29.8 PG (ref 27–31)
MCHC RBC AUTO-ENTMCNC: 32.7 G/DL (ref 32–36)
MCV RBC AUTO: 91 FL (ref 82–98)
MONOCYTES # BLD AUTO: 0.7 K/UL (ref 0.3–1)
MONOCYTES NFR BLD: 7.3 % (ref 4–15)
NEUTROPHILS # BLD AUTO: 7.6 K/UL (ref 1.8–7.7)
NEUTROPHILS NFR BLD: 85.9 % (ref 38–73)
NRBC BLD-RTO: 0 /100 WBC
PHOSPHATE SERPL-MCNC: 4.2 MG/DL (ref 2.7–4.5)
PLATELET # BLD AUTO: 146 K/UL (ref 150–450)
PMV BLD AUTO: 10.2 FL (ref 9.2–12.9)
POTASSIUM SERPL-SCNC: 4.1 MMOL/L (ref 3.5–5.1)
RBC # BLD AUTO: 4.3 M/UL (ref 4–5.4)
SODIUM SERPL-SCNC: 140 MMOL/L (ref 136–145)
WBC # BLD AUTO: 8.87 K/UL (ref 3.9–12.7)

## 2025-02-16 PROCEDURE — 94640 AIRWAY INHALATION TREATMENT: CPT

## 2025-02-16 PROCEDURE — 25000003 PHARM REV CODE 250: Performed by: FAMILY MEDICINE

## 2025-02-16 PROCEDURE — 63600175 PHARM REV CODE 636 W HCPCS: Performed by: FAMILY MEDICINE

## 2025-02-16 PROCEDURE — 84100 ASSAY OF PHOSPHORUS: CPT | Performed by: FAMILY MEDICINE

## 2025-02-16 PROCEDURE — 25000003 PHARM REV CODE 250

## 2025-02-16 PROCEDURE — 25000242 PHARM REV CODE 250 ALT 637 W/ HCPCS: Performed by: FAMILY MEDICINE

## 2025-02-16 PROCEDURE — 99900035 HC TECH TIME PER 15 MIN (STAT)

## 2025-02-16 PROCEDURE — 11000001 HC ACUTE MED/SURG PRIVATE ROOM

## 2025-02-16 PROCEDURE — 83735 ASSAY OF MAGNESIUM: CPT | Performed by: FAMILY MEDICINE

## 2025-02-16 PROCEDURE — 36415 COLL VENOUS BLD VENIPUNCTURE: CPT | Performed by: FAMILY MEDICINE

## 2025-02-16 PROCEDURE — 94761 N-INVAS EAR/PLS OXIMETRY MLT: CPT

## 2025-02-16 PROCEDURE — 27000207 HC ISOLATION

## 2025-02-16 PROCEDURE — 27000221 HC OXYGEN, UP TO 24 HOURS

## 2025-02-16 PROCEDURE — 80048 BASIC METABOLIC PNL TOTAL CA: CPT | Performed by: FAMILY MEDICINE

## 2025-02-16 PROCEDURE — 85025 COMPLETE CBC W/AUTO DIFF WBC: CPT | Performed by: FAMILY MEDICINE

## 2025-02-16 RX ADMIN — METOPROLOL SUCCINATE 75 MG: 25 TABLET, EXTENDED RELEASE ORAL at 09:02

## 2025-02-16 RX ADMIN — LISINOPRIL 40 MG: 20 TABLET ORAL at 09:02

## 2025-02-16 RX ADMIN — OXYCODONE 5 MG: 5 TABLET ORAL at 05:02

## 2025-02-16 RX ADMIN — ATORVASTATIN CALCIUM 80 MG: 40 TABLET, FILM COATED ORAL at 08:02

## 2025-02-16 RX ADMIN — OSELTAMIVIR PHOSPHATE 30 MG: 30 CAPSULE ORAL at 08:02

## 2025-02-16 RX ADMIN — PENTOXIFYLLINE 400 MG: 400 TABLET, EXTENDED RELEASE ORAL at 04:02

## 2025-02-16 RX ADMIN — ASPIRIN 81 MG: 81 TABLET, COATED ORAL at 09:02

## 2025-02-16 RX ADMIN — OXYCODONE 5 MG: 5 TABLET ORAL at 06:02

## 2025-02-16 RX ADMIN — IPRATROPIUM BROMIDE AND ALBUTEROL SULFATE 3 ML: 2.5; .5 SOLUTION RESPIRATORY (INHALATION) at 02:02

## 2025-02-16 RX ADMIN — FERROUS SULFATE TAB 325 MG (65 MG ELEMENTAL FE) 1 EACH: 325 (65 FE) TAB at 09:02

## 2025-02-16 RX ADMIN — PENTOXIFYLLINE 400 MG: 400 TABLET, EXTENDED RELEASE ORAL at 11:02

## 2025-02-16 RX ADMIN — PANTOPRAZOLE SODIUM 40 MG: 40 TABLET, DELAYED RELEASE ORAL at 09:02

## 2025-02-16 RX ADMIN — APIXABAN 5 MG: 5 TABLET, FILM COATED ORAL at 09:02

## 2025-02-16 RX ADMIN — PENTOXIFYLLINE 400 MG: 400 TABLET, EXTENDED RELEASE ORAL at 09:02

## 2025-02-16 RX ADMIN — APIXABAN 5 MG: 5 TABLET, FILM COATED ORAL at 08:02

## 2025-02-16 RX ADMIN — IPRATROPIUM BROMIDE AND ALBUTEROL SULFATE 3 ML: 2.5; .5 SOLUTION RESPIRATORY (INHALATION) at 07:02

## 2025-02-16 RX ADMIN — DULOXETINE HYDROCHLORIDE 60 MG: 30 CAPSULE, DELAYED RELEASE ORAL at 09:02

## 2025-02-16 RX ADMIN — OXYCODONE 5 MG: 5 TABLET ORAL at 11:02

## 2025-02-16 RX ADMIN — PREDNISONE 40 MG: 20 TABLET ORAL at 09:02

## 2025-02-16 RX ADMIN — TRAZODONE HYDROCHLORIDE 100 MG: 100 TABLET ORAL at 08:02

## 2025-02-16 RX ADMIN — DILTIAZEM HYDROCHLORIDE 120 MG: 120 CAPSULE, EXTENDED RELEASE ORAL at 09:02

## 2025-02-16 RX ADMIN — OSELTAMIVIR PHOSPHATE 30 MG: 30 CAPSULE ORAL at 09:02

## 2025-02-16 NOTE — ASSESSMENT & PLAN NOTE
Patient has paroxysmal (<7 days) atrial fibrillation. Patient is currently in sinus rhythm. [id number] Score: 4. The patients heart rate in the last 24 hours is as follows:  Pulse  Min: 85  Max: 137     Antiarrhythmics  diltiaZEM 24 hr capsule 120 mg, Daily, Oral  metoprolol succinate 24 hr tablet 75 mg, Daily, Oral    Anticoagulants  apixaban tablet 5 mg, 2 times daily, Oral  -12/16 controlled patient has sinus rhythm    Plan  - Replete lytes with a goal of K>4, Mg >2  - Patient is anticoagulated, see medications listed above.  - Patient's afib is currently controlled  - monitor for any arrhythmias.  -cardiology consulted echo ordered

## 2025-02-16 NOTE — PLAN OF CARE
Problem: Adult Inpatient Plan of Care  Goal: Plan of Care Review  Outcome: Progressing     Problem: COPD (Chronic Obstructive Pulmonary Disease)  Goal: Optimal Chronic Illness Coping  Outcome: Progressing     Problem: Wound  Goal: Optimal Coping  Outcome: Progressing     Problem: Comorbidity Management  Goal: Maintenance of COPD Symptom Control  Outcome: Progressing     Problem: Anemia  Goal: Anemia Symptom Improvement  Outcome: Progressing     Problem: Posttraumatic Stress Disorder  Goal: Improved Impulse and Aggression Control (Posttraumatic Stress Disorder)  Outcome: Progressing     Problem: Depression  Goal: Improved Mood  Outcome: Progressing     Problem: Skin Injury Risk Increased  Goal: Skin Health and Integrity  Outcome: Progressing

## 2025-02-16 NOTE — PLAN OF CARE
Problem: Adult Inpatient Plan of Care  Goal: Patient-Specific Goal (Individualized)  Outcome: Progressing     Problem: COPD (Chronic Obstructive Pulmonary Disease)  Goal: Optimal Chronic Illness Coping  Outcome: Progressing     Problem: Wound  Goal: Skin Health and Integrity  Outcome: Progressing

## 2025-02-16 NOTE — ASSESSMENT & PLAN NOTE
Cardiology consulted  -currently patient is euvolemic no signs of acute decompensation    Echo  Result Date: 8/28/2024    Left Ventricle: The left ventricle is normal in size. Ventricular mass   is normal. Normal wall thickness. Normal wall motion. There is low normal   systolic function with a visually estimated ejection fraction of 50 - 55%.   Unable to assess diastolic function due to atrial fibrillation.    Right Ventricle: Normal right ventricular cavity size. Wall thickness   is normal. Systolic function is mildly reduced.    Left Atrium: Left atrium is moderately dilated.    Aortic Valve: The aortic valve is a trileaflet valve. There is moderate   aortic valve sclerosis. There is moderate annular calcification present.    Mitral Valve: There is mild regurgitation.    Aorta: Aortic root is normal in size measuring 2.74 cm. Ascending aorta   is normal.    Pulmonary Artery: There is mild pulmonary hypertension. The estimated   pulmonary artery systolic pressure is 59 mmHg.    IVC/SVC: Elevated venous pressure at 15 mmHg.    Irregularly irregular rhythm was present during the study

## 2025-02-16 NOTE — PROGRESS NOTES
"[facility] Progress Note    Subjective:      Maria A Smith is a 79 y.o. female who is being followed by the hospitals Cardiology service for      Objective:   Last 24 Hour Vital Signs:  BP  Min: 123/75  Max: 167/70  Temp  Av.7 °F (36.5 °C)  Min: 97.3 °F (36.3 °C)  Max: 98.3 °F (36.8 °C)  Pulse  Av.9  Min: 85  Max: 137  Resp  Av.1  Min: 18  Max: 20  SpO2  Av.2 %  Min: 93 %  Max: 100 %  Height  Av' 5" (165.1 cm)  Min: 5' 5" (165.1 cm)  Max: 5' 5" (165.1 cm)  Weight  Av.1 kg (108 lb 2.2 oz)  Min: 45.9 kg (101 lb 3.1 oz)  Max: 52.2 kg (115 lb 1.3 oz)  I/O last 3 completed shifts:  In: -   Out: 400 [Urine:400]    Physical Examination:  Patient in NAD.  Awake and alert.  IRRegular rhythm.  Normal S1, S2.  Coarse breath sounds anteriorly.    Laboratory:  Most Recent Data:  CBC:   Lab Results   Component Value Date    WBC 8.87 2025    HGB 12.8 2025    HCT 39.2 2025     (L) 2025    MCV 91 2025    RDW 15.3 (H) 2025     BMP:   Lab Results   Component Value Date     2025    K 4.1 2025     2025    CO2 28 2025    BUN 31 (H) 2025     (H) 2025    CALCIUM 9.0 2025    MG 1.8 2025    PHOS 4.2 2025     LFTs:   Lab Results   Component Value Date    PROT 7.8 02/15/2025    ALBUMIN 3.8 02/15/2025    BILITOT 1.0 02/15/2025    AST 32 02/15/2025    ALKPHOS 119 02/15/2025    ALT 19 02/15/2025     Coags:   Lab Results   Component Value Date    INR 1.2 2023     FLP:   Lab Results   Component Value Date    CHOL 146 02/15/2025    HDL 33 (L) 02/15/2025    LDLCALC 94.0 02/15/2025    TRIG 95 02/15/2025    CHOLHDL 22.6 02/15/2025     DM:   Lab Results   Component Value Date    HGBA1C 5.5 2023    HGBA1C 5.6 2022    HGBA1C 5.6 2014    LDLCALC 94.0 02/15/2025    CREATININE 1.0 2025     Thyroid:   Lab Results   Component Value Date    TSH 1.316 2023    FREET4 1.00 2017 "     Anemia:   Lab Results   Component Value Date    IRON 14 (L) 02/10/2020    TIBC 556 (H) 02/10/2020    FERRITIN 5 (L) 02/10/2020    [id number] 173 (L) 02/10/2020    FOLATE 10.90 02/10/2020     Cardiac:   Lab Results   Component Value Date    TROPONINI 0.035 (H) 02/15/2025     (H) 02/15/2025     Urinalysis:   Lab Results   Component Value Date    LABURIN KLEBSIELLA PNEUMONIAE  >100,000 cfu/ml   (A) 03/21/2023    COLORU Yellow 08/26/2024    SPECGRAV 1.025 08/26/2024    NITRITE Negative 08/26/2024    KETONESU Negative 08/26/2024    UROBILINOGEN 1.0 07/08/2023       Trended Lab Data:  Recent Labs   Lab 02/15/25  0306 02/16/25  0650   WBC 9.10 8.87   HGB 13.6 12.8   HCT 41.1 39.2    146*   MCV 90 91   RDW 15.3* 15.3*    140   K 4.5 4.1   CL 99 102   CO2 25 28   BUN 19 31*   * 111*   CALCIUM 9.9 9.0   PROT 7.8  --    ALBUMIN 3.8  --    BILITOT 1.0  --    AST 32  --    ALKPHOS 119  --    ALT 19  --        Trended Cardiac Data:  Recent Labs   Lab 02/15/25  0306   TROPONINI 0.035*   *         Other Results:    Radiology:  CTA Chest Non-Coronary (PE Studies)  Result Date: 2/15/2025  EXAMINATION: CTA CHEST NON CORONARY (PE STUDIES) CLINICAL HISTORY: Pulmonary embolism (PE) suspected, high prob; TECHNIQUE: CT of the chest was acquired helically utilizing a low-dose technique from the lung apices through the posterior costophrenic angles status post administration of 100 cc of Omnipaque 350.  Bolus timing was utilized to optimize opacification of the pulmonary arterial system. 3-D maximum intensity projection and multiplanar reconstructions were created from the source data set and interpreted. COMPARISON: Chest x-ray from 02/15/2025 and CT a of the chest from 03/21/2023 FINDINGS: There is a stable area of scarring seen within the right upper lobe.  Extensive emphysematous changes seen within both lungs.  No discrete pulmonary nodules or masses are identified. Prominent vascular calcification  "seen involving the aorta.  There is good opacification of the pulmonary arterial system. No intraluminal filling defects are notified within the pulmonary arterial system to suggest pulmonary embolism. There is no pericardial effusion.  No enlarged mediastinal, hilar or axillary lymph nodes are identified. The visualized upper abdomen is unremarkable in appearance. No suspicious appearing osseous abnormalities.  There is contrast media seen within the subcutaneous soft tissues of the right humerus and right axilla due to contrast extravasation.     1. No evidence to suggest pulmonary arterial embolism. 2. No definite acute pathology seen within the chest. 3. Remaining findings as discussed above including contrast extravasation seen within the soft tissues of the right axilla and along the shaft of the right humerus. Electronically signed by: Viktor Eugene DO Date:    02/15/2025 Time:    12:57    X-Ray Chest AP Portable  Result Date: 2/15/2025  EXAMINATION: XR CHEST AP PORTABLE CLINICAL HISTORY: Provided history is "  Shortness of breath". TECHNIQUE: One view of the chest. COMPARISON: 08/30/2024. FINDINGS: Cardiomediastinal silhouette is mildly enlarged.  Atherosclerotic calcifications overlie the aortic arch.  Coarse interstitial lung markings and pulmonary emphysema.  No large focal consolidation.  No sizable pleural effusion.  No pneumothorax.  Operative changes in the left shoulder as seen previously.     No focal consolidation identified on this single view.  No significant change when compared with 08/30/2024. Pulmonary emphysema. Electronically signed by: Stu Granado MD Date:    02/15/2025 Time:    06:01    X-Ray Knee Complete 4 Or More Views Right  Result Date: 1/28/2025  EXAMINATION: XR KNEE COMP 4 OR MORE VIEWS RIGHT CLINICAL HISTORY: Pain in right knee TECHNIQUE: AP, lateral and patellar view, AP flexion view. COMPARISON: 04/21/2021 FINDINGS: Mild medial knee compartment joint space narrowing.  No " significant osteophyte formation, sclerosis or geodes.  No fracture, no osseous lesions. Moderate medial femoral patellar joint space narrowing and osteophyte formation. Trace of joint fluid. Vascular stent noted.  The soft tissues appear normal.     Degenerative changes, no acute process seen. Electronically signed by: Jenniffer Sigala MD Date:    01/28/2025 Time:    12:46    X-Ray Ankle Complete 3 View Right  Result Date: 1/28/2025  EXAMINATION: XR ANKLE COMPLETE 3 VIEW RIGHT CLINICAL HISTORY: Pain in right ankle and joints of right foot TECHNIQUE: AP, lateral, and oblique images of the right ankle were performed. COMPARISON: None FINDINGS: The tibiotalar joint alignment is normal.  There is no significant degenerative change.  No acute fracture, no osseous lesion seen.  There is decreased mineralization of the osseous structures.  The soft tissues appear normal.     No acute fracture identified. Electronically signed by: Jenniffer Sigala MD Date:    01/28/2025 Time:    10:00      Current Medications:       Scheduled:   albuterol-ipratropium  3 mL Nebulization Q6H WAKE    apixaban  5 mg Oral BID    aspirin  81 mg Oral Daily    atorvastatin  80 mg Oral QHS    diltiaZEM  120 mg Oral Daily    DULoxetine  60 mg Oral Daily    ferrous sulfate  1 tablet Oral Daily    lisinopriL  40 mg Oral Daily    metoprolol succinate  75 mg Oral Daily    oseltamivir  30 mg Oral BID    pantoprazole  40 mg Oral Daily    pentoxifylline  400 mg Oral TID WM    polyethylene glycol  17 g Oral Daily    predniSONE  40 mg Oral Daily    traZODone  100 mg Oral QHS          Assessment:     Maria A Smith is a 79 y.o.female with  Patient Active Problem List    Diagnosis Date Noted    Acute on chronic respiratory failure 02/15/2025    Influenza A 02/15/2025    COPD exacerbation 02/15/2025    Encephalopathy 02/15/2025    Chronic systolic heart failure 02/15/2025    GUERRERO (dyspnea on exertion) 02/15/2025    On continuous oral anticoagulation  02/15/2025    Chronic pain of right ankle 01/28/2025    Chronic hypercapnic respiratory failure 08/27/2024    Depression 08/27/2024    PAD (peripheral artery disease) 08/27/2024    Cellulitis of right lower extremity 08/26/2024    Malnutrition 07/08/2023    Assistance needed with transportation 04/03/2023    Chronic obstructive pulmonary emphysema 03/23/2023    Acute cystitis with hematuria 03/23/2023    Atrial fibrillation with rapid ventricular response 03/22/2023    Hydronephrosis 03/22/2023    History of CVA (cerebrovascular accident) 03/10/2022    Nicotine dependence 06/28/2021    Polyp of colon 02/13/2020    Elevated brain natriuretic peptide (BNP) level 02/10/2020    Osteoporosis 02/02/2020    Iron deficiency anemia 08/24/2018    Chronic post-traumatic stress disorder 08/24/2018    Osteopenia 08/24/2018    History of tobacco use 06/29/2018    Left hemiparesis 06/29/2018    Memory impairment 06/29/2018    Cerebrovascular accident (CVA) 06/29/2018    Late effects of cerebral ischemic stroke 06/24/2018    Essential hypertension 11/16/2017    Hyperlipidemia 11/16/2017    Squamous cell carcinoma 01/19/2017        Plan:     1.  Atrial fibrillation:  Patient noted on EKG to have atrial fibrillation are remains in atrial fib on telemetry.    --  Continue Eliquis.  --  Goal heart rate is < 110 beats/min.     2.  Influenza A:  It is speculative that this had tipped the patient over into having atrial fibrillation with a fast heart rate.  She also has a urinary tract infection which may have the same result.  --  Continue anti-viral and antibiotic therapies.     3.  GUERRERO:  Patient has advanced COPD and is on supplemental oxygen therapy.   This is the majority of her exertional dyspnea.  Rapid atrial fibrillation also is a component.  --  Stable.     4.  HTN:  Blood pressure is above goal at this time.  --  Can continue present medical therapy.  She is receiving both metoprolol and diltiazem as well as ACEi.     5.   Elevated BNP:  There is likely a touch of increased LV (or RV) filling pressures for which reduction in preload via diuretic therapy should improve.  Treatment of hypertension will also be fruitful.  --  Consider adding empagliflozin 10mg daily now or in the outpatient setting.      Josemanuel Edmondson MD

## 2025-02-16 NOTE — ASSESSMENT & PLAN NOTE
Patient's COPD is with exacerbation noted by continued dyspnea currently.  Patient is currently on COPD Pathway. Continue scheduled inhalers Steroids, Antibiotics, and Supplemental oxygen and monitor respiratory status closely.   -2/16 patient remained shortness a breath, titrating down nasal cannula

## 2025-02-16 NOTE — ASSESSMENT & PLAN NOTE
Patient's blood pressure range in the last 24 hours was: BP  Min: 118/71  Max: 155/79.The patient's inpatient anti-hypertensive regimen is listed below:  Current Antihypertensives  diltiaZEM 24 hr capsule 120 mg, Daily, Oral  lisinopriL tablet 40 mg, Daily, Oral  metoprolol succinate 24 hr tablet 75 mg, Daily, Oral  , Daily, Oral  , Daily, Oral    Plan  - BP is controlled home meds were reconciled- monitor blood pressure closely.

## 2025-02-16 NOTE — PROGRESS NOTES
[facility]  Progress Note    Patient Name: Maria A Smith  MRN: [id number]  Patient Class: IP- Inpatient   Admission Date: 2/15/2025  Length of Stay: 1 days  Attending Physician: Magui Stauffer MD  Primary Care Provider: Sneha López MD        Subjective     Principal Problem:Acute on chronic respiratory failure        HPI:  Maria A Smith is a 79-year-old female with a past medical history of HTN, Paroxysmal Afib, HLD, CVA w/ L sided weakness, prior tobacco use disorder, PVD, COPD on oxygen @2 L. Patient presents to emergency department  from [facility] rehab with complaint of shortness of breath for the past couple of weeks . In ER patient found to be positive for influenza A. CXR showed pulmonary emphysema.  Patient is a poor historian and most of the history is obtained from ER records and previous history.    Overview/Hospital Course:  No notes on file    Interval History:  Patient is a poor historian and hard of hearing currently on nasal cannula CTA was negative for PE.     Review of Systems   Unable to perform ROS: Dementia     Objective:     Vital Signs (Most Recent):  Temp: 97.3 °F (36.3 °C) (02/16/25 1128)  Pulse: 94 (02/16/25 1128)  Resp: 18 (02/16/25 1150)  BP: 118/71 (02/16/25 1128)  SpO2: 96 % (02/16/25 1128) Vital Signs (24h Range):  Temp:  [97.3 °F (36.3 °C)-98.3 °F (36.8 °C)] 97.3 °F (36.3 °C)  Pulse:  [] 94  Resp:  [18-20] 18  SpO2:  [93 %-100 %] 96 %  BP: (118-155)/(71-83) 118/71     Weight: 45.9 kg (101 lb 3.1 oz)  Body mass index is 16.84 kg/m².    Intake/Output Summary (Last 24 hours) at 2/16/2025 1238  Last data filed at 2/16/2025 0529  Gross per 24 hour   Intake --   Output 400 ml   Net -400 ml         Physical Exam  HENT:      Head: Normocephalic and atraumatic.      Nose: Nose normal.      Mouth/Throat:      Mouth: Mucous membranes are moist.   Eyes:      Extraocular Movements: Extraocular movements intact.      Pupils: Pupils  are equal, round, and reactive to light.   Cardiovascular:      Rate and Rhythm: Regular rhythm.   Pulmonary:      Breath sounds: Rhonchi present.   Abdominal:      Palpations: Abdomen is soft.   Musculoskeletal:         General: Normal range of motion.   Skin:     General: Skin is warm.   Neurological:      Mental Status: She is alert. Mental status is at baseline. She is disoriented.   Psychiatric:         Mood and Affect: Mood normal.             Significant Labs: All pertinent labs within the past 24 hours have been reviewed.  Recent Lab Results         02/16/25  0650        Anion Gap 10       Baso # 0.00       Basophil % 0.0       BUN 31       Calcium 9.0       Chloride 102       CO2 28       Creatinine 1.0       Differential Method Automated       eGFR 58       Eos # 0.0       Eos % 0.0       Glucose 111       Gran # (ANC) 7.6       Gran % 85.9       Hematocrit 39.2       Hemoglobin 12.8       Immature Grans (Abs) 0.06  Comment: Mild elevation in immature granulocytes is non specific and   can be seen in a variety of conditions including stress response,   acute inflammation, trauma and pregnancy. Correlation with other   laboratory and clinical findings is essential.         Immature Granulocytes 0.7       Lymph # 0.5       Lymph % 6.1       Magnesium  1.8       MCH 29.8       MCHC 32.7       MCV 91       Mono # 0.7       Mono % 7.3       MPV 10.2       nRBC 0       Phosphorus Level 4.2       Platelet Count 146       Potassium 4.1       RBC 4.30       RDW 15.3       Sodium 140       WBC 8.87               Significant Imaging: I have reviewed all pertinent imaging results/findings within the past 24 hours.    Imaging Results              CTA Chest Non-Coronary (PE Studies) (Final result)  Result time 02/15/25 12:57:36      Final result by Viktor Eugene DO (02/15/25 12:57:36)                   Impression:      1. No evidence to suggest pulmonary arterial embolism.  2. No definite acute pathology seen  within the chest.  3. Remaining findings as discussed above including contrast extravasation seen within the soft tissues of the right axilla and along the shaft of the right humerus.      Electronically signed by: Viktor Eugene DO  Date:    02/15/2025  Time:    12:57               Narrative:    EXAMINATION:  CTA CHEST NON CORONARY (PE STUDIES)    CLINICAL HISTORY:  Pulmonary embolism (PE) suspected, high prob;    TECHNIQUE:  CT of the chest was acquired helically utilizing a low-dose technique from the lung apices through the posterior costophrenic angles status post administration of 100 cc of Omnipaque 350.  Bolus timing was utilized to optimize opacification of the pulmonary arterial system. 3-D maximum intensity projection and multiplanar reconstructions were created from the source data set and interpreted.    COMPARISON:  Chest x-ray from 02/15/2025 and CT a of the chest from 03/21/2023    FINDINGS:  There is a stable area of scarring seen within the right upper lobe.  Extensive emphysematous changes seen within both lungs.  No discrete pulmonary nodules or masses are identified.    Prominent vascular calcification seen involving the aorta.  There is good opacification of the pulmonary arterial system. No intraluminal filling defects are notified within the pulmonary arterial system to suggest pulmonary embolism. There is no pericardial effusion.  No enlarged mediastinal, hilar or axillary lymph nodes are identified.    The visualized upper abdomen is unremarkable in appearance.    No suspicious appearing osseous abnormalities.  There is contrast media seen within the subcutaneous soft tissues of the right humerus and right axilla due to contrast extravasation.                                       X-Ray Chest AP Portable (Final result)  Result time 02/15/25 06:01:25      Final result by Stu Granado MD (02/15/25 06:01:25)                   Impression:      No focal consolidation identified on this  "single view.  No significant change when compared with 08/30/2024.    Pulmonary emphysema.      Electronically signed by: Stu Granado MD  Date:    02/15/2025  Time:    06:01               Narrative:    EXAMINATION:  XR CHEST AP PORTABLE    CLINICAL HISTORY:  Provided history is "  Shortness of breath".    TECHNIQUE:  One view of the chest.    COMPARISON:  08/30/2024.    FINDINGS:  Cardiomediastinal silhouette is mildly enlarged.  Atherosclerotic calcifications overlie the aortic arch.  Coarse interstitial lung markings and pulmonary emphysema.  No large focal consolidation.  No sizable pleural effusion.  No pneumothorax.  Operative changes in the left shoulder as seen previously.                                  Echo  Result Date: 8/28/2024    Left Ventricle: The left ventricle is normal in size. Ventricular mass   is normal. Normal wall thickness. Normal wall motion. There is low normal   systolic function with a visually estimated ejection fraction of 50 - 55%.   Unable to assess diastolic function due to atrial fibrillation.    Right Ventricle: Normal right ventricular cavity size. Wall thickness   is normal. Systolic function is mildly reduced.    Left Atrium: Left atrium is moderately dilated.    Aortic Valve: The aortic valve is a trileaflet valve. There is moderate   aortic valve sclerosis. There is moderate annular calcification present.    Mitral Valve: There is mild regurgitation.    Aorta: Aortic root is normal in size measuring 2.74 cm. Ascending aorta   is normal.    Pulmonary Artery: There is mild pulmonary hypertension. The estimated   pulmonary artery systolic pressure is 59 mmHg.    IVC/SVC: Elevated venous pressure at 15 mmHg.    Irregularly irregular rhythm was present during the study      CTA CHEST NON CORONARY (PE STUDIES)     CLINICAL HISTORY:  Pulmonary embolism (PE) suspected, high prob;     TECHNIQUE:  CT of the chest was acquired helically utilizing a low-dose technique from the lung " apices through the posterior costophrenic angles status post administration of 100 cc of Omnipaque 350.  Bolus timing was utilized to optimize opacification of the pulmonary arterial system. 3-D maximum intensity projection and multiplanar reconstructions were created from the source data set and interpreted.     COMPARISON:  Chest x-ray from 02/15/2025 and CT a of the chest from 03/21/2023     FINDINGS:  There is a stable area of scarring seen within the right upper lobe.  Extensive emphysematous changes seen within both lungs.  No discrete pulmonary nodules or masses are identified.     Prominent vascular calcification seen involving the aorta.  There is good opacification of the pulmonary arterial system. No intraluminal filling defects are notified within the pulmonary arterial system to suggest pulmonary embolism. There is no pericardial effusion.  No enlarged mediastinal, hilar or axillary lymph nodes are identified.     The visualized upper abdomen is unremarkable in appearance.     No suspicious appearing osseous abnormalities.  There is contrast media seen within the subcutaneous soft tissues of the right humerus and right axilla due to contrast extravasation.     Impression:     1. No evidence to suggest pulmonary arterial embolism.  2. No definite acute pathology seen within the chest.  3. Remaining findings as discussed above including contrast extravasation seen within the soft tissues of the right axilla and along the shaft of the right humerus.       Assessment and Plan     * Acute on chronic respiratory failure  Admit to medical floor with telemetry.  Patient with Hypoxic Respiratory failure which is Acute on chronic.  she is on home oxygen at 2 LPM. Supplemental oxygen was provided and noted- Oxygen Concentration (%):  [28] 28    .   Signs/symptoms of respiratory failure include- tachypnea, wheezing, and lethargy. Contributing diagnoses includes - COPD Labs and images were reviewed. Patient Has not  had a recent ABG. Will treat underlying causes and adjust management of respiratory failure as follows- supplemental oxygen and duo nebs.    Thrombocytopenia  The likely etiology of thrombocytopenia is infection. The patients 3 most recent labs are listed below.  Recent Labs     02/15/25  0306 02/16/25  0650    146*     Plan  - Will transfuse if platelet count is <20k.  Currently stable no need for transfusion      On continuous oral anticoagulation  Secondary to AFib RVR  Restart her apixaban  -controlled on 2/60      GUERRERO (dyspnea on exertion)  Secondary to COPD exacerbation  Currently on nasal cannula      Chronic systolic heart failure  Cardiology consulted  -currently patient is euvolemic no signs of acute decompensation    Echo  Result Date: 8/28/2024    Left Ventricle: The left ventricle is normal in size. Ventricular mass   is normal. Normal wall thickness. Normal wall motion. There is low normal   systolic function with a visually estimated ejection fraction of 50 - 55%.   Unable to assess diastolic function due to atrial fibrillation.    Right Ventricle: Normal right ventricular cavity size. Wall thickness   is normal. Systolic function is mildly reduced.    Left Atrium: Left atrium is moderately dilated.    Aortic Valve: The aortic valve is a trileaflet valve. There is moderate   aortic valve sclerosis. There is moderate annular calcification present.    Mitral Valve: There is mild regurgitation.    Aorta: Aortic root is normal in size measuring 2.74 cm. Ascending aorta   is normal.    Pulmonary Artery: There is mild pulmonary hypertension. The estimated   pulmonary artery systolic pressure is 59 mmHg.    IVC/SVC: Elevated venous pressure at 15 mmHg.    Irregularly irregular rhythm was present during the study            Encephalopathy    Neuro checks.  ABG to see if patient hypercapnic.  -2/16 patient is back to her baseline    COPD exacerbation  Patient's COPD is with exacerbation noted by  continued dyspnea currently.  Patient is currently on COPD Pathway. Continue scheduled inhalers Steroids, Antibiotics, and Supplemental oxygen and monitor respiratory status closely.   -2/16 patient remained shortness a breath, titrating down nasal cannula    Influenza A  Isolation protocol.  Tamiflu 75 mg p.o. b.i.d..    Malnutrition  Dietary consulted  Protein shakes supplements t.i.d.      Atrial fibrillation with rapid ventricular response  Patient has paroxysmal (<7 days) atrial fibrillation. Patient is currently in sinus rhythm. [id number] Score: 4. The patients heart rate in the last 24 hours is as follows:  Pulse  Min: 85  Max: 137     Antiarrhythmics  diltiaZEM 24 hr capsule 120 mg, Daily, Oral  metoprolol succinate 24 hr tablet 75 mg, Daily, Oral    Anticoagulants  apixaban tablet 5 mg, 2 times daily, Oral  -12/16 controlled patient has sinus rhythm    Plan  - Replete lytes with a goal of K>4, Mg >2  - Patient is anticoagulated, see medications listed above.  - Patient's afib is currently controlled  - monitor for any arrhythmias.  -cardiology consulted echo ordered      Hyperlipidemia    Lipid profile.  Restart patient on statin    Essential hypertension  Patient's blood pressure range in the last 24 hours was: BP  Min: 118/71  Max: 155/79.The patient's inpatient anti-hypertensive regimen is listed below:  Current Antihypertensives  diltiaZEM 24 hr capsule 120 mg, Daily, Oral  lisinopriL tablet 40 mg, Daily, Oral  metoprolol succinate 24 hr tablet 75 mg, Daily, Oral  , Daily, Oral  , Daily, Oral    Plan  - BP is controlled home meds were reconciled- monitor blood pressure closely.      VTE Risk Mitigation (From admission, onward)           Ordered     apixaban tablet 5 mg  2 times daily         02/15/25 0624                    Discharge Planning   ROSA:      Code Status: Full Code   Medical Readiness for Discharge Date:                            Magui Rosario MD  Department of Hospital Medicine   [facility]  Telemetry

## 2025-02-16 NOTE — SUBJECTIVE & OBJECTIVE
Interval History:  Patient is a poor historian and hard of hearing currently on nasal cannula CTA was negative for PE.     Review of Systems   Unable to perform ROS: Dementia     Objective:     Vital Signs (Most Recent):  Temp: 97.3 °F (36.3 °C) (02/16/25 1128)  Pulse: 94 (02/16/25 1128)  Resp: 18 (02/16/25 1150)  BP: 118/71 (02/16/25 1128)  SpO2: 96 % (02/16/25 1128) Vital Signs (24h Range):  Temp:  [97.3 °F (36.3 °C)-98.3 °F (36.8 °C)] 97.3 °F (36.3 °C)  Pulse:  [] 94  Resp:  [18-20] 18  SpO2:  [93 %-100 %] 96 %  BP: (118-155)/(71-83) 118/71     Weight: 45.9 kg (101 lb 3.1 oz)  Body mass index is 16.84 kg/m².    Intake/Output Summary (Last 24 hours) at 2/16/2025 1238  Last data filed at 2/16/2025 0529  Gross per 24 hour   Intake --   Output 400 ml   Net -400 ml         Physical Exam  HENT:      Head: Normocephalic and atraumatic.      Nose: Nose normal.      Mouth/Throat:      Mouth: Mucous membranes are moist.   Eyes:      Extraocular Movements: Extraocular movements intact.      Pupils: Pupils are equal, round, and reactive to light.   Cardiovascular:      Rate and Rhythm: Regular rhythm.   Pulmonary:      Breath sounds: Rhonchi present.   Abdominal:      Palpations: Abdomen is soft.   Musculoskeletal:         General: Normal range of motion.   Skin:     General: Skin is warm.   Neurological:      Mental Status: She is alert. Mental status is at baseline. She is disoriented.   Psychiatric:         Mood and Affect: Mood normal.             Significant Labs: All pertinent labs within the past 24 hours have been reviewed.  Recent Lab Results         02/16/25  0650        Anion Gap 10       Baso # 0.00       Basophil % 0.0       BUN 31       Calcium 9.0       Chloride 102       CO2 28       Creatinine 1.0       Differential Method Automated       eGFR 58       Eos # 0.0       Eos % 0.0       Glucose 111       Gran # (ANC) 7.6       Gran % 85.9       Hematocrit 39.2       Hemoglobin 12.8       Immature Grans  (Abs) 0.06  Comment: Mild elevation in immature granulocytes is non specific and   can be seen in a variety of conditions including stress response,   acute inflammation, trauma and pregnancy. Correlation with other   laboratory and clinical findings is essential.         Immature Granulocytes 0.7       Lymph # 0.5       Lymph % 6.1       Magnesium  1.8       MCH 29.8       MCHC 32.7       MCV 91       Mono # 0.7       Mono % 7.3       MPV 10.2       nRBC 0       Phosphorus Level 4.2       Platelet Count 146       Potassium 4.1       RBC 4.30       RDW 15.3       Sodium 140       WBC 8.87               Significant Imaging: I have reviewed all pertinent imaging results/findings within the past 24 hours.    Imaging Results              CTA Chest Non-Coronary (PE Studies) (Final result)  Result time 02/15/25 12:57:36      Final result by Viktor Eugene DO (02/15/25 12:57:36)                   Impression:      1. No evidence to suggest pulmonary arterial embolism.  2. No definite acute pathology seen within the chest.  3. Remaining findings as discussed above including contrast extravasation seen within the soft tissues of the right axilla and along the shaft of the right humerus.      Electronically signed by: Viktor Eugene DO  Date:    02/15/2025  Time:    12:57               Narrative:    EXAMINATION:  CTA CHEST NON CORONARY (PE STUDIES)    CLINICAL HISTORY:  Pulmonary embolism (PE) suspected, high prob;    TECHNIQUE:  CT of the chest was acquired helically utilizing a low-dose technique from the lung apices through the posterior costophrenic angles status post administration of 100 cc of Omnipaque 350.  Bolus timing was utilized to optimize opacification of the pulmonary arterial system. 3-D maximum intensity projection and multiplanar reconstructions were created from the source data set and interpreted.    COMPARISON:  Chest x-ray from 02/15/2025 and CT a of the chest from 03/21/2023    FINDINGS:  There is a  "stable area of scarring seen within the right upper lobe.  Extensive emphysematous changes seen within both lungs.  No discrete pulmonary nodules or masses are identified.    Prominent vascular calcification seen involving the aorta.  There is good opacification of the pulmonary arterial system. No intraluminal filling defects are notified within the pulmonary arterial system to suggest pulmonary embolism. There is no pericardial effusion.  No enlarged mediastinal, hilar or axillary lymph nodes are identified.    The visualized upper abdomen is unremarkable in appearance.    No suspicious appearing osseous abnormalities.  There is contrast media seen within the subcutaneous soft tissues of the right humerus and right axilla due to contrast extravasation.                                       X-Ray Chest AP Portable (Final result)  Result time 02/15/25 06:01:25      Final result by Stu Granado MD (02/15/25 06:01:25)                   Impression:      No focal consolidation identified on this single view.  No significant change when compared with 08/30/2024.    Pulmonary emphysema.      Electronically signed by: Stu Granado MD  Date:    02/15/2025  Time:    06:01               Narrative:    EXAMINATION:  XR CHEST AP PORTABLE    CLINICAL HISTORY:  Provided history is "  Shortness of breath".    TECHNIQUE:  One view of the chest.    COMPARISON:  08/30/2024.    FINDINGS:  Cardiomediastinal silhouette is mildly enlarged.  Atherosclerotic calcifications overlie the aortic arch.  Coarse interstitial lung markings and pulmonary emphysema.  No large focal consolidation.  No sizable pleural effusion.  No pneumothorax.  Operative changes in the left shoulder as seen previously.                                  Echo  Result Date: 8/28/2024    Left Ventricle: The left ventricle is normal in size. Ventricular mass   is normal. Normal wall thickness. Normal wall motion. There is low normal   systolic function with a " visually estimated ejection fraction of 50 - 55%.   Unable to assess diastolic function due to atrial fibrillation.    Right Ventricle: Normal right ventricular cavity size. Wall thickness   is normal. Systolic function is mildly reduced.    Left Atrium: Left atrium is moderately dilated.    Aortic Valve: The aortic valve is a trileaflet valve. There is moderate   aortic valve sclerosis. There is moderate annular calcification present.    Mitral Valve: There is mild regurgitation.    Aorta: Aortic root is normal in size measuring 2.74 cm. Ascending aorta   is normal.    Pulmonary Artery: There is mild pulmonary hypertension. The estimated   pulmonary artery systolic pressure is 59 mmHg.    IVC/SVC: Elevated venous pressure at 15 mmHg.    Irregularly irregular rhythm was present during the study      CTA CHEST NON CORONARY (PE STUDIES)     CLINICAL HISTORY:  Pulmonary embolism (PE) suspected, high prob;     TECHNIQUE:  CT of the chest was acquired helically utilizing a low-dose technique from the lung apices through the posterior costophrenic angles status post administration of 100 cc of Omnipaque 350.  Bolus timing was utilized to optimize opacification of the pulmonary arterial system. 3-D maximum intensity projection and multiplanar reconstructions were created from the source data set and interpreted.     COMPARISON:  Chest x-ray from 02/15/2025 and CT a of the chest from 03/21/2023     FINDINGS:  There is a stable area of scarring seen within the right upper lobe.  Extensive emphysematous changes seen within both lungs.  No discrete pulmonary nodules or masses are identified.     Prominent vascular calcification seen involving the aorta.  There is good opacification of the pulmonary arterial system. No intraluminal filling defects are notified within the pulmonary arterial system to suggest pulmonary embolism. There is no pericardial effusion.  No enlarged mediastinal, hilar or axillary lymph nodes are  identified.     The visualized upper abdomen is unremarkable in appearance.     No suspicious appearing osseous abnormalities.  There is contrast media seen within the subcutaneous soft tissues of the right humerus and right axilla due to contrast extravasation.     Impression:     1. No evidence to suggest pulmonary arterial embolism.  2. No definite acute pathology seen within the chest.  3. Remaining findings as discussed above including contrast extravasation seen within the soft tissues of the right axilla and along the shaft of the right humerus.

## 2025-02-16 NOTE — ASSESSMENT & PLAN NOTE
Admit to medical floor with telemetry.  Patient with Hypoxic Respiratory failure which is Acute on chronic.  she is on home oxygen at 2 LPM. Supplemental oxygen was provided and noted- Oxygen Concentration (%):  [28] 28    .   Signs/symptoms of respiratory failure include- tachypnea, wheezing, and lethargy. Contributing diagnoses includes - COPD Labs and images were reviewed. Patient Has not had a recent ABG. Will treat underlying causes and adjust management of respiratory failure as follows- supplemental oxygen and duo nebs.

## 2025-02-16 NOTE — ASSESSMENT & PLAN NOTE
The likely etiology of thrombocytopenia is infection. The patients 3 most recent labs are listed below.  Recent Labs     02/15/25  0306 02/16/25  0650    146*     Plan  - Will transfuse if platelet count is <20k.  Currently stable no need for transfusion

## 2025-02-17 VITALS
SYSTOLIC BLOOD PRESSURE: 114 MMHG | HEART RATE: 65 BPM | OXYGEN SATURATION: 97 % | TEMPERATURE: 97 F | RESPIRATION RATE: 18 BRPM | DIASTOLIC BLOOD PRESSURE: 68 MMHG | HEIGHT: 65 IN | WEIGHT: 101.19 LBS | BODY MASS INDEX: 16.86 KG/M2

## 2025-02-17 LAB
ANION GAP SERPL CALC-SCNC: 10 MMOL/L (ref 8–16)
BASOPHILS # BLD AUTO: 0.01 K/UL (ref 0–0.2)
BASOPHILS NFR BLD: 0.1 % (ref 0–1.9)
BUN SERPL-MCNC: 39 MG/DL (ref 8–23)
CALCIUM SERPL-MCNC: 8.7 MG/DL (ref 8.7–10.5)
CHLORIDE SERPL-SCNC: 101 MMOL/L (ref 95–110)
CO2 SERPL-SCNC: 26 MMOL/L (ref 23–29)
CREAT SERPL-MCNC: 1.1 MG/DL (ref 0.5–1.4)
DIFFERENTIAL METHOD BLD: ABNORMAL
EOSINOPHIL # BLD AUTO: 0 K/UL (ref 0–0.5)
EOSINOPHIL NFR BLD: 0 % (ref 0–8)
ERYTHROCYTE [DISTWIDTH] IN BLOOD BY AUTOMATED COUNT: 15.2 % (ref 11.5–14.5)
EST. GFR  (NO RACE VARIABLE): 52 ML/MIN/1.73 M^2
GLUCOSE SERPL-MCNC: 102 MG/DL (ref 70–110)
HCT VFR BLD AUTO: 36.6 % (ref 37–48.5)
HGB BLD-MCNC: 11.9 G/DL (ref 12–16)
IMM GRANULOCYTES # BLD AUTO: 0.04 K/UL (ref 0–0.04)
IMM GRANULOCYTES NFR BLD AUTO: 0.5 % (ref 0–0.5)
LYMPHOCYTES # BLD AUTO: 0.6 K/UL (ref 1–4.8)
LYMPHOCYTES NFR BLD: 7.4 % (ref 18–48)
MAGNESIUM SERPL-MCNC: 1.8 MG/DL (ref 1.6–2.6)
MCH RBC QN AUTO: 29.7 PG (ref 27–31)
MCHC RBC AUTO-ENTMCNC: 32.5 G/DL (ref 32–36)
MCV RBC AUTO: 91 FL (ref 82–98)
MONOCYTES # BLD AUTO: 0.7 K/UL (ref 0.3–1)
MONOCYTES NFR BLD: 7.6 % (ref 4–15)
NEUTROPHILS # BLD AUTO: 7.3 K/UL (ref 1.8–7.7)
NEUTROPHILS NFR BLD: 84.4 % (ref 38–73)
NRBC BLD-RTO: 0 /100 WBC
PHOSPHATE SERPL-MCNC: 2.9 MG/DL (ref 2.7–4.5)
PLATELET # BLD AUTO: 141 K/UL (ref 150–450)
PMV BLD AUTO: 10 FL (ref 9.2–12.9)
POTASSIUM SERPL-SCNC: 3.9 MMOL/L (ref 3.5–5.1)
RBC # BLD AUTO: 4.01 M/UL (ref 4–5.4)
SODIUM SERPL-SCNC: 137 MMOL/L (ref 136–145)
WBC # BLD AUTO: 8.63 K/UL (ref 3.9–12.7)

## 2025-02-17 PROCEDURE — 94640 AIRWAY INHALATION TREATMENT: CPT

## 2025-02-17 PROCEDURE — 63600175 PHARM REV CODE 636 W HCPCS: Performed by: FAMILY MEDICINE

## 2025-02-17 PROCEDURE — 25000003 PHARM REV CODE 250: Performed by: FAMILY MEDICINE

## 2025-02-17 PROCEDURE — 27000221 HC OXYGEN, UP TO 24 HOURS

## 2025-02-17 PROCEDURE — 25000242 PHARM REV CODE 250 ALT 637 W/ HCPCS: Performed by: FAMILY MEDICINE

## 2025-02-17 PROCEDURE — 83735 ASSAY OF MAGNESIUM: CPT | Performed by: FAMILY MEDICINE

## 2025-02-17 PROCEDURE — 80048 BASIC METABOLIC PNL TOTAL CA: CPT | Performed by: FAMILY MEDICINE

## 2025-02-17 PROCEDURE — 85025 COMPLETE CBC W/AUTO DIFF WBC: CPT | Performed by: FAMILY MEDICINE

## 2025-02-17 PROCEDURE — 84100 ASSAY OF PHOSPHORUS: CPT | Performed by: FAMILY MEDICINE

## 2025-02-17 PROCEDURE — 25000003 PHARM REV CODE 250

## 2025-02-17 PROCEDURE — 99900035 HC TECH TIME PER 15 MIN (STAT)

## 2025-02-17 PROCEDURE — 36415 COLL VENOUS BLD VENIPUNCTURE: CPT | Performed by: FAMILY MEDICINE

## 2025-02-17 PROCEDURE — 94761 N-INVAS EAR/PLS OXIMETRY MLT: CPT

## 2025-02-17 RX ORDER — BUMETANIDE 1 MG/1
1 TABLET ORAL DAILY
Start: 2025-02-17

## 2025-02-17 RX ORDER — OSELTAMIVIR PHOSPHATE 30 MG/1
30 CAPSULE ORAL 2 TIMES DAILY
Qty: 6 CAPSULE | Refills: 0 | Status: SHIPPED | OUTPATIENT
Start: 2025-02-17 | End: 2025-02-20

## 2025-02-17 RX ORDER — PREDNISONE 20 MG/1
40 TABLET ORAL DAILY
Qty: 3 TABLET | Refills: 0 | Status: SHIPPED | OUTPATIENT
Start: 2025-02-18

## 2025-02-17 RX ORDER — PREDNISONE 20 MG/1
40 TABLET ORAL DAILY
Qty: 3 TABLET | Refills: 0 | Status: SHIPPED | OUTPATIENT
Start: 2025-02-18 | End: 2025-02-17

## 2025-02-17 RX ADMIN — DILTIAZEM HYDROCHLORIDE 120 MG: 120 CAPSULE, EXTENDED RELEASE ORAL at 08:02

## 2025-02-17 RX ADMIN — OXYCODONE 5 MG: 5 TABLET ORAL at 01:02

## 2025-02-17 RX ADMIN — OXYCODONE 5 MG: 5 TABLET ORAL at 08:02

## 2025-02-17 RX ADMIN — OXYCODONE 5 MG: 5 TABLET ORAL at 02:02

## 2025-02-17 RX ADMIN — APIXABAN 5 MG: 5 TABLET, FILM COATED ORAL at 08:02

## 2025-02-17 RX ADMIN — METOPROLOL SUCCINATE 75 MG: 25 TABLET, EXTENDED RELEASE ORAL at 08:02

## 2025-02-17 RX ADMIN — IPRATROPIUM BROMIDE AND ALBUTEROL SULFATE 3 ML: 2.5; .5 SOLUTION RESPIRATORY (INHALATION) at 07:02

## 2025-02-17 RX ADMIN — DULOXETINE HYDROCHLORIDE 60 MG: 30 CAPSULE, DELAYED RELEASE ORAL at 08:02

## 2025-02-17 RX ADMIN — ACETAMINOPHEN 650 MG: 325 TABLET ORAL at 01:02

## 2025-02-17 RX ADMIN — PANTOPRAZOLE SODIUM 40 MG: 40 TABLET, DELAYED RELEASE ORAL at 08:02

## 2025-02-17 RX ADMIN — PREDNISONE 40 MG: 20 TABLET ORAL at 08:02

## 2025-02-17 RX ADMIN — LISINOPRIL 40 MG: 20 TABLET ORAL at 08:02

## 2025-02-17 RX ADMIN — PENTOXIFYLLINE 400 MG: 400 TABLET, EXTENDED RELEASE ORAL at 11:02

## 2025-02-17 RX ADMIN — ASPIRIN 81 MG: 81 TABLET, COATED ORAL at 08:02

## 2025-02-17 RX ADMIN — PENTOXIFYLLINE 400 MG: 400 TABLET, EXTENDED RELEASE ORAL at 08:02

## 2025-02-17 RX ADMIN — OSELTAMIVIR PHOSPHATE 30 MG: 30 CAPSULE ORAL at 08:02

## 2025-02-17 RX ADMIN — FERROUS SULFATE TAB 325 MG (65 MG ELEMENTAL FE) 1 EACH: 325 (65 FE) TAB at 08:02

## 2025-02-17 NOTE — PLAN OF CARE
[facility] [place]  [address] [place]  [address]  [phone]           FACILITY TRANSFER ORDERS           Admit to: [facility]  Diagnoses:   Active Hospital Problems    Diagnosis  POA    *Acute on chronic respiratory failure [J96.20]  Yes    Thrombocytopenia [D69.6]  Unknown    Influenza A [J10.1]  Yes    COPD exacerbation [J44.1]  Yes    Encephalopathy [G93.40]  Yes    Chronic systolic heart failure [I50.22]  Yes     Chronic    GUERRERO (dyspnea on exertion) [R06.09]  Yes    On continuous oral anticoagulation [Z79.01]  Not Applicable    Malnutrition [E46]  Yes     Chronic    Atrial fibrillation with rapid ventricular response [I48.91]  Yes    Essential hypertension [I10]  Yes    Hyperlipidemia [E78.5]  Yes      Resolved Hospital Problems   No resolved problems to display.        Goals of Care Treatment Preferences:  Code Status: Full Code         Allergies:Review of patient's allergies indicates:  No Known Allergies    Vitals:        Every shift    Diet: Cardiac diet  Activities:      - Up in a chair each morning as tolerated   - Ambulate with assistance to bathroom        Oxygen                  O2 2L PRN to maintain oxygen saturations >92%       LABS:  Per facility protocol     Nursing Precautions:      - Aspiration precautions:                   - Fall precautions per nursing home protocol       - Decubitus precautions:              -  for positioning              - Pressure reducing foam mattress              - Turn patient every two hours. Use wedge pillows to anchor patient     CONSULTS:                 Physical Therapy to evaluate and treat                 Occupational Therapy to evaluate and treat                 Speech Therapy   to evaluate and treat                 Nutrition to evaluate and recommend diet                      MISCELLANEOUS CARE:                  Routine Skin for Bedridden Patients:  Apply moisture barrier cream to all                           skin folds and wet areas in perineal area daily and after baths and                           all bowel movements.           DIABETES CARE:   n/a     Medications: Discontinue all previous medication orders, if any. See new list below.     Medication List        START taking these medications      ipratropium-albuteroL  mcg/actuation inhaler  Commonly known as: CombiVENT  Inhale 1 puff into the lungs every 6 (six) hours as needed for Wheezing. Rescue     oseltamivir 30 MG capsule  Commonly known as: TAMIFLU  Take 1 capsule (30 mg total) by mouth 2 (two) times daily. for 3 days     predniSONE 20 MG tablet  Commonly known as: DELTASONE  Take 2 tablets (40 mg total) by mouth once daily stop date 2/20/2025  Start taking on: February 18, 2025            CHANGE how you take these medications      lisinopriL 20 MG tablet  Commonly known as: PRINIVIL,ZESTRIL  Take 20 mg by mouth once daily. Hold if SBP is less than 110, Pulse is less than 60.  What changed: Another medication with the same name was removed. Continue taking this medication, and follow the directions you see here.            CONTINUE taking these medications      acetaminophen 500 MG tablet  Commonly known as: TYLENOL  Take 500 mg by mouth every 6 (six) hours as needed for Pain.     alendronate 70 MG tablet  Commonly known as: FOSAMAX  Take 70 mg by mouth every Thursday.     amLODIPine 5 MG tablet  Commonly known as: NORVASC  Take 5 mg by mouth once daily. Hold if SBP is less than 110, Pulse is less than 60.     apixaban 5 mg Tab  Commonly known as: ELIQUIS  Take 5 mg by mouth 2 (two) times daily.     aspirin 81 MG EC tablet  Commonly known as: ECOTRIN  Take 1 tablet (81 mg total) by mouth once daily.     atorvastatin 80 MG tablet  Commonly known as: LIPITOR  Take 1 tablet (80 mg total) by mouth once daily.     bumetanide 1 MG tablet  Commonly known as: BUMEX  Take 1 tablet (1 mg total) by mouth once daily.     calcium-vitamin D3 500 mg-5 mcg (200 unit)  per tablet  Commonly known as: OS-ANNA 500 + D3  Take 1 tablet by mouth once daily.     diltiaZEM HCl 120 mg 24 hr capsule  Commonly known as: TIAZAC  Take 120 mg by mouth once daily. Hold if SBP is less than 110, Pulse is less than 60.     DULoxetine 60 MG capsule  Commonly known as: CYMBALTA  Take 1 capsule (60 mg total) by mouth once daily.     ferrous sulfate 325 (65 FE) MG EC tablet  Take 325 mg by mouth 2 (two) times daily.     metoprolol succinate 50 MG 24 hr tablet  Commonly known as: TOPROL-XL  Take 1.5 tablets (75 mg total) by mouth once daily.     ONE DAILY MULTIVITAMIN per tablet  Generic drug: multivitamin  Take 1 tablet by mouth once daily.     oxyCODONE 5 MG immediate release tablet  Commonly known as: ROXICODONE  Take 1 tablet (5 mg total) by mouth every 6 (six) hours as needed for Pain.     pantoprazole 40 MG tablet  Commonly known as: PROTONIX  Take 1 tablet (40 mg total) by mouth once daily.     pentoxifylline 400 mg Tbsr  Commonly known as: TRENTAL  Take 400 mg by mouth 3 (three) times daily with meals.     polyethylene glycol 17 gram Pwpk  Commonly known as: GLYCOLAX  Take 17 g by mouth once daily.     senna 8.6 mg tablet  Commonly known as: SENOKOT  Take 1 tablet by mouth once daily.     traZODone 100 MG tablet  Commonly known as: DESYREL  Take 100 mg by mouth every evening.            STOP taking these medications      budesonide 0.5 mg/2 mL nebulizer solution  Commonly known as: PULMICORT               Infusion Therapy:   n/a

## 2025-02-17 NOTE — PLAN OF CARE
Patient on oxygen with documented flow.  Will attempt to wean per O2 order protocol.  The proper method of use, as well as anticipated side effects, of this aerosol treatment are discussed and demonstrated to the patient. Will continue to monitor.

## 2025-02-17 NOTE — PROGRESS NOTES
"[facility]           Pharmacy  Admission Medication History     The home medication history was taken by Lucio Drew PharmD.      Medication history obtained from Medications listed below were obtained from: Patient/family    Based on information gathered for medication list, you may go to "Admission" then "Reconcile Home Medications" tabs to review and/or act upon those items.     The home medication list has been updated by the Pharmacy department.   Please read ALL comments highlighted in yellow.   Please address this information as you see fit.    Feel free to contact us if you have any questions or require assistance.    The medications listed below were removed from the home medication list.  Please reorder if appropriate:    Home medications have been reviewed      Potential issues to be addressed PRIOR TO DISCHARGE      No current facility-administered medications on file prior to encounter.     Current Outpatient Medications on File Prior to Encounter   Medication Sig Dispense Refill    acetaminophen (TYLENOL) 500 MG tablet Take 500 mg by mouth every 6 (six) hours as needed for Pain.      alendronate (FOSAMAX) 70 MG tablet Take 70 mg by mouth every Thursday.      amLODIPine (NORVASC) 5 MG tablet Take 5 mg by mouth once daily. Hold if SBP is less than 110, Pulse is less than 60.      apixaban (ELIQUIS) 5 mg Tab Take 5 mg by mouth 2 (two) times daily.      aspirin (ECOTRIN) 81 MG EC tablet Take 1 tablet (81 mg total) by mouth once daily.      atorvastatin (LIPITOR) 80 MG tablet Take 1 tablet (80 mg total) by mouth once daily. 90 tablet 3    bumetanide (BUMEX) 1 MG tablet Take 1 tablet (1 mg total) by mouth 3 (three) times a week. (Patient taking differently: Take 1 mg by mouth once daily.)      calcium-vitamin D3 (OS-ANNA 500 + D3) 500 mg-5 mcg (200 unit) per tablet Take 1 tablet by mouth once daily.      diltiaZEM HCl (TIAZAC) 120 mg 24 hr capsule Take 120 mg by mouth once daily. Hold " if SBP is less than 110, Pulse is less than 60.      DULoxetine (CYMBALTA) 60 MG capsule Take 1 capsule (60 mg total) by mouth once daily. 30 capsule 11    ferrous sulfate 325 (65 FE) MG EC tablet Take 325 mg by mouth 2 (two) times daily.      lisinopriL (PRINIVIL,ZESTRIL) 20 MG tablet Take 20 mg by mouth once daily. Hold if SBP is less than 110, Pulse is less than 60.      metoprolol succinate (TOPROL-XL) 50 MG 24 hr tablet Take 1.5 tablets (75 mg total) by mouth once daily.      multivitamin (ONE DAILY MULTIVITAMIN) per tablet Take 1 tablet by mouth once daily.      oxyCODONE (ROXICODONE) 5 MG immediate release tablet Take 1 tablet (5 mg total) by mouth every 6 (six) hours as needed for Pain. 28 tablet 0    pantoprazole (PROTONIX) 40 MG tablet Take 1 tablet (40 mg total) by mouth once daily. 30 tablet 11    pentoxifylline (TRENTAL) 400 mg TbSR Take 400 mg by mouth 3 (three) times daily with meals.      polyethylene glycol (GLYCOLAX) 17 gram PwPk Take 17 g by mouth once daily. (Patient taking differently: Take 17 g by mouth once daily. Mix in 8 oz of liquid)      senna (SENOKOT) 8.6 mg tablet Take 1 tablet by mouth once daily. (Patient taking differently: Take 8.6 mg by mouth once daily.)      traZODone (DESYREL) 100 MG tablet Take 100 mg by mouth every evening.      [DISCONTINUED] budesonide (PULMICORT) 0.5 mg/2 mL nebulizer solution Take 0.5 mg by nebulization once daily. Inhale 1 vial one time a day for emphysema (Patient not taking: Reported on 2/15/2025)      [DISCONTINUED] lisinopriL (PRINIVIL,ZESTRIL) 40 MG tablet Take 1 tablet (40 mg total) by mouth once daily. (Patient not taking: Reported on 2/15/2025) 90 tablet 3       Please address this information as you see fit.  Feel free to contact us if you have any questions or require assistance.    Lucio Drew, PharmD  [phone]

## 2025-02-17 NOTE — PLAN OF CARE
went to meet with patient. Patient is a half-way resident at [facility]. She is aware of discharge plans for today. Patient was updated she already has an Ortho follow-up previously scheduled. Per nursing staff, patient unable to get in wheelchair and needs ambulance for transport. Patient reports she does use equipment at the facility.  to reach out to her daughter when ready. Patient encouraged to call with any questions or concerns.  will continue to follow patient through transitions of care and assist with any discharge needs.     Other Contacts    Name Relation Home Work Mobile   Sirihsa Benavides Daughter [phone]   Rocio Thompson Sister [phone]     Hussein Albret Roommate [phone]     Future Appointments   Date Time Provider Department Center   3/28/2025 11:00 AM Jeremy Francis MD [facility] ORTHO [place] [facility]         02/17/25 1015   Discharge Assessment   Assessment Type Discharge Planning Assessment   Confirmed/corrected address, phone number and insurance Yes   Confirmed Demographics Correct on Facesheet   Source of Information patient;family   People in Home facility resident   Facility Arrived From: [facility]   Do you expect to return to your current living situation? Yes   Do you have help at home or someone to help you manage your care at home? Yes   Prior to hospitilization cognitive status: Alert/Oriented   Current cognitive status: Alert/Oriented   Walking or Climbing Stairs Difficulty yes   Walking or Climbing Stairs ambulation difficulty, requires equipment   Dressing/Bathing Difficulty no   Equipment Currently Used at Home other (see comments)  (Facility Provided DME)   Do you take prescription medications? Yes   Do you have prescription coverage? Yes   Do you have any problems affording any of your prescribed medications? No   Is the patient taking medications as prescribed? yes   How do you get  to doctors appointments? agency   Are you on dialysis? No   Do you take coumadin? No   Discharge Plan A Return to nursing home   Discharge Plan B Skilled Nursing Facility   DME Needed Upon Discharge  none   Discharge Plan discussed with: Patient   Transition of Care Barriers None   Housing Stability   In the last 12 months, was there a time when you were not able to pay the mortgage or rent on time? N   At any time in the past 12 months, were you homeless or living in a shelter (including now)? N   Transportation Needs   Has the lack of transportation kept you from medical appointments, meetings, work or from getting things needed for daily living? No   Food Insecurity   Within the past 12 months, you worried that your food would run out before you got the money to buy more. Never true   Within the past 12 months, the food you bought just didn't last and you didn't have money to get more. Never true     Amada Subramanian RN    [phone]

## 2025-02-17 NOTE — PLAN OF CARE
Facility orders sent to [facility] via EPIC for discharge today.    Future Appointments   Date Time Provider Department Center   3/28/2025 11:00 AM Jeremy Francis MD [facility]         02/17/25 0919   Post-Acute Status   Post-Acute Authorization Placement   Post-Acute Placement Status Pending post-acute provider review/more information requested         Name Relationship Specialty Phone Fax Address Order                [facility]    [phone] [address] [address]     Next Steps: Follow up  Instructions: Nursing Home         Amada Subramanian RN    [phone]

## 2025-02-17 NOTE — DISCHARGE SUMMARY
[facility]  Discharge Summary      Patient Name: Maria A [place]  MRN: [id number]  JASON: [id number]  Patient Class: IP- Inpatient  Admission Date: 2/15/2025  Hospital Length of Stay: 2 days  Discharge Date and Time:  02/17/2025 1:36 PM  Attending Physician: Magui Stauffer MD   Discharging Provider: Magui Rosario MD  Primary Care Provider: Sneha López MD    Primary Care Team: Networked reference to record PCT     HPI:   Maria A Smith is a 79-year-old female with a past medical history of HTN, Paroxysmal Afib, HLD, CVA w/ L sided weakness, prior tobacco use disorder, PVD, COPD on oxygen @2 L. Patient presents to emergency department  from [facility] rehab with complaint of shortness of breath for the past couple of weeks . In ER patient found to be positive for influenza A. CXR showed pulmonary emphysema.  Patient is a poor historian and most of the history is obtained from ER records and previous history.    * No surgery found *      Hospital Course:   Patient was admitted for worsening of acute on chronic hypoxic hypercapnic respiratory failure, patient was found to have influenza a with AFib RVR restarted home medications started on Tamiflu was successfully weaned off 5 to baseline 2 L nasal cannula oxygen.  Patient we will need to finish total of 5 days of Tamiflu, and prednisone.     Goals of Care Treatment Preferences:  Code Status: Full Code      SDOH Screening:  The patient was screened for utility difficulties, food insecurity, transport difficulties, housing insecurity, and interpersonal safety and there were no concerns identified this admission.     Consults:   Consults (From admission, onward)          Status Ordering Provider     Inpatient consult to Registered Dietitian/Nutritionist  Once        Provider:  (Not yet assigned)    Acknowledged MAGUI STAUFFER     Inpatient consult to Midline team  Once        Provider:  (Not yet assigned)     Acknowledged NAIF WOODS     Inpatient consult to Cardiology-[facility]  Once        Provider:  (Not yet assigned)    Completed NAIF WOODS            * Acute on chronic respiratory failure  Admit to medical floor with telemetry.  Patient with Hypoxic Respiratory failure which is Acute on chronic.  she is on home oxygen at 2 LPM. Supplemental oxygen was provided and noted- Oxygen Concentration (%):  [28] 28    .   Signs/symptoms of respiratory failure include- tachypnea, wheezing, and lethargy. Contributing diagnoses includes - COPD Labs and images were reviewed. Patient Has not had a recent ABG. Will treat underlying causes and adjust management of respiratory failure as follows- supplemental oxygen and duo nebs.    Thrombocytopenia  The likely etiology of thrombocytopenia is infection. The patients 3 most recent labs are listed below.  Recent Labs     02/15/25  0306 02/16/25  0650    146*     Plan  - Will transfuse if platelet count is <20k.  Currently stable no need for transfusion      On continuous oral anticoagulation  Secondary to AFib RVR  Restart her apixaban  -controlled on 2/60      GUERRERO (dyspnea on exertion)  Secondary to COPD exacerbation  Currently on nasal cannula      Chronic systolic heart failure  Cardiology consulted  -currently patient is euvolemic no signs of acute decompensation    Echo  Result Date: 8/28/2024    Left Ventricle: The left ventricle is normal in size. Ventricular mass   is normal. Normal wall thickness. Normal wall motion. There is low normal   systolic function with a visually estimated ejection fraction of 50 - 55%.   Unable to assess diastolic function due to atrial fibrillation.    Right Ventricle: Normal right ventricular cavity size. Wall thickness   is normal. Systolic function is mildly reduced.    Left Atrium: Left atrium is moderately dilated.    Aortic Valve: The aortic valve is a trileaflet valve. There is moderate   aortic valve sclerosis. There is moderate  annular calcification present.    Mitral Valve: There is mild regurgitation.    Aorta: Aortic root is normal in size measuring 2.74 cm. Ascending aorta   is normal.    Pulmonary Artery: There is mild pulmonary hypertension. The estimated   pulmonary artery systolic pressure is 59 mmHg.    IVC/SVC: Elevated venous pressure at 15 mmHg.    Irregularly irregular rhythm was present during the study            Encephalopathy    Neuro checks.  ABG to see if patient hypercapnic.  -2/16 patient is back to her baseline    COPD exacerbation  Patient's COPD is with exacerbation noted by continued dyspnea currently.  Patient is currently on COPD Pathway. Continue scheduled inhalers Steroids, Antibiotics, and Supplemental oxygen and monitor respiratory status closely.   -2/16 patient remained shortness a breath, titrating down nasal cannula    Influenza A  Isolation protocol.  Tamiflu 75 mg p.o. b.i.d..    Malnutrition  Dietary consulted  Protein shakes supplements t.i.d.      Atrial fibrillation with rapid ventricular response  Patient has paroxysmal (<7 days) atrial fibrillation. Patient is currently in sinus rhythm. [id number] Score: 4. The patients heart rate in the last 24 hours is as follows:  Pulse  Min: 85  Max: 137     Antiarrhythmics  diltiaZEM 24 hr capsule 120 mg, Daily, Oral  metoprolol succinate 24 hr tablet 75 mg, Daily, Oral    Anticoagulants  apixaban tablet 5 mg, 2 times daily, Oral  -12/16 controlled patient has sinus rhythm    Plan  - Replete lytes with a goal of K>4, Mg >2  - Patient is anticoagulated, see medications listed above.  - Patient's afib is currently controlled  - monitor for any arrhythmias.  -cardiology consulted echo ordered      Hyperlipidemia    Lipid profile.  Restart patient on statin    Essential hypertension  Patient's blood pressure range in the last 24 hours was: BP  Min: 118/71  Max: 155/79.The patient's inpatient anti-hypertensive regimen is listed below:  Current  Antihypertensives  diltiaZEM 24 hr capsule 120 mg, Daily, Oral  lisinopriL tablet 40 mg, Daily, Oral  metoprolol succinate 24 hr tablet 75 mg, Daily, Oral  , Daily, Oral  , Daily, Oral    Plan  - BP is controlled home meds were reconciled- monitor blood pressure closely.      Final Active Diagnoses:    Diagnosis Date Noted POA    PRINCIPAL PROBLEM:  Acute on chronic respiratory failure [J96.20] 02/15/2025 Yes    Thrombocytopenia [D69.6] 02/16/2025 Yes    Influenza A [J10.1] 02/15/2025 Yes    COPD exacerbation [J44.1] 02/15/2025 Yes    Encephalopathy [G93.40] 02/15/2025 Yes    Chronic systolic heart failure [I50.22] 02/15/2025 Yes     Chronic    GUERRERO (dyspnea on exertion) [R06.09] 02/15/2025 Yes    On continuous oral anticoagulation [Z79.01] 02/15/2025 Not Applicable    Malnutrition [E46] 07/08/2023 Yes     Chronic    Atrial fibrillation with rapid ventricular response [I48.91] 03/22/2023 Yes    Essential hypertension [I10] 11/16/2017 Yes    Hyperlipidemia [E78.5] 11/16/2017 Yes      Problems Resolved During this Admission:       Discharged Condition: fair    Disposition: [facility]*    Follow Up:   Follow-up Information       [facility]-half-way Follow up.    Why: Nursing Home  Contact information:  [address]  [facility] [address]                         Patient Instructions:   No discharge procedures on file.    Significant Diagnostic Studies: Labs: All labs within the past 24 hours have been reviewed    Pending Diagnostic Studies:       None           Medications:  Reconciled Home Medications:      Medication List        START taking these medications      ipratropium-albuteroL  mcg/actuation inhaler  Commonly known as: CombiVENT  Inhale 1 puff into the lungs every 6 (six) hours as needed for Wheezing. Rescue     oseltamivir 30 MG capsule  Commonly known as: TAMIFLU  Take 1 capsule (30 mg total) by mouth 2 (two) times daily. for 3 days     predniSONE 20 MG  tablet  Commonly known as: DELTASONE  Take 2 tablets (40 mg total) by mouth once daily.  Start taking on: February 18, 2025            CHANGE how you take these medications      lisinopriL 20 MG tablet  Commonly known as: PRINIVIL,ZESTRIL  Take 20 mg by mouth once daily. Hold if SBP is less than 110, Pulse is less than 60.  What changed: Another medication with the same name was removed. Continue taking this medication, and follow the directions you see here.            CONTINUE taking these medications      acetaminophen 500 MG tablet  Commonly known as: TYLENOL  Take 500 mg by mouth every 6 (six) hours as needed for Pain.     alendronate 70 MG tablet  Commonly known as: FOSAMAX  Take 70 mg by mouth every Thursday.     amLODIPine 5 MG tablet  Commonly known as: NORVASC  Take 5 mg by mouth once daily. Hold if SBP is less than 110, Pulse is less than 60.     apixaban 5 mg Tab  Commonly known as: ELIQUIS  Take 5 mg by mouth 2 (two) times daily.     aspirin 81 MG EC tablet  Commonly known as: ECOTRIN  Take 1 tablet (81 mg total) by mouth once daily.     atorvastatin 80 MG tablet  Commonly known as: LIPITOR  Take 1 tablet (80 mg total) by mouth once daily.     bumetanide 1 MG tablet  Commonly known as: BUMEX  Take 1 tablet (1 mg total) by mouth once daily.     calcium-vitamin D3 500 mg-5 mcg (200 unit) per tablet  Commonly known as: OS-ANNA 500 + D3  Take 1 tablet by mouth once daily.     diltiaZEM HCl 120 mg 24 hr capsule  Commonly known as: TIAZAC  Take 120 mg by mouth once daily. Hold if SBP is less than 110, Pulse is less than 60.     DULoxetine 60 MG capsule  Commonly known as: CYMBALTA  Take 1 capsule (60 mg total) by mouth once daily.     ferrous sulfate 325 (65 FE) MG EC tablet  Take 325 mg by mouth 2 (two) times daily.     metoprolol succinate 50 MG 24 hr tablet  Commonly known as: TOPROL-XL  Take 1.5 tablets (75 mg total) by mouth once daily.     ONE DAILY MULTIVITAMIN per tablet  Generic drug:  multivitamin  Take 1 tablet by mouth once daily.     oxyCODONE 5 MG immediate release tablet  Commonly known as: ROXICODONE  Take 1 tablet (5 mg total) by mouth every 6 (six) hours as needed for Pain.     pantoprazole 40 MG tablet  Commonly known as: PROTONIX  Take 1 tablet (40 mg total) by mouth once daily.     pentoxifylline 400 mg Tbsr  Commonly known as: TRENTAL  Take 400 mg by mouth 3 (three) times daily with meals.     polyethylene glycol 17 gram Pwpk  Commonly known as: GLYCOLAX  Take 17 g by mouth once daily.     senna 8.6 mg tablet  Commonly known as: SENOKOT  Take 1 tablet by mouth once daily.     traZODone 100 MG tablet  Commonly known as: DESYREL  Take 100 mg by mouth every evening.            STOP taking these medications      budesonide 0.5 mg/2 mL nebulizer solution  Commonly known as: PULMICORT              Indwelling Lines/Drains at time of discharge:   Lines/Drains/Airways       None                   Time spent on the discharge of patient: 45 minutes         Magui Rosario MD  Department of Hospital Medicine  [facility]

## 2025-02-17 NOTE — PLAN OF CARE
Problem: Adult Inpatient Plan of Care  Goal: Plan of Care Review  Outcome: Progressing  Goal: Patient-Specific Goal (Individualized)  Outcome: Progressing  Goal: Absence of Hospital-Acquired Illness or Injury  Outcome: Progressing  Goal: Optimal Comfort and Wellbeing  Outcome: Progressing  Goal: Readiness for Transition of Care  Outcome: Progressing     Problem: COPD (Chronic Obstructive Pulmonary Disease)  Goal: Optimal Chronic Illness Coping  Outcome: Progressing  Goal: Optimal Level of Functional Ceiba  Outcome: Progressing  Goal: Absence of Infection Signs and Symptoms  Outcome: Progressing  Goal: Improved Oral Intake  Outcome: Progressing  Goal: Effective Oxygenation and Ventilation  Outcome: Progressing     Problem: Wound  Goal: Optimal Coping  Outcome: Progressing  Goal: Optimal Functional Ability  Outcome: Progressing  Goal: Absence of Infection Signs and Symptoms  Outcome: Progressing  Goal: Improved Oral Intake  Outcome: Progressing  Goal: Optimal Pain Control and Function  Outcome: Progressing  Goal: Skin Health and Integrity  Outcome: Progressing  Goal: Optimal Wound Healing  Outcome: Progressing

## 2025-02-17 NOTE — PLAN OF CARE
Discharge orders noted. Zeynep with [facility] contacted me to update [facility] on orders. Awaiting MD team to fix. Facility orders corrected and Zeynep updated on stop date for prednisone. Nurse Teressa confirmed ambulance transport with 02 needed. Patient is clear to arrive at facility. Packet placed at nurses's station.     [facility] WITH [facility] SET UP FOR 1:30 PM.    You can call report to [phone] Room 114B.     I contacted patient's daughter Sirisha. She is aware and agreeable to the discharge plan. All questions answered.    Other Contacts    Name Relation Home Work Mobile   Sirisha Benavides Daughter [phone]   Rocio Thompson Sister [phone]     Hussein Albert Roommate [phone]     Future Appointments   Date Time Provider Department Center   3/28/2025 11:00 AM [facility]    [phone] [facility] [place] [id number]      Next Steps: Follow up  Instructions: Nursing Home        02/17/25 1019   Final Note   Assessment Type Final Discharge Note   Anticipated Discharge Disposition [facility]   Hospital Resources/Appts/Education Provided Appointments scheduled and added to AVS   Post-Acute Status   Post-Acute Authorization Placement   Post-Acute Placement Status Pending post-acute provider review/more information requested   Discharge Delays (!) Ambulance Transport/Facility Transport     Amada Subramanian RN    [phone]

## 2025-02-17 NOTE — NURSING
Pt to be transferred back to [facility], report called to nurse Kim at receiving facility. IV and heart monitor removed. Call light in reach, safety measures in place. AVS placed in transfer packet.

## 2025-02-17 NOTE — HOSPITAL COURSE
Patient was admitted for worsening of acute on chronic hypoxic hypercapnic respiratory failure, patient was found to have influenza a with AFib RVR restarted home medications started on Tamiflu was successfully weaned off 5 to baseline 2 L nasal cannula oxygen.  Patient we will need to finish total of 5 days of Tamiflu, and prednisone.

## 2025-02-18 LAB
OHS QRS DURATION: 122 MS
OHS QTC CALCULATION: 469 MS

## 2025-03-28 ENCOUNTER — TELEPHONE (OUTPATIENT)
Dept: ORTHOPEDICS | Facility: CLINIC | Age: 80
End: 2025-03-28
Payer: MEDICARE

## 2025-03-28 NOTE — TELEPHONE ENCOUNTER
----- Message from Cecilia sent at 3/28/2025 12:40 PM CDT -----  Regarding: pt  Name of Who is Calling:[facility] Barry is the request in detail: Requesting if office can fax over today's visit notes to facilityAlso inquiring why pt is wearing a bootFAX [phone] the clinic reply by [facility]: What Number to Call Back if not in [facility]:[phone]

## (undated) DEVICE — SYS LABEL CORRECT MED

## (undated) DEVICE — CATH ULTRAVERSE 035 4X40X75

## (undated) DEVICE — CATH 5FR OMNIFLUSH 65CM .038

## (undated) DEVICE — VISE RADIFOCUS MULTI TORQUE

## (undated) DEVICE — CATH ULTRAVERSE 035 5X60X130

## (undated) DEVICE — COVER INSTR ELASTIC BAND 40X20

## (undated) DEVICE — KIT INTRO MICRO NIT VSI 4FR

## (undated) DEVICE — SOL NS 1000CC

## (undated) DEVICE — GAUZE WOVEN STRL 12-PLY 4X4IN

## (undated) DEVICE — CATH ANGLED GLIDE CATH 5FR

## (undated) DEVICE — STOPCOCK 3 WAY MED PRESSURE

## (undated) DEVICE — INFLATOR ENCORE

## (undated) DEVICE — Device

## (undated) DEVICE — DECANTER FLUID TRNSF WHITE 9IN

## (undated) DEVICE — GUIDEWIRE STF .035X260CM ANG

## (undated) DEVICE — CONTRAST FLEXCIL INJECTION

## (undated) DEVICE — DRESSING TRANS 4X4 TEGADERM

## (undated) DEVICE — INTRODUCER VASC RADPQ 5FRX10CM

## (undated) DEVICE — SOL 9P NACL IRR PIC IL

## (undated) DEVICE — WIRE ROSEN .035X260

## (undated) DEVICE — SET MICROPUNCTURE

## (undated) DEVICE — PAD DEFIB CADENCE ADULT R2

## (undated) DEVICE — SHEATH ANSEL FLEXOR 5FRX45CM

## (undated) DEVICE — COVERS PROBE NR-48 STERILE